# Patient Record
Sex: FEMALE | Race: WHITE | Employment: OTHER | URBAN - METROPOLITAN AREA
[De-identification: names, ages, dates, MRNs, and addresses within clinical notes are randomized per-mention and may not be internally consistent; named-entity substitution may affect disease eponyms.]

---

## 2018-06-14 ENCOUNTER — APPOINTMENT (OUTPATIENT)
Dept: PHYSICAL THERAPY | Facility: CLINIC | Age: 68
End: 2018-06-14
Payer: COMMERCIAL

## 2018-06-14 PROCEDURE — G8981 BODY POS CURRENT STATUS: HCPCS | Performed by: PHYSICAL THERAPIST

## 2018-06-14 PROCEDURE — G8982 BODY POS GOAL STATUS: HCPCS | Performed by: PHYSICAL THERAPIST

## 2018-06-18 ENCOUNTER — APPOINTMENT (OUTPATIENT)
Dept: PHYSICAL THERAPY | Facility: CLINIC | Age: 68
End: 2018-06-18
Payer: COMMERCIAL

## 2018-06-27 ENCOUNTER — OFFICE VISIT (OUTPATIENT)
Dept: PHYSICAL THERAPY | Facility: CLINIC | Age: 68
End: 2018-06-27
Payer: COMMERCIAL

## 2018-06-27 DIAGNOSIS — M51.36 LUMBAR DEGENERATIVE DISC DISEASE: ICD-10-CM

## 2018-06-27 DIAGNOSIS — M79.7 FIBROMYALGIA: ICD-10-CM

## 2018-06-27 DIAGNOSIS — R10.31 RIGHT GROIN PAIN: Primary | ICD-10-CM

## 2018-06-27 DIAGNOSIS — M47.812 OSTEOARTHRITIS OF CERVICAL SPINE, UNSPECIFIED SPINAL OSTEOARTHRITIS COMPLICATION STATUS: ICD-10-CM

## 2018-06-27 PROCEDURE — 97035 APP MDLTY 1+ULTRASOUND EA 15: CPT | Performed by: PHYSICAL THERAPIST

## 2018-06-27 PROCEDURE — 97110 THERAPEUTIC EXERCISES: CPT | Performed by: PHYSICAL THERAPIST

## 2018-06-27 NOTE — PROGRESS NOTES
Daily Note     Today's date: 2018  Patient name: Karine Chong  : 1950  MRN: [id number]  Referring provider: Giselle Chavez MD  Dx:   Encounter Diagnosis     ICD-10-CM    1  Right groin pain R10 31    2  Fibromyalgia M79 7    3  Osteoarthritis of cervical spine, unspecified spinal osteoarthritis complication status O04 119    4  Lumbar degenerative disc disease M51 36                   Subjective:Patient c/o L LBP 4-5/10 today and 1-2/10 pain Left upper trapezius  She received sacrum injections yesterday afternoon done by Dr Madisyn Judge with pain management  The patient expressed concern over red spots on arms and legs which are not new  She was advised to call her PCP regarding the red spots for follow up/ bloodwork - patient was accepting of this  The patient is to return to pain management again 18  Objective: See treatment diary below  Precautions Fibromyalgia  RE-EVAL DATE: 18  Specialty Daily Treatment Diary     Manual                                                     Exercise Diary  18       Nu step S=9 L2 x 8 mins       Stand Lumbar Extensions x15       Cross Legs LTR 3x:30 B/L       Self 90-90 HS stretch 3x:30 B/L       Abdominal Hollow x10 sit       Bridges x10       Abdominal Brace x15 sit       Calm Shells x10 B/L       L Upper Trapezius stretch 3x:30       Chin tucks x10                                                                                           Modalities 18       Ultrasound 3 mHz, Cont, 1 8w/cm2 L Upper Trapezius 10 mins                           The patient was given new home exercise plan with written handout, pictures, and verbal instruction  The patient accepts and understands her new home activities  Assessment: Tolerated treatment well  Patient demonstrated fatigue post treatment, exhibited good technique with therapeutic exercises and would benefit from continued PT  The patient did not complain of pain after session today  Plan: The patient will return for treatment 6/29/18  Will continue to focus on improving core strength and decreasing back and neck pain

## 2018-06-29 ENCOUNTER — APPOINTMENT (OUTPATIENT)
Dept: PHYSICAL THERAPY | Facility: CLINIC | Age: 68
End: 2018-06-29
Payer: COMMERCIAL

## 2018-07-03 ENCOUNTER — OFFICE VISIT (OUTPATIENT)
Dept: PHYSICAL THERAPY | Facility: CLINIC | Age: 68
End: 2018-07-03
Payer: COMMERCIAL

## 2018-07-03 DIAGNOSIS — M47.812 OSTEOARTHRITIS OF CERVICAL SPINE, UNSPECIFIED SPINAL OSTEOARTHRITIS COMPLICATION STATUS: ICD-10-CM

## 2018-07-03 DIAGNOSIS — R10.31 RIGHT GROIN PAIN: Primary | ICD-10-CM

## 2018-07-03 DIAGNOSIS — M51.36 LUMBAR DEGENERATIVE DISC DISEASE: ICD-10-CM

## 2018-07-03 DIAGNOSIS — M79.7 FIBROMYALGIA: ICD-10-CM

## 2018-07-03 PROCEDURE — 97035 APP MDLTY 1+ULTRASOUND EA 15: CPT

## 2018-07-03 PROCEDURE — 97110 THERAPEUTIC EXERCISES: CPT

## 2018-07-03 NOTE — PROGRESS NOTES
Daily Note     Today's date: 7/3/2018  Patient name: Isabelle Brown  : 1950  MRN: [id number]  Referring provider: Rosalba Dickens MD  Dx:   Encounter Diagnosis     ICD-10-CM    1  Right groin pain R10 31    2  Osteoarthritis of cervical spine, unspecified spinal osteoarthritis complication status O11 [id number]    3  Lumbar degenerative disc disease M51 36    4  Fibromyalgia M79 7                   Subjective: Pain level is 4/10 from waist down as per patient  Patient states she has a hard time distinguishing between two of her exercises and asked to review them  Objective: See treatment diary below  Precautions Fibromyalgia  RE-EVAL DATE: 18  Specialty Daily Treatment Diary     Manual                                                     Exercise Diary  6/27/18 7/3/18      Nu step S=9 L2 x 8 mins L2 x 9 mins      Stand Lumbar Extensions x15 X 20      Cross Legs LTR 3x:30 B/L 3 x :30 B/L      Self 90-90 HS stretch 3x:30 B/L 3 x:30 B/L      Abdominal Hollow x10 sit Supine x15      Bridges x10 x15      Abdominal Brace x15 sit Supine x15      Calm Shells x10 B/L X 15 B/L      L Upper Trapezius stretch 3x:30 3 x :30      Chin tucks x10 x15                        Reviewed HEP                                                                  Modalities 6/27/18 7/3/18      Ultrasound 3 mHz, Cont, 1 8w/cm2 L Upper Trapezius 10 mins 10 min                          Assessment: Tolerated treatment well  Patient would benefit from continued PT to improve technique of exercises and for strengthening  Plan: Progress treatment as tolerated

## 2018-07-05 ENCOUNTER — OFFICE VISIT (OUTPATIENT)
Dept: PHYSICAL THERAPY | Facility: CLINIC | Age: 68
End: 2018-07-05
Payer: COMMERCIAL

## 2018-07-05 DIAGNOSIS — M51.36 LUMBAR DEGENERATIVE DISC DISEASE: ICD-10-CM

## 2018-07-05 DIAGNOSIS — M47.812 OSTEOARTHRITIS OF CERVICAL SPINE, UNSPECIFIED SPINAL OSTEOARTHRITIS COMPLICATION STATUS: ICD-10-CM

## 2018-07-05 DIAGNOSIS — M79.7 FIBROMYALGIA: ICD-10-CM

## 2018-07-05 DIAGNOSIS — R10.31 RIGHT GROIN PAIN: Primary | ICD-10-CM

## 2018-07-05 PROCEDURE — 97035 APP MDLTY 1+ULTRASOUND EA 15: CPT

## 2018-07-05 PROCEDURE — 97110 THERAPEUTIC EXERCISES: CPT

## 2018-07-05 NOTE — PROGRESS NOTES
Daily Note     Today's date: 2018  Patient name: Katie Beyer  : 1950  MRN: [id number]  Referring provider: Roberta Dodge MD  Dx:   Encounter Diagnosis     ICD-10-CM    1  Right groin pain R10 31    2  Osteoarthritis of cervical spine, unspecified spinal osteoarthritis complication status [id number]    3  Lumbar degenerative disc disease M51 36    4  Fibromyalgia M79 7                   Subjective: Patient states her pain levels are up in her neck today  "My back is better then my neck now "  Patient rates her pain in her LB a 10 and her neck 10  She also states she sees her Neurologist tomorrow  Objective: See treatment diary below  Precautions Fibromyalgia  RE-EVAL DATE: 18  Specialty Daily Treatment Diary     Manual                                                     Exercise Diary  6/27/18 7/3/18 7/5/18     Nu step S=9 L2 x 8 mins L2 x 9 mins L2 x 10 min     Stand Lumbar Extensions x15 X 20 x25     Cross Legs LTR 3x:30 B/L 3 x :30 B/L 3 x :30 B/L     Self 90-90 HS stretch 3x:30 B/L 3 x:30 B/L 3 x :30 B/L     Abdominal Hollow x10 sit Supine x15 x20 sit     Bridges x10 x15 x20     Abdominal Brace x15 sit Supine x15 X 20 sit     Calm Shells x10 B/L X 15 B/L x20 B/L     L Upper Trapezius stretch 3x:30 3 x :30 4 x :30     Chin tucks x10 x15 x20                       Reviewed HEP Mirror/ posture  correction                                                                 Modalities 6/27/18 7/3/18 7/5/18     Ultrasound 3 mHz, Cont, 1 8w/cm2 L Upper Trapezius 10 mins 10 min 10 min                        Educated on sitting posture correction activity with mirror for addition to HEP  Assessment: Tolerated treatment well  Patient would benefit from continued PT for decrease of pain and increase of strength/ ROM  Plan: Continue per plan of care  Progress treatment as tolerated

## 2018-07-10 ENCOUNTER — OFFICE VISIT (OUTPATIENT)
Dept: PHYSICAL THERAPY | Facility: CLINIC | Age: 68
End: 2018-07-10
Payer: COMMERCIAL

## 2018-07-10 DIAGNOSIS — M79.7 FIBROMYALGIA: ICD-10-CM

## 2018-07-10 DIAGNOSIS — R10.31 RIGHT GROIN PAIN: Primary | ICD-10-CM

## 2018-07-10 DIAGNOSIS — M51.36 LUMBAR DEGENERATIVE DISC DISEASE: ICD-10-CM

## 2018-07-10 PROCEDURE — 97035 APP MDLTY 1+ULTRASOUND EA 15: CPT

## 2018-07-10 PROCEDURE — 97110 THERAPEUTIC EXERCISES: CPT

## 2018-07-10 NOTE — PROGRESS NOTES
Daily Note     Today's date: 7/10/2018  Patient name: Tiffany Sherman  : 1950  MRN: [id number]  Referring provider: Ct Gao MD  Dx:   Encounter Diagnosis     ICD-10-CM    1  Right groin pain R10 31    2  Lumbar degenerative disc disease M51 36    3  Fibromyalgia M79 7                   Subjective: Patient states pain is 4-5/10 in left LB only, no pain in the neck today  Patient feels her HEP going well  She has most of her pain when she tries to sleep or if she is reaching up with either hand in standing  She wants next visit to be her last visit due to personal issues  Objective: See treatment diary below  Precautions Fibromyalgia  RE-EVAL DATE: 18  Specialty Daily Treatment Diary     Manual                                                     Exercise Diary  6/27/18 7/3/18 7/5/18 7/10/18    Nu step S=9 L2 x 8 mins L2 x 9 mins L2 x 10 min L2 x 10 min    Stand Lumbar Extensions x15 X 20 x25 x30    Cross Legs LTR 3x:30 B/L 3 x :30 B/L 3 x :30 B/L 3x :30 B/L    Self 90-90 HS stretch 3x:30 B/L 3 x:30 B/L 3 x :30 B/L 3x :30 B/L    Abdominal Hollow x10 sit Supine x15 x20 sit Supine x25    Bridges x10 x15 x20 x25    Abdominal Brace x15 sit Supine x15 X 20 sit Supine x25    Calm Shells x10 B/L X 15 B/L x20 B/L x25 B/L    L Upper Trapezius stretch 3x:30 3 x :30 4 x :30 4x :30    Chin tucks x10 x15 x20 x20                      Reviewed HEP Mirror/ posture  correction                                                                 Modalities 6/27/18 7/3/18 7/5/18 7/10/18    Ultrasound 3 mHz, Cont, 1 8w/cm2 L Upper Trapezius 10 mins 10 min 10 min 10 min                        Assessment: Tolerated treatment well  Patient exhibited good technique with therapeutic exercises and would benefit from continued PT for strengthening, pain relief, and posture correction  Plan: Continue per plan of care  Potential discharge next visit

## 2018-07-12 ENCOUNTER — EVALUATION (OUTPATIENT)
Dept: PHYSICAL THERAPY | Facility: CLINIC | Age: 68
End: 2018-07-12
Payer: COMMERCIAL

## 2018-07-12 DIAGNOSIS — M47.812 OSTEOARTHRITIS OF CERVICAL SPINE, UNSPECIFIED SPINAL OSTEOARTHRITIS COMPLICATION STATUS: ICD-10-CM

## 2018-07-12 DIAGNOSIS — M51.36 LUMBAR DEGENERATIVE DISC DISEASE: ICD-10-CM

## 2018-07-12 DIAGNOSIS — M79.7 FIBROMYALGIA: ICD-10-CM

## 2018-07-12 DIAGNOSIS — R10.31 RIGHT GROIN PAIN: Primary | ICD-10-CM

## 2018-07-12 PROCEDURE — G8981 BODY POS CURRENT STATUS: HCPCS | Performed by: PHYSICAL THERAPIST

## 2018-07-12 PROCEDURE — G8982 BODY POS GOAL STATUS: HCPCS | Performed by: PHYSICAL THERAPIST

## 2018-07-12 PROCEDURE — 97110 THERAPEUTIC EXERCISES: CPT | Performed by: PHYSICAL THERAPIST

## 2018-07-12 NOTE — LETTER
2018    Alfredo Roldan MD  [address] [facility]  [address]  [phone]    Patient: Guero Hamm   YOB: 1950   Date of Visit: 2018     Encounter Diagnosis     ICD-10-CM    1  Right groin pain R10 31    2  Lumbar degenerative disc disease M51 36    3  Fibromyalgia M79 7    4  Osteoarthritis of cervical spine, unspecified spinal osteoarthritis complication status [id number]        Dear Dr Yoel Plaza:    Please review the attached Plan of Care from [facility] recent visit  Please verify that you agree therapy should continue by signing the attached document and sending it back to our office  If you have any questions or concerns, please don't hesitate to call  Sincerely,    Jake Patel, PT      Referring Provider:      I certify that I have read the below Plan of Care and certify the need for these services furnished under this plan of treatment while under my care  Alfredo Roldan MD  [address] [facility]  [address]  [phone]  [address] [place] [address] Avenue: [address] [place] [address]          PT Re-Evaluation     Today's date: 2018  Patient name: Guero Hamm  : 1950  MRN: [id number]  Referring provider: Dana Felix MD  Dx:   Encounter Diagnosis     ICD-10-CM    1  Right groin pain R10 31    2  Lumbar degenerative disc disease M51 36    3  Fibromyalgia M79 7    4  Osteoarthritis of cervical spine, unspecified spinal osteoarthritis complication status [id number]                   Assessment  Impairments: activity intolerance, lacks appropriate home exercise program, pain with function, poor posture  and poor body mechanics  Functional limitations: pain with sleeping, difficulty getting comfortable when sitting, difficulty walking long distances  Assessment details: Guero Hamm is a 70 y o  female that presents with signs/symptoms consistent with L-spine DDD, R groin pain, fibromyalgia, and C-spine OA    The patient has made functional gains since starting therapy  The patient's Oswestry score improved significantly since assessed during initial evaluation  She is able to cut grass and perform the necessary chores at her house to maintain the household  The patient continues to have difficulty with sleeping at night without pain at low back and neck  The patient will benefit from continued skilled PT for 1 more session to provide instruction and feedback on exercises and achieve independence for home exercise plan  Goals  ST-4 weeks  1  C-spine pain @ worst: 2/10 and L/B pain @ worst : 4/10 to improve sleeping  (Partially achieved - c-spine achieved and L-spine not achieved)  2  L-spine flexion/extension ROM improve by 50% to improve ADL's  ACHIEVED  3   R iliopsoas, Quads, L shoulder flexion strength > 4/5 to improve ADL's  ACHIEVED  LTG: Achieve in 4-6 weeks  1  Oswestry Disability Score less than 25% disability  Partially ACHIEVED - Oswestry score 26% disability  2  Patient sleep greater than 4 hours without disturbance  NOT ACHIEVED  3  Patient independently perform home exercise program   Partially Achieved - patient was given new activities for home completion today - she wants to do 1 more visit to perform therapeutic exercises and become independent with home program    Plan  Patient would benefit from: skilled physical therapy  Referral necessary: No  Planned modality interventions: ultrasound  Planned therapy interventions: home exercise program, therapeutic exercise, manual therapy, patient education, stretching and strengthening  Frequency: 1x week  Duration in visits: 1  Duration in weeks: 1  Plan of Care beginning date: 2018  Plan of Care expiration date: 2018  Plan details: Will continue 1 more session to perform new activities/exercises with supervision and feedback to make patient independent for completion of home exercises upon discharge          Subjective Evaluation    History of Present Illness  Date of onset: 2018  Mechanism of injury: Please refer to documentation in previous chart for mechanism of injury  Recurrent probem    Quality of life: fair    Pain  Current pain ratin  At best pain rating: 3  Location: B/L low back, R hip/groin, R anterior thigh  Quality: knife-like  Relieving factors: change in position  Aggravating factors: overhead activity and sitting  Progression: no change    Treatments  Current treatment: physical therapy  Patient Goals  Patient goals for therapy: decreased pain, increased strength, independence with ADLs/IADLs and return to sport/leisure activities  Patient goal: patient would like to sleep > 4hours without pain        Objective     Special Questions  Negative for bladder dysfunction, bowel dysfunction and saddle (S4) numbness    Additional Special Questions  The patient does have difficulty sleeping at night due to low back, hip, and neck pain  The patient's PCP is aware of these complaints per patient  Palpation   Left   Tenderness of the upper trapezius  Left Shoulder Comments  Left upper trapezius: mild  Active Range of Motion   Cervical/Thoracic Spine   Cervical    Flexion: WFL  Extension: with pain    Lumbar   Flexion: 50 degrees   Extension: 30 degrees     Strength/Myotome Testing     Left Shoulder     Planes of Motion   Flexion: 4     Right Shoulder     Planes of Motion   Flexion: 4     Left Hip   Planes of Motion   Flexion: 4  Extension: 4    Right Hip   Planes of Motion   Flexion: 4  Extension: 4    Left Knee   Extension: 4    Right Knee   Extension: 4    Tests       Thoracic   Negative slump  Lumbar   Negative repeated flexion, repeated extension and slump         Flowsheet Rows      Most Recent Value   PT/OT G-Codes   Current Score  oswestry score : 26% disability   FOTO information reviewed  No   Assessment Type  Re-evaluation   G code set  Changing & Maintaining Body Position   Changing and Maintaining Body Position Current Status ()  CJ Changing and Maintaining Body Position Goal Status ()  CI          Precautions: fibromyalgia  RE-EVAL DATE: 7/12/18  Specialty Daily Treatment Diary     Manual                                                     Exercise Diary  6/27/18 7/3/18 7/5/18 7/10/18 7/12/18   Nu step S=9 L2 x 8 mins L2 x 9 mins L2 x 10 min L2 x 10 min L2 x 10min   Stand Lumbar Extensions x15 X 20 x25 x30 x30   Cross Legs LTR 3x:30 B/L 3 x :30 B/L 3 x :30 B/L 3x :30 B/L 3x :30 B/L   Self 90-90 HS stretch 3x:30 B/L 3 x:30 B/L 3 x :30 B/L 3x :30 B/L 3x:30 B/L   Abdominal Hollow x10 sit Supine x15 x20 sit Supine x25 Supine x30   Bridges x10 x15 x20 x25 30   Abdominal Brace x15 sit Supine x15 X 20 sit Supine x25 Supine x25   Calm Shells x10 B/L X 15 B/L x20 B/L x25 B/L Red T-band x15   L Upper Trapezius stretch 3x:30 3 x :30 4 x :30 4x :30 4x:30     Chin tucks x10 x15 x20 x20 x25   Standing ham curls B/L     x15   Hip flexor stretch off mat      4x :20 B/L     Reviewed HEP Mirror/ posture  correction                                                                 Modalities 6/27/18 7/3/18 7/5/18 7/10/18 7/12/18   Ultrasound 3 mHz, Cont, 1 8w/cm2 L Upper Trapezius 10 mins 10 min 10 min 10 min hold                     The patient was given a new home exercise plan with written handout, pictures, and verbal instruction  The patient accepts and understands the new home activities

## 2018-07-12 NOTE — PROGRESS NOTES
PT Re-Evaluation     Today's date: 2018  Patient name: Luis Alfredo Mireles  : 1950  MRN: [id number]  Referring provider: Teetee Calloway MD  Dx:   Encounter Diagnosis     ICD-10-CM    1  Right groin pain R10 31    2  Lumbar degenerative disc disease M51 36    3  Fibromyalgia M79 7    4  Osteoarthritis of cervical spine, unspecified spinal osteoarthritis complication status [id number]                   Assessment  Impairments: activity intolerance, lacks appropriate home exercise program, pain with function, poor posture  and poor body mechanics  Functional limitations: pain with sleeping, difficulty getting comfortable when sitting, difficulty walking long distances  Assessment details: Luis Alfredo Mireles is a 70 y o  female that presents with signs/symptoms consistent with L-spine DDD, R groin pain, fibromyalgia, and C-spine OA  The patient has made functional gains since starting therapy  The patient's Oswestry score improved significantly since assessed during initial evaluation  She is able to cut grass and perform the necessary chores at her house to maintain the household  The patient continues to have difficulty with sleeping at night without pain at low back and neck  The patient will benefit from continued skilled PT for 1 more session to provide instruction and feedback on exercises and achieve independence for home exercise plan  Goals  ST-4 weeks  1  C-spine pain @ worst: 2/10 and L/B pain @ worst : 4/10 to improve sleeping  (Partially achieved - c-spine achieved and L-spine not achieved)  2  L-spine flexion/extension ROM improve by 50% to improve ADL's  ACHIEVED  3   R iliopsoas, Quads, L shoulder flexion strength > 4/5 to improve ADL's  ACHIEVED  LTG: Achieve in 4-6 weeks  1  Oswestry Disability Score less than 25% disability  Partially ACHIEVED - Oswestry score 26% disability  2  Patient sleep greater than 4 hours without disturbance  NOT ACHIEVED  3    Patient independently perform home exercise program   Partially Achieved - patient was given new activities for home completion today - she wants to do 1 more visit to perform therapeutic exercises and become independent with home program    Plan  Patient would benefit from: skilled physical therapy  Referral necessary: No  Planned modality interventions: ultrasound  Planned therapy interventions: home exercise program, therapeutic exercise, manual therapy, patient education, stretching and strengthening  Frequency: 1x week  Duration in visits: 1  Duration in weeks: 1  Plan of Care beginning date: 2018  Plan of Care expiration date: 2018  Plan details: Will continue 1 more session to perform new activities/exercises with supervision and feedback to make patient independent for completion of home exercises upon discharge  Subjective Evaluation    History of Present Illness  Date of onset: 2018  Mechanism of injury: Please refer to documentation in previous chart for mechanism of injury  Recurrent probem    Quality of life: fair    Pain  Current pain ratin  At best pain rating: 3  Location: B/L low back, R hip/groin, R anterior thigh  Quality: knife-like  Relieving factors: change in position  Aggravating factors: overhead activity and sitting  Progression: no change    Treatments  Current treatment: physical therapy  Patient Goals  Patient goals for therapy: decreased pain, increased strength, independence with ADLs/IADLs and return to sport/leisure activities  Patient goal: patient would like to sleep > 4hours without pain        Objective     Special Questions  Negative for bladder dysfunction, bowel dysfunction and saddle (S4) numbness    Additional Special Questions  The patient does have difficulty sleeping at night due to low back, hip, and neck pain  The patient's PCP is aware of these complaints per patient  Palpation   Left   Tenderness of the upper trapezius       Left Shoulder Comments  Left upper trapezius: mild  Active Range of Motion   Cervical/Thoracic Spine   Cervical    Flexion: WFL  Extension: with pain    Lumbar   Flexion: 50 degrees   Extension: 30 degrees     Strength/Myotome Testing     Left Shoulder     Planes of Motion   Flexion: 4     Right Shoulder     Planes of Motion   Flexion: 4     Left Hip   Planes of Motion   Flexion: 4  Extension: 4    Right Hip   Planes of Motion   Flexion: 4  Extension: 4    Left Knee   Extension: 4    Right Knee   Extension: 4    Tests       Thoracic   Negative slump  Lumbar   Negative repeated flexion, repeated extension and slump         Flowsheet Rows      Most Recent Value   PT/OT G-Codes   Current Score  oswestry score : 26% disability   FOTO information reviewed  No   Assessment Type  Re-evaluation   G code set  Changing & Maintaining Body Position   Changing and Maintaining Body Position Current Status ()  CJ   Changing and Maintaining Body Position Goal Status ()  CI          Precautions: fibromyalgia  RE-EVAL DATE: 7/12/18  Specialty Daily Treatment Diary     Manual                                                     Exercise Diary  6/27/18 7/3/18 7/5/18 7/10/18 7/12/18   Nu step S=9 L2 x 8 mins L2 x 9 mins L2 x 10 min L2 x 10 min L2 x 10min   Stand Lumbar Extensions x15 X 20 x25 x30 x30   Cross Legs LTR 3x:30 B/L 3 x :30 B/L 3 x :30 B/L 3x :30 B/L 3x :30 B/L   Self 90-90 HS stretch 3x:30 B/L 3 x:30 B/L 3 x :30 B/L 3x :30 B/L 3x:30 B/L   Abdominal Hollow x10 sit Supine x15 x20 sit Supine x25 Supine x30   Bridges x10 x15 x20 x25 30   Abdominal Brace x15 sit Supine x15 X 20 sit Supine x25 Supine x25   Calm Shells x10 B/L X 15 B/L x20 B/L x25 B/L Red T-band x15   L Upper Trapezius stretch 3x:30 3 x :30 4 x :30 4x :30 4x:30     Chin tucks x10 x15 x20 x20 x25   Standing ham curls B/L     x15   Hip flexor stretch off mat      4x :20 B/L     Reviewed HEP Mirror/ posture  correction Modalities 6/27/18 7/3/18 7/5/18 7/10/18 7/12/18   Ultrasound 3 mHz, Cont, 1 8w/cm2 L Upper Trapezius 10 mins 10 min 10 min 10 min hold                     The patient was given a new home exercise plan with written handout, pictures, and verbal instruction  The patient accepts and understands the new home activities

## 2018-07-16 ENCOUNTER — OFFICE VISIT (OUTPATIENT)
Dept: PHYSICAL THERAPY | Facility: CLINIC | Age: 68
End: 2018-07-16
Payer: COMMERCIAL

## 2018-07-16 DIAGNOSIS — R10.31 RIGHT GROIN PAIN: Primary | ICD-10-CM

## 2018-07-16 DIAGNOSIS — M79.7 FIBROMYALGIA: ICD-10-CM

## 2018-07-16 DIAGNOSIS — M51.36 LUMBAR DEGENERATIVE DISC DISEASE: ICD-10-CM

## 2018-07-16 DIAGNOSIS — M47.812 OSTEOARTHRITIS OF CERVICAL SPINE, UNSPECIFIED SPINAL OSTEOARTHRITIS COMPLICATION STATUS: ICD-10-CM

## 2018-07-16 PROCEDURE — 97110 THERAPEUTIC EXERCISES: CPT | Performed by: PHYSICAL THERAPIST

## 2018-07-16 NOTE — PROGRESS NOTES
Daily Note/Discharge    Today's date: 2018  Patient name: Isabelle Brown  : 1950  MRN: [id number]  Referring provider: Rosalba Dickens MD  Dx:   Encounter Diagnosis     ICD-10-CM    1  Right groin pain R10 31    2  Lumbar degenerative disc disease M51 36    3  Fibromyalgia M79 7    4  Osteoarthritis of cervical spine, unspecified spinal osteoarthritis complication status [id number]                   Subjective: The patient reports feeling pain at her lower back area -5/10  The patient is requesting to be discharged after performing her exercises today to review and prepare for being independent with home exercise plan  Objective: See treatment diary below  Precautions: fibromyalgia  RE-EVAL DATE: 18  Specialty Daily Treatment Diary     Manual                                                     Exercise Diary  18   Nu step S=9 L3 x10 mins    L2 x 10min   Stand Lumbar Extensions x30    x30   Cross Legs LTR 3x:30 B/L    3x :30 B/L   Self 90-90 HS stretch 1x:30 B/L D/C    3x:30 B/L   Abdominal Hollow x30 supine    Supine x30   Bridges  nt    30   Abdominal Brace x30    Supine x25   Calm Shells Red T-band  x20    Red T-band x15   L Upper Trapezius stretch nt    4x:30     Chin tucks nt    x25   Standing ham curls B/L x20    x15   Hip flexor stretch off mat  4x:30    4x :20 B/L                                                                       Modalities 18   Ultrasound 3 mHz, Cont, 1 8w/cm2 L Upper Trapezius hold    hold                       Assessment: The patient did well performing all of her exercises today  She only needed minimal verbal feedback to correct her positioning  The hamstring stretches were discontinued and removed from her home exercise plan due to hip cramping  The patient is requesting discharge today to home exercise plan  The patient is independent with her home exercises at this time        Plan: Discharge outpatient PT to independent home exercise plan at this time

## 2018-11-23 ENCOUNTER — TRANSCRIBE ORDERS (OUTPATIENT)
Dept: ADMINISTRATIVE | Facility: HOSPITAL | Age: 68
End: 2018-11-23

## 2018-11-23 DIAGNOSIS — M54.81 OCCIPITAL NEURALGIA, UNSPECIFIED LATERALITY: Primary | ICD-10-CM

## 2018-11-23 DIAGNOSIS — R20.0 NUMBNESS: ICD-10-CM

## 2018-12-27 ENCOUNTER — HOSPITAL ENCOUNTER (OUTPATIENT)
Dept: MRI IMAGING | Facility: HOSPITAL | Age: 68
Discharge: HOME | End: 2018-12-27
Payer: COMMERCIAL

## 2018-12-27 DIAGNOSIS — M54.81 OCCIPITAL NEURALGIA, UNSPECIFIED LATERALITY: ICD-10-CM

## 2018-12-27 DIAGNOSIS — R20.0 NUMBNESS: ICD-10-CM

## 2018-12-27 PROCEDURE — 72141 MRI NECK SPINE W/O DYE: CPT

## 2019-03-19 ENCOUNTER — TRANSCRIBE ORDERS (OUTPATIENT)
Dept: ADMINISTRATIVE | Facility: HOSPITAL | Age: 69
End: 2019-03-19

## 2019-03-19 DIAGNOSIS — E04.1 THYROID NODULE: Primary | ICD-10-CM

## 2019-03-22 ENCOUNTER — TRANSCRIBE ORDERS (OUTPATIENT)
Dept: ADMINISTRATIVE | Facility: HOSPITAL | Age: 69
End: 2019-03-22

## 2019-03-22 ENCOUNTER — APPOINTMENT (OUTPATIENT)
Dept: LAB | Facility: HOSPITAL | Age: 69
End: 2019-03-22
Payer: COMMERCIAL

## 2019-03-22 ENCOUNTER — HOSPITAL ENCOUNTER (OUTPATIENT)
Dept: ULTRASOUND IMAGING | Facility: HOSPITAL | Age: 69
Discharge: HOME | End: 2019-03-22
Payer: COMMERCIAL

## 2019-03-22 DIAGNOSIS — E55.9 VITAMIN D DEFICIENCY: ICD-10-CM

## 2019-03-22 DIAGNOSIS — R63.4 LOSS OF WEIGHT: ICD-10-CM

## 2019-03-22 DIAGNOSIS — R53.83 FATIGUE, UNSPECIFIED TYPE: ICD-10-CM

## 2019-03-22 DIAGNOSIS — E78.2 MIXED HYPERLIPIDEMIA: Primary | ICD-10-CM

## 2019-03-22 DIAGNOSIS — R22.1 FULLNESS OF NECK: ICD-10-CM

## 2019-03-22 DIAGNOSIS — E78.2 MIXED HYPERLIPIDEMIA: ICD-10-CM

## 2019-03-22 DIAGNOSIS — E04.1 THYROID NODULE: ICD-10-CM

## 2019-03-22 LAB
25(OH)D3 SERPL-MCNC: 29.6 NG/ML (ref 30–100)
ALBUMIN SERPL BCP-MCNC: 4.6 G/DL (ref 3.5–5.7)
ALP SERPL-CCNC: 60 U/L (ref 55–165)
ALT SERPL W P-5'-P-CCNC: 20 U/L (ref 7–52)
ANION GAP SERPL CALCULATED.3IONS-SCNC: 8 MMOL/L (ref 4–13)
AST SERPL W P-5'-P-CCNC: 30 U/L (ref 13–39)
BASOPHILS # BLD AUTO: 0.1 THOUSANDS/ΜL (ref 0–0.1)
BASOPHILS NFR BLD AUTO: 2 % (ref 0–2)
BILIRUB SERPL-MCNC: 0.5 MG/DL (ref 0.2–1)
BUN SERPL-MCNC: 15 MG/DL (ref 7–25)
CALCIUM ALBUM COR SERPL-MCNC: 10.1 MG/DL (ref 8.3–10.1)
CALCIUM SERPL-MCNC: 10.6 MG/DL (ref 8.6–10.5)
CHLORIDE SERPL-SCNC: 88 MMOL/L (ref 98–107)
CHOLEST SERPL-MCNC: 215 MG/DL (ref 0–200)
CO2 SERPL-SCNC: 32 MMOL/L (ref 21–31)
CREAT SERPL-MCNC: 0.76 MG/DL (ref 0.6–1.2)
EOSINOPHIL # BLD AUTO: 0.2 THOUSAND/ΜL (ref 0–0.61)
EOSINOPHIL NFR BLD AUTO: 4 % (ref 0–5)
ERYTHROCYTE [DISTWIDTH] IN BLOOD BY AUTOMATED COUNT: 12.6 % (ref 11.5–14.5)
GFR SERPL CREATININE-BSD FRML MDRD: 80 ML/MIN/1.73SQ M
GLUCOSE P FAST SERPL-MCNC: 104 MG/DL (ref 65–99)
HCT VFR BLD AUTO: 39.3 % (ref 42–47)
HDLC SERPL-MCNC: 114 MG/DL (ref 40–60)
HGB BLD-MCNC: 13.4 G/DL (ref 12–16)
LDLC SERPL CALC-MCNC: 85 MG/DL (ref 75–193)
LYMPHOCYTES # BLD AUTO: 1.9 THOUSANDS/ΜL (ref 0.6–4.47)
LYMPHOCYTES NFR BLD AUTO: 38 % (ref 21–51)
MCH RBC QN AUTO: 33.6 PG (ref 26–34)
MCHC RBC AUTO-ENTMCNC: 34 G/DL (ref 31–37)
MCV RBC AUTO: 99 FL (ref 81–99)
MONOCYTES # BLD AUTO: 0.5 THOUSAND/ΜL (ref 0.17–1.22)
MONOCYTES NFR BLD AUTO: 10 % (ref 2–12)
NEUTROPHILS # BLD AUTO: 2.3 THOUSANDS/ΜL (ref 1.4–6.5)
NEUTS SEG NFR BLD AUTO: 46 % (ref 42–75)
NONHDLC SERPL-MCNC: 101 MG/DL
PLATELET # BLD AUTO: 204 THOUSANDS/UL (ref 149–390)
PLATELET BLD QL SMEAR: ADEQUATE
PLATELET CLUMP BLD QL SMEAR: NORMAL
PMV BLD AUTO: 8.5 FL (ref 8.6–11.7)
POTASSIUM SERPL-SCNC: 3.7 MMOL/L (ref 3.5–5.5)
PROT SERPL-MCNC: 7.5 G/DL (ref 6.4–8.9)
RBC # BLD AUTO: 3.98 MILLION/UL (ref 3.9–5.2)
RBC MORPH BLD: NORMAL
SODIUM SERPL-SCNC: 128 MMOL/L (ref 134–143)
TRIGL SERPL-MCNC: 78 MG/DL (ref 44–166)
TSH SERPL DL<=0.05 MIU/L-ACNC: 1 UIU/ML (ref 0.45–5.33)
WBC # BLD AUTO: 5 THOUSAND/UL (ref 4.8–10.8)

## 2019-03-22 PROCEDURE — 82306 VITAMIN D 25 HYDROXY: CPT

## 2019-03-22 PROCEDURE — 36415 COLL VENOUS BLD VENIPUNCTURE: CPT

## 2019-03-22 PROCEDURE — 85025 COMPLETE CBC W/AUTO DIFF WBC: CPT

## 2019-03-22 PROCEDURE — 76536 US EXAM OF HEAD AND NECK: CPT

## 2019-03-22 PROCEDURE — 80053 COMPREHEN METABOLIC PANEL: CPT

## 2019-03-22 PROCEDURE — 84443 ASSAY THYROID STIM HORMONE: CPT

## 2019-03-22 PROCEDURE — 80061 LIPID PANEL: CPT

## 2019-06-17 ENCOUNTER — TRANSCRIBE ORDERS (OUTPATIENT)
Dept: ADMINISTRATIVE | Facility: HOSPITAL | Age: 69
End: 2019-06-17

## 2019-06-17 DIAGNOSIS — Z12.31 ENCOUNTER FOR SCREENING MAMMOGRAM FOR MALIGNANT NEOPLASM OF BREAST: Primary | ICD-10-CM

## 2019-06-18 ENCOUNTER — HOSPITAL ENCOUNTER (OUTPATIENT)
Dept: RADIOLOGY | Facility: HOSPITAL | Age: 69
Discharge: HOME | End: 2019-06-18
Attending: ANESTHESIOLOGY
Payer: COMMERCIAL

## 2019-06-18 ENCOUNTER — HOSPITAL ENCOUNTER (OUTPATIENT)
Dept: MAMMOGRAPHY | Facility: HOSPITAL | Age: 69
Discharge: HOME | End: 2019-06-18
Payer: COMMERCIAL

## 2019-06-18 ENCOUNTER — TRANSCRIBE ORDERS (OUTPATIENT)
Dept: ADMINISTRATIVE | Facility: HOSPITAL | Age: 69
End: 2019-06-18

## 2019-06-18 VITALS — WEIGHT: 146 LBS | HEIGHT: 62 IN | BODY MASS INDEX: 26.87 KG/M2

## 2019-06-18 DIAGNOSIS — Z12.31 ENCOUNTER FOR SCREENING MAMMOGRAM FOR MALIGNANT NEOPLASM OF BREAST: ICD-10-CM

## 2019-06-18 DIAGNOSIS — M54.6 PAIN IN THORACIC SPINE: ICD-10-CM

## 2019-06-18 DIAGNOSIS — M54.6 PAIN IN THORACIC SPINE: Primary | ICD-10-CM

## 2019-06-18 PROCEDURE — 72070 X-RAY EXAM THORAC SPINE 2VWS: CPT

## 2019-06-18 PROCEDURE — 77063 BREAST TOMOSYNTHESIS BI: CPT

## 2019-06-18 PROCEDURE — 77067 SCR MAMMO BI INCL CAD: CPT

## 2019-12-09 ENCOUNTER — TRANSCRIBE ORDERS (OUTPATIENT)
Dept: ADMINISTRATIVE | Facility: HOSPITAL | Age: 69
End: 2019-12-09

## 2019-12-09 ENCOUNTER — APPOINTMENT (OUTPATIENT)
Dept: LAB | Facility: HOSPITAL | Age: 69
End: 2019-12-09
Attending: INTERNAL MEDICINE
Payer: COMMERCIAL

## 2019-12-09 DIAGNOSIS — I25.10 ASCVD (ARTERIOSCLEROTIC CARDIOVASCULAR DISEASE): ICD-10-CM

## 2019-12-09 DIAGNOSIS — E78.2 MIXED HYPERLIPIDEMIA: ICD-10-CM

## 2019-12-09 DIAGNOSIS — R53.83 OTHER FATIGUE: ICD-10-CM

## 2019-12-09 DIAGNOSIS — R73.9 HYPERGLYCEMIA: ICD-10-CM

## 2019-12-09 DIAGNOSIS — N39.0 URINARY TRACT INFECTION WITHOUT HEMATURIA, SITE UNSPECIFIED: ICD-10-CM

## 2019-12-09 DIAGNOSIS — I10 ESSENTIAL HYPERTENSION: Primary | ICD-10-CM

## 2019-12-09 DIAGNOSIS — I10 ESSENTIAL HYPERTENSION: ICD-10-CM

## 2019-12-09 DIAGNOSIS — I63.9 CEREBROVASCULAR ACCIDENT (CVA), UNSPECIFIED MECHANISM (HCC): ICD-10-CM

## 2019-12-09 DIAGNOSIS — M06.9 RHEUMATOID ARTHRITIS, INVOLVING UNSPECIFIED SITE, UNSPECIFIED RHEUMATOID FACTOR PRESENCE: ICD-10-CM

## 2019-12-09 LAB
25(OH)D3 SERPL-MCNC: 33.1 NG/ML (ref 30–100)
ALBUMIN SERPL BCP-MCNC: 4 G/DL (ref 3.5–5)
ALP SERPL-CCNC: 69 U/L (ref 46–116)
ALT SERPL W P-5'-P-CCNC: 34 U/L (ref 12–78)
ANION GAP SERPL CALCULATED.3IONS-SCNC: 2 MMOL/L (ref 4–13)
AST SERPL W P-5'-P-CCNC: 27 U/L (ref 5–45)
BACTERIA UR QL AUTO: ABNORMAL /HPF
BASOPHILS # BLD AUTO: 0.09 THOUSANDS/ΜL (ref 0–0.1)
BASOPHILS NFR BLD AUTO: 2 % (ref 0–1)
BILIRUB SERPL-MCNC: 0.53 MG/DL (ref 0.2–1)
BILIRUB UR QL STRIP: NEGATIVE
BUN SERPL-MCNC: 17 MG/DL (ref 5–25)
CALCIUM ALBUM COR SERPL-MCNC: 10.7 MG/DL (ref 8.3–10.1)
CALCIUM SERPL-MCNC: 10.7 MG/DL (ref 8.3–10.1)
CHLORIDE SERPL-SCNC: 99 MMOL/L (ref 100–108)
CHOLEST SERPL-MCNC: 202 MG/DL (ref 50–200)
CK SERPL-CCNC: 83 U/L (ref 26–192)
CLARITY UR: CLEAR
CO2 SERPL-SCNC: 31 MMOL/L (ref 21–32)
COLOR UR: ABNORMAL
CREAT SERPL-MCNC: 0.86 MG/DL (ref 0.6–1.3)
EOSINOPHIL # BLD AUTO: 0.29 THOUSAND/ΜL (ref 0–0.61)
EOSINOPHIL NFR BLD AUTO: 5 % (ref 0–6)
ERYTHROCYTE [DISTWIDTH] IN BLOOD BY AUTOMATED COUNT: 11.8 % (ref 11.6–15.1)
ERYTHROCYTE [SEDIMENTATION RATE] IN BLOOD: 23 MM/HOUR (ref 0–20)
EST. AVERAGE GLUCOSE BLD GHB EST-MCNC: 105 MG/DL
FERRITIN SERPL-MCNC: 143 NG/ML (ref 8–388)
GFR SERPL CREATININE-BSD FRML MDRD: 69 ML/MIN/1.73SQ M
GLUCOSE P FAST SERPL-MCNC: 88 MG/DL (ref 65–99)
GLUCOSE UR STRIP-MCNC: NEGATIVE MG/DL
HBA1C MFR BLD: 5.3 % (ref 4.2–6.3)
HCT VFR BLD AUTO: 39.5 % (ref 34.8–46.1)
HDLC SERPL-MCNC: 121 MG/DL
HGB BLD-MCNC: 12.7 G/DL (ref 11.5–15.4)
HGB UR QL STRIP.AUTO: NEGATIVE
HYALINE CASTS #/AREA URNS LPF: ABNORMAL /LPF
IMM GRANULOCYTES # BLD AUTO: 0.01 THOUSAND/UL (ref 0–0.2)
IMM GRANULOCYTES NFR BLD AUTO: 0 % (ref 0–2)
IRON SERPL-MCNC: 78 UG/DL (ref 50–170)
KETONES UR STRIP-MCNC: NEGATIVE MG/DL
LDLC SERPL CALC-MCNC: 68 MG/DL (ref 0–100)
LEUKOCYTE ESTERASE UR QL STRIP: NEGATIVE
LYMPHOCYTES # BLD AUTO: 2.37 THOUSANDS/ΜL (ref 0.6–4.47)
LYMPHOCYTES NFR BLD AUTO: 39 % (ref 14–44)
MCH RBC QN AUTO: 32.6 PG (ref 26.8–34.3)
MCHC RBC AUTO-ENTMCNC: 32.2 G/DL (ref 31.4–37.4)
MCV RBC AUTO: 101 FL (ref 82–98)
MONOCYTES # BLD AUTO: 0.57 THOUSAND/ΜL (ref 0.17–1.22)
MONOCYTES NFR BLD AUTO: 9 % (ref 4–12)
NEUTROPHILS # BLD AUTO: 2.83 THOUSANDS/ΜL (ref 1.85–7.62)
NEUTS SEG NFR BLD AUTO: 45 % (ref 43–75)
NITRITE UR QL STRIP: NEGATIVE
NON-SQ EPI CELLS URNS QL MICRO: ABNORMAL /HPF
NONHDLC SERPL-MCNC: 81 MG/DL
NRBC BLD AUTO-RTO: 0 /100 WBCS
PH UR STRIP.AUTO: 5.5 [PH]
PLATELET # BLD AUTO: 142 THOUSANDS/UL (ref 149–390)
PMV BLD AUTO: 12.4 FL (ref 8.9–12.7)
POTASSIUM SERPL-SCNC: 4 MMOL/L (ref 3.5–5.3)
PROT SERPL-MCNC: 7.4 G/DL (ref 6.4–8.2)
PROT UR STRIP-MCNC: NEGATIVE MG/DL
RBC # BLD AUTO: 3.9 MILLION/UL (ref 3.81–5.12)
RBC #/AREA URNS AUTO: ABNORMAL /HPF
SODIUM SERPL-SCNC: 132 MMOL/L (ref 136–145)
SP GR UR STRIP.AUTO: 1.03 (ref 1–1.03)
T4 FREE SERPL-MCNC: 1.09 NG/DL (ref 0.76–1.46)
TIBC SERPL-MCNC: 400 UG/DL (ref 250–450)
TRIGL SERPL-MCNC: 67 MG/DL
TSH SERPL DL<=0.05 MIU/L-ACNC: 0.65 UIU/ML (ref 0.36–3.74)
UROBILINOGEN UR QL STRIP.AUTO: 0.2 E.U./DL
WBC # BLD AUTO: 6.16 THOUSAND/UL (ref 4.31–10.16)
WBC #/AREA URNS AUTO: ABNORMAL /HPF

## 2019-12-09 PROCEDURE — 84439 ASSAY OF FREE THYROXINE: CPT

## 2019-12-09 PROCEDURE — 80053 COMPREHEN METABOLIC PANEL: CPT

## 2019-12-09 PROCEDURE — 85652 RBC SED RATE AUTOMATED: CPT

## 2019-12-09 PROCEDURE — 86430 RHEUMATOID FACTOR TEST QUAL: CPT

## 2019-12-09 PROCEDURE — 86618 LYME DISEASE ANTIBODY: CPT

## 2019-12-09 PROCEDURE — 84443 ASSAY THYROID STIM HORMONE: CPT

## 2019-12-09 PROCEDURE — 82306 VITAMIN D 25 HYDROXY: CPT

## 2019-12-09 PROCEDURE — 83540 ASSAY OF IRON: CPT

## 2019-12-09 PROCEDURE — 82728 ASSAY OF FERRITIN: CPT

## 2019-12-09 PROCEDURE — 82550 ASSAY OF CK (CPK): CPT

## 2019-12-09 PROCEDURE — 87086 URINE CULTURE/COLONY COUNT: CPT

## 2019-12-09 PROCEDURE — 85025 COMPLETE CBC W/AUTO DIFF WBC: CPT

## 2019-12-09 PROCEDURE — 36415 COLL VENOUS BLD VENIPUNCTURE: CPT

## 2019-12-09 PROCEDURE — 83036 HEMOGLOBIN GLYCOSYLATED A1C: CPT

## 2019-12-09 PROCEDURE — 82747 ASSAY OF FOLIC ACID RBC: CPT

## 2019-12-09 PROCEDURE — 81001 URINALYSIS AUTO W/SCOPE: CPT | Performed by: INTERNAL MEDICINE

## 2019-12-09 PROCEDURE — 80061 LIPID PANEL: CPT

## 2019-12-09 PROCEDURE — 83550 IRON BINDING TEST: CPT

## 2019-12-10 LAB
B BURGDOR IGG+IGM SER-ACNC: <0.91 ISR (ref 0–0.9)
BACTERIA UR CULT: NORMAL
RHEUMATOID FACT SER QL LA: NEGATIVE

## 2019-12-11 LAB
FOLATE BLD-MCNC: 535.4 NG/ML
FOLATE RBC-MCNC: 1413 NG/ML
HCT VFR BLD AUTO: 37.9 % (ref 34–46.6)

## 2020-07-06 ENCOUNTER — OFFICE VISIT (OUTPATIENT)
Dept: OBGYN CLINIC | Facility: CLINIC | Age: 70
End: 2020-07-06
Payer: COMMERCIAL

## 2020-07-06 ENCOUNTER — APPOINTMENT (OUTPATIENT)
Dept: RADIOLOGY | Facility: CLINIC | Age: 70
End: 2020-07-06
Payer: COMMERCIAL

## 2020-07-06 VITALS
TEMPERATURE: 99.5 F | SYSTOLIC BLOOD PRESSURE: 142 MMHG | DIASTOLIC BLOOD PRESSURE: 68 MMHG | BODY MASS INDEX: 32.35 KG/M2 | WEIGHT: 171.2 LBS

## 2020-07-06 DIAGNOSIS — Z96.651 STATUS POST RIGHT KNEE REPLACEMENT: ICD-10-CM

## 2020-07-06 DIAGNOSIS — M25.512 LEFT SHOULDER PAIN, UNSPECIFIED CHRONICITY: Primary | ICD-10-CM

## 2020-07-06 DIAGNOSIS — M25.512 LEFT SHOULDER PAIN, UNSPECIFIED CHRONICITY: ICD-10-CM

## 2020-07-06 PROCEDURE — 73030 X-RAY EXAM OF SHOULDER: CPT

## 2020-07-06 PROCEDURE — 73562 X-RAY EXAM OF KNEE 3: CPT

## 2020-07-06 PROCEDURE — 99213 OFFICE O/P EST LOW 20 MIN: CPT | Performed by: ORTHOPAEDIC SURGERY

## 2020-07-06 NOTE — PROGRESS NOTES
Assessment/Plan:  Assessment/Plan   Diagnoses and all orders for this visit:    Left shoulder pain, unspecified chronicity  -     XR shoulder 2+ vw left; Future    Status post right knee replacement  -     XR knee 3 vw right non injury; Future      Discussed with patient that today's physical exam and radiographic findings are consistent with probable, chronic retear of the left rotator cuff  We discussed various treatment options with the patient in regards to her left shoulder, including continuing with capabilities as they are or seeng a specialist for consultation regarding reverse total shoulder surgery  Patient was offered a corticosteroid injection for relief of pain and soreness, but she declined at this time  We also discussed x-ray findings in regards to her right knee which show degradation of the replacement hardware that would most likely benefit from revision  At this time patient would like to follow up with a rheumatologist, and neurologist regarding other issues, and [place] back to these considerations in the next 2 months  She is welcome to contact the office to schedule sooner appointment if she feels necessary  Subjective:   Patient ID: Connor Fishman is a 69 y o  female  HPI  Patient returns today for follow-up evaluation of bilateral knee replacement and evaluation of new complaint of left shoulder/arm pain  She reports insidious onset of left shoulder pain over the course of several years, however she could not address this issue because she was caring for her ailing   She also has a past medical history that includes multiple rotator cuff surgeries on the left shoulder  On today's presentation she complains of aching pain that radiates from the shoulder into the brachial region  She reports occasional feelings of numbness and tingling that extend down to the hand  She denies any associated bruising or swelling      The following portions of the patient's history were reviewed and updated as appropriate: allergies, current medications, past family history, past medical history, past social history, past surgical history and problem list     There is no problem list on file for this patient      Past Medical History:   Diagnosis Date    Anxiety     Arthritis     Bernard's esophagus     [facility]     CVA [facility]cerebral vascular accident) [facility] ) 10/2016    Fibromyalgia     GERD (gastroesophageal reflux disease)     Hypercholesterolemia     Hypertension     Insomnia     [facility]umatoid arthritis [facility]     Stroke ([facility] ) 2016     Past Surgical History:   Procedure Laterality Date    APPENDECTOMY      BREAST BIOPSY Bilateral     benign    CARPAL TUNNEL RELEASE Left 10/27/2003    DORSAL COMPARTMENT RELEASE Left 03/09/2006    FINGER SURGERY      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    KNEE ARTHROSCOPY Right     x3(6/91,7/96,7/99)    OTHER SURGICAL HISTORY Left 03/09/2006    tennis elbow release    REPLACEMENT TOTAL KNEE BILATERAL      rt-11/99, lt-1/13/10    ROTATOR CUFF REPAIR Bilateral     SHOULDER ARTHROSCOPY Right 02/10/2016    SHOULDER ARTHROSCOPY Left     11/26/08,9/99    SHOULDER ARTHROSCOPY Left 03/09/2006    TONSILLECTOMY AND ADENOIDECTOMY      TRIGGER FINGER RELEASE Left 07/01/2002    middle and ring finger    ULNAR NERVE TRANSPOSITION Left      Family History   Problem Relation Age of Onset    Arthritis Mother     Lung cancer Father     Brain cancer Father     No Known Problems Daughter     No Known Problems Maternal Grandmother     No Known Problems Maternal Grandfather     No Known Problems Paternal Grandmother     No Known Problems Paternal Grandfather     No Known Problems Daughter     No Known Problems Maternal Aunt     No Known Problems Paternal Aunt     No Known Problems Paternal Aunt     No Known Problems Paternal Aunt      Social History     Socioeconomic History    Marital status: /Civil Union     Spouse name: None    Number of children: None    Years of education: None    Highest education level: None   Occupational History    None   Social Needs    Financial resource strain: None    Food insecurity:     Worry: None     Inability: None    Transportation needs:     Medical: None     Non-medical: None   Tobacco Use    Smoking status: Former Smoker     Types: Cigarettes     Last attempt to quit: 2010     Years since quitting: 10 5    Smokeless tobacco: Never Used   Substance and Sexual Activity    Alcohol use: Yes     Frequency: Monthly or less    Drug use: Never    Sexual activity: None   Lifestyle    Physical activity:     Days per week: None     Minutes per session: None    Stress: None   Relationships    Social connections:     Talks on phone: None     Gets together: None     Attends Protestant service: None     Active member of club or organization: None     Attends meetings of clubs or organizations: None     Relationship status: None    Intimate partner violence:     Fear of current or ex partner: None     Emotionally abused: None     Physically abused: None     Forced sexual activity: None   Other Topics Concern    None   Social History Narrative    None       Current Outpatient Medications:     amLODIPine (NORVASC) 5 mg tablet, take 1 tablet by mouth once daily, Disp: , Rfl:     atorvastatin (LIPITOR) 40 mg tablet, Take 40 mg by mouth daily at bedtime, Disp: , Rfl: 0    azelastine (ASTELIN) 0 1 % nasal spray, 1 spray into each nostril 2 (two) times a day, Disp: 30 mL, Rfl: 11    clopidogrel (PLAVIX) 75 mg tablet, , Disp: , Rfl: 0    gabapentin (NEURONTIN) 600 MG tablet, Take 600 mg by mouth 3 (three) times a day, Disp: , Rfl: 0    lisinopril-hydrochlorothiazide (PRINZIDE,ZESTORETIC) 10-12 5 MG per tablet, take 2 tablets by mouth once daily, Disp: , Rfl:     oxyCODONE-acetaminophen (PERCOCET) 5-325 mg per tablet, Take 1 tablet by mouth every 6 (six) hours as needed, Disp: , Rfl:     TiZANidine (ZANAFLEX) 2 MG capsule, , Disp: , Rfl: 0    traZODone (DESYREL) 50 mg tablet, , Disp: , Rfl: 0    DULoxetine (CYMBALTA) 60 mg delayed release capsule, Take 60 mg by mouth daily, Disp: , Rfl: 0    gabapentin (NEURONTIN) 300 mg capsule, Take 300 mg by mouth, Disp: , Rfl:     Melatonin 5 MG TABS, Take 5 mg by mouth, Disp: , Rfl:     morphine (MS CONTIN) 15 mg 12 hr tablet, Take 15 mg by mouth 2 (two) times a day, Disp: , Rfl: 0    pantoprazole (PROTONIX) 40 mg tablet, , Disp: , Rfl: 0    No Known Allergies    Review of Systems   Constitutional: Negative for chills, fever and unexpected weight change  HENT: Negative for hearing loss, nosebleeds and sore throat  Eyes: Negative for pain, redness and visual disturbance  Respiratory: Negative for cough, shortness of breath and wheezing  Cardiovascular: Negative for chest pain, palpitations and leg swelling  Gastrointestinal: Negative for abdominal pain, nausea and vomiting  Endocrine: Negative for polydipsia and polyuria  Genitourinary: Negative for dysuria and hematuria  Musculoskeletal:        As noted in HPI   Skin: Negative for rash and wound  Neurological: Negative for dizziness, numbness and headaches  Psychiatric/Behavioral: Negative for decreased concentration and suicidal ideas  The patient is not nervous/anxious          Objective:  /68 (BP Location: Left arm, Patient Position: Sitting, Cuff Size: Standard)   Temp 99 5 °F (37 5 °C)   Wt 77 7 kg (171 lb 3 2 oz)   BMI 32 35 kg/m²     Ortho Exam   Left shoulder -   No obvious anatomical deformity  Skin is warm and dry to touch with no signs of erythema, ecchymosis, or infection  Active forward flexion and abduction to 90°  Palpable glenohumeral crepitation with motion  3/5 rotator cuff strength  Demonstrates normal elbow motion  2+ distal radial pulse with brisk capillary refill to the fingers  Sensation light touch intact distally    Right knee -   No obvious anatomical deformity  Incision well healed with no signs of erythema, ecchymosis, or infection  Medial lateral joint line tenderness      Physical Exam   Constitutional: She is oriented to person, place, and time  She appears well-developed and well-nourished  HENT:   Right Ear: External ear normal    Left Ear: External ear normal    Nose: Nose normal    Eyes: Pupils are equal, round, and reactive to light  Conjunctivae and EOM are normal    Neck: Normal range of motion  Cardiovascular: Intact distal pulses  Pulmonary/Chest: Effort normal    Musculoskeletal: Normal range of motion  Neurological: She is alert and oriented to person, place, and time  Skin: Skin is warm and dry  Psychiatric: She has a normal mood and affect  Her behavior is normal  Judgment and thought content normal        Attending Physician has personally reviewed pertinent imaging and/or reports in PACS, impression is as follows:     Review of xray series taken 7/6/2020 of the left shoulder is significant for rotator cuff arthropathy with superior migration of the humeral head on the glenoid  Review of xray series taken 7/6/2020 of the right knee is significant for polyethylene wear of joint replacement hardware  There is also evidence of anterior compartment degeneration        Scribe Attestation    I,:   Germaine Call am acting as a scribe while in the presence of the attending physician :        I,:   Nataly Pandya, DO personally performed the services described in this documentation    as scribed in my presence :

## 2020-08-17 ENCOUNTER — TRANSCRIBE ORDERS (OUTPATIENT)
Dept: ADMINISTRATIVE | Facility: HOSPITAL | Age: 70
End: 2020-08-17

## 2020-08-17 DIAGNOSIS — Z12.31 SCREENING MAMMOGRAM FOR HIGH-RISK PATIENT: Primary | ICD-10-CM

## 2020-08-19 ENCOUNTER — HOSPITAL ENCOUNTER (OUTPATIENT)
Dept: MAMMOGRAPHY | Facility: HOSPITAL | Age: 70
Discharge: HOME | End: 2020-08-19
Attending: INTERNAL MEDICINE
Payer: COMMERCIAL

## 2020-08-19 VITALS — HEIGHT: 61 IN | WEIGHT: 173 LBS | BODY MASS INDEX: 32.66 KG/M2

## 2020-08-19 DIAGNOSIS — Z12.31 SCREENING MAMMOGRAM FOR HIGH-RISK PATIENT: ICD-10-CM

## 2020-08-19 PROCEDURE — 77067 SCR MAMMO BI INCL CAD: CPT

## 2020-08-19 PROCEDURE — 77063 BREAST TOMOSYNTHESIS BI: CPT

## 2020-08-25 ENCOUNTER — OFFICE VISIT (OUTPATIENT)
Dept: OBGYN CLINIC | Facility: CLINIC | Age: 70
End: 2020-08-25
Payer: COMMERCIAL

## 2020-08-25 ENCOUNTER — APPOINTMENT (OUTPATIENT)
Dept: RADIOLOGY | Facility: CLINIC | Age: 70
End: 2020-08-25
Payer: COMMERCIAL

## 2020-08-25 VITALS
WEIGHT: 172 LBS | HEIGHT: 61 IN | DIASTOLIC BLOOD PRESSURE: 64 MMHG | SYSTOLIC BLOOD PRESSURE: 128 MMHG | BODY MASS INDEX: 32.47 KG/M2 | TEMPERATURE: 97.2 F

## 2020-08-25 DIAGNOSIS — M25.511 RIGHT SHOULDER PAIN, UNSPECIFIED CHRONICITY: ICD-10-CM

## 2020-08-25 DIAGNOSIS — M75.101 ROTATOR CUFF TEAR ARTHROPATHY, RIGHT: ICD-10-CM

## 2020-08-25 DIAGNOSIS — M12.811 ROTATOR CUFF TEAR ARTHROPATHY, RIGHT: ICD-10-CM

## 2020-08-25 DIAGNOSIS — M12.812 ROTATOR CUFF TEAR ARTHROPATHY OF LEFT SHOULDER: ICD-10-CM

## 2020-08-25 DIAGNOSIS — M75.102 ROTATOR CUFF TEAR ARTHROPATHY OF LEFT SHOULDER: ICD-10-CM

## 2020-08-25 DIAGNOSIS — M75.101 NONTRAUMATIC TEAR OF RIGHT ROTATOR CUFF, UNSPECIFIED TEAR EXTENT: ICD-10-CM

## 2020-08-25 DIAGNOSIS — M75.102 NONTRAUMATIC TEAR OF LEFT ROTATOR CUFF, UNSPECIFIED TEAR EXTENT: Primary | ICD-10-CM

## 2020-08-25 PROCEDURE — 99213 OFFICE O/P EST LOW 20 MIN: CPT | Performed by: ORTHOPAEDIC SURGERY

## 2020-08-25 PROCEDURE — 73030 X-RAY EXAM OF SHOULDER: CPT

## 2020-08-25 NOTE — PROGRESS NOTES
Assessment:     1  Nontraumatic tear of left rotator cuff, unspecified tear extent    2  Nontraumatic tear of right rotator cuff, unspecified tear extent    3  Rotator cuff tear arthropathy of left shoulder    4  Rotator cuff tear arthropathy, right          Plan:     Problem List Items Addressed This Visit        Musculoskeletal and Integument    Rotator cuff tear arthropathy of left shoulder    Rotator cuff tear arthropathy, right    Relevant Orders    XR shoulder 2+ vw right      Other Visit Diagnoses     Nontraumatic tear of left rotator cuff, unspecified tear extent    -  Primary    Relevant Orders    Ambulatory referral to Orthopedic Surgery    Nontraumatic tear of right rotator cuff, unspecified tear extent        Relevant Orders    Ambulatory referral to Orthopedic Surgery           69 y o  female with bilateral rotator cuff tear arthropathy  Luda's x-ray's were reviewed with her in the office today  Her physical exam findings were discussed  Dianna Daniel would likely benefit from reverse total shoulders  A referral was placed for Dr Brian Rebollar  It was discussed today that I will have to differ her surgery questions for Dr Brian Rebollar  She can likely expect a 6-12 month recovery process  As she had a reaction to cortisone in the past I would not recommend a CSI even though this may be beneficial for pain control  I will see her back in the office as needed if symptoms worse or fail to improve  Patient ID: Letitia Freire is a 69 y o  female  Chief Complaint:  Bilateral shoulder pain     HPI:  69 y o  female presents to the office today for bilateral shoulder pain  Dianna Daniel states that she has been experiencing pain to the top as well as posterior aspect of her shoulder for multiple years  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 as she is unable to care for him  She notes her left shoulder is worse then her right   She notes her pain is worse with use, such as washing her hair, reaching behind her back or when wearing a bra as the straps sit over her painful are  She underwent 2 rotator cuff surgeries on the left and 1 on the right aprox  20 + years ago  She is taking tylenol as needed for pain control  She has not underwent any recent shoulder CSI as she underwent one once by her rheumatologist which she had a reaction to  She has known RA        Allergy:  No Known Allergies    Medications:  all current active meds have been reviewed    Past Medical History:  Past Medical History:   Diagnosis Date    Anxiety     Arthritis     Bernard's esophagus     [facility]     [facility]A [facility]cere[facility]) [facility] ) 10/2016    Fibromyalgia     GERD (gastroesophageal reflux disease)     Hypercholesterolemia     Hypertension     Insomnia     Rheumat[facility] arthritis [facility]     [facility] 2016       Past Surgical History:  Past Surgical History:   Procedure Laterality Date    APPENDECTOMY      BREAST BIOPSY Bilateral     benign    CARPAL TUNNEL RELEASE Left 10/27/2003    DORSAL COMPARTMENT RELEASE Left 03/09/2006    FINGER SURGERY      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    KNEE ARTHROSCOPY Right     x3(6/91,7/96,7/99)    OTHER SURGICAL HISTORY Left 03/09/2006    tennis elbow release    REPLACEMENT TOTAL KNEE BILATERAL      rt-11/99, lt-1/13/10    ROTATOR CUFF REPAIR Bilateral     SHOULDER ARTHROSCOPY Right 02/10/2016    SHOULDER ARTHROSCOPY Left     11/26/08,9/99    SHOULDER ARTHROSCOPY Left 03/09/2006    TONSILLECTOMY AND ADENOIDECTOMY      TRIGGER FINGER RELEASE Left 07/01/2002    middle and ring finger    ULNAR NERVE TRANSPOSITION Left        Family History:  Family History   Problem Relation Age of Onset    Arthritis Mother     Lung cancer Father     Brain cancer Father     No Known Problems Daughter     No Known Problems Maternal Grandmother     No Known Problems Maternal Grandfather     No Known Problems Paternal Grandmother     No Known Problems Paternal Grandfather     No Known Problems Daughter     No Known Problems Maternal Aunt     No Known Problems Paternal Aunt     No Known Problems Paternal Aunt     No Known Problems Paternal Aunt        Social History:  Social History     Substance and Sexual Activity   Alcohol Use Yes    Frequency: Monthly or less     Social History     Substance and Sexual Activity   Drug Use Never     Social History     Tobacco Use   Smoking Status Former Smoker    Types: Cigarettes    Last attempt to quit: 2010    Years since quitting: 10 6   Smokeless Tobacco Never Used           ROS:  Review of Systems   Constitutional: Negative for chills, fever and unexpected weight change  HENT: Negative for hearing loss, nosebleeds and sore throat  Eyes: Negative for pain, redness and visual disturbance  Respiratory: Negative for cough, shortness of breath and wheezing  Cardiovascular: Negative for chest pain, palpitations and leg swelling  Gastrointestinal: Negative for abdominal pain, nausea and vomiting  Endocrine: Negative for polydipsia and polyuria  Genitourinary: Negative for difficulty urinating and hematuria  Musculoskeletal: Negative for arthralgias, joint swelling and myalgias  Skin: Negative for rash and wound  Neurological: Negative for dizziness, numbness and headaches  Psychiatric/Behavioral: Negative for decreased concentration, dysphoric mood and suicidal ideas  The patient is not nervous/anxious  Objective:  BP Readings from Last 1 Encounters:   08/25/20 128/64      Wt Readings from Last 1 Encounters:   08/25/20 78 kg (172 lb)        BMI:   Estimated body mass index is 32 5 kg/m² as calculated from the following:    Height as of this encounter: 5' 1" (1 549 m)  Weight as of this encounter: 78 kg (172 lb)  EXAM:   Physical Exam  Vitals signs and nursing note reviewed  Constitutional:       Appearance: She is well-developed  HENT:      Head: Normocephalic and atraumatic  Eyes:      Pupils: Pupils are equal, round, and reactive to light  Neck:      Musculoskeletal: Neck supple  Pulmonary:      Effort: Pulmonary effort is normal       Breath sounds: Normal breath sounds  Skin:     General: Skin is warm and dry  Neurological:      Mental Status: She is alert and oriented to person, place, and time  Right Shoulder Exam     Range of Motion   External rotation: 30   Forward flexion: 100   Internal rotation 0 degrees: Lumbar     Muscle Strength   Supraspinatus: 4/5   Subscapularis: 4/5     Tests   Impingement: positive    Other   Erythema: absent  Scars: present  Sensation: normal  Pulse: present    Comments:  Crepitus with ROM   Infraspinatus strength 4/5  supraspinatus atrophy   Well healed portal incisions   Generalized posterior and anterior tenderness  Sensation intact to light touch         Left Shoulder Exam     Range of Motion   External rotation: 20   Forward flexion: 110   Internal rotation 0 degrees: Lumbar     Muscle Strength   Supraspinatus: 4/5   Subscapularis: 4/5     Tests   Impingement: positive    Other   Erythema: absent  Scars: present  Sensation: normal  Pulse: present     Comments:  Crepitus with ROM   Lateral incision aprox  4 CM in line with lateral border of the acromion   Diffuse posterior and anterior tenderness   infraspinatus atrophy   Infraspinatus strength 4/5  Sensation intact to light touch             Radiographs:  I have personally reviewed pertinent films in PACS and my interpretation is evidence of left rotator cuff repair with a high riding humeral head consistent with rotator cuff pathology, glenohumeral joint osteoarthritis  Right shoulder demonstrates severe glenohumeral joint osteoarthritis with subcondylar cysts and complete joint loss  Slight high riding humeral head         Scribe Attestation    I,:   David Comfort am acting as a scribe while in the presence of the attending physician :        I,:   Yaya Abraham MD personally performed the services described in this documentation    as scribed in my presence :

## 2020-09-28 ENCOUNTER — TRANSCRIBE ORDERS (OUTPATIENT)
Dept: ADMINISTRATIVE | Facility: HOSPITAL | Age: 70
End: 2020-09-28

## 2020-09-28 DIAGNOSIS — I63.9 CEREBROVASCULAR ACCIDENT (CVA), UNSPECIFIED MECHANISM (HCC): Primary | ICD-10-CM

## 2020-10-05 ENCOUNTER — HOSPITAL ENCOUNTER (OUTPATIENT)
Dept: MRI IMAGING | Facility: HOSPITAL | Age: 70
Discharge: HOME | End: 2020-10-05
Attending: INTERNAL MEDICINE
Payer: COMMERCIAL

## 2020-10-05 DIAGNOSIS — I63.9 CEREBROVASCULAR ACCIDENT (CVA), UNSPECIFIED MECHANISM (HCC): ICD-10-CM

## 2020-10-05 PROCEDURE — A9585 GADOBUTROL INJECTION: HCPCS | Performed by: INTERNAL MEDICINE

## 2020-10-05 PROCEDURE — 70553 MRI BRAIN STEM W/O & W/DYE: CPT

## 2020-10-05 PROCEDURE — G1004 CDSM NDSC: HCPCS

## 2020-10-05 RX ADMIN — GADOBUTROL 8 ML: 604.72 INJECTION INTRAVENOUS at 13:12

## 2020-10-06 ENCOUNTER — TRANSCRIBE ORDERS (OUTPATIENT)
Dept: ADMINISTRATIVE | Facility: HOSPITAL | Age: 70
End: 2020-10-06

## 2020-10-06 DIAGNOSIS — K22.70 BARRETT'S ESOPHAGUS WITHOUT DYSPLASIA: ICD-10-CM

## 2020-10-06 DIAGNOSIS — R93.89 ABNORMAL ULTRASOUND: Primary | ICD-10-CM

## 2020-10-06 DIAGNOSIS — K21.9 GASTROESOPHAGEAL REFLUX DISEASE WITHOUT ESOPHAGITIS: ICD-10-CM

## 2020-10-06 DIAGNOSIS — B18.2 CHRONIC HEPATITIS C WITH HEPATIC COMA (HCC): ICD-10-CM

## 2020-10-06 DIAGNOSIS — R53.83 FATIGUE, UNSPECIFIED TYPE: ICD-10-CM

## 2020-10-14 ENCOUNTER — TRANSCRIBE ORDERS (OUTPATIENT)
Dept: LAB | Facility: HOSPITAL | Age: 70
End: 2020-10-14

## 2020-10-14 ENCOUNTER — APPOINTMENT (OUTPATIENT)
Dept: LAB | Facility: HOSPITAL | Age: 70
End: 2020-10-14
Attending: INTERNAL MEDICINE
Payer: COMMERCIAL

## 2020-10-14 ENCOUNTER — HOSPITAL ENCOUNTER (OUTPATIENT)
Dept: ULTRASOUND IMAGING | Facility: HOSPITAL | Age: 70
Discharge: HOME | End: 2020-10-14
Attending: INTERNAL MEDICINE
Payer: COMMERCIAL

## 2020-10-14 DIAGNOSIS — R53.83 OTHER FATIGUE: ICD-10-CM

## 2020-10-14 DIAGNOSIS — K21.9 GASTRO-ESOPHAGEAL REFLUX DISEASE WITHOUT ESOPHAGITIS: ICD-10-CM

## 2020-10-14 DIAGNOSIS — K22.70 BARRETT'S ESOPHAGUS WITHOUT DYSPLASIA: ICD-10-CM

## 2020-10-14 DIAGNOSIS — B18.2 CHRONIC HEPATITIS C WITHOUT HEPATIC COMA (HCC): ICD-10-CM

## 2020-10-14 DIAGNOSIS — K21.9 GASTROESOPHAGEAL REFLUX DISEASE WITHOUT ESOPHAGITIS: ICD-10-CM

## 2020-10-14 DIAGNOSIS — B18.2 CHRONIC HEPATITIS C WITHOUT HEPATIC COMA (HCC): Primary | ICD-10-CM

## 2020-10-14 DIAGNOSIS — B18.2 CHRONIC HEPATITIS C WITH HEPATIC COMA (HCC): ICD-10-CM

## 2020-10-14 DIAGNOSIS — R53.83 FATIGUE, UNSPECIFIED TYPE: ICD-10-CM

## 2020-10-14 LAB
AFP-TM SERPL-MCNC: 4.3 NG/ML (ref 0.5–8)
ALBUMIN SERPL BCP-MCNC: 4.4 G/DL (ref 3.5–5.7)
ALP SERPL-CCNC: 64 U/L (ref 55–165)
ALT SERPL W P-5'-P-CCNC: 14 U/L (ref 7–52)
ANION GAP SERPL CALCULATED.3IONS-SCNC: 7 MMOL/L (ref 4–13)
AST SERPL W P-5'-P-CCNC: 21 U/L (ref 13–39)
BILIRUB SERPL-MCNC: 0.5 MG/DL (ref 0.2–1)
BUN SERPL-MCNC: 21 MG/DL (ref 7–25)
CALCIUM SERPL-MCNC: 10.6 MG/DL (ref 8.6–10.5)
CHLORIDE SERPL-SCNC: 95 MMOL/L (ref 98–107)
CO2 SERPL-SCNC: 34 MMOL/L (ref 21–31)
CREAT SERPL-MCNC: 0.83 MG/DL (ref 0.6–1.2)
GFR SERPL CREATININE-BSD FRML MDRD: 71 ML/MIN/1.73SQ M
GLUCOSE P FAST SERPL-MCNC: 86 MG/DL (ref 65–99)
INR PPP: 1.02 (ref 0.84–1.19)
POTASSIUM SERPL-SCNC: 4 MMOL/L (ref 3.5–5.5)
PROT SERPL-MCNC: 7 G/DL (ref 6.4–8.9)
PROTHROMBIN TIME: 13.3 SECONDS (ref 11.6–14.5)
SODIUM SERPL-SCNC: 136 MMOL/L (ref 134–143)

## 2020-10-14 PROCEDURE — 84460 ALANINE AMINO (ALT) (SGPT): CPT

## 2020-10-14 PROCEDURE — 87389 HIV-1 AG W/HIV-1&-2 AB AG IA: CPT

## 2020-10-14 PROCEDURE — 86706 HEP B SURFACE ANTIBODY: CPT

## 2020-10-14 PROCEDURE — 82247 BILIRUBIN TOTAL: CPT

## 2020-10-14 PROCEDURE — 80074 ACUTE HEPATITIS PANEL: CPT

## 2020-10-14 PROCEDURE — 82172 ASSAY OF APOLIPOPROTEIN: CPT

## 2020-10-14 PROCEDURE — 83010 ASSAY OF HAPTOGLOBIN QUANT: CPT

## 2020-10-14 PROCEDURE — 87902 NFCT AGT GNTYP ALYS HEP C: CPT

## 2020-10-14 PROCEDURE — 36415 COLL VENOUS BLD VENIPUNCTURE: CPT

## 2020-10-14 PROCEDURE — 82977 ASSAY OF GGT: CPT

## 2020-10-14 PROCEDURE — 82105 ALPHA-FETOPROTEIN SERUM: CPT

## 2020-10-14 PROCEDURE — 85610 PROTHROMBIN TIME: CPT

## 2020-10-14 PROCEDURE — 87522 HEPATITIS C REVRS TRNSCRPJ: CPT

## 2020-10-14 PROCEDURE — 76700 US EXAM ABDOM COMPLETE: CPT

## 2020-10-14 PROCEDURE — 83883 ASSAY NEPHELOMETRY NOT SPEC: CPT

## 2020-10-14 PROCEDURE — 86708 HEPATITIS A ANTIBODY: CPT

## 2020-10-14 PROCEDURE — 80053 COMPREHEN METABOLIC PANEL: CPT

## 2020-10-15 ENCOUNTER — APPOINTMENT (OUTPATIENT)
Dept: LAB | Facility: HOSPITAL | Age: 70
End: 2020-10-15
Attending: INTERNAL MEDICINE
Payer: COMMERCIAL

## 2020-10-15 LAB
HAV AB SER QL IA: NORMAL
HAV IGM SER QL: ABNORMAL
HBV CORE IGM SER QL: ABNORMAL
HBV SURFACE AB SER-ACNC: <3.1 MIU/ML
HBV SURFACE AG SER QL: ABNORMAL
HCV AB SER QL: ABNORMAL
HIV 1+2 AB+HIV1 P24 AG SERPL QL IA: NORMAL

## 2020-10-15 PROCEDURE — 36415 COLL VENOUS BLD VENIPUNCTURE: CPT

## 2020-10-16 ENCOUNTER — TRANSCRIBE ORDERS (OUTPATIENT)
Dept: LAB | Facility: HOSPITAL | Age: 70
End: 2020-10-16

## 2020-10-16 LAB
HCV RNA SERPL NAA+PROBE-ACNC: NORMAL IU/ML
HCV RNA SERPL NAA+PROBE-LOG IU: 5.9 LOG10 IU/ML
TEST INFORMATION: NORMAL

## 2020-10-17 ENCOUNTER — TRANSCRIBE ORDERS (OUTPATIENT)
Dept: LAB | Facility: HOSPITAL | Age: 70
End: 2020-10-17

## 2020-10-17 ENCOUNTER — LAB (OUTPATIENT)
Dept: LAB | Facility: HOSPITAL | Age: 70
End: 2020-10-17
Attending: INTERNAL MEDICINE
Payer: COMMERCIAL

## 2020-10-17 DIAGNOSIS — K21.9 GASTRO-ESOPHAGEAL REFLUX DISEASE WITHOUT ESOPHAGITIS: ICD-10-CM

## 2020-10-17 DIAGNOSIS — R53.83 OTHER FATIGUE: ICD-10-CM

## 2020-10-17 DIAGNOSIS — K22.70 BARRETT'S ESOPHAGUS WITHOUT DYSPLASIA: ICD-10-CM

## 2020-10-17 DIAGNOSIS — B18.2 CHRONIC HEPATITIS C WITHOUT HEPATIC COMA (HCC): ICD-10-CM

## 2020-10-17 LAB
A2 MACROGLOB SERPL-MCNC: 219 MG/DL (ref 110–276)
ALT SERPL W P-5'-P-CCNC: 19 IU/L (ref 0–40)
APO A-I SERPL-MCNC: 220 MG/DL (ref 116–209)
BASOPHILS # BLD AUTO: 0.1 THOUSANDS/ΜL (ref 0–0.1)
BASOPHILS NFR BLD AUTO: 1 % (ref 0–2)
BILIRUB SERPL-MCNC: 0.3 MG/DL (ref 0–1.2)
COMMENT: ABNORMAL
EOSINOPHIL # BLD AUTO: 0.5 THOUSAND/ΜL (ref 0–0.61)
EOSINOPHIL NFR BLD AUTO: 8 % (ref 0–5)
ERYTHROCYTE [DISTWIDTH] IN BLOOD BY AUTOMATED COUNT: 12.3 % (ref 11.5–14.5)
FIBROSIS SCORING:: ABNORMAL
FIBROSIS STAGE SERPL QL: ABNORMAL
GGT SERPL-CCNC: 35 IU/L (ref 0–60)
HAPTOGLOB SERPL-MCNC: 101 MG/DL (ref 42–346)
HCT VFR BLD AUTO: 38.2 % (ref 42–47)
HCV GENTYP SERPL NAA+PROBE: 3
HCV PLEASE NOTE: NORMAL
HGB BLD-MCNC: 12.8 G/DL (ref 12–16)
INTERPRETATIONS: ABNORMAL
LIVER FIBR SCORE SERPL CALC.FIBROSURE: 0.14 (ref 0–0.21)
LYMPHOCYTES # BLD AUTO: 1.8 THOUSANDS/ΜL (ref 0.6–4.47)
LYMPHOCYTES NFR BLD AUTO: 27 % (ref 21–51)
MACROCYTES BLD QL AUTO: PRESENT
MCH RBC QN AUTO: 34.2 PG (ref 26–34)
MCHC RBC AUTO-ENTMCNC: 33.5 G/DL (ref 31–37)
MCV RBC AUTO: 102 FL (ref 81–99)
MONOCYTES # BLD AUTO: 0.5 THOUSAND/ΜL (ref 0.17–1.22)
MONOCYTES NFR BLD AUTO: 8 % (ref 2–12)
NECROINFLAMM ACTIVITY SCORING:: ABNORMAL
NECROINFLAMMATORY ACT GRADE SERPL QL: ABNORMAL
NECROINFLAMMATORY ACT SCORE SERPL: 0.06 (ref 0–0.17)
NEUTROPHILS # BLD AUTO: 3.7 THOUSANDS/ΜL (ref 1.4–6.5)
NEUTS SEG NFR BLD AUTO: 56 % (ref 42–75)
PLATELET # BLD AUTO: 200 THOUSANDS/UL (ref 149–390)
PLATELET BLD QL SMEAR: ADEQUATE
PMV BLD AUTO: 8.4 FL (ref 8.6–11.7)
RBC # BLD AUTO: 3.74 MILLION/UL (ref 3.9–5.2)
RBC MORPH BLD: PRESENT
SERVICE CMNT-IMP: ABNORMAL
WBC # BLD AUTO: 6.6 THOUSAND/UL (ref 4.8–10.8)

## 2020-10-17 PROCEDURE — 85025 COMPLETE CBC W/AUTO DIFF WBC: CPT

## 2020-10-17 PROCEDURE — 36415 COLL VENOUS BLD VENIPUNCTURE: CPT

## 2020-10-27 ENCOUNTER — TRANSCRIBE ORDERS (OUTPATIENT)
Dept: ADMINISTRATIVE | Facility: HOSPITAL | Age: 70
End: 2020-10-27

## 2020-10-27 DIAGNOSIS — Z86.19 HISTORY OF HEPATITIS C: ICD-10-CM

## 2020-10-27 DIAGNOSIS — R93.5 ABNORMAL ULTRASOUND OF ABDOMEN: Primary | ICD-10-CM

## 2020-11-05 ENCOUNTER — HOSPITAL ENCOUNTER (OUTPATIENT)
Dept: MRI IMAGING | Facility: HOSPITAL | Age: 70
Discharge: HOME | End: 2020-11-05
Attending: INTERNAL MEDICINE
Payer: COMMERCIAL

## 2020-11-05 DIAGNOSIS — R93.5 ABNORMAL ULTRASOUND OF ABDOMEN: ICD-10-CM

## 2020-11-05 DIAGNOSIS — Z86.19 HISTORY OF HEPATITIS C: ICD-10-CM

## 2020-11-05 PROCEDURE — G1004 CDSM NDSC: HCPCS

## 2020-11-05 PROCEDURE — 74181 MRI ABDOMEN W/O CONTRAST: CPT

## 2020-11-20 ENCOUNTER — APPOINTMENT (EMERGENCY)
Dept: CT IMAGING | Facility: HOSPITAL | Age: 70
DRG: 948 | End: 2020-11-20
Payer: COMMERCIAL

## 2020-11-20 ENCOUNTER — HOSPITAL ENCOUNTER (INPATIENT)
Facility: HOSPITAL | Age: 70
LOS: 1 days | Discharge: HOME | DRG: 948 | End: 2020-11-21
Attending: EMERGENCY MEDICINE | Admitting: INTERNAL MEDICINE
Payer: COMMERCIAL

## 2020-11-20 ENCOUNTER — APPOINTMENT (INPATIENT)
Dept: MRI IMAGING | Facility: HOSPITAL | Age: 70
DRG: 948 | End: 2020-11-20
Payer: COMMERCIAL

## 2020-11-20 ENCOUNTER — APPOINTMENT (INPATIENT)
Dept: NUCLEAR MEDICINE | Facility: HOSPITAL | Age: 70
DRG: 948 | End: 2020-11-20
Payer: COMMERCIAL

## 2020-11-20 ENCOUNTER — APPOINTMENT (EMERGENCY)
Dept: RADIOLOGY | Facility: HOSPITAL | Age: 70
DRG: 948 | End: 2020-11-20
Payer: COMMERCIAL

## 2020-11-20 DIAGNOSIS — R51.9 HEADACHE: ICD-10-CM

## 2020-11-20 DIAGNOSIS — R77.8 TROPONIN LEVEL ELEVATED: Primary | ICD-10-CM

## 2020-11-20 DIAGNOSIS — R77.8 ELEVATED TROPONIN: ICD-10-CM

## 2020-11-20 DIAGNOSIS — I10 ESSENTIAL HYPERTENSION: ICD-10-CM

## 2020-11-20 DIAGNOSIS — Z86.73 HISTORY OF STROKE: ICD-10-CM

## 2020-11-20 PROBLEM — R79.89 ELEVATED D-DIMER: Status: ACTIVE | Noted: 2020-11-20

## 2020-11-20 PROBLEM — R29.90 STROKE-LIKE SYMPTOMS: Status: ACTIVE | Noted: 2020-11-20

## 2020-11-20 PROBLEM — G89.4 CHRONIC PAIN SYNDROME: Status: ACTIVE | Noted: 2020-11-20

## 2020-11-20 PROBLEM — J00 ACUTE NASOPHARYNGITIS: Status: ACTIVE | Noted: 2020-11-20

## 2020-11-20 LAB
ALBUMIN SERPL BCP-MCNC: 4 G/DL (ref 3.5–5.7)
ALP SERPL-CCNC: 79 U/L (ref 55–165)
ALT SERPL W P-5'-P-CCNC: 13 U/L (ref 7–52)
ANION GAP SERPL CALCULATED.3IONS-SCNC: 5 MMOL/L (ref 4–13)
APTT PPP: 34 SECONDS (ref 23–37)
AST SERPL W P-5'-P-CCNC: 20 U/L (ref 13–39)
ATRIAL RATE: 106 BPM
BASOPHILS # BLD AUTO: 0 THOUSANDS/ΜL (ref 0–0.1)
BASOPHILS NFR BLD AUTO: 1 % (ref 0–2)
BILIRUB SERPL-MCNC: 0.4 MG/DL (ref 0.2–1)
BILIRUB UR QL STRIP: NEGATIVE
BNP SERPL-MCNC: 52 PG/ML (ref 1–100)
BUN SERPL-MCNC: 29 MG/DL (ref 7–25)
CALCIUM SERPL-MCNC: 9.9 MG/DL (ref 8.6–10.5)
CHLORIDE SERPL-SCNC: 96 MMOL/L (ref 98–107)
CLARITY UR: CLEAR
CO2 SERPL-SCNC: 34 MMOL/L (ref 21–31)
COLOR UR: YELLOW
CREAT SERPL-MCNC: 0.97 MG/DL (ref 0.6–1.2)
D DIMER PPP FEU-MCNC: 1.32 UG/ML FEU
EOSINOPHIL # BLD AUTO: 0.3 THOUSAND/ΜL (ref 0–0.61)
EOSINOPHIL NFR BLD AUTO: 5 % (ref 0–5)
ERYTHROCYTE [DISTWIDTH] IN BLOOD BY AUTOMATED COUNT: 12.2 % (ref 11.5–14.5)
FLUAV RNA RESP QL NAA+PROBE: NEGATIVE
FLUBV RNA RESP QL NAA+PROBE: NEGATIVE
GFR SERPL CREATININE-BSD FRML MDRD: 59 ML/MIN/1.73SQ M
GLUCOSE SERPL-MCNC: 137 MG/DL (ref 65–99)
GLUCOSE UR STRIP-MCNC: NEGATIVE MG/DL
HCT VFR BLD AUTO: 37.1 % (ref 42–47)
HGB BLD-MCNC: 12 G/DL (ref 12–16)
HGB UR QL STRIP.AUTO: NEGATIVE
INR PPP: 0.97 (ref 0.84–1.19)
KETONES UR STRIP-MCNC: NEGATIVE MG/DL
LEUKOCYTE ESTERASE UR QL STRIP: NEGATIVE
LYMPHOCYTES # BLD AUTO: 0.9 THOUSANDS/ΜL (ref 0.6–4.47)
LYMPHOCYTES NFR BLD AUTO: 15 % (ref 21–51)
MCH RBC QN AUTO: 32.8 PG (ref 26–34)
MCHC RBC AUTO-ENTMCNC: 32.4 G/DL (ref 31–37)
MCV RBC AUTO: 101 FL (ref 81–99)
MONOCYTES # BLD AUTO: 0.4 THOUSAND/ΜL (ref 0.17–1.22)
MONOCYTES NFR BLD AUTO: 7 % (ref 2–12)
NEUTROPHILS # BLD AUTO: 4.6 THOUSANDS/ΜL (ref 1.4–6.5)
NEUTS SEG NFR BLD AUTO: 73 % (ref 42–75)
NITRITE UR QL STRIP: NEGATIVE
P AXIS: 59 DEGREES
PH UR STRIP.AUTO: 7.5 [PH]
PLATELET # BLD AUTO: 257 THOUSANDS/UL (ref 149–390)
PMV BLD AUTO: 7.6 FL (ref 8.6–11.7)
POTASSIUM SERPL-SCNC: 5 MMOL/L (ref 3.5–5.5)
PR INTERVAL: 152 MS
PROT SERPL-MCNC: 6.7 G/DL (ref 6.4–8.9)
PROT UR STRIP-MCNC: NEGATIVE MG/DL
PROTHROMBIN TIME: 12.8 SECONDS (ref 11.6–14.5)
QRS AXIS: 55 DEGREES
QRSD INTERVAL: 70 MS
QT INTERVAL: 292 MS
QTC INTERVAL: 387 MS
RBC # BLD AUTO: 3.67 MILLION/UL (ref 3.9–5.2)
RSV RNA RESP QL NAA+PROBE: NEGATIVE
SARS-COV-2 RNA RESP QL NAA+PROBE: NEGATIVE
SODIUM SERPL-SCNC: 135 MMOL/L (ref 134–143)
SP GR UR STRIP.AUTO: 1.01 (ref 1–1.03)
T WAVE AXIS: 58 DEGREES
TROPONIN I SERPL-MCNC: 0.12 NG/ML
TROPONIN I SERPL-MCNC: 0.14 NG/ML
TROPONIN I SERPL-MCNC: 0.14 NG/ML
UROBILINOGEN UR QL STRIP.AUTO: 1 E.U./DL
VENTRICULAR RATE: 106 BPM
WBC # BLD AUTO: 6.3 THOUSAND/UL (ref 4.8–10.8)

## 2020-11-20 PROCEDURE — 71045 X-RAY EXAM CHEST 1 VIEW: CPT

## 2020-11-20 PROCEDURE — 70498 CT ANGIOGRAPHY NECK: CPT

## 2020-11-20 PROCEDURE — 84484 ASSAY OF TROPONIN QUANT: CPT | Performed by: INTERNAL MEDICINE

## 2020-11-20 PROCEDURE — 70450 CT HEAD/BRAIN W/O DYE: CPT

## 2020-11-20 PROCEDURE — 96375 TX/PRO/DX INJ NEW DRUG ADDON: CPT

## 2020-11-20 PROCEDURE — G1004 CDSM NDSC: HCPCS

## 2020-11-20 PROCEDURE — 78580 LUNG PERFUSION IMAGING: CPT

## 2020-11-20 PROCEDURE — 85730 THROMBOPLASTIN TIME PARTIAL: CPT | Performed by: EMERGENCY MEDICINE

## 2020-11-20 PROCEDURE — 85610 PROTHROMBIN TIME: CPT | Performed by: EMERGENCY MEDICINE

## 2020-11-20 PROCEDURE — 99285 EMERGENCY DEPT VISIT HI MDM: CPT | Performed by: EMERGENCY MEDICINE

## 2020-11-20 PROCEDURE — 85379 FIBRIN DEGRADATION QUANT: CPT | Performed by: EMERGENCY MEDICINE

## 2020-11-20 PROCEDURE — 80053 COMPREHEN METABOLIC PANEL: CPT | Performed by: EMERGENCY MEDICINE

## 2020-11-20 PROCEDURE — 93005 ELECTROCARDIOGRAM TRACING: CPT

## 2020-11-20 PROCEDURE — 36415 COLL VENOUS BLD VENIPUNCTURE: CPT | Performed by: EMERGENCY MEDICINE

## 2020-11-20 PROCEDURE — 81003 URINALYSIS AUTO W/O SCOPE: CPT | Performed by: EMERGENCY MEDICINE

## 2020-11-20 PROCEDURE — 93010 ELECTROCARDIOGRAM REPORT: CPT | Performed by: INTERNAL MEDICINE

## 2020-11-20 PROCEDURE — 70551 MRI BRAIN STEM W/O DYE: CPT

## 2020-11-20 PROCEDURE — 85025 COMPLETE CBC W/AUTO DIFF WBC: CPT | Performed by: EMERGENCY MEDICINE

## 2020-11-20 PROCEDURE — 70496 CT ANGIOGRAPHY HEAD: CPT

## 2020-11-20 PROCEDURE — 99223 1ST HOSP IP/OBS HIGH 75: CPT | Performed by: INTERNAL MEDICINE

## 2020-11-20 PROCEDURE — 0241U HB NFCT DS VIR RESP RNA 4 TRGT: CPT | Performed by: EMERGENCY MEDICINE

## 2020-11-20 PROCEDURE — 99285 EMERGENCY DEPT VISIT HI MDM: CPT

## 2020-11-20 PROCEDURE — A9540 TC99M MAA: HCPCS

## 2020-11-20 PROCEDURE — 96361 HYDRATE IV INFUSION ADD-ON: CPT

## 2020-11-20 PROCEDURE — 96365 THER/PROPH/DIAG IV INF INIT: CPT

## 2020-11-20 PROCEDURE — 83880 ASSAY OF NATRIURETIC PEPTIDE: CPT | Performed by: EMERGENCY MEDICINE

## 2020-11-20 PROCEDURE — 84484 ASSAY OF TROPONIN QUANT: CPT | Performed by: EMERGENCY MEDICINE

## 2020-11-20 RX ORDER — MINERAL OIL AND PETROLATUM 150; 830 MG/G; MG/G
1 OINTMENT OPHTHALMIC
Status: DISCONTINUED | OUTPATIENT
Start: 2020-11-20 | End: 2020-11-20 | Stop reason: SDUPTHER

## 2020-11-20 RX ORDER — ASPIRIN 81 MG/1
324 TABLET, CHEWABLE ORAL ONCE
Status: COMPLETED | OUTPATIENT
Start: 2020-11-20 | End: 2020-11-20

## 2020-11-20 RX ORDER — ACETAMINOPHEN 325 MG/1
650 TABLET ORAL ONCE
Status: COMPLETED | OUTPATIENT
Start: 2020-11-20 | End: 2020-11-20

## 2020-11-20 RX ORDER — GUAIFENESIN 600 MG
600 TABLET, EXTENDED RELEASE 12 HR ORAL EVERY 12 HOURS SCHEDULED
Status: DISCONTINUED | OUTPATIENT
Start: 2020-11-20 | End: 2020-11-21 | Stop reason: HOSPADM

## 2020-11-20 RX ORDER — TIZANIDINE 2 MG/1
2 TABLET ORAL EVERY MORNING
Status: DISCONTINUED | OUTPATIENT
Start: 2020-11-21 | End: 2020-11-21 | Stop reason: HOSPADM

## 2020-11-20 RX ORDER — OXYCODONE HYDROCHLORIDE 5 MG/1
5 CAPSULE ORAL 2 TIMES DAILY
Status: ON HOLD | COMMUNITY
End: 2022-08-02 | Stop reason: SDUPTHER

## 2020-11-20 RX ORDER — AMLODIPINE BESYLATE 5 MG/1
5 TABLET ORAL DAILY
Status: DISCONTINUED | OUTPATIENT
Start: 2020-11-20 | End: 2020-11-21 | Stop reason: HOSPADM

## 2020-11-20 RX ORDER — FLUTICASONE PROPIONATE 50 MCG
1 SPRAY, SUSPENSION (ML) NASAL DAILY
Status: DISCONTINUED | OUTPATIENT
Start: 2020-11-20 | End: 2020-11-21 | Stop reason: HOSPADM

## 2020-11-20 RX ORDER — MORPHINE SULFATE 15 MG/1
15 TABLET, FILM COATED, EXTENDED RELEASE ORAL 2 TIMES DAILY
Status: DISCONTINUED | OUTPATIENT
Start: 2020-11-20 | End: 2020-11-21 | Stop reason: HOSPADM

## 2020-11-20 RX ORDER — MINERAL OIL AND PETROLATUM 150; 830 MG/G; MG/G
1 OINTMENT OPHTHALMIC DAILY
COMMUNITY

## 2020-11-20 RX ORDER — CLOPIDOGREL BISULFATE 75 MG/1
75 TABLET ORAL DAILY
Status: DISCONTINUED | OUTPATIENT
Start: 2020-11-20 | End: 2020-11-21 | Stop reason: HOSPADM

## 2020-11-20 RX ORDER — FUROSEMIDE 10 MG/ML
40 INJECTION INTRAMUSCULAR; INTRAVENOUS ONCE
Status: COMPLETED | OUTPATIENT
Start: 2020-11-20 | End: 2020-11-20

## 2020-11-20 RX ORDER — MINERAL OIL, WHITE PETROLATUM .03; .94 G/G; G/G
1 OINTMENT OPHTHALMIC
Status: DISCONTINUED | OUTPATIENT
Start: 2020-11-20 | End: 2020-11-21 | Stop reason: HOSPADM

## 2020-11-20 RX ORDER — ACETAMINOPHEN 325 MG/1
650 TABLET ORAL EVERY 6 HOURS PRN
Status: DISCONTINUED | OUTPATIENT
Start: 2020-11-20 | End: 2020-11-21 | Stop reason: HOSPADM

## 2020-11-20 RX ORDER — PANTOPRAZOLE SODIUM 40 MG/1
40 TABLET, DELAYED RELEASE ORAL
Status: DISCONTINUED | OUTPATIENT
Start: 2020-11-21 | End: 2020-11-21 | Stop reason: HOSPADM

## 2020-11-20 RX ORDER — ATORVASTATIN CALCIUM 40 MG/1
40 TABLET, FILM COATED ORAL
Status: DISCONTINUED | OUTPATIENT
Start: 2020-11-20 | End: 2020-11-21 | Stop reason: HOSPADM

## 2020-11-20 RX ORDER — HEPARIN SODIUM 1000 [USP'U]/ML
6800 INJECTION, SOLUTION INTRAVENOUS; SUBCUTANEOUS ONCE
Status: COMPLETED | OUTPATIENT
Start: 2020-11-20 | End: 2020-11-20

## 2020-11-20 RX ORDER — LORAZEPAM 2 MG/ML
0.5 INJECTION INTRAMUSCULAR ONCE
Status: COMPLETED | OUTPATIENT
Start: 2020-11-20 | End: 2020-11-20

## 2020-11-20 RX ORDER — AZELASTINE 1 MG/ML
1 SPRAY, METERED NASAL 2 TIMES DAILY
Status: DISCONTINUED | OUTPATIENT
Start: 2020-11-20 | End: 2020-11-20

## 2020-11-20 RX ORDER — GABAPENTIN 300 MG/1
600 CAPSULE ORAL 3 TIMES DAILY
Status: DISCONTINUED | OUTPATIENT
Start: 2020-11-20 | End: 2020-11-21 | Stop reason: HOSPADM

## 2020-11-20 RX ORDER — ONDANSETRON 2 MG/ML
4 INJECTION INTRAMUSCULAR; INTRAVENOUS EVERY 6 HOURS PRN
Status: DISCONTINUED | OUTPATIENT
Start: 2020-11-20 | End: 2020-11-21 | Stop reason: HOSPADM

## 2020-11-20 RX ORDER — HYDROCODONE BITARTRATE AND ACETAMINOPHEN 5; 325 MG/1; MG/1
1 TABLET ORAL ONCE
Status: COMPLETED | OUTPATIENT
Start: 2020-11-20 | End: 2020-11-20

## 2020-11-20 RX ORDER — TRAZODONE HYDROCHLORIDE 50 MG/1
50 TABLET ORAL
Status: DISCONTINUED | OUTPATIENT
Start: 2020-11-20 | End: 2020-11-21 | Stop reason: HOSPADM

## 2020-11-20 RX ORDER — HEPARIN SODIUM 10000 [USP'U]/100ML
3-30 INJECTION, SOLUTION INTRAVENOUS
Status: DISCONTINUED | OUTPATIENT
Start: 2020-11-20 | End: 2020-11-20

## 2020-11-20 RX ORDER — OXYCODONE HYDROCHLORIDE 5 MG/1
5 TABLET ORAL EVERY 6 HOURS PRN
Status: DISCONTINUED | OUTPATIENT
Start: 2020-11-20 | End: 2020-11-21 | Stop reason: HOSPADM

## 2020-11-20 RX ADMIN — GABAPENTIN 600 MG: 300 CAPSULE ORAL at 21:27

## 2020-11-20 RX ADMIN — GABAPENTIN 600 MG: 300 CAPSULE ORAL at 15:17

## 2020-11-20 RX ADMIN — MORPHINE SULFATE 15 MG: 15 TABLET, EXTENDED RELEASE ORAL at 17:32

## 2020-11-20 RX ADMIN — FUROSEMIDE 40 MG: 10 INJECTION, SOLUTION INTRAVENOUS at 16:30

## 2020-11-20 RX ADMIN — AMLODIPINE BESYLATE 5 MG: 5 TABLET ORAL at 15:17

## 2020-11-20 RX ADMIN — ASPIRIN 81 MG CHEWABLE TABLET 324 MG: 81 TABLET CHEWABLE at 10:13

## 2020-11-20 RX ADMIN — FLUTICASONE PROPIONATE 1 SPRAY: 50 SPRAY, METERED NASAL at 16:31

## 2020-11-20 RX ADMIN — SODIUM CHLORIDE 1000 ML: 0.9 INJECTION, SOLUTION INTRAVENOUS at 09:47

## 2020-11-20 RX ADMIN — HEPARIN SODIUM 18 UNITS/KG/HR: 10000 INJECTION, SOLUTION INTRAVENOUS at 11:03

## 2020-11-20 RX ADMIN — GLYCERIN, HYPROMELLOSE, POLYETHYLENE GLYCOL 1 DROP: .2; .2; 1 LIQUID OPHTHALMIC at 11:23

## 2020-11-20 RX ADMIN — GUAIFENESIN 600 MG: 600 TABLET, EXTENDED RELEASE ORAL at 21:27

## 2020-11-20 RX ADMIN — ATORVASTATIN CALCIUM 40 MG: 40 TABLET, FILM COATED ORAL at 21:27

## 2020-11-20 RX ADMIN — ACETAMINOPHEN 650 MG: 325 TABLET ORAL at 11:43

## 2020-11-20 RX ADMIN — ENOXAPARIN SODIUM 40 MG: 40 INJECTION SUBCUTANEOUS at 16:30

## 2020-11-20 RX ADMIN — LORAZEPAM 0.5 MG: 2 INJECTION INTRAMUSCULAR; INTRAVENOUS at 17:31

## 2020-11-20 RX ADMIN — CLOPIDOGREL BISULFATE 75 MG: 75 TABLET ORAL at 15:17

## 2020-11-20 RX ADMIN — HEPARIN SODIUM 6800 UNITS: 1000 INJECTION, SOLUTION INTRAVENOUS; SUBCUTANEOUS at 11:02

## 2020-11-20 RX ADMIN — HYDROCODONE BITARTRATE AND ACETAMINOPHEN 1 TABLET: 5; 325 TABLET ORAL at 14:50

## 2020-11-20 RX ADMIN — IOHEXOL 85 ML: 350 INJECTION, SOLUTION INTRAVENOUS at 10:02

## 2020-11-20 RX ADMIN — MINERAL OIL, WHITE PETROLATUM 1 APPLICATION: .03; .94 OINTMENT OPHTHALMIC at 16:31

## 2020-11-20 RX ADMIN — OXYCODONE HYDROCHLORIDE 5 MG: 5 TABLET ORAL at 15:17

## 2020-11-21 VITALS
SYSTOLIC BLOOD PRESSURE: 155 MMHG | RESPIRATION RATE: 18 BRPM | DIASTOLIC BLOOD PRESSURE: 80 MMHG | HEIGHT: 60 IN | WEIGHT: 181.22 LBS | BODY MASS INDEX: 35.58 KG/M2 | OXYGEN SATURATION: 99 % | HEART RATE: 73 BPM | TEMPERATURE: 98 F

## 2020-11-21 LAB
ANION GAP SERPL CALCULATED.3IONS-SCNC: 7 MMOL/L (ref 4–13)
ATRIAL RATE: 79 BPM
BASOPHILS # BLD AUTO: 0 THOUSANDS/ΜL (ref 0–0.1)
BASOPHILS NFR BLD AUTO: 1 % (ref 0–2)
BUN SERPL-MCNC: 23 MG/DL (ref 7–25)
CALCIUM SERPL-MCNC: 9.7 MG/DL (ref 8.6–10.5)
CHLORIDE SERPL-SCNC: 97 MMOL/L (ref 98–107)
CO2 SERPL-SCNC: 33 MMOL/L (ref 21–31)
CREAT SERPL-MCNC: 0.76 MG/DL (ref 0.6–1.2)
EOSINOPHIL # BLD AUTO: 0.3 THOUSAND/ΜL (ref 0–0.61)
EOSINOPHIL NFR BLD AUTO: 7 % (ref 0–5)
ERYTHROCYTE [DISTWIDTH] IN BLOOD BY AUTOMATED COUNT: 11.9 % (ref 11.5–14.5)
GFR SERPL CREATININE-BSD FRML MDRD: 79 ML/MIN/1.73SQ M
GLUCOSE SERPL-MCNC: 88 MG/DL (ref 65–99)
HCT VFR BLD AUTO: 35.5 % (ref 42–47)
HGB BLD-MCNC: 11.7 G/DL (ref 12–16)
LYMPHOCYTES # BLD AUTO: 1 THOUSANDS/ΜL (ref 0.6–4.47)
LYMPHOCYTES NFR BLD AUTO: 22 % (ref 21–51)
MCH RBC QN AUTO: 33 PG (ref 26–34)
MCHC RBC AUTO-ENTMCNC: 32.9 G/DL (ref 31–37)
MCV RBC AUTO: 100 FL (ref 81–99)
MONOCYTES # BLD AUTO: 0.3 THOUSAND/ΜL (ref 0.17–1.22)
MONOCYTES NFR BLD AUTO: 8 % (ref 2–12)
NEUTROPHILS # BLD AUTO: 2.8 THOUSANDS/ΜL (ref 1.4–6.5)
NEUTS SEG NFR BLD AUTO: 63 % (ref 42–75)
P AXIS: 52 DEGREES
PLATELET # BLD AUTO: 208 THOUSANDS/UL (ref 149–390)
PMV BLD AUTO: 8.4 FL (ref 8.6–11.7)
POTASSIUM SERPL-SCNC: 3.9 MMOL/L (ref 3.5–5.5)
PR INTERVAL: 164 MS
QRS AXIS: 23 DEGREES
QRSD INTERVAL: 80 MS
QT INTERVAL: 358 MS
QTC INTERVAL: 410 MS
RBC # BLD AUTO: 3.54 MILLION/UL (ref 3.9–5.2)
SODIUM SERPL-SCNC: 137 MMOL/L (ref 134–143)
T WAVE AXIS: 42 DEGREES
VENTRICULAR RATE: 79 BPM
WBC # BLD AUTO: 4.4 THOUSAND/UL (ref 4.8–10.8)

## 2020-11-21 PROCEDURE — 97166 OT EVAL MOD COMPLEX 45 MIN: CPT

## 2020-11-21 PROCEDURE — 99239 HOSP IP/OBS DSCHRG MGMT >30: CPT | Performed by: INTERNAL MEDICINE

## 2020-11-21 PROCEDURE — 99222 1ST HOSP IP/OBS MODERATE 55: CPT | Performed by: INTERNAL MEDICINE

## 2020-11-21 PROCEDURE — 97163 PT EVAL HIGH COMPLEX 45 MIN: CPT

## 2020-11-21 PROCEDURE — 85025 COMPLETE CBC W/AUTO DIFF WBC: CPT | Performed by: INTERNAL MEDICINE

## 2020-11-21 PROCEDURE — 80048 BASIC METABOLIC PNL TOTAL CA: CPT | Performed by: INTERNAL MEDICINE

## 2020-11-21 PROCEDURE — 93010 ELECTROCARDIOGRAM REPORT: CPT | Performed by: INTERNAL MEDICINE

## 2020-11-21 RX ADMIN — LISINOPRIL: 10 TABLET ORAL at 08:14

## 2020-11-21 RX ADMIN — PANTOPRAZOLE SODIUM 40 MG: 40 TABLET, DELAYED RELEASE ORAL at 05:16

## 2020-11-21 RX ADMIN — OXYCODONE HYDROCHLORIDE 5 MG: 5 TABLET ORAL at 03:09

## 2020-11-21 RX ADMIN — GABAPENTIN 600 MG: 300 CAPSULE ORAL at 08:14

## 2020-11-21 RX ADMIN — ENOXAPARIN SODIUM 40 MG: 40 INJECTION SUBCUTANEOUS at 08:14

## 2020-11-21 RX ADMIN — CLOPIDOGREL BISULFATE 75 MG: 75 TABLET ORAL at 08:14

## 2020-11-21 RX ADMIN — AMLODIPINE BESYLATE 5 MG: 5 TABLET ORAL at 08:15

## 2020-11-21 RX ADMIN — FLUTICASONE PROPIONATE 1 SPRAY: 50 SPRAY, METERED NASAL at 08:15

## 2020-11-21 RX ADMIN — TIZANIDINE 2 MG: 2 TABLET ORAL at 08:14

## 2020-11-21 RX ADMIN — MORPHINE SULFATE 15 MG: 15 TABLET, EXTENDED RELEASE ORAL at 08:14

## 2020-11-21 RX ADMIN — OXYCODONE HYDROCHLORIDE 5 MG: 5 TABLET ORAL at 12:51

## 2020-11-21 RX ADMIN — MINERAL OIL, WHITE PETROLATUM 1 APPLICATION: .03; .94 OINTMENT OPHTHALMIC at 08:15

## 2020-11-21 RX ADMIN — GUAIFENESIN 600 MG: 600 TABLET, EXTENDED RELEASE ORAL at 08:14

## 2020-12-18 ENCOUNTER — TRANSCRIBE ORDERS (OUTPATIENT)
Dept: LAB | Facility: HOSPITAL | Age: 70
End: 2020-12-18

## 2021-07-30 ENCOUNTER — HOSPITAL ENCOUNTER (OUTPATIENT)
Dept: CT IMAGING | Facility: HOSPITAL | Age: 71
Discharge: HOME | End: 2021-07-30
Attending: PHYSICIAN ASSISTANT
Payer: COMMERCIAL

## 2021-07-30 DIAGNOSIS — B18.2 CHRONIC HEPATITIS C WITHOUT HEPATIC COMA (HCC): ICD-10-CM

## 2021-07-30 DIAGNOSIS — R93.3 ABNORMAL FINDINGS ON RADIOLOGICAL EXAMINATION OF GASTROINTESTINAL TRACT: ICD-10-CM

## 2021-07-30 DIAGNOSIS — R63.4 ABNORMAL WEIGHT LOSS: ICD-10-CM

## 2021-07-30 DIAGNOSIS — K21.9 GASTROESOPHAGEAL REFLUX DISEASE WITHOUT ESOPHAGITIS: ICD-10-CM

## 2021-07-30 DIAGNOSIS — K22.70 BARRETT'S ESOPHAGUS WITHOUT DYSPLASIA: ICD-10-CM

## 2021-07-30 PROCEDURE — 74177 CT ABD & PELVIS W/CONTRAST: CPT

## 2021-07-30 PROCEDURE — G1004 CDSM NDSC: HCPCS

## 2021-07-30 PROCEDURE — 71260 CT THORAX DX C+: CPT

## 2021-07-30 RX ADMIN — IOHEXOL 100 ML: 350 INJECTION, SOLUTION INTRAVENOUS at 11:49

## 2021-09-07 ENCOUNTER — TELEPHONE (OUTPATIENT)
Dept: SURGERY | Facility: HOSPITAL | Age: 71
End: 2021-09-07

## 2021-09-08 ENCOUNTER — ANESTHESIA (OUTPATIENT)
Dept: GASTROENTEROLOGY | Facility: HOSPITAL | Age: 71
End: 2021-09-08

## 2021-09-08 ENCOUNTER — ANESTHESIA EVENT (OUTPATIENT)
Dept: GASTROENTEROLOGY | Facility: HOSPITAL | Age: 71
End: 2021-09-08

## 2021-09-08 ENCOUNTER — HOSPITAL ENCOUNTER (OUTPATIENT)
Dept: GASTROENTEROLOGY | Facility: HOSPITAL | Age: 71
Setting detail: OUTPATIENT SURGERY
Discharge: HOME | End: 2021-09-08
Attending: INTERNAL MEDICINE
Payer: COMMERCIAL

## 2021-09-08 VITALS
RESPIRATION RATE: 18 BRPM | HEART RATE: 77 BPM | BODY MASS INDEX: 27.94 KG/M2 | DIASTOLIC BLOOD PRESSURE: 54 MMHG | SYSTOLIC BLOOD PRESSURE: 96 MMHG | OXYGEN SATURATION: 92 % | TEMPERATURE: 97.2 F | WEIGHT: 148 LBS | HEIGHT: 61 IN

## 2021-09-08 DIAGNOSIS — R63.4 ABNORMAL WEIGHT LOSS: ICD-10-CM

## 2021-09-08 DIAGNOSIS — R93.3 ABNORMAL FINDINGS ON DIAGNOSTIC IMAGING OF OTHER PARTS OF DIGESTIVE TRACT: ICD-10-CM

## 2021-09-08 DIAGNOSIS — K21.9 GASTRO-ESOPHAGEAL REFLUX DISEASE WITHOUT ESOPHAGITIS: ICD-10-CM

## 2021-09-08 DIAGNOSIS — K22.70 BARRETT'S ESOPHAGUS WITHOUT DYSPLASIA: ICD-10-CM

## 2021-09-08 PROBLEM — M19.90 ARTHRITIS: Status: ACTIVE | Noted: 2021-09-08

## 2021-09-08 PROCEDURE — 88305 TISSUE EXAM BY PATHOLOGIST: CPT | Performed by: PATHOLOGY

## 2021-09-08 RX ORDER — PROPOFOL 10 MG/ML
INJECTION, EMULSION INTRAVENOUS AS NEEDED
Status: DISCONTINUED | OUTPATIENT
Start: 2021-09-08 | End: 2021-09-08

## 2021-09-08 RX ORDER — ZINC GLUCONATE 50 MG
50 TABLET ORAL DAILY
COMMUNITY
End: 2022-07-21

## 2021-09-08 RX ORDER — FUROSEMIDE 20 MG/1
20 TABLET ORAL DAILY
COMMUNITY
End: 2022-01-25 | Stop reason: HOSPADM

## 2021-09-08 RX ORDER — LIDOCAINE HYDROCHLORIDE 10 MG/ML
INJECTION, SOLUTION EPIDURAL; INFILTRATION; INTRACAUDAL; PERINEURAL AS NEEDED
Status: DISCONTINUED | OUTPATIENT
Start: 2021-09-08 | End: 2021-09-08

## 2021-09-08 RX ORDER — SODIUM CHLORIDE, SODIUM LACTATE, POTASSIUM CHLORIDE, CALCIUM CHLORIDE 600; 310; 30; 20 MG/100ML; MG/100ML; MG/100ML; MG/100ML
INJECTION, SOLUTION INTRAVENOUS CONTINUOUS PRN
Status: DISCONTINUED | OUTPATIENT
Start: 2021-09-08 | End: 2021-09-08

## 2021-09-08 RX ADMIN — PROPOFOL 50 MG: 10 INJECTION, EMULSION INTRAVENOUS at 08:54

## 2021-09-08 RX ADMIN — SODIUM CHLORIDE, SODIUM LACTATE, POTASSIUM CHLORIDE, AND CALCIUM CHLORIDE: .6; .31; .03; .02 INJECTION, SOLUTION INTRAVENOUS at 08:43

## 2021-09-08 RX ADMIN — LIDOCAINE HYDROCHLORIDE 50 MG: 10 INJECTION, SOLUTION EPIDURAL; INFILTRATION; INTRACAUDAL; PERINEURAL at 08:49

## 2021-09-08 RX ADMIN — PROPOFOL 100 MG: 10 INJECTION, EMULSION INTRAVENOUS at 08:50

## 2021-09-08 NOTE — H&P
History and Physical - [facility]  Tresa Gamez 66 y o  female MRN: [id number]                  HPI: Tresa Gamez is a 66y o  year old female who presents for EGD      REVIEW OF SYSTEMS: Per the HPI, and otherwise unremarkable      Historical Information   Past Medical History:   Diagnosis Date    Anxiety     Arthritis     Bernard's esophagus     [facility] [address] [facility]     [facility]    Fibromyalgia     GERD (gastroesophageal reflux disease)     Hypercholesterolemia     Hypertension     Insomnia     Rheumatoid arthritis ([place] [address] )     Stroke ([place] [address] )      Past Surgical History:   Procedure Laterality Date    APPENDECTOMY      BREAST BIOPSY Bilateral     benign    CARPAL TUNNEL RELEASE Left 10/27/2003    DORSAL COMPARTMENT RELEASE Left 2006    FINGER SURGERY      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    KNEE ARTHROSCOPY Right     x3(,,)    OTHER SURGICAL HISTORY Left 2006    tennis elbow release    REPLACEMENT TOTAL KNEE BILATERAL      rt-, lt-1/13/10    ROTATOR CUFF REPAIR Bilateral     SHOULDER ARTHROSCOPY Right 02/10/2016    SHOULDER ARTHROSCOPY Left     08,    SHOULDER ARTHROSCOPY Left 2006    TONSILLECTOMY AND ADENOIDECTOMY      TRIGGER FINGER RELEASE Left 2002    middle and ring finger    ULNAR NERVE TRANSPOSITION Left      Social History   Social History     Substance and Sexual Activity   Alcohol Use Not Currently     Social History     Substance and Sexual Activity   Drug Use Yes    Types: Marijuana    Comment: medical marijuana     Social History     Tobacco Use   Smoking Status Former Smoker    Types: Cigarettes    Quit date:     Years since quittin 6   Smokeless Tobacco Never Used     Family History   Problem Relation Age of Onset    Arthritis Mother     Lung cancer Father     Brain cancer Father     No Known Problems Daughter     No Known Problems Maternal Grandmother     No Known Problems Maternal Grandfather     No Known Problems Paternal Grandmother     No Known Problems Paternal Grandfather     No Known Problems Daughter     No Known Problems Maternal Aunt     No Known Problems Paternal Aunt     No Known Problems Paternal Aunt     No Known Problems Paternal Aunt        Meds/Allergies       Current Outpatient Medications:     artificial tear (LUBRIFRESH P M ) 83-15 % ophthalmic ointment    atorvastatin (LIPITOR) 40 mg tablet    azelastine (ASTELIN) 0 1 % nasal spray    clopidogrel (PLAVIX) 75 mg tablet    furosemide (LASIX) 20 mg tablet    gabapentin (NEURONTIN) 600 MG tablet    lisinopril-hydrochlorothiazide (PRINZIDE,ZESTORETIC) 10-12 5 MG per tablet    Melatonin 5 MG TABS    morphine (MS CONTIN) 15 mg 12 hr tablet    oxyCODONE (OXY-IR) 5 MG capsule    pantoprazole (PROTONIX) 40 mg tablet    TiZANidine (ZANAFLEX) 2 MG capsule    traZODone (DESYREL) 50 mg tablet    White Petrolatum-Mineral Oil (Soothe Night Time) OINT    zinc gluconate 50 mg tablet    fluticasone (FLONASE) 50 mcg/act nasal spray  No current facility-administered medications for this encounter  Facility-Administered Medications Ordered in Other Encounters:     lactated ringers infusion, , Intravenous, Continuous PRN, New Bag at 09/08/21 0843    No Known Allergies    Objective     /59   Pulse 94   Temp 98 2 °F (36 8 °C) (Temporal)   Resp 18   Ht 5' 1" (1 549 m)   Wt 67 1 kg (148 lb)   SpO2 94%   BMI 27 96 kg/m²       PHYSICAL EXAM    Gen: NAD  Head: NCAT  CV: RRR  CHEST: Clear  ABD: soft, NT/ND  EXT: no edema      ASSESSMENT/PLAN:  This is a 66y o  year old female here for EGD, and she is stable and optimized for her procedure

## 2021-09-08 NOTE — ANESTHESIA PREPROCEDURE EVALUATION
Procedure:  EGD    Relevant Problems   ANESTHESIA (within normal limits)   (-) History of anesthesia complications      CARDIO   (+) Essential hypertension      GI/HEPATIC  Confirmed NPO appropriate      HEMATOLOGY (within normal limits)      MUSCULOSKELETAL   (+) Arthritis      NEURO/PSYCH   (+) Chronic pain syndrome   (+) History of stroke (vertebral artery stenosis)      PULMONARY   (+) Acute nasopharyngitis   (-) Smoking        Physical Exam    Airway    Mallampati score: II  TM Distance: >3 FB  Neck ROM: limited     Dental   upper dentures,     Cardiovascular  Rhythm: regular, Rate: normal,     Pulmonary  Breath sounds clear to auscultation,     Other Findings        Anesthesia Plan  ASA Score- 3     Anesthesia Type- IV sedation with anesthesia with ASA Monitors  Additional Monitors:   Airway Plan:     Comment: I discussed the risks and benefits of IV sedation anesthesia including the possibility of the need to convert to general anesthesia and the potential risk of awareness  The patient was given the opportunity to ask questions, which were answered          Plan Factors-    Chart reviewed  Imaging results reviewed  Patient is not a current smoker  Induction- intravenous  Postoperative Plan-     Informed Consent- Anesthetic plan and risks discussed with patient  I personally reviewed this patient with the CRNA  Discussed and agreed on the Anesthesia Plan with the CRNA  Connie Craig CTA head and neck 11/20/2020:  IMPRESSION:  No evidence for high-grade stenosis, major branch vessel occlusion or vascular aneurysm of the anterior circulation  Tandem areas of high-grade stenosis involving the right V3-V4 junction as well as intradural V4 segment, as described above  Findings are however overall improved since 2016 which demonstrated segmental nonopacification of the intradural right V4 segment

## 2021-10-19 ENCOUNTER — HOSPITAL ENCOUNTER (OUTPATIENT)
Dept: MRI IMAGING | Facility: HOSPITAL | Age: 71
Discharge: HOME | End: 2021-10-19
Attending: INTERNAL MEDICINE
Payer: COMMERCIAL

## 2021-10-19 DIAGNOSIS — R63.4 LOSS OF WEIGHT: ICD-10-CM

## 2021-10-19 DIAGNOSIS — B18.2 CHRONIC HEPATITIS C WITH HEPATIC COMA (HCC): ICD-10-CM

## 2021-10-19 DIAGNOSIS — K22.70 BARRETT'S ESOPHAGUS WITHOUT DYSPLASIA: ICD-10-CM

## 2021-10-19 PROCEDURE — A9585 GADOBUTROL INJECTION: HCPCS | Performed by: INTERNAL MEDICINE

## 2021-10-19 PROCEDURE — 74183 MRI ABD W/O CNTR FLWD CNTR: CPT

## 2021-10-19 RX ADMIN — GADOBUTROL 7 ML: 604.72 INJECTION INTRAVENOUS at 15:05

## 2021-10-28 ENCOUNTER — APPOINTMENT (OUTPATIENT)
Dept: LAB | Facility: HOSPITAL | Age: 71
End: 2021-10-28
Attending: PHYSICIAN ASSISTANT
Payer: COMMERCIAL

## 2021-10-28 DIAGNOSIS — B18.2 CHRONIC HEPATITIS C WITHOUT HEPATIC COMA (HCC): ICD-10-CM

## 2021-10-28 DIAGNOSIS — R93.3 ABNORMAL FINDINGS ON RADIOLOGICAL EXAMINATION OF GASTROINTESTINAL TRACT: ICD-10-CM

## 2021-10-28 DIAGNOSIS — K22.70 BARRETT'S ESOPHAGUS WITHOUT DYSPLASIA: ICD-10-CM

## 2021-10-28 DIAGNOSIS — R63.4 LOSS OF WEIGHT: ICD-10-CM

## 2021-10-28 DIAGNOSIS — K21.9 GASTROESOPHAGEAL REFLUX DISEASE WITHOUT ESOPHAGITIS: ICD-10-CM

## 2021-10-28 DIAGNOSIS — R63.4 ABNORMAL WEIGHT LOSS: ICD-10-CM

## 2021-10-28 LAB
AFP-TM SERPL-MCNC: 3.7 NG/ML (ref 0.5–8)
ALBUMIN SERPL BCP-MCNC: 3.8 G/DL (ref 3.5–5.7)
ALP SERPL-CCNC: 59 U/L (ref 55–165)
ALT SERPL W P-5'-P-CCNC: 25 U/L (ref 7–52)
ANION GAP SERPL CALCULATED.3IONS-SCNC: 6 MMOL/L (ref 4–13)
AST SERPL W P-5'-P-CCNC: 28 U/L (ref 13–39)
BASOPHILS # BLD AUTO: 0.1 THOUSANDS/ΜL (ref 0–0.1)
BASOPHILS NFR BLD AUTO: 1 % (ref 0–2)
BILIRUB SERPL-MCNC: 0.6 MG/DL (ref 0.2–1)
BUN SERPL-MCNC: 33 MG/DL (ref 7–25)
CALCIUM SERPL-MCNC: 10.4 MG/DL (ref 8.6–10.5)
CHLORIDE SERPL-SCNC: 95 MMOL/L (ref 98–107)
CO2 SERPL-SCNC: 36 MMOL/L (ref 21–31)
CREAT SERPL-MCNC: 0.9 MG/DL (ref 0.6–1.2)
EOSINOPHIL # BLD AUTO: 0.6 THOUSAND/ΜL (ref 0–0.61)
EOSINOPHIL NFR BLD AUTO: 7 % (ref 0–5)
ERYTHROCYTE [DISTWIDTH] IN BLOOD BY AUTOMATED COUNT: 12.7 % (ref 11.5–14.5)
ERYTHROCYTE [SEDIMENTATION RATE] IN BLOOD: 28 MM/HOUR (ref 0–29)
GFR SERPL CREATININE-BSD FRML MDRD: 64 ML/MIN/1.73SQ M
GIANT PLATELETS BLD QL SMEAR: PRESENT
GLUCOSE P FAST SERPL-MCNC: 92 MG/DL (ref 65–99)
HCT VFR BLD AUTO: 39.4 % (ref 42–47)
HGB BLD-MCNC: 12.9 G/DL (ref 12–16)
INR PPP: 1.01 (ref 0.84–1.19)
LG PLATELETS BLD QL SMEAR: PRESENT
LYMPHOCYTES # BLD AUTO: 2.2 THOUSANDS/ΜL (ref 0.6–4.47)
LYMPHOCYTES NFR BLD AUTO: 28 % (ref 21–51)
MCH RBC QN AUTO: 31.1 PG (ref 26–34)
MCHC RBC AUTO-ENTMCNC: 32.7 G/DL (ref 31–37)
MCV RBC AUTO: 95 FL (ref 81–99)
MONOCYTES # BLD AUTO: 0.7 THOUSAND/ΜL (ref 0.17–1.22)
MONOCYTES NFR BLD AUTO: 9 % (ref 2–12)
NEUTROPHILS # BLD AUTO: 4.4 THOUSANDS/ΜL (ref 1.4–6.5)
NEUTS SEG NFR BLD AUTO: 55 % (ref 42–75)
PLATELET # BLD AUTO: 205 THOUSANDS/UL (ref 149–390)
PLATELET BLD QL SMEAR: ADEQUATE
PLATELET CLUMP BLD QL SMEAR: NORMAL
PMV BLD AUTO: 9.5 FL (ref 8.6–11.7)
POTASSIUM SERPL-SCNC: 3.8 MMOL/L (ref 3.5–5.5)
PROT SERPL-MCNC: 6.4 G/DL (ref 6.4–8.9)
PROTHROMBIN TIME: 13.4 SECONDS (ref 11.6–14.5)
RBC # BLD AUTO: 4.13 MILLION/UL (ref 3.9–5.2)
RBC MORPH BLD: NORMAL
SODIUM SERPL-SCNC: 137 MMOL/L (ref 134–143)
WBC # BLD AUTO: 8 THOUSAND/UL (ref 4.8–10.8)

## 2021-10-28 PROCEDURE — 85652 RBC SED RATE AUTOMATED: CPT

## 2021-10-28 PROCEDURE — 85025 COMPLETE CBC W/AUTO DIFF WBC: CPT

## 2021-10-28 PROCEDURE — 80053 COMPREHEN METABOLIC PANEL: CPT

## 2021-10-28 PROCEDURE — 36415 COLL VENOUS BLD VENIPUNCTURE: CPT

## 2021-10-28 PROCEDURE — 82105 ALPHA-FETOPROTEIN SERUM: CPT

## 2021-10-28 PROCEDURE — 85610 PROTHROMBIN TIME: CPT

## 2021-11-04 ENCOUNTER — HOSPITAL ENCOUNTER (OUTPATIENT)
Dept: MAMMOGRAPHY | Facility: HOSPITAL | Age: 71
Discharge: HOME | End: 2021-11-04
Attending: OBSTETRICS & GYNECOLOGY
Payer: COMMERCIAL

## 2021-11-04 ENCOUNTER — HOSPITAL ENCOUNTER (OUTPATIENT)
Dept: RADIOLOGY | Facility: HOSPITAL | Age: 71
Discharge: HOME | End: 2021-11-04
Attending: INTERNAL MEDICINE
Payer: COMMERCIAL

## 2021-11-04 VITALS — BODY MASS INDEX: 29.03 KG/M2 | HEIGHT: 59 IN | WEIGHT: 144 LBS

## 2021-11-04 DIAGNOSIS — K56.600 PARTIAL SMALL BOWEL OBSTRUCTION (HCC): ICD-10-CM

## 2021-11-04 DIAGNOSIS — Z12.31 ENCOUNTER FOR SCREENING MAMMOGRAM FOR MALIGNANT NEOPLASM OF BREAST: ICD-10-CM

## 2021-11-04 PROCEDURE — 74250 X-RAY XM SM INT 1CNTRST STD: CPT

## 2021-11-04 PROCEDURE — 77067 SCR MAMMO BI INCL CAD: CPT

## 2021-11-04 PROCEDURE — 77063 BREAST TOMOSYNTHESIS BI: CPT

## 2021-12-28 ENCOUNTER — APPOINTMENT (OUTPATIENT)
Dept: LAB | Facility: CLINIC | Age: 71
End: 2021-12-28
Payer: COMMERCIAL

## 2021-12-28 DIAGNOSIS — R31.9 HEMATURIA, UNSPECIFIED TYPE: ICD-10-CM

## 2021-12-28 DIAGNOSIS — N93.9 VAGINAL BLEEDING, ABNORMAL: ICD-10-CM

## 2021-12-28 DIAGNOSIS — D64.9 ANEMIA, UNSPECIFIED TYPE: ICD-10-CM

## 2021-12-28 DIAGNOSIS — N93.9 ABNORMAL VAGINAL BLEEDING: ICD-10-CM

## 2021-12-28 LAB
ALBUMIN SERPL BCP-MCNC: 3.2 G/DL (ref 3.5–5)
ALP SERPL-CCNC: 77 U/L (ref 46–116)
ALT SERPL W P-5'-P-CCNC: 29 U/L (ref 12–78)
ANION GAP SERPL CALCULATED.3IONS-SCNC: 3 MMOL/L (ref 4–13)
APTT PPP: 39 SECONDS (ref 23–37)
AST SERPL W P-5'-P-CCNC: 26 U/L (ref 5–45)
BILIRUB SERPL-MCNC: 0.48 MG/DL (ref 0.2–1)
BUN SERPL-MCNC: 28 MG/DL (ref 5–25)
CALCIUM ALBUM COR SERPL-MCNC: 10.9 MG/DL (ref 8.3–10.1)
CALCIUM SERPL-MCNC: 10.3 MG/DL (ref 8.3–10.1)
CHLORIDE SERPL-SCNC: 96 MMOL/L (ref 100–108)
CO2 SERPL-SCNC: 35 MMOL/L (ref 21–32)
CREAT SERPL-MCNC: 0.81 MG/DL (ref 0.6–1.3)
FERRITIN SERPL-MCNC: 117 NG/ML (ref 8–388)
GFR SERPL CREATININE-BSD FRML MDRD: 72 ML/MIN/1.73SQ M
GLUCOSE P FAST SERPL-MCNC: 105 MG/DL (ref 65–99)
INR PPP: 1.05 (ref 0.84–1.19)
IRON SATN MFR SERPL: 13 % (ref 15–50)
IRON SERPL-MCNC: 36 UG/DL (ref 50–170)
POTASSIUM SERPL-SCNC: 3.9 MMOL/L (ref 3.5–5.3)
PROT SERPL-MCNC: 6.9 G/DL (ref 6.4–8.2)
PROTHROMBIN TIME: 13.3 SECONDS (ref 11.6–14.5)
SODIUM SERPL-SCNC: 134 MMOL/L (ref 136–145)
TIBC SERPL-MCNC: 269 UG/DL (ref 250–450)

## 2021-12-28 PROCEDURE — 82728 ASSAY OF FERRITIN: CPT

## 2021-12-28 PROCEDURE — 36415 COLL VENOUS BLD VENIPUNCTURE: CPT

## 2021-12-28 PROCEDURE — 85610 PROTHROMBIN TIME: CPT

## 2021-12-28 PROCEDURE — 83550 IRON BINDING TEST: CPT

## 2021-12-28 PROCEDURE — 83540 ASSAY OF IRON: CPT

## 2021-12-28 PROCEDURE — 85730 THROMBOPLASTIN TIME PARTIAL: CPT

## 2021-12-28 PROCEDURE — 82747 ASSAY OF FOLIC ACID RBC: CPT

## 2021-12-28 PROCEDURE — 87086 URINE CULTURE/COLONY COUNT: CPT

## 2021-12-28 PROCEDURE — 80053 COMPREHEN METABOLIC PANEL: CPT

## 2021-12-29 LAB
BACTERIA UR CULT: NORMAL
FOLATE BLD-MCNC: >620 NG/ML
FOLATE RBC-MCNC: >1782 NG/ML
HCT VFR BLD AUTO: 34.8 % (ref 34–46.6)

## 2021-12-30 ENCOUNTER — APPOINTMENT (OUTPATIENT)
Dept: LAB | Facility: CLINIC | Age: 71
End: 2021-12-30
Payer: COMMERCIAL

## 2021-12-30 DIAGNOSIS — D64.9 ANEMIA, UNSPECIFIED TYPE: ICD-10-CM

## 2021-12-30 LAB
ERYTHROCYTE [DISTWIDTH] IN BLOOD BY AUTOMATED COUNT: 12.5 % (ref 11.6–15.1)
HCT VFR BLD AUTO: 37.2 % (ref 34.8–46.1)
HGB BLD-MCNC: 11.8 G/DL (ref 11.5–15.4)
MCH RBC QN AUTO: 30.5 PG (ref 26.8–34.3)
MCHC RBC AUTO-ENTMCNC: 31.7 G/DL (ref 31.4–37.4)
MCV RBC AUTO: 96 FL (ref 82–98)
PLATELET # BLD AUTO: 344 THOUSANDS/UL (ref 149–390)
PMV BLD AUTO: 11.6 FL (ref 8.9–12.7)
RBC # BLD AUTO: 3.87 MILLION/UL (ref 3.81–5.12)
WBC # BLD AUTO: 10.29 THOUSAND/UL (ref 4.31–10.16)

## 2021-12-30 PROCEDURE — 85027 COMPLETE CBC AUTOMATED: CPT

## 2021-12-30 PROCEDURE — 36415 COLL VENOUS BLD VENIPUNCTURE: CPT

## 2022-01-01 ENCOUNTER — HOME CARE VISIT (OUTPATIENT)
Dept: HOME HOSPICE | Facility: HOSPICE | Age: 72
End: 2022-01-01
Payer: MEDICARE

## 2022-01-01 ENCOUNTER — HOME CARE VISIT (OUTPATIENT)
Dept: HOME HEALTH SERVICES | Facility: HOME HEALTHCARE | Age: 72
End: 2022-01-01
Payer: MEDICARE

## 2022-01-01 VITALS
DIASTOLIC BLOOD PRESSURE: 74 MMHG | TEMPERATURE: 98.4 F | RESPIRATION RATE: 20 BRPM | HEART RATE: 92 BPM | SYSTOLIC BLOOD PRESSURE: 162 MMHG

## 2022-01-01 VITALS
TEMPERATURE: 97.2 F | RESPIRATION RATE: 20 BRPM | SYSTOLIC BLOOD PRESSURE: 122 MMHG | DIASTOLIC BLOOD PRESSURE: 64 MMHG | HEART RATE: 76 BPM

## 2022-01-01 DIAGNOSIS — C54.1 ENDOMETRIAL CANCER (HCC): Primary | ICD-10-CM

## 2022-01-01 PROCEDURE — G0299 HHS/HOSPICE OF RN EA 15 MIN: HCPCS

## 2022-01-01 PROCEDURE — G0156 HHCP-SVS OF AIDE,EA 15 MIN: HCPCS

## 2022-01-01 RX ORDER — SODIUM CHLORIDE 9 MG/ML
75 INJECTION, SOLUTION INTRAVENOUS CONTINUOUS
Status: CANCELLED | OUTPATIENT
Start: 2022-01-01

## 2022-01-01 RX ORDER — SODIUM CHLORIDE 9 MG/ML
20 INJECTION, SOLUTION INTRAVENOUS ONCE
Status: CANCELLED | OUTPATIENT
Start: 2022-01-01

## 2022-01-01 RX ORDER — PALONOSETRON 0.05 MG/ML
0.25 INJECTION, SOLUTION INTRAVENOUS ONCE
Status: CANCELLED | OUTPATIENT
Start: 2022-01-01

## 2022-01-01 RX ORDER — CEFAZOLIN SODIUM 1 G/50ML
1000 SOLUTION INTRAVENOUS ONCE
Status: CANCELLED | OUTPATIENT
Start: 2022-01-01 | End: 2022-01-01

## 2022-01-14 ENCOUNTER — TRANSCRIBE ORDERS (OUTPATIENT)
Dept: SURGERY | Facility: CLINIC | Age: 72
End: 2022-01-14

## 2022-01-17 ENCOUNTER — TRANSCRIBE ORDERS (OUTPATIENT)
Dept: SURGERY | Facility: CLINIC | Age: 72
End: 2022-01-17

## 2022-01-17 ENCOUNTER — CONSULT (OUTPATIENT)
Dept: SURGERY | Facility: CLINIC | Age: 72
End: 2022-01-17
Payer: COMMERCIAL

## 2022-01-17 VITALS
BODY MASS INDEX: 29.64 KG/M2 | OXYGEN SATURATION: 91 % | HEIGHT: 59 IN | WEIGHT: 147 LBS | TEMPERATURE: 98.7 F | SYSTOLIC BLOOD PRESSURE: 100 MMHG | HEART RATE: 95 BPM | RESPIRATION RATE: 16 BRPM | DIASTOLIC BLOOD PRESSURE: 60 MMHG

## 2022-01-17 DIAGNOSIS — T14.8XXA OTHER INJURY OF UNSPECIFIED BODY REGION, INITIAL ENCOUNTER: ICD-10-CM

## 2022-01-17 DIAGNOSIS — T14.8XXA OTHER INJURY OF UNSPECIFIED BODY REGION, INITIAL ENCOUNTER: Primary | ICD-10-CM

## 2022-01-17 DIAGNOSIS — L89.153 STAGE III PRESSURE ULCER OF SACRAL REGION (HCC): Primary | ICD-10-CM

## 2022-01-17 PROBLEM — Z96.653 HISTORY OF KNEE REPLACEMENT, TOTAL, BILATERAL: Status: ACTIVE | Noted: 2017-12-11

## 2022-01-17 PROBLEM — M50.20 CERVICAL DISC HERNIATION: Status: ACTIVE | Noted: 2019-01-10

## 2022-01-17 PROCEDURE — 99204 OFFICE O/P NEW MOD 45 MIN: CPT | Performed by: SURGERY

## 2022-01-17 RX ORDER — OXYCODONE AND ACETAMINOPHEN 7.5; 325 MG/1; MG/1
TABLET ORAL
COMMUNITY
End: 2022-07-21

## 2022-01-17 RX ORDER — FAMOTIDINE 20 MG/1
TABLET, FILM COATED ORAL
COMMUNITY
End: 2022-07-21

## 2022-01-17 RX ORDER — CEFAZOLIN SODIUM 2 G/50ML
2000 SOLUTION INTRAVENOUS ONCE
Status: CANCELLED | OUTPATIENT
Start: 2022-01-24 | End: 2022-01-17

## 2022-01-17 RX ORDER — CLINDAMYCIN HYDROCHLORIDE 300 MG/1
CAPSULE ORAL
COMMUNITY
Start: 2022-01-12 | End: 2022-01-25 | Stop reason: HOSPADM

## 2022-01-17 RX ORDER — OMEPRAZOLE 20 MG/1
CAPSULE, DELAYED RELEASE ORAL
COMMUNITY
End: 2022-01-25 | Stop reason: HOSPADM

## 2022-01-17 RX ORDER — SODIUM CHLORIDE, SODIUM LACTATE, POTASSIUM CHLORIDE, CALCIUM CHLORIDE 600; 310; 30; 20 MG/100ML; MG/100ML; MG/100ML; MG/100ML
125 INJECTION, SOLUTION INTRAVENOUS CONTINUOUS
Status: CANCELLED | OUTPATIENT
Start: 2022-01-24

## 2022-01-17 NOTE — ASSESSMENT & PLAN NOTE
The patient is a 58-year-old female with multiple comorbidities most notably history hypertension cerebrovascular disease and coronary artery disease presenting with a deep stage III sacral decubitus ulcer for which examination under anesthesia, surgical wound debridement and application of wound VAC is now indicated  According the patient she has developed acute pain over her tailbone to his having a significant impact upon her ability to have restful night's sleep  She has also developed a 2nd lump on the skin over the greater trochanter of the left hip for which she presents today for surgical consultation  On physical examination patient is a pleasant well-nourished well-developed elderly female  Competent and reliable as a historian  She has a deep stage III sacral decubitus ulcer and the midline at the apex of the gluteal cleft  Addition she has a stage I pressure ulcer over the left greater trochanter  Diagnosis of sacral decubitus ulcer was explained to the patient the presence of her daughter  Various pressure relief maneuvers were discussed  Additionally hive recommended surgical debridement of the stage III ulcer and application of wound VAC to help encourage granulation tissue formation and ultimately wound epithelialization  The options benefits risks and all internist is approach were reviewed  All questions answered to the satisfaction of the patient and informed written consent taken to proceed

## 2022-01-17 NOTE — H&P
Assessment/Plan:    Stage III pressure ulcer of sacral region [facility])  The patient is a 76-year-old female with multiple comorbidities most notably history hypertension cerebrovascular disease and coronary artery disease presenting with a deep stage III sacral decubitus ulcer for which examination under anesthesia, surgical wound debridement and application of wound VAC is now indicated  According the patient she has developed acute pain over her tailbone to his having a significant impact upon her ability to have restful night's sleep  She has also developed a 2nd lump on the skin over the greater trochanter of the left hip for which she presents today for surgical consultation  On physical examination patient is a pleasant well-nourished well-developed elderly female  Competent and reliable as a historian  She has a deep stage III sacral decubitus ulcer and the midline at the apex of the gluteal cleft  Addition she has a stage I pressure ulcer over the left greater trochanter  Diagnosis of sacral decubitus ulcer was explained to the patient the presence of her daughter  Various pressure relief maneuvers were discussed  Additionally hive recommended surgical debridement of the stage III ulcer and application of wound VAC to help encourage granulation tissue formation and ultimately wound epithelialization  The options benefits risks and all internist is approach were reviewed  All questions answered to the satisfaction of the patient and informed written consent taken to proceed  Subjective:      Patient ID: Aaliyah Kent is a 66 y o  female  Patient came in today for a sore on her left buttock, she states she had the sore for a while, she is currently getting wound care and they are applying the sliver cream on her sore  She rates her pain a 10/10  She explain that she has a hard time sleeping and sitting  Patient also has a red hard spot that is also really painful under her left buttocks, and a rash all over her body  She is also currently bleeding from her vagina since 12/27/21  The following portions of the patient's history were reviewed and updated as appropriate: allergies, current medications, past family history, past medical history, past social history, past surgical history and problem list     Review of Systems   Constitutional: Negative for chills and fever  HENT: Negative for ear pain and sore throat  Eyes: Negative for pain and visual disturbance  Respiratory: Negative for cough and shortness of breath  Cardiovascular: Negative for chest pain and palpitations  Gastrointestinal: Negative for abdominal pain and vomiting  Genitourinary: Negative for dysuria and hematuria  Musculoskeletal: Negative for arthralgias and back pain  Skin: Negative for color change and rash  Neurological: Negative for seizures and syncope  All other systems reviewed and are negative  Objective:      /60 (BP Location: Left arm, Patient Position: Sitting, Cuff Size: Standard)   Pulse 95   Temp 98 7 °F (37 1 °C) (Temporal)   Resp 16   Ht 4' 11" (1 499 m)   Wt 66 7 kg (147 lb)   SpO2 91%   BMI 29 69 kg/m²          Physical Exam  Constitutional:       Appearance: She is well-developed  HENT:      Head: Normocephalic and atraumatic  Eyes:      Conjunctiva/sclera: Conjunctivae normal       Pupils: Pupils are equal, round, and reactive to light  Cardiovascular:      Rate and Rhythm: Normal rate and regular rhythm  Pulmonary:      Effort: Pulmonary effort is normal       Breath sounds: Normal breath sounds  Abdominal:      General: Bowel sounds are normal       Palpations: Abdomen is soft  Musculoskeletal:         General: Normal range of motion  Cervical back: Normal range of motion and neck supple  Skin:     General: Skin is warm and dry        Comments: Deep stage III sacral decubitus ulcer    Stage I left hip pressure ulcer    Neurological: Mental Status: She is alert and oriented to person, place, and time  Psychiatric:         Behavior: Behavior normal          Thought Content:  Thought content normal          Judgment: Judgment normal

## 2022-01-17 NOTE — PROGRESS NOTES
Assessment/Plan:    Stage III pressure ulcer of sacral region [facility])  The patient is a 69-year-old female with multiple comorbidities most notably history hypertension cerebrovascular disease and coronary artery disease presenting with a deep stage III sacral decubitus ulcer for which examination under anesthesia, surgical wound debridement and application of wound VAC is now indicated  According the patient she has developed acute pain over her tailbone to his having a significant impact upon her ability to have restful night's sleep  She has also developed a 2nd lump on the skin over the greater trochanter of the left hip for which she presents today for surgical consultation  On physical examination patient is a pleasant well-nourished well-developed elderly female  Competent and reliable as a historian  She has a deep stage III sacral decubitus ulcer and the midline at the apex of the gluteal cleft  Addition she has a stage I pressure ulcer over the left greater trochanter  Diagnosis of sacral decubitus ulcer was explained to the patient the presence of her daughter  Various pressure relief maneuvers were discussed  Additionally hive recommended surgical debridement of the stage III ulcer and application of wound VAC to help encourage granulation tissue formation and ultimately wound epithelialization  The options benefits risks and all internist is approach were reviewed  All questions answered to the satisfaction of the patient and informed written consent taken to proceed  Subjective:      Patient ID: Piper Figueroa is a 66 y o  female  Patient came in today for a sore on her left buttock, she states she had the sore for a while, she is currently getting wound care and they are applying the sliver cream on her sore  She rates her pain a 10/10  She explain that she has a hard time sleeping and sitting  Patient also has a red hard spot that is also really painful under her left buttocks, and a rash all over her body  She is also currently bleeding from her vagina since 12/27/21  The following portions of the patient's history were reviewed and updated as appropriate: allergies, current medications, past family history, past medical history, past social history, past surgical history and problem list     Review of Systems   Constitutional: Negative for chills and fever  HENT: Negative for ear pain and sore throat  Eyes: Negative for pain and visual disturbance  Respiratory: Negative for cough and shortness of breath  Cardiovascular: Negative for chest pain and palpitations  Gastrointestinal: Negative for abdominal pain and vomiting  Genitourinary: Negative for dysuria and hematuria  Musculoskeletal: Negative for arthralgias and back pain  Skin: Negative for color change and rash  Neurological: Negative for seizures and syncope  All other systems reviewed and are negative  Objective:      /60 (BP Location: Left arm, Patient Position: Sitting, Cuff Size: Standard)   Pulse 95   Temp 98 7 °F (37 1 °C) (Temporal)   Resp 16   Ht 4' 11" (1 499 m)   Wt 66 7 kg (147 lb)   SpO2 91%   BMI 29 69 kg/m²          Physical Exam  Constitutional:       Appearance: She is well-developed  HENT:      Head: Normocephalic and atraumatic  Eyes:      Conjunctiva/sclera: Conjunctivae normal       Pupils: Pupils are equal, round, and reactive to light  Cardiovascular:      Rate and Rhythm: Normal rate and regular rhythm  Pulmonary:      Effort: Pulmonary effort is normal       Breath sounds: Normal breath sounds  Abdominal:      General: Bowel sounds are normal       Palpations: Abdomen is soft  Musculoskeletal:         General: Normal range of motion  Cervical back: Normal range of motion and neck supple  Skin:     General: Skin is warm and dry        Comments: Deep stage III sacral decubitus ulcer    Stage I left hip pressure ulcer    Neurological: Mental Status: She is alert and oriented to person, place, and time  Psychiatric:         Behavior: Behavior normal          Thought Content:  Thought content normal          Judgment: Judgment normal

## 2022-01-17 NOTE — LETTER
January 17, 2022     Melisa Tay MD  [address] [address]    Patient: Tasia Guevara   YOB: 1950   Date of Visit: 1/17/2022       Dear Dr Sage French: Thank you for referring Tasia Guevara to me for evaluation  Below are my notes for this consultation  If you have questions, please do not hesitate to call me  I look forward to following your patient along with you  Sincerely,        Scottie Denver, MD        CC: No Recipients  Scottie Denver, MD  1/17/2022 11:41 AM  Sign when Signing Visit  Assessment/Plan:    Stage III pressure ulcer of sacral region [facility])  The patient is a 65-year-old female with multiple comorbidities most notably history hypertension cerebrovascular disease and coronary artery disease presenting with a deep stage III sacral decubitus ulcer for which examination under anesthesia, surgical wound debridement and application of wound VAC is now indicated  According the patient she has developed acute pain over her tailbone to his having a significant impact upon her ability to have restful night's sleep  She has also developed a 2nd lump on the skin over the greater trochanter of the left hip for which she presents today for surgical consultation  On physical examination patient is a pleasant well-nourished well-developed elderly female  Competent and reliable as a historian  She has a deep stage III sacral decubitus ulcer and the midline at the apex of the gluteal cleft  Addition she has a stage I pressure ulcer over the left greater trochanter  Diagnosis of sacral decubitus ulcer was explained to the patient the presence of her daughter  Various pressure relief maneuvers were discussed  Additionally hive recommended surgical debridement of the stage III ulcer and application of wound VAC to help encourage granulation tissue formation and ultimately wound epithelialization      The options benefits risks and all internist is approach were reviewed  All questions answered to the satisfaction of the patient and informed written consent taken to proceed  Subjective:      Patient ID: Rexene Apgar is a 66 y o  female  Patient came in today for a sore on her left buttock, she states she had the sore for a while, she is currently getting wound care and they are applying the sliver cream on her sore  She rates her pain a 10/10  She explain that she has a hard time sleeping and sitting  Patient also has a red hard spot that is also really painful under her left buttocks, and a rash all over her body  She is also currently bleeding from her vagina since 21  The following portions of the patient's history were reviewed and updated as appropriate: allergies, current medications, past family history, past medical history, past social history, past surgical history and problem list     Review of Systems   Constitutional: Negative for chills and fever  HENT: Negative for ear pain and sore throat  Eyes: Negative for pain and visual disturbance  Respiratory: Negative for cough and shortness of breath  Cardiovascular: Negative for chest pain and palpitations  Gastrointestinal: Negative for abdominal pain and vomiting  Genitourinary: Negative for dysuria and hematuria  Musculoskeletal: Negative for arthralgias and back pain  Skin: Negative for color change and rash  Neurological: Negative for seizures and syncope  All other systems reviewed and are negative  Objective:      /60 (BP Location: Left arm, Patient Position: Sitting, Cuff Size: Standard)   Pulse 95   Temp 98 7 °F (37 1 °C) (Temporal)   Resp 16   Ht 4' 11" (1 499 m)   Wt 66 7 kg (147 lb)   SpO2 91%   BMI 29 69 kg/m²          Physical Exam  Constitutional:       Appearance: She is well-developed  HENT:      Head: Normocephalic and atraumatic     Eyes:      Conjunctiva/sclera: Conjunctivae normal       Pupils: Pupils are equal, round, and reactive to light  Cardiovascular:      Rate and Rhythm: Normal rate and regular rhythm  Pulmonary:      Effort: Pulmonary effort is normal       Breath sounds: Normal breath sounds  Abdominal:      General: Bowel sounds are normal       Palpations: Abdomen is soft  Musculoskeletal:         General: Normal range of motion  Cervical back: Normal range of motion and neck supple  Skin:     General: Skin is warm and dry  Comments: Deep stage III sacral decubitus ulcer    Stage I left hip pressure ulcer    Neurological:      Mental Status: She is alert and oriented to person, place, and time  Psychiatric:         Behavior: Behavior normal          Thought Content:  Thought content normal          Judgment: Judgment normal

## 2022-01-17 NOTE — PROGRESS NOTES
CC: "sore on left buttocks"    HPI:   uLisa Marie is a pleasant 65-year-old F with a PMH significant for HTN, CVA in 2016, and RA presenting to the outpatient surgery clinic for a sore on her left buttocks x 5-6 months  Patient was referred by her PCP  Patient states the sore is a 10/10 in severity, pain is sharp and constant  Patient has been visited by wound care for the sore for the last month and was recently prescribed clindamycin by her PCP for a suspected infection  Patient states she previously had sores similar to this in the same area which resolved without treatment or intervention  Patient states she does not sit or lay for long periods of time, but admits to sleeping upright secondary to RA pain/stiffness  Admits that the pain is inhibiting her ability to sleep at night  Patient admits to a new "red spot" on her posterior lateral left thigh  Admits to sharp pain in the area that is 9/10 in severity and constant  Patient states she has not tried anything for the "spot", but admits to laying on her left side more to alleviate pain from the left buttocks  Upon further questioning, patient admits to vaginal bleeding since December 27th  Patient states she has been to Gynecology for evaluation of this, and was recommended to undergo hysterectomy       PMH:  Patient Active Problem List   Diagnosis    Rotator cuff tear arthropathy of left shoulder    Rotator cuff tear arthropathy, right    Chronic pain syndrome    Stroke-like symptoms    Elevated troponin    Elevated d-dimer    Acute nasopharyngitis    Essential hypertension    History of stroke    Arthritis    Acute arterial ischemic stroke, vertebrobasilar, brainstem, right (HCC)    Cervical disc herniation    Cervical spondylosis    Coronary artery disease    GERD (gastroesophageal reflux disease)    History of knee replacement, total, bilateral    Lateral medullary syndrome    Stage III pressure ulcer of sacral region [facility])     PSH: Appendectomy, year unknown, uncomplicated  Bilateral total knee replacement, year unknown, uncomplicated    Medications:  Current Outpatient Medications on File Prior to Visit   Medication Sig Dispense Refill    artificial tear (LUBRIFRESH P M ) 83-15 % ophthalmic ointment 1 application daily      atorvastatin (LIPITOR) 40 mg tablet Take 40 mg by mouth daily at bedtime  0    azelastine (ASTELIN) 0 1 % nasal spray 1 spray into each nostril 2 (two) times a day 30 mL 11    clindamycin (CLEOCIN) 300 MG capsule clindamycin HCl 300 mg capsule      clopidogrel (PLAVIX) 75 mg tablet   0    famotidine (PEPCID) 20 mg tablet famotidine 20 mg tablet   Take by oral route for 30 days        furosemide (LASIX) 20 mg tablet Take 20 mg by mouth daily      gabapentin (NEURONTIN) 600 MG tablet Take 600 mg by mouth 3 (three) times a day  0    lisinopril-hydrochlorothiazide (PRINZIDE,ZESTORETIC) 10-12 5 MG per tablet take 2 tablets by mouth once daily      morphine (MS CONTIN) 15 mg 12 hr tablet Take 15 mg by mouth 2 (two) times a day  0    omeprazole (PriLOSEC) 20 mg delayed release capsule omeprazole 20 mg capsule,delayed release      oxyCODONE (OXY-IR) 5 MG capsule Take 5 mg by mouth 2 (two) times a day      pantoprazole (PROTONIX) 40 mg tablet   0    TiZANidine (ZANAFLEX) 2 MG capsule   0    traZODone (DESYREL) 50 mg tablet   0    fluticasone (FLONASE) 50 mcg/act nasal spray 2 sprays into each nostril daily 16 g 11    Melatonin 5 MG TABS Take 5 mg by mouth (Patient not taking: Reported on 1/17/2022 )      oxyCODONE-acetaminophen (PERCOCET) 7 5-325 MG per tablet oxycodone-acetaminophen 7 5 mg-325 mg tablet   1 tab Q8h prn (Patient not taking: Reported on 1/17/2022)      White Petrolatum-Mineral Oil (Soothe Night Time) OINT Apply 1 application to eye daily at bedtime (Patient not taking: Reported on 1/17/2022 )      zinc gluconate 50 mg tablet Take 50 mg by mouth daily (Patient not taking: Reported on 1/17/2022 ) No current facility-administered medications on file prior to visit  Allergies: NKDA    FH: Noncontributary    SH:  Patient states she lives in a one-story home with her dog  States she is able to take care of herself and cook herself meals  Denies alcohol usage, tobacco usage, and use of illicit drugs  However, patient admits to occasionally using medical marijuana to supplement pain medication  ROS:  Constitutional: Denies fever, chills, night sweats  Admits to previous unintentional weight loss, but denies recent weight loss  Skin: Admits to skin lesion on left buttocks and drainage from the lesion  CV: Denies chest pain, palpitations  Pulm: Denies SOB  GI: Denies abdominal pain, N/V/D  : Admits to vaginal bleeding  Physical:  /60 (BP Location: Left arm, Patient Position: Sitting, Cuff Size: Standard)   Pulse 95   Temp 98 7 °F (37 1 °C) (Temporal)   Resp 16   Ht 4' 11" (1 499 m)   Wt 66 7 kg (147 lb)   SpO2 91%   BMI 29 69 kg/m²   Gen: Pleasant 65-year-old female in NAD  Alert and coherent  Accompanied by her daughter  Skin: Warm, dry and pink  Deep stage-3 pressure ulcer present on medial left buttocks, approximately 5 by 2 cm in size with a 4 cm cephalad tract  Stage 1 pressure ulcer present on posterior-lateral left thigh below greater trochanter, approximately 4 by 4 cm in size  CV: RRR, no M/R/G  Pulm: CTAB, no W/R/R  Ab: + BS x 4  Soft abdomen  NTND  Impression:  Nathan Correa is a 65-year-old female referred by her PCP for evaluation of a sore on her left buttocks  Physical examination revealing deep stage 3 pressure ulcer on left medial buttocks with a developing cephalad tract and stage 1 pressure ulcer on left posterior lateral thigh  Stage 3 pressure ulcer now indicates intraoperative debridement and wound vac placement      Plan:  Explained diagnosis of pressure ulcer to patient and her daughter and predisposing factors, such as sitting on the area for long periods of time  Script for gel pads given to patient to provide additional cushioning  Patient given treatment options, including performing in-office debridement and intraoperative debridement  Intraoperative debridement of deep stage 3 pressure ulcer explained to patient in detail, including benefits and risks, such as anesthesia, bleeding, and infection  Explained wound vac to patient  Discussed staying in the hospital for wound vac changes, visiting nurses, and coming to the office for changes every Monday, Wednesday, Friday  Patient and her daughter stated that if needed, she would be able to come to the office 3-times per week  Consent was performed  Patient to continue Plavix and get pre-op clearance from her PCP  Time was allowed for questions  Patient agreed with plan as outlined above  Call office with questions or concerns  Procedure to be scheduled  Follow up at time of procedure or sooner if needed      Seen by: CHERELLE South  Seen with: Dr Harjinder Valentin MD

## 2022-01-20 ENCOUNTER — APPOINTMENT (OUTPATIENT)
Dept: RADIOLOGY | Facility: CLINIC | Age: 72
End: 2022-01-20
Payer: COMMERCIAL

## 2022-01-20 ENCOUNTER — CLINICAL SUPPORT (OUTPATIENT)
Dept: URGENT CARE | Facility: CLINIC | Age: 72
End: 2022-01-20
Payer: COMMERCIAL

## 2022-01-20 ENCOUNTER — APPOINTMENT (OUTPATIENT)
Dept: LAB | Facility: CLINIC | Age: 72
End: 2022-01-20
Payer: COMMERCIAL

## 2022-01-20 DIAGNOSIS — L89.153 STAGE III PRESSURE ULCER OF SACRAL REGION (HCC): ICD-10-CM

## 2022-01-20 DIAGNOSIS — R94.31 ABNORMAL EKG: Primary | ICD-10-CM

## 2022-01-20 LAB
ANION GAP SERPL CALCULATED.3IONS-SCNC: 6 MMOL/L (ref 4–13)
BUN SERPL-MCNC: 53 MG/DL (ref 5–25)
CALCIUM SERPL-MCNC: 10.5 MG/DL (ref 8.3–10.1)
CHLORIDE SERPL-SCNC: 95 MMOL/L (ref 100–108)
CO2 SERPL-SCNC: 29 MMOL/L (ref 21–32)
CREAT SERPL-MCNC: 1.62 MG/DL (ref 0.6–1.3)
ERYTHROCYTE [DISTWIDTH] IN BLOOD BY AUTOMATED COUNT: 12.4 % (ref 11.6–15.1)
GFR SERPL CREATININE-BSD FRML MDRD: 31 ML/MIN/1.73SQ M
GLUCOSE P FAST SERPL-MCNC: 105 MG/DL (ref 65–99)
HCT VFR BLD AUTO: 32.8 % (ref 34.8–46.1)
HGB BLD-MCNC: 10.6 G/DL (ref 11.5–15.4)
MCH RBC QN AUTO: 30.1 PG (ref 26.8–34.3)
MCHC RBC AUTO-ENTMCNC: 32.3 G/DL (ref 31.4–37.4)
MCV RBC AUTO: 93 FL (ref 82–98)
PMV BLD AUTO: 11.4 FL (ref 8.9–12.7)
POTASSIUM SERPL-SCNC: 4.8 MMOL/L (ref 3.5–5.3)
RBC # BLD AUTO: 3.52 MILLION/UL (ref 3.81–5.12)
SODIUM SERPL-SCNC: 130 MMOL/L (ref 136–145)
WBC # BLD AUTO: 16.05 THOUSAND/UL (ref 4.31–10.16)

## 2022-01-20 PROCEDURE — 93005 ELECTROCARDIOGRAM TRACING: CPT

## 2022-01-20 PROCEDURE — 85027 COMPLETE CBC AUTOMATED: CPT

## 2022-01-20 PROCEDURE — 80048 BASIC METABOLIC PNL TOTAL CA: CPT

## 2022-01-20 PROCEDURE — 36415 COLL VENOUS BLD VENIPUNCTURE: CPT

## 2022-01-20 PROCEDURE — 71046 X-RAY EXAM CHEST 2 VIEWS: CPT

## 2022-01-21 ENCOUNTER — ANESTHESIA EVENT (INPATIENT)
Dept: PERIOP | Facility: HOSPITAL | Age: 72
DRG: 571 | End: 2022-01-21
Payer: COMMERCIAL

## 2022-01-21 ENCOUNTER — TELEPHONE (OUTPATIENT)
Dept: SURGERY | Facility: CLINIC | Age: 72
End: 2022-01-21

## 2022-01-21 LAB
ATRIAL RATE: 101 BPM
P AXIS: 36 DEGREES
PR INTERVAL: 136 MS
QRS AXIS: 27 DEGREES
QRSD INTERVAL: 76 MS
QT INTERVAL: 314 MS
QTC INTERVAL: 407 MS
T WAVE AXIS: 45 DEGREES
VENTRICULAR RATE: 101 BPM

## 2022-01-21 PROCEDURE — 93010 ELECTROCARDIOGRAM REPORT: CPT | Performed by: INTERNAL MEDICINE

## 2022-01-21 NOTE — TELEPHONE ENCOUNTER
Spoke to patient by phone regarding abnormal blood work  After discussing the case with Dr Felicia Huynh I have advised the patient to present to the emergency room for further evaluation and regarding her abnormal kidney function and leukocytosis

## 2022-01-22 ENCOUNTER — HOSPITAL ENCOUNTER (INPATIENT)
Facility: HOSPITAL | Age: 72
LOS: 3 days | Discharge: HOME HEALTH | DRG: 571 | End: 2022-01-25
Attending: EMERGENCY MEDICINE | Admitting: INTERNAL MEDICINE
Payer: COMMERCIAL

## 2022-01-22 ENCOUNTER — APPOINTMENT (EMERGENCY)
Dept: CT IMAGING | Facility: HOSPITAL | Age: 72
DRG: 571 | End: 2022-01-22
Payer: COMMERCIAL

## 2022-01-22 DIAGNOSIS — L89.153 STAGE III PRESSURE ULCER OF SACRAL REGION (HCC): ICD-10-CM

## 2022-01-22 DIAGNOSIS — S31.000A WOUND OF SACRAL REGION, INITIAL ENCOUNTER: Primary | ICD-10-CM

## 2022-01-22 PROBLEM — N93.9 VAGINAL BLEEDING: Status: ACTIVE | Noted: 2022-01-01

## 2022-01-22 PROBLEM — R60.0 LOWER EXTREMITY EDEMA: Status: ACTIVE | Noted: 2022-01-22

## 2022-01-22 PROBLEM — E87.1 HYPONATREMIA: Status: ACTIVE | Noted: 2022-01-22

## 2022-01-22 PROBLEM — N17.9 AKI (ACUTE KIDNEY INJURY) (HCC): Status: ACTIVE | Noted: 2022-01-22

## 2022-01-22 LAB
ALBUMIN SERPL BCP-MCNC: 3.4 G/DL (ref 3.5–5)
ALP SERPL-CCNC: 59 U/L (ref 34–104)
ALT SERPL W P-5'-P-CCNC: 44 U/L (ref 7–52)
ANION GAP SERPL CALCULATED.3IONS-SCNC: 1 MMOL/L (ref 4–13)
APTT PPP: 30 SECONDS (ref 23–37)
AST SERPL W P-5'-P-CCNC: 70 U/L (ref 13–39)
BASOPHILS # BLD AUTO: 0.08 THOUSANDS/ΜL (ref 0–0.1)
BASOPHILS NFR BLD AUTO: 1 % (ref 0–1)
BILIRUB SERPL-MCNC: 0.37 MG/DL (ref 0.2–1)
BNP SERPL-MCNC: 77 PG/ML (ref 1–100)
BUN SERPL-MCNC: 61 MG/DL (ref 5–25)
CALCIUM ALBUM COR SERPL-MCNC: 11.1 MG/DL (ref 8.3–10.1)
CALCIUM SERPL-MCNC: 10.6 MG/DL (ref 8.4–10.2)
CHLORIDE SERPL-SCNC: 91 MMOL/L (ref 96–108)
CO2 SERPL-SCNC: 34 MMOL/L (ref 21–32)
CREAT SERPL-MCNC: 1.42 MG/DL (ref 0.6–1.3)
EOSINOPHIL # BLD AUTO: 0.69 THOUSAND/ΜL (ref 0–0.61)
EOSINOPHIL NFR BLD AUTO: 5 % (ref 0–6)
ERYTHROCYTE [DISTWIDTH] IN BLOOD BY AUTOMATED COUNT: 12.1 % (ref 11.6–15.1)
GFR SERPL CREATININE-BSD FRML MDRD: 36 ML/MIN/1.73SQ M
GLUCOSE SERPL-MCNC: 147 MG/DL (ref 65–140)
HCT VFR BLD AUTO: 33.6 % (ref 34.8–46.1)
HGB BLD-MCNC: 10.6 G/DL (ref 11.5–15.4)
IMM GRANULOCYTES # BLD AUTO: 0.08 THOUSAND/UL (ref 0–0.2)
IMM GRANULOCYTES NFR BLD AUTO: 1 % (ref 0–2)
INR PPP: 0.96 (ref 0.84–1.19)
LACTATE SERPL-SCNC: 0.8 MMOL/L (ref 0.5–2)
LYMPHOCYTES # BLD AUTO: 1.41 THOUSANDS/ΜL (ref 0.6–4.47)
LYMPHOCYTES NFR BLD AUTO: 11 % (ref 14–44)
MCH RBC QN AUTO: 29.8 PG (ref 26.8–34.3)
MCHC RBC AUTO-ENTMCNC: 31.5 G/DL (ref 31.4–37.4)
MCV RBC AUTO: 94 FL (ref 82–98)
MONOCYTES # BLD AUTO: 1.12 THOUSAND/ΜL (ref 0.17–1.22)
MONOCYTES NFR BLD AUTO: 9 % (ref 4–12)
NEUTROPHILS # BLD AUTO: 9.44 THOUSANDS/ΜL (ref 1.85–7.62)
NEUTS SEG NFR BLD AUTO: 73 % (ref 43–75)
NRBC BLD AUTO-RTO: 0 /100 WBCS
PLATELET # BLD AUTO: 240 THOUSANDS/UL (ref 149–390)
PMV BLD AUTO: 10.4 FL (ref 8.9–12.7)
POTASSIUM SERPL-SCNC: 4.6 MMOL/L (ref 3.5–5.3)
PROT SERPL-MCNC: 6.8 G/DL (ref 6.4–8.4)
PROTHROMBIN TIME: 12.7 SECONDS (ref 11.6–14.5)
RBC # BLD AUTO: 3.56 MILLION/UL (ref 3.81–5.12)
SODIUM SERPL-SCNC: 126 MMOL/L (ref 135–147)
WBC # BLD AUTO: 12.82 THOUSAND/UL (ref 4.31–10.16)

## 2022-01-22 PROCEDURE — 87205 SMEAR GRAM STAIN: CPT | Performed by: EMERGENCY MEDICINE

## 2022-01-22 PROCEDURE — 87040 BLOOD CULTURE FOR BACTERIA: CPT | Performed by: EMERGENCY MEDICINE

## 2022-01-22 PROCEDURE — 83605 ASSAY OF LACTIC ACID: CPT | Performed by: EMERGENCY MEDICINE

## 2022-01-22 PROCEDURE — 87070 CULTURE OTHR SPECIMN AEROBIC: CPT | Performed by: EMERGENCY MEDICINE

## 2022-01-22 PROCEDURE — 99223 1ST HOSP IP/OBS HIGH 75: CPT | Performed by: NURSE PRACTITIONER

## 2022-01-22 PROCEDURE — 85610 PROTHROMBIN TIME: CPT | Performed by: EMERGENCY MEDICINE

## 2022-01-22 PROCEDURE — G1004 CDSM NDSC: HCPCS

## 2022-01-22 PROCEDURE — 99285 EMERGENCY DEPT VISIT HI MDM: CPT | Performed by: EMERGENCY MEDICINE

## 2022-01-22 PROCEDURE — 85730 THROMBOPLASTIN TIME PARTIAL: CPT | Performed by: EMERGENCY MEDICINE

## 2022-01-22 PROCEDURE — 99285 EMERGENCY DEPT VISIT HI MDM: CPT

## 2022-01-22 PROCEDURE — 85025 COMPLETE CBC W/AUTO DIFF WBC: CPT | Performed by: EMERGENCY MEDICINE

## 2022-01-22 PROCEDURE — 87186 SC STD MICRODIL/AGAR DIL: CPT | Performed by: EMERGENCY MEDICINE

## 2022-01-22 PROCEDURE — 72192 CT PELVIS W/O DYE: CPT

## 2022-01-22 PROCEDURE — 36415 COLL VENOUS BLD VENIPUNCTURE: CPT | Performed by: EMERGENCY MEDICINE

## 2022-01-22 PROCEDURE — 96365 THER/PROPH/DIAG IV INF INIT: CPT

## 2022-01-22 PROCEDURE — 99222 1ST HOSP IP/OBS MODERATE 55: CPT | Performed by: SURGERY

## 2022-01-22 PROCEDURE — 80053 COMPREHEN METABOLIC PANEL: CPT | Performed by: EMERGENCY MEDICINE

## 2022-01-22 PROCEDURE — 96366 THER/PROPH/DIAG IV INF ADDON: CPT

## 2022-01-22 PROCEDURE — 83880 ASSAY OF NATRIURETIC PEPTIDE: CPT | Performed by: NURSE PRACTITIONER

## 2022-01-22 PROCEDURE — 87077 CULTURE AEROBIC IDENTIFY: CPT | Performed by: EMERGENCY MEDICINE

## 2022-01-22 RX ORDER — CEFEPIME HYDROCHLORIDE 1 G/1
2000 INJECTION, POWDER, FOR SOLUTION INTRAMUSCULAR; INTRAVENOUS EVERY 12 HOURS
Status: DISCONTINUED | OUTPATIENT
Start: 2022-01-22 | End: 2022-01-22

## 2022-01-22 RX ORDER — MINERAL OIL AND PETROLATUM 150; 830 MG/G; MG/G
1 OINTMENT OPHTHALMIC DAILY
Status: DISCONTINUED | OUTPATIENT
Start: 2022-01-23 | End: 2022-01-25 | Stop reason: HOSPADM

## 2022-01-22 RX ORDER — ACETAMINOPHEN 325 MG/1
650 TABLET ORAL EVERY 6 HOURS PRN
Status: DISCONTINUED | OUTPATIENT
Start: 2022-01-22 | End: 2022-01-25 | Stop reason: HOSPADM

## 2022-01-22 RX ORDER — MORPHINE SULFATE 15 MG/1
15 TABLET, FILM COATED, EXTENDED RELEASE ORAL 2 TIMES DAILY
Status: DISCONTINUED | OUTPATIENT
Start: 2022-01-22 | End: 2022-01-25 | Stop reason: HOSPADM

## 2022-01-22 RX ORDER — GABAPENTIN 600 MG/1
300 TABLET ORAL
Status: DISCONTINUED | OUTPATIENT
Start: 2022-01-22 | End: 2022-01-25 | Stop reason: HOSPADM

## 2022-01-22 RX ORDER — SODIUM CHLORIDE 9 MG/ML
100 INJECTION, SOLUTION INTRAVENOUS CONTINUOUS
Status: DISCONTINUED | OUTPATIENT
Start: 2022-01-22 | End: 2022-01-24

## 2022-01-22 RX ORDER — CEFEPIME HYDROCHLORIDE 1 G/50ML
1000 INJECTION, SOLUTION INTRAVENOUS EVERY 12 HOURS
Status: DISCONTINUED | OUTPATIENT
Start: 2022-01-23 | End: 2022-01-25 | Stop reason: HOSPADM

## 2022-01-22 RX ORDER — VANCOMYCIN HYDROCHLORIDE 1 G/200ML
1000 INJECTION, SOLUTION INTRAVENOUS EVERY 24 HOURS
Status: DISCONTINUED | OUTPATIENT
Start: 2022-01-22 | End: 2022-01-22

## 2022-01-22 RX ORDER — CEFEPIME HYDROCHLORIDE 2 G/50ML
2000 INJECTION, SOLUTION INTRAVENOUS EVERY 12 HOURS
Status: DISCONTINUED | OUTPATIENT
Start: 2022-01-22 | End: 2022-01-22 | Stop reason: DRUGHIGH

## 2022-01-22 RX ORDER — OXYCODONE HYDROCHLORIDE 5 MG/1
5 TABLET ORAL EVERY 12 HOURS PRN
Status: DISCONTINUED | OUTPATIENT
Start: 2022-01-22 | End: 2022-01-25 | Stop reason: HOSPADM

## 2022-01-22 RX ORDER — ATORVASTATIN CALCIUM 40 MG/1
40 TABLET, FILM COATED ORAL
Status: DISCONTINUED | OUTPATIENT
Start: 2022-01-22 | End: 2022-01-25 | Stop reason: HOSPADM

## 2022-01-22 RX ORDER — VANCOMYCIN HYDROCHLORIDE 1 G/200ML
1000 INJECTION, SOLUTION INTRAVENOUS EVERY 24 HOURS
Status: DISCONTINUED | OUTPATIENT
Start: 2022-01-23 | End: 2022-01-23

## 2022-01-22 RX ORDER — AZELASTINE 1 MG/ML
1 SPRAY, METERED NASAL 2 TIMES DAILY
Status: DISCONTINUED | OUTPATIENT
Start: 2022-01-22 | End: 2022-01-25 | Stop reason: HOSPADM

## 2022-01-22 RX ORDER — FLUTICASONE PROPIONATE 50 MCG
2 SPRAY, SUSPENSION (ML) NASAL DAILY
Status: DISCONTINUED | OUTPATIENT
Start: 2022-01-23 | End: 2022-01-25 | Stop reason: HOSPADM

## 2022-01-22 RX ORDER — HEPARIN SODIUM 5000 [USP'U]/ML
5000 INJECTION, SOLUTION INTRAVENOUS; SUBCUTANEOUS EVERY 8 HOURS SCHEDULED
Status: DISCONTINUED | OUTPATIENT
Start: 2022-01-22 | End: 2022-01-25 | Stop reason: HOSPADM

## 2022-01-22 RX ORDER — PANTOPRAZOLE SODIUM 40 MG/1
40 TABLET, DELAYED RELEASE ORAL
Status: DISCONTINUED | OUTPATIENT
Start: 2022-01-23 | End: 2022-01-25 | Stop reason: HOSPADM

## 2022-01-22 RX ORDER — VANCOMYCIN HYDROCHLORIDE 1 G/200ML
15 INJECTION, SOLUTION INTRAVENOUS ONCE
Status: COMPLETED | OUTPATIENT
Start: 2022-01-22 | End: 2022-01-22

## 2022-01-22 RX ORDER — TIZANIDINE 4 MG/1
2 TABLET ORAL 3 TIMES DAILY
Status: DISCONTINUED | OUTPATIENT
Start: 2022-01-22 | End: 2022-01-25 | Stop reason: HOSPADM

## 2022-01-22 RX ADMIN — MORPHINE SULFATE 15 MG: 15 TABLET, EXTENDED RELEASE ORAL at 17:02

## 2022-01-22 RX ADMIN — MORPHINE SULFATE 2 MG: 2 INJECTION, SOLUTION INTRAMUSCULAR; INTRAVENOUS at 23:26

## 2022-01-22 RX ADMIN — OXYCODONE HYDROCHLORIDE 5 MG: 5 TABLET ORAL at 21:55

## 2022-01-22 RX ADMIN — GABAPENTIN 300 MG: 600 TABLET, FILM COATED ORAL at 21:55

## 2022-01-22 RX ADMIN — TIZANIDINE 2 MG: 4 TABLET ORAL at 16:58

## 2022-01-22 RX ADMIN — VANCOMYCIN HYDROCHLORIDE 1000 MG: 1 INJECTION, SOLUTION INTRAVENOUS at 13:05

## 2022-01-22 RX ADMIN — CEFEPIME HYDROCHLORIDE 2000 MG: 2 INJECTION, SOLUTION INTRAVENOUS at 16:51

## 2022-01-22 RX ADMIN — HEPARIN SODIUM 5000 UNITS: 5000 INJECTION INTRAVENOUS; SUBCUTANEOUS at 21:55

## 2022-01-22 RX ADMIN — AZELASTINE HYDROCHLORIDE 1 SPRAY: 137 SPRAY, METERED NASAL at 21:56

## 2022-01-22 RX ADMIN — ATORVASTATIN CALCIUM 40 MG: 40 TABLET, FILM COATED ORAL at 21:55

## 2022-01-22 RX ADMIN — SODIUM CHLORIDE 100 ML/HR: 0.9 INJECTION, SOLUTION INTRAVENOUS at 16:50

## 2022-01-22 RX ADMIN — ACETAMINOPHEN 650 MG: 325 TABLET ORAL at 23:26

## 2022-01-22 RX ADMIN — TIZANIDINE 2 MG: 4 TABLET ORAL at 22:00

## 2022-01-22 RX ADMIN — HEPARIN SODIUM 5000 UNITS: 5000 INJECTION INTRAVENOUS; SUBCUTANEOUS at 16:54

## 2022-01-22 NOTE — PROGRESS NOTES
Vancomycin Assessment    Ruddy Trevino is a 66 y o  female who is currently receiving vancomycin 1000 mg IV q24 for skin-soft tissue infection     Relevant clinical data and objective history reviewed:  Creatinine   Date Value Ref Range Status   01/22/2022 1 42 (H) 0 60 - 1 30 mg/dL Final     Comment:     Standardized to IDMS reference method   01/20/2022 1 62 (H) 0 60 - 1 30 mg/dL Final     Comment:     Standardized to IDMS reference method   12/28/2021 0 81 0 60 - 1 30 mg/dL Final     Comment:     Standardized to IDMS reference method     BP 98/53 (BP Location: Right arm)   Pulse 90   Temp 98 1 °F (36 7 °C) (Temporal)   Resp 18   Wt 66 7 kg (147 lb)   SpO2 95%   BMI 29 69 kg/m²   No intake/output data recorded  Lab Results   Component Value Date/Time    BUN 61 (H) 01/22/2022 11:47 AM    WBC 12 82 (H) 01/22/2022 11:47 AM    HGB 10 6 (L) 01/22/2022 11:47 AM    HCT 33 6 (L) 01/22/2022 11:47 AM    MCV 94 01/22/2022 11:47 AM     01/22/2022 11:47 AM     Temp Readings from Last 3 Encounters:   01/22/22 98 1 °F (36 7 °C) (Temporal)   01/17/22 98 7 °F (37 1 °C) (Temporal)   09/08/21 (!) 97 2 °F (36 2 °C) (Temporal)     Vancomycin Days of Therapy: 1    Assessment/Plan  The patient is currently on vancomycin utilizing scheduled dosing based on adjusted body weight (due to obesity)  Baseline risks associated with therapy include: pre-existing renal impairment and advanced age  The patient is currently receiving 1000 mg IV q24 and is clinically appropriate and dose will be continued  Pharmacy will also follow closely for s/sx of nephrotoxicity, infusion reactions, and appropriateness of therapy  BMP and CBC will be ordered per protocol  Plan for trough as patient approaches steady state, prior to the 4th  dose at approximately 1230 on 1/25/22  Due to infection severity, will target a trough of 15-20 (appropriate for most indications)     Pharmacy will continue to follow the patients culture results and clinical progress daily      Naheed Meadows, Pharmacist

## 2022-01-22 NOTE — ASSESSMENT & PLAN NOTE
· This has been going on since September of 2021  The patient was evaluated by [facility] gyn with vaginal bleeding  She was recommended to follow-up and undergo hysterectomy however have not had a chance to do so  · CT abdomen and pelvis shows low-density thickening of the endometrium     · Hemoglobin stable  · Recommend to follow-up with gynecology outpatient

## 2022-01-22 NOTE — ASSESSMENT & PLAN NOTE
· Patient states that she has had this wound for quite sometime but only was evaluated by Dr Jose Tripathi on  01/17/2022  · The patient was scheduled for a surgical sacral wound debridement on Monday 01/24/2022 with wound VAC placement  However, due to abnormal labs she was recommended to come in to the ED  · Surgery input appreciated   · CT a/p shows sacral wound with soft tissue thickening, fluid, and gas  No sign of osteomyelitis  · Received 1 dose of IV vancomycin  Will vancomycin and cefepime pending surgical wound culture     · Trend procalcitonin  · The patient has leukocytosis however this is improving  · Continue with frequent reposition, local wound care  · Pain management  · Surgery team plans for OR debridement with wound VAC placement on Monday

## 2022-01-22 NOTE — ASSESSMENT & PLAN NOTE
· Hold off on lisinopril and hydrochlorothiazide and diuresis for now due to acute kidney injury and volume depletion  · Monitor blood pressure closely

## 2022-01-22 NOTE — CONSULTS
Consultation - General Surgery   Adella Goltz 66 y o  female MRN: [id number]  Unit/Bed#: ED 06 Encounter: [id number]    Reason for Consult: Sacral Wound  Physician Requesting Consult: Janneth Akbar , *    Assessment/Plan   Adella Goltz is a 66 y o  female with multiple medical comorbidities including HTN, cerebrovascular dz, CAD, post-menopausal bleeding with plan for future THBSO who was recently seen under general surgery consultation by Dr Isamar Bergre on 1/17/2022 for her sacral wound  She was scheduled for surgical sacral wound debridement followed by wound vac placement [place] Monday 1[place]24 [place] [address]  Her pre-operative labs were obtained and she was noted to have leukocytosis, ANDRES, and hyponatremia  Dr Isamar Berger reviewed these results and recommended she be evaluated at [facility] ED for treatment and medical optimization before surgical debridement  Stage III Sacral Decubitus Wound  -Patient was examined around 1:00pm today with Dr Kimberly Colindres  Daughter present at bedside  All questions/concerns answered    -1/22 CTAP reveals: sacral wound with soft tissue thickening, fluid, and gas   No signs of osteomyelitis   -See physical exam and media below   -Daily wound checks  -Wound care orders placed for nursing   -Offloading  -Frequent position changes   -Medically optimize before surgical sacral wound debridement   -Trend CBC, BMP  -Plan for eventual OR debridement with wound vac placement     Leukocytosis  -WBC improved 12 82>16 05  -May be related to wound, however no acute signs of infection based on physical exam and has been improving    ANDRES  -Cr 1 42>1 63  -Baseline 0 8-0 9  -Likely prerenal, 2/2 dehydration  -Monitor renal indices, I/Os  -IVF  -Avoid nephrotoxic agents    Hyponatremia  -Na 126>130  -Likely related to dehydration/poor oral intake with recent decreased appetite   -Medical management per primary           Subjective   Chief Complaint: I was referred to come here by   Render Ramón, due to my abnormal lab values     History of Present Illness   HPI:  Rogers Mandujano is a 66 y o  female who presents to [facility] ED for a workup and tx of her abnormal pre-op lab values  Her daughter is also with her at bedside, but the patient provides her own history  States she does have some discomfort at the area of the wound, but does not feel any different over the last few days compared to previously  Daughter reports that she saw the wound and was concerned and says there has been significant malodor coming from the wound  [facility] has been providing wound care at home  Per patient, they have been placing silver on the wound and a thick sticky pad  Denies any significant saturation through the pad  Patient admits to "just feeling sick " Denies fevers/chills  Reports decreased appetite, no N/V/D/C or changes in bowel/urinary habits  Review of Systems   Constitutional: Positive for appetite change  Negative for chills and fever  HENT: Negative  Eyes: Negative  Respiratory: Positive for cough and shortness of breath  Cardiovascular: Positive for chest pain and palpitations  Gastrointestinal: Positive for abdominal distention, constipation, diarrhea, nausea and vomiting  Genitourinary: Negative for difficulty urinating and dysuria  Musculoskeletal: Positive for neck pain  Skin: Positive for wound (sacral wound)  Neurological: Positive for dizziness and headaches (occipital region)  Negative for weakness and light-headedness  Hematological: Negative  Psychiatric/Behavioral: Negative          Historical Information   Past Medical History:   Diagnosis Date    Anxiety     Arthritis     Bernard's esophagus     [facility]eds easily [facility]     CVA (cerebral vascular accident) ([facility] ) 10/2016    Fibromyalgia     GERD (gastroesophageal reflux disease)     Hypercholesterolemia     Hypertension     Insomnia     Rheumatoid arthritis ([facility]     [facility] Past Surgical History:   Procedure Laterality Date    APPENDECTOMY      BREAST BIOPSY Bilateral     benign    CARPAL TUNNEL RELEASE Left 10/27/2003    DORSAL COMPARTMENT RELEASE Left 2006    FINGER SURGERY      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    KNEE ARTHROSCOPY Right     x3(,,)    OTHER SURGICAL HISTORY Left 2006    tennis elbow release    REPLACEMENT TOTAL KNEE BILATERAL      rt-, lt-1/13/10    ROTATOR CUFF REPAIR Bilateral     SHOULDER ARTHROSCOPY Right 02/10/2016    SHOULDER ARTHROSCOPY Left     08,    SHOULDER ARTHROSCOPY Left 2006    TONSILLECTOMY AND ADENOIDECTOMY      TRIGGER FINGER RELEASE Left 2002    middle and ring finger    ULNAR NERVE TRANSPOSITION Left      Social History   Social History     Substance and Sexual Activity   Alcohol Use Not Currently     Social History     Substance and Sexual Activity   Drug Use Yes    Types: Marijuana    Comment: medical marijuana     Social History     Tobacco Use   Smoking Status Former Smoker    Types: Cigarettes    Quit date:     Years since quittin 0   Smokeless Tobacco Never Used     Family History:   Family History   Problem Relation Age of Onset    Arthritis Mother     Lung cancer Father     Brain cancer Father     No Known Problems Daughter     No Known Problems Maternal Grandmother     No Known Problems Maternal Grandfather     No Known Problems Paternal Grandmother     No Known Problems Paternal Grandfather     No Known Problems Daughter     No Known Problems Maternal Aunt     No Known Problems Paternal Aunt     No Known Problems Paternal Aunt     No Known Problems Paternal Aunt        Meds/Allergies   all medications and allergies reviewed  No Known Allergies        Objective   First Vitals:   Blood Pressure: 98/53 (22 1109)  Pulse: 90 (22 1109)  Temperature: 98 1 °F (36 7 °C) (22 1109)  Temp Source: Temporal (22 1109)  Respirations: 18 (01/22/22 1109)  Weight - Scale: 66 7 kg (147 lb) (01/22/22 1109)  SpO2: 95 % (01/22/22 1109) Body mass index is 29 69 kg/m²  Current Vitals:   Blood Pressure: 98/53 (01/22/22 1109)  Pulse: 90 (01/22/22 1109)  Temperature: 98 1 °F (36 7 °C) (01/22/22 1109)  Temp Source: Temporal (01/22/22 1109)  Respirations: 18 (01/22/22 1109)  Weight - Scale: 66 7 kg (147 lb) (01/22/22 1109)  SpO2: 95 % (01/22/22 1109)   I/Os:    Intake/Output Summary (Last 24 hours) at 1/22/2022 1521  Last data filed at 1/22/2022 1436  Gross per 24 hour   Intake 200 ml   Output --   Net 200 ml     Lines/Drains:  Invasive Devices  Report    Peripheral Intravenous Line            Peripheral IV 01/22/22 Left Arm <1 day                Physical Exam  Vitals reviewed  Constitutional:       General: She is not in acute distress  Appearance: She is not toxic-appearing  Comments: Daughter present at bedside  Nontoxic, well-appearing 66year old   HENT:      Head: Normocephalic and atraumatic  Nose: No rhinorrhea  Eyes:      Extraocular Movements: Extraocular movements intact  Cardiovascular:      Rate and Rhythm: Normal rate and regular rhythm  Pulmonary:      Effort: Pulmonary effort is normal       Breath sounds: Normal breath sounds  Abdominal:      General: There is no distension  Palpations: Abdomen is soft  Musculoskeletal:         General: No tenderness  Cervical back: Neck supple  Right lower leg: No edema  Skin:     General: Skin is warm and dry  Comments: Stage 3 sacral wound, thickened soft black slough covers entire wound bed, dressing with wet sloughy material, no active purulent drainage    No surrounding erythema, edema, warmth, slightly tender to palpation, no flutuance or induration of surrounding skin   Neurological:      General: No focal deficit present  Mental Status: She is alert and oriented to person, place, and time               Lab Results: I have personally reviewed pertinent lab results      Recent Results (from the past 36 hour(s))   Lactic acid    Collection Time: 01/22/22 11:41 AM   Result Value Ref Range    LACTIC ACID 0 8 0 5 - 2 0 mmol/L   CBC and differential    Collection Time: 01/22/22 11:47 AM   Result Value Ref Range    WBC 12 82 (H) 4 31 - 10 16 Thousand/uL    RBC 3 56 (L) 3 81 - 5 12 Million/uL    Hemoglobin 10 6 (L) 11 5 - 15 4 g/dL    Hematocrit 33 6 (L) 34 8 - 46 1 %    MCV 94 82 - 98 fL    MCH 29 8 26 8 - 34 3 pg    MCHC 31 5 31 4 - 37 4 g/dL    RDW 12 1 11 6 - 15 1 %    MPV 10 4 8 9 - 12 7 fL    Platelets 664 036 - 770 Thousands/uL    nRBC 0 /100 WBCs    Neutrophils Relative 73 43 - 75 %    Immat GRANS % 1 0 - 2 %    Lymphocytes Relative 11 (L) 14 - 44 %    Monocytes Relative 9 4 - 12 %    Eosinophils Relative 5 0 - 6 %    Basophils Relative 1 0 - 1 %    Neutrophils Absolute 9 44 (H) 1 85 - 7 62 Thousands/µL    Immature Grans Absolute 0 08 0 00 - 0 20 Thousand/uL    Lymphocytes Absolute 1 41 0 60 - 4 47 Thousands/µL    Monocytes Absolute 1 12 0 17 - 1 22 Thousand/µL    Eosinophils Absolute 0 69 (H) 0 00 - 0 61 Thousand/µL    Basophils Absolute 0 08 0 00 - 0 10 Thousands/µL   Comprehensive metabolic panel    Collection Time: 01/22/22 11:47 AM   Result Value Ref Range    Sodium 126 (L) 135 - 147 mmol/L    Potassium 4 6 3 5 - 5 3 mmol/L    Chloride 91 (L) 96 - 108 mmol/L    CO2 34 (H) 21 - 32 mmol/L    ANION GAP 1 (L) 4 - 13 mmol/L    BUN 61 (H) 5 - 25 mg/dL    Creatinine 1 42 (H) 0 60 - 1 30 mg/dL    Glucose 147 (H) 65 - 140 mg/dL    Calcium 10 6 (H) 8 4 - 10 2 mg/dL    Corrected Calcium 11 1 (H) 8 3 - 10 1 mg/dL    AST 70 (H) 13 - 39 U/L    ALT 44 7 - 52 U/L    Alkaline Phosphatase 59 34 - 104 U/L    Total Protein 6 8 6 4 - 8 4 g/dL    Albumin 3 4 (L) 3 5 - 5 0 g/dL    Total Bilirubin 0 37 0 20 - 1 00 mg/dL    eGFR 36 ml/min/1 73sq m   Protime-INR    Collection Time: 01/22/22 11:47 AM   Result Value Ref Range    Protime 12 7 11 6 - 14 5 seconds    INR 0 96 0 84 - 1 19   APTT    Collection Time: 01/22/22 11:47 AM   Result Value Ref Range    PTT 30 23 - 37 seconds     Imaging: I have personally reviewed pertinent reports  CT pelvis wo contrast    Result Date: 1/22/2022  Impression: Sacral decubitus ulcer with associated soft tissue thickening, fluid and locules of gas extending to the posterior margin of the sacrum without overt destruction to suggest osteomyelitis  Low-density thickening of the endometrium again noted  Although difficult to determine on noncontrast imaging, this may have progressed from prior study  Please see pelvic ultrasound report from [facility] dated 9/14/2021  Workstation performed: [id number]     EKG, Pathology, and Other Studies: I have personally reviewed pertinent reports  VTE Prophylaxis: Sequential compression device (Venodyne)        Counseling / Coordination of Care  Counseling/Coordination of Care: Total floor / unit time spent today 20 minutes  Greater than 50% of total time was spent with the patient and / or family counseling and / or coordination of care  A description of the counseling / coordination of care: discussed dx and tx plan with patient and patients daughter  Dr Murray Young discussed with ED provider and hospitalists        [address] [place] [address]  1/22/2022

## 2022-01-22 NOTE — H&P
[address] 44  H&P- Edwin Goodpasture 1950, 66 y o  female MRN: [id number]  Unit/Bed#: ED 06 Encounter: [id number]  Primary Care Provider: Melita Mercado MD   Date and time admitted to hospital: 1/22/2022 11:12 AM    * Stage III pressure ulcer of sacral region [facility])  Assessment & Plan  · Patient states that she has had this wound for quite sometime but only was evaluated by Dr Nico Lorenzana on  01/17/2022  · The patient was scheduled for a surgical sacral wound debridement on Monday 01/24/2022 with wound VAC placement  However, due to abnormal labs she was recommended to come in to the ED  · Surgery input appreciated   · CT a/p shows sacral wound with soft tissue thickening, fluid, and gas  No sign of osteomyelitis  · Received 1 dose of IV vancomycin  Will vancomycin and cefepime pending surgical wound culture     · Trend procalcitonin  · The patient has leukocytosis however this is improving  · Continue with frequent reposition, local wound care  · Pain management  · Surgery team plans for OR debridement with wound VAC placement on Monday    ANDRES (acute kidney injury) ([facility] [address] )  Assessment & Plan  · Baseline Cr appears to be 0 7 to 0 9 mg/dL  · Presented with creatinine of 1 42 mg/dL today  · Suspected this is likely due to acute infection and volume depletion in the setting of diuresis use  · Obtain PVR  · CT abdomen and pelvis without hydronephrosis  · Gentle IV fluid; monitor closely with lower extremity edema  · Avoid nephrotoxins and hypotension  · Hold furosemide for now  · Will hold off on consult to Nephrology unless renal function worsens    Results from last 7 days   Lab Units 01/22/22  1147 01/20/22  1400   CREATININE mg/dL 1 42* 1 62*         Lower extremity edema  Assessment & Plan  · Will check BNP   · No prior echocardiogram result to review   · Chronic lower extremities edema on furosemide 20 mg daily  · Hold off on diuresis at this time due to acute kidney injury  · Wrap lower extremity and elevate as tolerated    Vaginal bleeding  Assessment & Plan  · This has been going on since September of 2021  The patient was evaluated by [facility] gyn with vaginal bleeding  She was recommended to follow-up and undergo hysterectomy however have not had a chance to do so  · CT abdomen and pelvis shows low-density thickening of the endometrium  · Hemoglobin stable  · Recommend to follow-up with gynecology outpatient    Hyponatremia  Assessment & Plan  · Likely hypovolemia hyponatremia  · Obtain urine study   · Gentle IVF   · Monitor sodium level     Coronary artery disease involving native coronary artery of native heart without angina pectoris  Assessment & Plan  · Continue with medical management  · Hold Plavix for now for debridement plan on Monday by surgery  · Continue with statin    Essential hypertension  Assessment & Plan  · Hold off on lisinopril and hydrochlorothiazide and diuresis for now due to acute kidney injury and volume depletion  · Monitor blood pressure closely    VTE Prophylaxis: Heparin  Code Status: full code   Discussion with family: patient and daughter at bedside     Anticipated Length of Stay:  Patient will be admitted on an Inpatient basis with an anticipated length of stay of  > 2 midnights  Justification for Hospital Stay: sacral wound     Chief Complaint:  Sent here by Dr Dinesh Munoz for "worsening labwork"  Was to have wound debridement this coming monday    History of Present Illness:    Timothy Caldwell is a 66 y o  female who presents to the ED today with her daughter at the request of DR Dinesh Munoz due to worsening labwork (patient was due to have debridement of wound by Dr Luann Gonzalez on 1/24/22)  Patient states that she has rheumatoid arthritis and sleeps in an upright position which put pressure on her sacral area causing the wound   She is unclear as to exactly how long she had this wound, daughter states it developed "sometime after the summer " Patient states she was initially embarrassed about the wound so she did not seek medical attention initially  She first sought out dermatology for treatment and remarks "they tried a few things but it didn't get better so they referred me to wound care "  Since then, wound care has had VNA set up and patient reports they have been coming to the house twice a week for wound care, which is a "silver packing covred by a foamy dressing" per the patient  Last week she saw Dr Johana Thomas due to rash on her lower extremities and some leg swelling, was prescribed 7 days of clindamycin and the rash resolved  PCP referred her to Dr Dana Peña who saw her for the first time on Monday of this week and planned for a debridement of the wound on 1/24/22  She was sent in to the ED today by Dr Dana Peña due to worsening lab work and possible wound infection requiring antibiotic  She also mentioned she has been having postmenopausal vaginal bleeding since July  CT of abdomen and pelvis 7/30/21 showed 2 8 cm thickened endometrial canal, indeterminate  Differential includes endometrial carcinoma, endometrial hyperplasia, among other differentials  Patient has been following with GYN who recommend a hysterectomy, however, they requested clearance by neurology who wanted to do a CT scan of the head which she didn't persue "due to her worsening sacral wound"  She felt she would handle the sacral wound first     Review of Systems:    Review of Systems   Constitutional: Negative for chills, fatigue and fever  HENT: Negative for congestion, ear discharge, ear pain, sinus pressure, sinus pain and sore throat  Eyes: Negative for pain, discharge and itching  Respiratory: Negative for cough, chest tightness, shortness of breath, wheezing and stridor  Cardiovascular: Positive for leg swelling  Negative for chest pain and palpitations          Reports chronic leg swelling    Gastrointestinal: Negative for abdominal distention, abdominal pain, anal bleeding, blood in stool, constipation, diarrhea, nausea, rectal pain and vomiting  Endocrine: Negative  Negative for cold intolerance and heat intolerance  Genitourinary: Positive for vaginal bleeding  Negative for difficulty urinating, dysuria, frequency, hematuria, pelvic pain and vaginal pain  Patient reports she has had ongoing vaginal bleeding for a few months now  Is following with GYN in the [facility] recommended hysterectomy but needed further testing so didn't pursue yet   Musculoskeletal: Positive for back pain and neck pain  Patient reports she has RA and has neck and back pain    Skin: Positive for wound  Negative for pallor  Allergic/Immunologic: Negative for environmental allergies, food allergies and immunocompromised state  Neurological: Negative for dizziness, weakness, light-headedness and headaches  Hematological: Negative for adenopathy  Does not bruise/bleed easily  Psychiatric/Behavioral: Negative for agitation, confusion and decreased concentration         Past Medical and Surgical History:     Past Medical History:   Diagnosis Date    Anxiety     Arthritis     Bernard's esophagus     Bleeds easily ([facility] [address] )     CVA (cerebral vascular accident) ([facility] [address] ) 10/2016    Fibromyalgia     GERD (gastroesophageal reflux disease)     Hypercholesterolemia     Hypertension     Insomnia     Rheumatoid arthritis ([facility] [address] )     Stroke ([facility] [address] ) 2016       Past Surgical History:   Procedure Laterality Date    APPENDECTOMY      BREAST BIOPSY Bilateral     benign    CARPAL TUNNEL RELEASE Left 10/27/2003    DORSAL COMPARTMENT RELEASE Left 03/09/2006    FINGER SURGERY      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    KNEE ARTHROSCOPY Right     x3(6/91,7/96,7/99)    OTHER SURGICAL HISTORY Left 03/09/2006    tennis elbow release    REPLACEMENT TOTAL KNEE BILATERAL      rt-11/99, lt-1/13/10    ROTATOR CUFF REPAIR Bilateral     SHOULDER ARTHROSCOPY Right 02/10/2016    SHOULDER ARTHROSCOPY Left     11/26/08,9/99    SHOULDER ARTHROSCOPY Left 03/09/2006    TONSILLECTOMY AND ADENOIDECTOMY      TRIGGER FINGER RELEASE Left 07/01/2002    middle and ring finger    ULNAR NERVE TRANSPOSITION Left        Meds/Allergies:    Prior to Admission medications    Medication Sig Start Date End Date Taking? Authorizing Provider   artificial tear (LUBRIFRESH P M ) 83-15 % ophthalmic ointment 1 application daily    Historical Provider, MD   atorvastatin (LIPITOR) 40 mg tablet Take 40 mg by mouth daily at bedtime 7/18/19   Historical Provider, MD   azelastine (ASTELIN) 0 1 % nasal spray 1 spray into each nostril 2 (two) times a day 9/9/19   Max Powers DO   clindamycin (CLEOCIN) 300 MG capsule clindamycin HCl 300 mg capsule 1/12/22   Historical Provider, MD   clopidogrel (PLAVIX) 75 mg tablet  6/28/19   Historical Provider, MD   famotidine (PEPCID) 20 mg tablet famotidine 20 mg tablet   Take by oral route for 30 days      Historical Provider, MD   fluticasone Radha Cobble) 50 mcg/act nasal spray 2 sprays into each nostril daily 6/7/21 7/7/21  Max Powers DO   furosemide (LASIX) 20 mg tablet Take 20 mg by mouth daily    Historical Provider, MD   gabapentin (NEURONTIN) 600 MG tablet Take 600 mg by mouth 3 (three) times a day 8/12/19   Historical Provider, MD   lisinopril-hydrochlorothiazide (PRINZIDE,ZESTORETIC) 10-12 5 MG per tablet take 2 tablets by mouth once daily 4/3/19   Historical Provider, MD   Melatonin 5 MG TABS Take 5 mg by mouth  Patient not taking: Reported on 1/17/2022     Historical Provider, MD   morphine (MS CONTIN) 15 mg 12 hr tablet Take 15 mg by mouth 2 (two) times a day 8/28/19   Historical Provider, MD   omeprazole (PriLOSEC) 20 mg delayed release capsule omeprazole 20 mg capsule,delayed release    Historical Provider, MD   oxyCODONE (OXY-IR) 5 MG capsule Take 5 mg by mouth 2 (two) times a day    Historical Provider, MD   oxyCODONE-acetaminophen (PERCOCET) 7 5-325 MG per tablet oxycodone-acetaminophen 7 5 mg-325 mg tablet   1 tab Q8h prn  Patient not taking: Reported on 2022    Historical Provider, MD   pantoprazole (PROTONIX) 40 mg tablet  19   Historical Provider, MD   TiZANidine ([place]) 2 MG capsule  19   Historical Provider, MD   traZODone (DESYREL) 50 mg tablet  19   Historical Provider, MD Torres Daigle Petrolatum-Mineral Oil (Soothe Night Time) OINT Apply 1 application to eye daily at bedtime  Patient not taking: Reported on 2022     Historical Provider, MD   zinc gluconate 50 mg tablet Take 50 mg by mouth daily  Patient not taking: Reported on 2022     Historical Provider, MD     all medications and allergies reviewed    Allergies: No Known Allergies    Social History:     Marital Status: /Civil Union   Occupation: retired   Patient Pre-hospital Living Situation: family   Patient Pre-hospital Level of Mobility: assist  Patient Pre-hospital Diet Restrictions: none   Substance Use History:   Social History     Substance and Sexual Activity   Alcohol Use Not Currently     Social History     Tobacco Use   Smoking Status Former Smoker    Types: Cigarettes    Quit date:     Years since quittin 0   Smokeless Tobacco Never Used     Social History     Substance and Sexual Activity   Drug Use Yes    Types: Marijuana    Comment: medical marijuana       Family History:  I have reviewed the patient's family history    Physical Exam:     Vitals:   Blood Pressure: 98/53 (22 1109)  Pulse: 90 (22 1109)  Temperature: 98 1 °F (36 7 °C) (22 1109)  Temp Source: Temporal (22 110)  Respirations: 18 (22 1109)  Weight - Scale: 66 7 kg (147 lb) (22 1109)  SpO2: 95 % (22 1109)    Physical Exam  Vitals and nursing note reviewed  Constitutional:       General: She is not in acute distress  Appearance: Normal appearance  She is not toxic-appearing        Comments: Thin, frail   HENT: Head: Normocephalic and atraumatic  Right Ear: External ear normal       Left Ear: External ear normal       Nose: Nose normal  No congestion or rhinorrhea  Mouth/Throat:      Mouth: Mucous membranes are moist       Pharynx: Oropharynx is clear  Eyes:      General:         Right eye: No discharge  Left eye: No discharge  Conjunctiva/sclera: Conjunctivae normal       Pupils: Pupils are equal, round, and reactive to light  Neck:      Comments: Patient reports always has limited ROM in neck due to RA  Cardiovascular:      Rate and Rhythm: Normal rate and regular rhythm  Pulses: Normal pulses  Heart sounds: Normal heart sounds  No murmur heard  No friction rub  No gallop  Pulmonary:      Effort: Pulmonary effort is normal  No respiratory distress  Breath sounds: Normal breath sounds  No stridor  No wheezing, rhonchi or rales  Chest:      Chest wall: No tenderness  Abdominal:      General: Bowel sounds are normal  There is no distension  Palpations: Abdomen is soft  There is no mass  Tenderness: There is no abdominal tenderness  There is no guarding  Genitourinary:     Vagina: Vaginal discharge present  Comments: Pt reports ongoing vaginal bleeding since July has been following with GYN and states they recommended a hyster but required neurology clearance, then she developed the gluteal  wound in the interim so didn't pursue the hyster at this time  Musculoskeletal:         General: No swelling or tenderness  Normal range of motion  Cervical back: Normal range of motion and neck supple  No tenderness  No muscular tenderness  Right lower leg: Edema present  Left lower leg: Edema present  Lymphadenopathy:      Cervical: No cervical adenopathy  Skin:     General: Skin is warm and dry  Capillary Refill: Capillary refill takes less than 2 seconds  Findings: Bruising present  No erythema or rash        Comments: Sacral wound POA     Neurological:      General: No focal deficit present  Mental Status: She is alert and oriented to person, place, and time  Mental status is at baseline  Cranial Nerves: No cranial nerve deficit  Motor: No weakness  Gait: Gait normal       Comments: Ambulates independently with cane gait steady    Psychiatric:         Mood and Affect: Mood normal          Behavior: Behavior normal          Thought Content: Thought content normal          Judgment: Judgment normal          Additional Data:     Lab Results: I have personally reviewed pertinent reports  Results from last 7 days   Lab Units 01/22/22  1147   WBC Thousand/uL 12 82*   HEMOGLOBIN g/dL 10 6*   HEMATOCRIT % 33 6*   PLATELETS Thousands/uL 240   NEUTROS PCT % 73   LYMPHS PCT % 11*   MONOS PCT % 9   EOS PCT % 5     Results from last 7 days   Lab Units 01/22/22  1147   SODIUM mmol/L 126*   POTASSIUM mmol/L 4 6   CHLORIDE mmol/L 91*   CO2 mmol/L 34*   BUN mg/dL 61*   CREATININE mg/dL 1 42*   ANION GAP mmol/L 1*   CALCIUM mg/dL 10 6*   ALBUMIN g/dL 3 4*   TOTAL BILIRUBIN mg/dL 0 37   ALK PHOS U/L 59   ALT U/L 44   AST U/L 70*   GLUCOSE RANDOM mg/dL 147*     Results from last 7 days   Lab Units 01/22/22  1147   INR  0 96             Results from last 7 days   Lab Units 01/22/22  1141   LACTIC ACID mmol/L 0 8       Imaging: I have personally reviewed pertinent reports  CT pelvis wo contrast   Final Result by Army Sicard, DO (01/22 1437)      Sacral decubitus ulcer with associated soft tissue thickening, fluid and locules of gas extending to the posterior margin of the sacrum without overt destruction to suggest osteomyelitis  Low-density thickening of the endometrium again noted  Although difficult to determine on noncontrast imaging, this may have progressed from prior study  Please see pelvic ultrasound report from [facility] dated 9/14/2021               Workstation performed: [id number] EKG, Pathology, and Other Studies Reviewed on Admission:   · EKG: sinus tachycardia     Epic / Care Everywhere Records Reviewed: Yes    ** Please Note: This note has been constructed using a voice recognition system   **

## 2022-01-22 NOTE — ASSESSMENT & PLAN NOTE
· Continue with medical management  · Hold Plavix for now for debridement plan on Monday by surgery  · Continue with statin

## 2022-01-22 NOTE — ASSESSMENT & PLAN NOTE
· Baseline Cr appears to be 0 7 to 0 9 mg/dL  · Presented with creatinine of 1 42 mg/dL today  · Suspected this is likely due to acute infection and volume depletion in the setting of diuresis use  · Obtain PVR  · CT abdomen and pelvis without hydronephrosis  · Gentle IV fluid; monitor closely with lower extremity edema  · Avoid nephrotoxins and hypotension  · Hold furosemide for now  · Will hold off on consult to Nephrology unless renal function worsens    Results from last 7 days   Lab Units 01/22/22  1147 01/20/22  1400   CREATININE mg/dL 1 42* 1 62*

## 2022-01-22 NOTE — ED PROVIDER NOTES
History  Chief Complaint   Patient presents with    Abnormal Lab     pt reports she was sent by Dr Marcelle Winn for elevated WBC, "kidney function was off" and other blood work was not good  pts daughter reports she is to have surgery monday for a wound to her sacrum that appears infected   Wound Infection     65-year-old female presents emergency department complaining of worsening blood work that was done on an outpatient basis as well as an infection that is getting out of hand over the left superior gluteal cleft  Dr Oliva Dailey said the patient to be admitted for IV antibiotics to prepare her for surgery on Monday  The patient denies any chest pain shortness of breath or high fevers  Patient denies any abdominal pain at this time  Prior to Admission Medications   Prescriptions Last Dose Informant Patient Reported? Taking? Melatonin 5 MG TABS  Self Yes No   Sig: Take 5 mg by mouth   Patient not taking: Reported on 2022    TiZANidine (ZANAFLEX) 2 MG capsule  Self Yes No   White Petrolatum-Mineral Oil (Soothe Night Time) OINT  Self Yes No   Sig: Apply 1 application to eye daily at bedtime   Patient not taking: Reported on 2022    artificial tear (LUBRIFRESH P M ) 83-15 % ophthalmic ointment  Self Yes No   Si application daily   atorvastatin (LIPITOR) 40 mg tablet  Self Yes No   Sig: Take 40 mg by mouth daily at bedtime   azelastine (ASTELIN) 0 1 % nasal spray  Self No No   Si spray into each nostril 2 (two) times a day   clindamycin (CLEOCIN) 300 MG capsule  Self Yes No   Sig: clindamycin HCl 300 mg capsule   clopidogrel (PLAVIX) 75 mg tablet  Self Yes No   famotidine (PEPCID) 20 mg tablet  Self Yes No   Sig: famotidine 20 mg tablet   Take by oral route for 30 days     fluticasone (FLONASE) 50 mcg/act nasal spray   No No   Si sprays into each nostril daily   furosemide (LASIX) 20 mg tablet  Self Yes No   Sig: Take 20 mg by mouth daily   gabapentin (NEURONTIN) 600 MG tablet  Self Yes No   Sig: Take 600 mg by mouth 3 (three) times a day   lisinopril-hydrochlorothiazide (PRINZIDE,ZESTORETIC) 10-12 5 MG per tablet  Self Yes No   Sig: take 2 tablets by mouth once daily   morphine (MS CONTIN) 15 mg 12 hr tablet  Self Yes No   Sig: Take 15 mg by mouth 2 (two) times a day   omeprazole (PriLOSEC) 20 mg delayed release capsule  Self Yes No   Sig: omeprazole 20 mg capsule,delayed release   oxyCODONE (OXY-IR) 5 MG capsule  Self Yes No   Sig: Take 5 mg by mouth 2 (two) times a day   oxyCODONE-acetaminophen (PERCOCET) 7 5-325 MG per tablet  Self Yes No   Sig: oxycodone-acetaminophen 7 5 mg-325 mg tablet   1 tab Q8h prn   Patient not taking: Reported on 1/17/2022   pantoprazole (PROTONIX) 40 mg tablet  Self Yes No   traZODone (DESYREL) 50 mg tablet  Self Yes No   zinc gluconate 50 mg tablet  Self Yes No   Sig: Take 50 mg by mouth daily   Patient not taking: Reported on 1/17/2022       Facility-Administered Medications: None       Past Medical History:   Diagnosis Date    Anxiety     Arthritis     Bernard's esophagus     [facility] [address] [facility]     CVA (cerebral vascular accident) ([facility] [address] ) 10/2016    Fibromyalgia     GERD (gastroesophageal reflux disease)     Hypercholesterolemia     Hypertension     Insomnia     Rheumatoid arthritis [facility] [address] [facility]     [facility] 2016       Past Surgical History:   Procedure Laterality Date    APPENDECTOMY      BREAST BIOPSY Bilateral     benign    CARPAL TUNNEL RELEASE Left 10/27/2003    DORSAL COMPARTMENT RELEASE Left 03/09/2006    FINGER SURGERY      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    KNEE ARTHROSCOPY Right     x3(6/91,7/96,7/99)    OTHER SURGICAL HISTORY Left 03/09/2006    tennis elbow release    REPLACEMENT TOTAL KNEE BILATERAL      rt-11/99, lt-1/13/10    ROTATOR CUFF REPAIR Bilateral     SHOULDER ARTHROSCOPY Right 02/10/2016    SHOULDER ARTHROSCOPY Left     11/26/08,9/99    SHOULDER ARTHROSCOPY Left 03/09/2006    TONSILLECTOMY AND ADENOIDECTOMY  TRIGGER FINGER RELEASE Left 2002    middle and ring finger    ULNAR NERVE TRANSPOSITION Left        Family History   Problem Relation Age of Onset    Arthritis Mother     Lung cancer Father     Brain cancer Father     No Known Problems Daughter     No Known Problems Maternal Grandmother     No Known Problems Maternal Grandfather     No Known Problems Paternal Grandmother     No Known Problems Paternal Grandfather     No Known Problems Daughter     No Known Problems Maternal Aunt     No Known Problems Paternal Aunt     No Known Problems Paternal Aunt     No Known Problems Paternal Aunt      I have reviewed and agree with the history as documented  E-Cigarette/Vaping    E-Cigarette Use Never User      E-Cigarette/Vaping Substances    Nicotine No     THC No     CBD No     Flavoring No     Other No     Unknown No      Social History     Tobacco Use    Smoking status: Former Smoker     Types: Cigarettes     Quit date:      Years since quittin 0    Smokeless tobacco: Never Used   Vaping Use    Vaping Use: Never used   Substance Use Topics    Alcohol use: Not Currently    Drug use: Yes     Types: Marijuana     Comment: medical marijuana       Review of Systems   Constitutional: Positive for fatigue  Negative for chills and fever  HENT: Negative for ear pain and sore throat  Eyes: Negative for pain and visual disturbance  Respiratory: Negative for cough and shortness of breath  Cardiovascular: Negative for chest pain and palpitations  Gastrointestinal: Negative for abdominal pain and vomiting  Genitourinary: Negative for dysuria and hematuria  Musculoskeletal: Negative for arthralgias and back pain  Skin: Positive for color change and wound  Negative for rash  Neurological: Negative for seizures and syncope  All other systems reviewed and are negative  Physical Exam  Physical Exam  Vitals and nursing note reviewed     Constitutional:       General: She is not in acute distress  Appearance: Normal appearance  She is well-developed  She is ill-appearing  HENT:      Head: Normocephalic and atraumatic  Right Ear: External ear normal       Left Ear: External ear normal       Nose: Nose normal       Mouth/Throat:      Mouth: Mucous membranes are moist    Eyes:      Conjunctiva/sclera: Conjunctivae normal    Cardiovascular:      Rate and Rhythm: Normal rate and regular rhythm  Pulses: Normal pulses  Heart sounds: Murmur heard  Pulmonary:      Effort: Pulmonary effort is normal  No respiratory distress  Breath sounds: Normal breath sounds  Abdominal:      General: Bowel sounds are normal       Palpations: Abdomen is soft  Tenderness: There is no abdominal tenderness  Musculoskeletal:         General: No swelling or deformity  Cervical back: Neck supple  Skin:     General: Skin is warm and dry  Capillary Refill: Capillary refill takes less than 2 seconds  Findings: Lesion present  Comments: Roughly a 3 x 3 cm necrotic appearing wound to the left superior gluteal cleft with faint surrounding erythema  Foul smelling  Neurological:      General: No focal deficit present  Mental Status: She is alert and oriented to person, place, and time  Mental status is at baseline           Vital Signs  ED Triage Vitals   Temperature Pulse Respirations Blood Pressure SpO2   01/22/22 1109 01/22/22 1109 01/22/22 1109 01/22/22 1109 01/22/22 1109   98 1 °F (36 7 °C) 90 18 98/53 95 %      Temp Source Heart Rate Source Patient Position - Orthostatic VS BP Location FiO2 (%)   01/22/22 1109 01/22/22 1109 -- 01/22/22 1109 --   Temporal Monitor  Right arm       Pain Score       01/22/22 1702       10 - Worst Possible Pain           Vitals:    01/22/22 1109   BP: 98/53   Pulse: 90         Visual Acuity  Visual Acuity      Most Recent Value   L Pupil Size (mm) 3   R Pupil Size (mm) 3   L Pupil Shape Round   R Pupil Shape Round ED Medications  Medications   iohexol (OMNIPAQUE) 240 MG/ML solution 50 mL (has no administration in time range)   artificial tear (LUBRIFRESH P M ) ophthalmic ointment 1 application (has no administration in time range)   atorvastatin (LIPITOR) tablet 40 mg (has no administration in time range)   azelastine (ASTELIN) 0 1 % nasal spray 1 spray (has no administration in time range)   fluticasone (FLONASE) 50 mcg/act nasal spray 2 spray (has no administration in time range)   gabapentin (NEURONTIN) tablet 300 mg (has no administration in time range)   morphine (MS CONTIN) ER tablet 15 mg (15 mg Oral Given 1/22/22 1702)   pantoprazole (PROTONIX) EC tablet 40 mg (has no administration in time range)   oxyCODONE (ROXICODONE) IR tablet 5 mg (has no administration in time range)   tiZANidine (ZANAFLEX) tablet 2 mg (2 mg Oral Given 1/22/22 1658)   sodium chloride 0 9 % infusion (100 mL/hr Intravenous New Bag 1/22/22 1650)   acetaminophen (TYLENOL) tablet 650 mg (has no administration in time range)   heparin (porcine) subcutaneous injection 5,000 Units (5,000 Units Subcutaneous Given 1/22/22 1654)   morphine injection 2 mg (has no administration in time range)   vancomycin (VANCOCIN) IVPB (premix in dextrose) 1,000 mg 200 mL (has no administration in time range)   cefepime (MAXIPIME) IVPB (premix in dextrose) 1,000 mg 50 mL (has no administration in time range)   vancomycin (VANCOCIN) IVPB (premix in dextrose) 1,000 mg 200 mL (0 mg/kg × 66 7 kg Intravenous Stopped 1/22/22 1436)       Diagnostic Studies  Results Reviewed     Procedure Component Value Units Date/Time    B-Type Natriuretic Peptide(BNP) CA, GH, EA Camp[facility] Only [[id number]]     Lab Status: No result Specimen: Blood     Blood culture #1 [[id number]] Collected: 01/22/22 1147    Lab Status: Preliminary result Specimen: Blood from Arm, Left Updated: 01/22/22 1701     Blood Culture Received in Microbiology Lab  Culture in Progress      Blood culture #2 [[id number]] Collected: 01/22/22 1141    Lab Status: Preliminary result Specimen: Blood from Arm, Right Updated: 01/22/22 1701     Blood Culture Received in Microbiology Lab  Culture in Progress      UA w Reflex to Microscopic w Reflex to Culture [[id number]]     Lab Status: No result Specimen: Urine     Protime-INR [[id number]]  (Normal) Collected: 01/22/22 1147    Lab Status: Final result Specimen: Blood from Arm, Right Updated: 01/22/22 1214     Protime 12 7 seconds      INR 0 96    APTT [[id number]]  (Normal) Collected: 01/22/22 1147    Lab Status: Final result Specimen: Blood from Arm, Right Updated: 01/22/22 1214     PTT 30 seconds     Comprehensive metabolic panel [[id number]]  (Abnormal) Collected: 01/22/22 1147    Lab Status: Final result Specimen: Blood from Arm, Right Updated: 01/22/22 1211     Sodium 126 mmol/L      Potassium 4 6 mmol/L      Chloride 91 mmol/L      CO2 34 mmol/L      ANION GAP 1 mmol/L      BUN 61 mg/dL      Creatinine 1 42 mg/dL      Glucose 147 mg/dL      Calcium 10 6 mg/dL      Corrected Calcium 11 1 mg/dL      AST 70 U/L      ALT 44 U/L      Alkaline Phosphatase 59 U/L      Total Protein 6 8 g/dL      Albumin 3 4 g/dL      Total Bilirubin 0 37 mg/dL      eGFR 36 ml/min/1 73sq m     Narrative:      [place] guidelines for Chronic Kidney Disease (CKD):     Stage 1 with normal or high GFR (GFR > 90 mL/min/1 73 square meters)    Stage 2 Mild CKD (GFR = 60-89 mL/min/1 73 square meters)    Stage 3A Moderate CKD (GFR = 45-59 mL/min/1 73 square meters)    Stage 3B Moderate CKD (GFR = 30-44 mL/min/1 73 square meters)    Stage 4 Severe CKD (GFR = 15-29 mL/min/1 73 square meters)    Stage 5 End Stage CKD (GFR <15 mL/min/1 73 square meters)  Note: GFR calculation is accurate only with a steady state creatinine    Lactic acid [[id number]]  (Normal) Collected: 01/22/22 1141    Lab Status: Final result Specimen: Blood from Arm, Right Updated: 01/22/22 1211     LACTIC ACID 0 8 mmol/L     Narrative:      Result may be elevated if tourniquet was used during collection  Wound culture and Gram stain [[id number]] Collected: 01/22/22 1204    Lab Status: In process Specimen: Wound from Sacrum Updated: 01/22/22 1209    CBC and differential [[id number]]  (Abnormal) Collected: 01/22/22 1147    Lab Status: Final result Specimen: Blood from Arm, Right Updated: 01/22/22 1156     WBC 12 82 Thousand/uL      RBC 3 56 Million/uL      Hemoglobin 10 6 g/dL      Hematocrit 33 6 %      MCV 94 fL      MCH 29 8 pg      MCHC 31 5 g/dL      RDW 12 1 %      MPV 10 4 fL      Platelets 724 Thousands/uL      nRBC 0 /100 WBCs      Neutrophils Relative 73 %      Immat GRANS % 1 %      Lymphocytes Relative 11 %      Monocytes Relative 9 %      Eosinophils Relative 5 %      Basophils Relative 1 %      Neutrophils Absolute 9 44 Thousands/µL      Immature Grans Absolute 0 08 Thousand/uL      Lymphocytes Absolute 1 41 Thousands/µL      Monocytes Absolute 1 12 Thousand/µL      Eosinophils Absolute 0 69 Thousand/µL      Basophils Absolute 0 08 Thousands/µL                  CT pelvis wo contrast   Final Result by Bela Sandoval DO (01/22 9067)      Sacral decubitus ulcer with associated soft tissue thickening, fluid and locules of gas extending to the posterior margin of the sacrum without overt destruction to suggest osteomyelitis  Low-density thickening of the endometrium again noted  Although difficult to determine on noncontrast imaging, this may have progressed from prior study  Please see pelvic ultrasound report from [facility] dated 9/14/2021  Workstation performed: [id number]                    Procedures  Procedures         ED Course  ED Course as of 01/22/22 1725   Sat Jan 22, 2022   1159 WBC(!): 12 82   1228 Sodium(!): 126   1228 Discussed case with Dr Blair Roy, he recommends that the patient be admitted to medicine service surgical consult  IV antibiotics initiated  SBIRT 22yo+      Most Recent Value   SBIRT (22 yo +)    In order to provide better care to our patients, we are screening all of our patients for alcohol and drug use  Would it be okay to ask you these screening questions? Yes Filed at: 01/22/2022 1207   Initial Alcohol Screen: US AUDIT-C     1  How often do you have a drink containing alcohol? 0 Filed at: 01/22/2022 1207   2  How many drinks containing alcohol do you have on a typical day you are drinking? 0 Filed at: 01/22/2022 1207   3a  Male UNDER 65: How often do you have five or more drinks on one occasion? 0 Filed at: 01/22/2022 1207   3b  FEMALE Any Age, or MALE 65+: How often do you have 4 or more drinks on one occassion? 0 Filed at: 01/22/2022 1207   Audit-C Score 0 Filed at: 01/22/2022 1207   ISADORA: How many times in the past year have you    Used an illegal drug or used a prescription medication for non-medical reasons? Never Filed at: 01/22/2022 1207                    MDM    Disposition  Final diagnoses:   Wound of sacral region, initial encounter     Time reflects when diagnosis was documented in both MDM as applicable and the Disposition within this note     Time User Action Codes Description Comment    1/22/2022  3:20 PM BruSanam foster Shoulder Add [S31 000A] Wound of sacral region, initial encounter       ED Disposition     ED Disposition Condition Date/Time Comment    Admit Stable Sat Jan 22, 2022  3:22 PM Case was discussed with Mi Morrison and the patient's admission status was agreed to be Admission Status: inpatient status to the service of Dr Michael Pierson   Follow-up Information    None         Patient's Medications   Discharge Prescriptions    No medications on file       No discharge procedures on file      PDMP Review     None          ED Provider  Electronically Signed by           Ria Oates DO  01/22/22 0904

## 2022-01-22 NOTE — ASSESSMENT & PLAN NOTE
· Will check BNP   · No prior echocardiogram result to review   · Chronic lower extremities edema on furosemide 20 mg daily  · Hold off on diuresis at this time due to acute kidney injury  · Wrap lower extremity and elevate as tolerated

## 2022-01-23 LAB
ALBUMIN SERPL BCP-MCNC: 2.8 G/DL (ref 3.5–5)
ALP SERPL-CCNC: 49 U/L (ref 34–104)
ALT SERPL W P-5'-P-CCNC: 34 U/L (ref 7–52)
ANION GAP SERPL CALCULATED.3IONS-SCNC: 6 MMOL/L (ref 4–13)
AST SERPL W P-5'-P-CCNC: 47 U/L (ref 13–39)
BASOPHILS # BLD AUTO: 0.11 THOUSANDS/ΜL (ref 0–0.1)
BASOPHILS NFR BLD AUTO: 1 % (ref 0–1)
BILIRUB SERPL-MCNC: 0.38 MG/DL (ref 0.2–1)
BUN SERPL-MCNC: 42 MG/DL (ref 5–25)
CALCIUM ALBUM COR SERPL-MCNC: 10.8 MG/DL (ref 8.3–10.1)
CALCIUM SERPL-MCNC: 9.8 MG/DL (ref 8.4–10.2)
CHLORIDE SERPL-SCNC: 96 MMOL/L (ref 96–108)
CO2 SERPL-SCNC: 32 MMOL/L (ref 21–32)
CREAT SERPL-MCNC: 0.89 MG/DL (ref 0.6–1.3)
EOSINOPHIL # BLD AUTO: 1.17 THOUSAND/ΜL (ref 0–0.61)
EOSINOPHIL NFR BLD AUTO: 10 % (ref 0–6)
ERYTHROCYTE [DISTWIDTH] IN BLOOD BY AUTOMATED COUNT: 12.1 % (ref 11.6–15.1)
GFR SERPL CREATININE-BSD FRML MDRD: 64 ML/MIN/1.73SQ M
GLUCOSE SERPL-MCNC: 109 MG/DL (ref 65–140)
HCT VFR BLD AUTO: 31.7 % (ref 34.8–46.1)
HGB BLD-MCNC: 10.1 G/DL (ref 11.5–15.4)
IMM GRANULOCYTES # BLD AUTO: 0.04 THOUSAND/UL (ref 0–0.2)
IMM GRANULOCYTES NFR BLD AUTO: 0 % (ref 0–2)
LYMPHOCYTES # BLD AUTO: 1.78 THOUSANDS/ΜL (ref 0.6–4.47)
LYMPHOCYTES NFR BLD AUTO: 16 % (ref 14–44)
MCH RBC QN AUTO: 29.5 PG (ref 26.8–34.3)
MCHC RBC AUTO-ENTMCNC: 31.9 G/DL (ref 31.4–37.4)
MCV RBC AUTO: 93 FL (ref 82–98)
MONOCYTES # BLD AUTO: 1.39 THOUSAND/ΜL (ref 0.17–1.22)
MONOCYTES NFR BLD AUTO: 12 % (ref 4–12)
NEUTROPHILS # BLD AUTO: 7 THOUSANDS/ΜL (ref 1.85–7.62)
NEUTS SEG NFR BLD AUTO: 61 % (ref 43–75)
NRBC BLD AUTO-RTO: 0 /100 WBCS
PLATELET # BLD AUTO: 304 THOUSANDS/UL (ref 149–390)
PMV BLD AUTO: 9.9 FL (ref 8.9–12.7)
POTASSIUM SERPL-SCNC: 4.1 MMOL/L (ref 3.5–5.3)
PROT SERPL-MCNC: 5.7 G/DL (ref 6.4–8.4)
RBC # BLD AUTO: 3.42 MILLION/UL (ref 3.81–5.12)
SODIUM SERPL-SCNC: 134 MMOL/L (ref 135–147)
WBC # BLD AUTO: 11.49 THOUSAND/UL (ref 4.31–10.16)

## 2022-01-23 PROCEDURE — 99232 SBSQ HOSP IP/OBS MODERATE 35: CPT | Performed by: PHYSICIAN ASSISTANT

## 2022-01-23 PROCEDURE — 99232 SBSQ HOSP IP/OBS MODERATE 35: CPT | Performed by: INTERNAL MEDICINE

## 2022-01-23 PROCEDURE — 97166 OT EVAL MOD COMPLEX 45 MIN: CPT

## 2022-01-23 PROCEDURE — 85025 COMPLETE CBC W/AUTO DIFF WBC: CPT | Performed by: NURSE PRACTITIONER

## 2022-01-23 PROCEDURE — 97162 PT EVAL MOD COMPLEX 30 MIN: CPT

## 2022-01-23 PROCEDURE — 80053 COMPREHEN METABOLIC PANEL: CPT | Performed by: NURSE PRACTITIONER

## 2022-01-23 RX ORDER — VANCOMYCIN HYDROCHLORIDE 500 MG/100ML
10 INJECTION, SOLUTION INTRAVENOUS EVERY 12 HOURS
Status: DISCONTINUED | OUTPATIENT
Start: 2022-01-23 | End: 2022-01-24

## 2022-01-23 RX ADMIN — AZELASTINE HYDROCHLORIDE 1 SPRAY: 137 SPRAY, METERED NASAL at 11:10

## 2022-01-23 RX ADMIN — VANCOMYCIN HYDROCHLORIDE 500 MG: 500 INJECTION, SOLUTION INTRAVENOUS at 13:26

## 2022-01-23 RX ADMIN — AZELASTINE HYDROCHLORIDE 1 SPRAY: 137 SPRAY, METERED NASAL at 17:42

## 2022-01-23 RX ADMIN — PANTOPRAZOLE SODIUM 40 MG: 40 TABLET, DELAYED RELEASE ORAL at 06:29

## 2022-01-23 RX ADMIN — TIZANIDINE 2 MG: 4 TABLET ORAL at 22:00

## 2022-01-23 RX ADMIN — HEPARIN SODIUM 5000 UNITS: 5000 INJECTION INTRAVENOUS; SUBCUTANEOUS at 15:59

## 2022-01-23 RX ADMIN — MINERAL OIL AND PETROLATUM 1 APPLICATION: 150; 830 OINTMENT OPHTHALMIC at 11:09

## 2022-01-23 RX ADMIN — OXYCODONE HYDROCHLORIDE 5 MG: 5 TABLET ORAL at 13:38

## 2022-01-23 RX ADMIN — CEFEPIME HYDROCHLORIDE 1000 MG: 1 INJECTION, SOLUTION INTRAVENOUS at 06:00

## 2022-01-23 RX ADMIN — MORPHINE SULFATE 15 MG: 15 TABLET, EXTENDED RELEASE ORAL at 17:44

## 2022-01-23 RX ADMIN — CEFEPIME HYDROCHLORIDE 1000 MG: 1 INJECTION, SOLUTION INTRAVENOUS at 16:28

## 2022-01-23 RX ADMIN — MORPHINE SULFATE 2 MG: 2 INJECTION, SOLUTION INTRAMUSCULAR; INTRAVENOUS at 06:29

## 2022-01-23 RX ADMIN — ATORVASTATIN CALCIUM 40 MG: 40 TABLET, FILM COATED ORAL at 22:00

## 2022-01-23 RX ADMIN — MORPHINE SULFATE 15 MG: 15 TABLET, EXTENDED RELEASE ORAL at 08:59

## 2022-01-23 RX ADMIN — TIZANIDINE 2 MG: 4 TABLET ORAL at 08:59

## 2022-01-23 RX ADMIN — GABAPENTIN 300 MG: 600 TABLET, FILM COATED ORAL at 21:59

## 2022-01-23 RX ADMIN — MORPHINE SULFATE 2 MG: 2 INJECTION, SOLUTION INTRAMUSCULAR; INTRAVENOUS at 16:23

## 2022-01-23 RX ADMIN — TIZANIDINE 2 MG: 4 TABLET ORAL at 15:59

## 2022-01-23 RX ADMIN — SODIUM CHLORIDE 100 ML/HR: 0.9 INJECTION, SOLUTION INTRAVENOUS at 17:40

## 2022-01-23 RX ADMIN — ACETAMINOPHEN 650 MG: 325 TABLET ORAL at 15:59

## 2022-01-23 RX ADMIN — ACETAMINOPHEN 650 MG: 325 TABLET ORAL at 08:59

## 2022-01-23 RX ADMIN — SODIUM CHLORIDE 100 ML/HR: 0.9 INJECTION, SOLUTION INTRAVENOUS at 06:28

## 2022-01-23 NOTE — UTILIZATION REVIEW
Initial Clinical Review    Admission: Date/Time/Statement:   Admission Orders (From admission, onward)     Ordered        01/22/22 1523  Inpatient Admission  Once                      Orders Placed This Encounter   Procedures    Inpatient Admission     Standing Status:   Standing     Number of Occurrences:   1     Order Specific Question:   Level of Care     Answer:   Med Surg [16]     Order Specific Question:   Estimated length of stay     Answer:   More than 2 Midnights     Order Specific Question:   Certification     Answer:   I certify that inpatient services are medically necessary for this patient for a duration of greater than two midnights  See H&P and MD Progress Notes for additional information about the patient's course of treatment  ED Arrival Information     Expected Arrival Acuity    - 1/22/2022 11:01 Urgent         Means of arrival Escorted by Service Admission type    Walk-In Family Member Hospitalist Urgent         Arrival complaint    abnormal labs        Chief Complaint   Patient presents with    Abnormal Lab     pt reports she was sent by Dr Avinash Pollard for elevated WBC, "kidney function was off" and other blood work was not good  pts daughter reports she is to have surgery monday for a wound to her sacrum that appears infected   Wound Infection     Initial Presentation: 66year old female to the ED from home with complaints of abnormal blood work, wound worsening  Admitted to inpatient for stage III pressure ulcer sacrum, mirza, hyponatremia, vaginal bleeding  Arrives with chronic leg swelling, vaginal bleeding for months, back, neck pain  Has rheumatoid arthritis and sleeps sitting up in a chair  Visiting nurses coming for dressing changes 2x week  Gen/surg consult  IV abx started in the ED  CT a/p shows sacral wound with soft tissue thickening, fluid, and gas  Creat on arrival 1 42 with baseline 0 7-0 9  IV fluids started  Hold furosemide  Check PVR  Continue with iv fluids  CHeck BNP  Has had vaginal bleeding since 9/21, supposed to follow up with gyn, hasnt done so yet  Hgb stable  Gen/surg: Reposition  Medically optimize prior to surgical debridement and vac placement  Date: 1/23   Day 2:   Trend procal   WBCs improving  Continue with repositioning  Creat improving  Continue with gentle iv hydration  Hold lasix  Pro BNP WNL  Continue with IV abx  ED Triage Vitals   Temperature Pulse Respirations Blood Pressure SpO2   01/22/22 1109 01/22/22 1109 01/22/22 1109 01/22/22 1109 01/22/22 1109   98 1 °F (36 7 °C) 90 18 98/53 95 %      Temp Source Heart Rate Source Patient Position - Orthostatic VS BP Location FiO2 (%)   01/22/22 1109 01/22/22 1109 01/22/22 1826 01/22/22 1109 --   Temporal Monitor Lying Right arm       Pain Score       01/22/22 1702       10 - Worst Possible Pain          Wt Readings from Last 1 Encounters:   01/22/22 66 7 kg (147 lb)     Additional Vital Signs:   Patient Position - Orthostatic VS           01/23/22 0700 97 3 °F (36 3 °C) Abnormal  74 -- 127/58 83 93 % None (Room air) Lying   01/22/22 2318 101 4 °F (38 6 °C) Abnormal  90 17 125/56 -- 91 % None (Room air) Lying   01/22/22 1826 99 6 °F (37 6 °C) 89 18 116/56 -- 94 % None (Room air) Lying   01/22/22 1109 98 1 °F (36 7 °C) 90 18 98/53 -- 95 % None (Room air) --       Pertinent Labs/Diagnostic Test Results:   1/22 CT pelvis: Sacral decubitus ulcer with associated soft tissue thickening, fluid and locules of gas extending to the posterior margin of the sacrum without overt destruction to suggest osteomyelitis  Low-density thickening of the endometrium again noted  1/21 CXR:No acute cardiopulmonary disease      1/20 EKG     Sinus tachycardia  Possible Left atrial enlargement  Borderline ECG  When compared with ECG of 20-NOV-2020 19:58,  No significant change was found  Results from last 7 days   Lab Units 01/23/22  0634 01/22/22  1147 01/20/22  1400   WBC Thousand/uL 11 49* 12 82* 16 05* HEMOGLOBIN g/dL 10 1* 10 6* 10 6*   HEMATOCRIT % 31 7* 33 6* 32 8*   PLATELETS Thousands/uL 304 240  --    NEUTROS ABS Thousands/µL 7 00 9 44*  --          Results from last 7 days   Lab Units 01/23/22  0634 01/22/22  1147 01/20/22  1400   SODIUM mmol/L 134* 126* 130*   POTASSIUM mmol/L 4 1 4 6 4 8   CHLORIDE mmol/L 96 91* 95*   CO2 mmol/L 32 34* 29   ANION GAP mmol/L 6 1* 6   BUN mg/dL 42* 61* 53*   CREATININE mg/dL 0 89 1 42* 1 62*   EGFR ml/min/1 73sq m 64 36 31   CALCIUM mg/dL 9 8 10 6* 10 5*     Results from last 7 days   Lab Units 01/23/22  0634 01/22/22  1147   AST U/L 47* 70*   ALT U/L 34 44   ALK PHOS U/L 49 59   TOTAL PROTEIN g/dL 5 7* 6 8   ALBUMIN g/dL 2 8* 3 4*   TOTAL BILIRUBIN mg/dL 0 38 0 37         Results from last 7 days   Lab Units 01/23/22  0634 01/22/22  1147   GLUCOSE RANDOM mg/dL 109 147*             Results from last 7 days   Lab Units 01/22/22  1147   PROTIME seconds 12 7   INR  0 96   PTT seconds 30     Results from last 7 days   Lab Units 01/22/22  1141   LACTIC ACID mmol/L 0 8       Results from last 7 days   Lab Units 01/22/22  1147   BNP pg/mL 77     Results from last 7 days   Lab Units 01/22/22  1204 01/22/22  1147 01/22/22  1141   BLOOD CULTURE   --  Received in Microbiology Lab  Culture in Progress  Received in Microbiology Lab  Culture in Progress     GRAM STAIN RESULT  2+ Gram positive cocci in pairs and chains*  2+ Gram negative rods*  2+ Gram positive rods*  No polys seen*  --   --      ED Treatment:   Medication Administration from 01/22/2022 1101 to 01/22/2022 1841       Date/Time Order Dose Route Action     01/22/2022 1305 vancomycin (VANCOCIN) IVPB (premix in dextrose) 1,000 mg 200 mL 1,000 mg Intravenous New Bag     01/22/2022 1702 morphine (MS CONTIN) ER tablet 15 mg 15 mg Oral Given     01/22/2022 1658 tiZANidine (ZANAFLEX) tablet 2 mg 2 mg Oral Given     01/22/2022 1650 sodium chloride 0 9 % infusion 100 mL/hr Intravenous New Bag     01/22/2022 1654 heparin (porcine) subcutaneous injection 5,000 Units 5,000 Units Subcutaneous Given     01/22/2022 1641 cefepime (MAXIPIME) injection 2,000 mg 2,000 mg Intravenous Not Given     01/22/2022 1651 cefepime (MAXIPIME) IVPB (premix in dextrose) 2,000 mg 50 mL 2,000 mg Intravenous New Bag        Past Medical History:   Diagnosis Date    Anxiety     Arthritis     Bernard's esophagus     Bleeds easily ([place] [address] )     CVA (cerebral vascular accident) ([place] [address] ) 10/2016    Fibromyalgia     GERD (gastroesophageal reflux disease)     Hypercholesterolemia     Hypertension     Insomnia     Rheumatoid arthritis ([place] [address] )     Stroke ([place] [address] ) 2016       Admitting Diagnosis: Wound of sacral region, initial encounter [S31 000A]  Age/Sex: 66 y o  female  Admission Orders:  SCDs  Up with assistance  CBC, BMP, mag, phos  UA with reflex  NPO  1800  Scheduled Medications:  artificial tear, 1 application, Both Eyes, Daily  atorvastatin, 40 mg, Oral, HS  azelastine, 1 spray, Each Nare, BID  cefepime, 1,000 mg, Intravenous, Q12H  fluticasone, 2 spray, Nasal, Daily  gabapentin, 300 mg, Oral, HS  heparin (porcine), 5,000 Units, Subcutaneous, Q8H RENETTA  morphine, 15 mg, Oral, BID  pantoprazole, 40 mg, Oral, Early Morning  tiZANidine, 2 mg, Oral, TID  vancomycin, 10 mg/kg (Adjusted), Intravenous, Q12H      Continuous IV Infusions:  sodium chloride, 100 mL/hr, Intravenous, Continuous      PRN Meds:  acetaminophen, 650 mg, Oral, Q6H PRN  iohexol, 50 mL, Oral, Once in imaging  morphine injection, 2 mg, Intravenous, Q6H PRN x1  oxyCODONE, 5 mg, Oral, Q12H PRN        IP CONSULT TO ACUTE CARE SURGERY  IP CONSULT TO ACUTE CARE SURGERY  IP CONSULT TO CASE MANAGEMENT  IP CONSULT TO PHARMACY    Network Utilization Review Department  ATTENTION: Please call with any questions or concerns to [phone] and carefully listen to the prompts so that you are directed to the right person   All voicemails are confidential   Rafia Valdes all requests for admission clinical reviews, approved or denied determinations and any other requests to dedicated fax number below belonging to the campus where the patient is receiving treatment   List of dedicated fax numbers for the Facilities:  [address] [place] [address] DENIALS (Administrative/Medical Necessity) [phone]   [address] (Maternity/NICU/Pediatrics) [phone] [address]  [address] [facility] [place] [address] [address] [place] [address] [address] [facility] [place] [address] [phone]

## 2022-01-23 NOTE — UTILIZATION REVIEW
Inpatient Admission Authorization Request   NOTIFICATION OF INPATIENT ADMISSION/INPATIENT AUTHORIZATION REQUEST   [facility]IC[facility] FACILITY[facility]   [facility]  [address] [facility], [address]  Tax ID: [id number]  NPI: [id number]  Place of Service[facility]patient [facility]  Place of Service Code: 24     ATTENDING PROVIDER:  Attending Name and NPI#: Roxy Macdonaldhead [[id number]]  Address: [address] [place] [address], [address]  Phone: [phone]     UTILIZATION REVIEW CONTACT:  Crystal Lund, Utilization Review Supervisor  Network Utilization Review Department  Phone: [phone]  Fax [phone]  Email: [email or web address]     PHYSICIAN ADVISORY SERVICES:  FOR [id number] REVIEW - MEDICAL NECESSITY DENIAL  Phone: [phone]  Fax: [phone]  Email: [email or web address]     TYPE OF REQUEST:  Inpatient Status     ADMISSION INFORMATION:  ADMISSION DATE/TIME: 1/22/22  3:23 PM  PATIENT DIAGNOSIS CODE/DESCRIPTION:  Wound of sacral region, initial encounter [S31 000A]  DISCHARGE DATE/TIME: No discharge date for patient encounter  DISCHARGE DISPOSITION (IF DISCHARGED): Home/Self Care     IMPORTANT INFORMATION:  Please contact the Crystal Lund directly with any questions or concerns regarding this request  Department voicemails are confidential     Send requests for admission clinical reviews, concurrent reviews, approvals, and administrative denials due to lack of clinical to fax [phone]

## 2022-01-23 NOTE — PROGRESS NOTES
Progress Note - General Surgery   Tariq Mrar 66 y o  female MRN: [id number]  Unit/Bed#: [facility] 12 Encounter: [id number]      ASSESSMENT/PLAN:  67 y/o F with multiple comorbidities including HTN, cerebrovascular dz, CAD, and post-menopausal vaginal bleeding who presenting with:     Stage III Sacral Decubitus Wound  -Tmax 101 4 last night, received Tylenol and has been afebrile  -Wound cx growing gram + cocci in pairs/chains, gram +/- rods  -Blood cx pending   -Still with signficant pain    -Pain control prn   -IV abx   -FU final growth wound cx, adjust abx regimen accordingly   -Daily wound checks  -Local wound care per nursing (Mesalt in wound bed + Allevyn pad)  -Offloading  -Frequent position changes q 2 hours  -P500 mattress   -Continue to medically optimize before surgical sacral wound debridement   -Trend CBC, BMP  -Continue to hold Plavix   -NPO at midnight   -Plan for surigcal sacral wound debridement/washout with wound vac placement by Dr Wesley Ivory tomorrow     Leukocytosis  -2/2 to sacral wound above   -WBC downtrendin 49>12 48>16 05    ANDRES  -Cr improving 0 89>1 42>1 673  -IVF  -Monitor renal indices, I/Os  -Avoid nephrotoxic agents    Hyponatremia  -Na improving 134>126  -Medical management per primary     Protein Malnutrition   -Hypoalbuminemia 2 8  -Total protein 5 7   -Add nutritional supplement improve nutrition for wound healing       SUBJECTIVE:  Pain still significant, 11/10  Uncomfortable when sitting on her bottom  Smells significant odor  Tolerating diet, no n/v/d/c  No troubles voiding or having BMs  Denies fevers/chills  OBJECTIVE:   Vitals:  Blood pressure 127/58, pulse 74, temperature (!) 97 3 °F (36 3 °C), temperature source Tympanic, resp  rate 17, weight 66 7 kg (147 lb), SpO2 93 %  ,Body mass index is 29 69 kg/m²    I/Os:    Intake/Output Summary (Last 24 hours) at 2022 0805  Last data filed at 2022 0423  Gross per 24 hour   Intake 250 ml   Output 1000 ml   Net -750 ml     Lines/Drains:  Invasive Devices  Report    Peripheral Intravenous Line            Peripheral IV 01/22/22 Left Arm <1 day                Physical Exam:   Physical Exam  Constitutional:       General: She is not in acute distress  HENT:      Head: Normocephalic and atraumatic  Cardiovascular:      Rate and Rhythm: Normal rate and regular rhythm  Pulmonary:      Effort: Pulmonary effort is normal       Breath sounds: Normal breath sounds  Musculoskeletal:         General: No tenderness  Cervical back: Neck supple  Skin:     General: Skin is warm and dry  Comments: Stage III sacral wound  Wound bed with thick dark slough, soft, tender to palpation  Allie-wound skin erythematous, edematous, warm to touch, tender to palpation, no fluctuance/induration felt  No crepitus, bullae, or skin necrosis  Significant malodor  Sloughy exudate on dressing  Neurological:      General: No focal deficit present  Mental Status: She is alert and oriented to person, place, and time  Diagnostics:  I have personally reviewed pertinent lab results  , Urinalysis: No results found for: Tedra Juli, SPECGRAV, PHUR, LEUKOCYTESUR, NITRITE, PROTEINUA, GLUCOSEU, KETONESU, BILIRUBINUR, BLOODU, Amylase: No results found for: AMYLASE, Lipase: No results found for: LIPASE   CT pelvis wo contrast    Result Date: 1/22/2022  Impression: Sacral decubitus ulcer with associated soft tissue thickening, fluid and locules of gas extending to the posterior margin of the sacrum without overt destruction to suggest osteomyelitis  Low-density thickening of the endometrium again noted  Although difficult to determine on noncontrast imaging, this may have progressed from prior study  Please see pelvic ultrasound report from [facility] dated 9/14/2021   Workstation performed: [id number]     Recent Results (from the past 36 hour(s))   Lactic acid    Collection Time: 01/22/22 11:41 AM   Result Value Ref Range    LACTIC ACID 0 8 0 5 - 2 0 mmol/L   Blood culture #2    Collection Time: 01/22/22 11:41 AM    Specimen: Arm, Right; Blood   Result Value Ref Range    Blood Culture Received in Microbiology Lab  Culture in Progress      CBC and differential    Collection Time: 01/22/22 11:47 AM   Result Value Ref Range    WBC 12 82 (H) 4 31 - 10 16 Thousand/uL    RBC 3 56 (L) 3 81 - 5 12 Million/uL    Hemoglobin 10 6 (L) 11 5 - 15 4 g/dL    Hematocrit 33 6 (L) 34 8 - 46 1 %    MCV 94 82 - 98 fL    MCH 29 8 26 8 - 34 3 pg    MCHC 31 5 31 4 - 37 4 g/dL    RDW 12 1 11 6 - 15 1 %    MPV 10 4 8 9 - 12 7 fL    Platelets 795 495 - 213 Thousands/uL    nRBC 0 /100 WBCs    Neutrophils Relative 73 43 - 75 %    Immat GRANS % 1 0 - 2 %    Lymphocytes Relative 11 (L) 14 - 44 %    Monocytes Relative 9 4 - 12 %    Eosinophils Relative 5 0 - 6 %    Basophils Relative 1 0 - 1 %    Neutrophils Absolute 9 44 (H) 1 85 - 7 62 Thousands/µL    Immature Grans Absolute 0 08 0 00 - 0 20 Thousand/uL    Lymphocytes Absolute 1 41 0 60 - 4 47 Thousands/µL    Monocytes Absolute 1 12 0 17 - 1 22 Thousand/µL    Eosinophils Absolute 0 69 (H) 0 00 - 0 61 Thousand/µL    Basophils Absolute 0 08 0 00 - 0 10 Thousands/µL   Comprehensive metabolic panel    Collection Time: 01/22/22 11:47 AM   Result Value Ref Range    Sodium 126 (L) 135 - 147 mmol/L    Potassium 4 6 3 5 - 5 3 mmol/L    Chloride 91 (L) 96 - 108 mmol/L    CO2 34 (H) 21 - 32 mmol/L    ANION GAP 1 (L) 4 - 13 mmol/L    BUN 61 (H) 5 - 25 mg/dL    Creatinine 1 42 (H) 0 60 - 1 30 mg/dL    Glucose 147 (H) 65 - 140 mg/dL    Calcium 10 6 (H) 8 4 - 10 2 mg/dL    Corrected Calcium 11 1 (H) 8 3 - 10 1 mg/dL    AST 70 (H) 13 - 39 U/L    ALT 44 7 - 52 U/L    Alkaline Phosphatase 59 34 - 104 U/L    Total Protein 6 8 6 4 - 8 4 g/dL    Albumin 3 4 (L) 3 5 - 5 0 g/dL    Total Bilirubin 0 37 0 20 - 1 00 mg/dL    eGFR 36 ml/min/1 73sq m   Protime-INR    Collection Time: 01/22/22 11:47 AM   Result Value Ref Range Protime 12 7 11 6 - 14 5 seconds    INR 0 96 0 84 - 1 19   APTT    Collection Time: 01/22/22 11:47 AM   Result Value Ref Range    PTT 30 23 - 37 seconds   Blood culture #1    Collection Time: 01/22/22 11:47 AM    Specimen: Arm, Left; Blood   Result Value Ref Range    Blood Culture Received in Microbiology Lab  Culture in Progress  B-Type Natriuretic Peptide(BNP) CA, GH, EA Campuses Only    Collection Time: 01/22/22 11:47 AM   Result Value Ref Range    BNP 77 1 - 100 pg/mL   Wound culture and Gram stain    Collection Time: 01/22/22 12:04 PM    Specimen: Sacrum;  Wound   Result Value Ref Range    Gram Stain Result 2+ Gram positive cocci in pairs and chains (A)     Gram Stain Result 2+ Gram negative rods (A)     Gram Stain Result 2+ Gram positive rods (A)     Gram Stain Result No polys seen (A)    Comprehensive metabolic panel    Collection Time: 01/23/22  6:34 AM   Result Value Ref Range    Sodium 134 (L) 135 - 147 mmol/L    Potassium 4 1 3 5 - 5 3 mmol/L    Chloride 96 96 - 108 mmol/L    CO2 32 21 - 32 mmol/L    ANION GAP 6 4 - 13 mmol/L    BUN 42 (H) 5 - 25 mg/dL    Creatinine 0 89 0 60 - 1 30 mg/dL    Glucose 109 65 - 140 mg/dL    Calcium 9 8 8 4 - 10 2 mg/dL    Corrected Calcium 10 8 (H) 8 3 - 10 1 mg/dL    AST 47 (H) 13 - 39 U/L    ALT 34 7 - 52 U/L    Alkaline Phosphatase 49 34 - 104 U/L    Total Protein 5 7 (L) 6 4 - 8 4 g/dL    Albumin 2 8 (L) 3 5 - 5 0 g/dL    Total Bilirubin 0 38 0 20 - 1 00 mg/dL    eGFR 64 ml/min/1 73sq m   CBC and differential    Collection Time: 01/23/22  6:34 AM   Result Value Ref Range    WBC 11 49 (H) 4 31 - 10 16 Thousand/uL    RBC 3 42 (L) 3 81 - 5 12 Million/uL    Hemoglobin 10 1 (L) 11 5 - 15 4 g/dL    Hematocrit 31 7 (L) 34 8 - 46 1 %    MCV 93 82 - 98 fL    MCH 29 5 26 8 - 34 3 pg    MCHC 31 9 31 4 - 37 4 g/dL    RDW 12 1 11 6 - 15 1 %    MPV 9 9 8 9 - 12 7 fL    Platelets 476 728 - 447 Thousands/uL    nRBC 0 /100 WBCs    Neutrophils Relative 61 43 - 75 %    Immat GRANS % 0 0 - 2 %    Lymphocytes Relative 16 14 - 44 %    Monocytes Relative 12 4 - 12 %    Eosinophils Relative 10 (H) 0 - 6 %    Basophils Relative 1 0 - 1 %    Neutrophils Absolute 7 00 1 85 - 7 62 Thousands/µL    Immature Grans Absolute 0 04 0 00 - 0 20 Thousand/uL    Lymphocytes Absolute 1 78 0 60 - 4 47 Thousands/µL    Monocytes Absolute 1 39 (H) 0 17 - 1 22 Thousand/µL    Eosinophils Absolute 1 17 (H) 0 00 - 0 61 Thousand/µL    Basophils Absolute 0 11 (H) 0 00 - 0 10 Thousands/µL       Current Medications:  Scheduled Meds:  Current Facility-Administered Medications   Medication Dose Route Frequency Provider Last Rate    acetaminophen  650 mg Oral Q6H PRN Vernona Spiperi, CRNP      artificial tear  1 application Both Eyes Daily Vernona Melody, CRNP      atorvastatin  40 mg Oral HS Vernona Melody, CRNP      azelastine  1 spray Each Nare BID Vernona Melody, CRNP      cefepime  1,000 mg Intravenous Q12H Vernona Melody, CRNP 1,000 mg (01/23/22 0600)    fluticasone  2 spray Nasal Daily Vernona Melody, CRNP      gabapentin  300 mg Oral HS Vernona Melody, CRNP      heparin (porcine)  5,000 Units Subcutaneous [facility] Vernona Melody, CRNP      iohexol  50 mL Oral Once in imaging Vernona Melody, SANDIENP      morphine  15 mg Oral BID Vernona Melody, CRNP      morphine injection  2 mg Intravenous Q6H PRN Vernona Melody, CRNP      oxyCODONE  5 mg Oral Q12H PRN Vernona Melody, CRNP      pantoprazole  40 mg Oral Early Morning Vernona Melody, CRNP      sodium chloride  100 mL/hr Intravenous Continuous Vernona Spitz, CRNP 100 mL/hr (01/23/22 0435)    tiZANidine  2 mg Oral TID Vernona Melody, CRNP      vancomycin  1,000 mg Intravenous Q24H Vernona Melody, CRNP       Continuous Infusions:sodium chloride, 100 mL/hr, Last Rate: 100 mL/hr (01/23/22 4957)      PRN Meds:    acetaminophen    iohexol    morphine injection    oxyCODONE    VTE PPX:  VTE Pharmacologic Prophylaxis: Heparin  VTE Mechanical Prophylaxis: sequential compression device      JAY Kelly  1/23/2022

## 2022-01-23 NOTE — ASSESSMENT & PLAN NOTE
· This has been going on since September of 2021  The patient was evaluated by [facility] gyn with vaginal bleeding  She was recommended to follow-up and undergo hysterectomy however have not had a chance to do so  · CT abdomen and pelvis shows low-density thickening of the endometrium     · Hemoglobin stable  · Recommend to follow-up with gynecology outpatient

## 2022-01-23 NOTE — PROGRESS NOTES
01/22/22 2100   Sepsis Screening (Must be performed on all patients over 25years of age) **Use most recent VS and lab values available within previous 24 hours for your screen**Notify Physician of new/acute/suspected infection   Indicate SIRS criteria Leukocytosis (WBC > 32538 IJL); Tachycardia > 90 bpm;Hyperthemia > 38 3C (100 9F)   Two (2) or more SIRS criteria present? (!) Yes (Proceed)   Physician Notified? Yes (enter name in comments)  [place])   Sepsis Protocol Initiated?  No

## 2022-01-23 NOTE — OCCUPATIONAL THERAPY NOTE
Occupational Therapy Evaluation      Leora Goode    1/23/2022    Patient Active Problem List   Diagnosis    Rotator cuff tear arthropathy of left shoulder    Rotator cuff tear arthropathy, right    Chronic pain syndrome    Stroke-like symptoms    Elevated troponin    Elevated d-dimer    Acute nasopharyngitis    Essential hypertension    History of stroke    Arthritis    Acute arterial ischemic stroke, vertebrobasilar, brainstem, right (HCC)    Cervical disc herniation    Cervical spondylosis    Coronary artery disease involving native coronary artery of native heart without angina pectoris    GERD (gastroesophageal reflux disease)    History of knee replacement, total, bilateral    Lateral medullary syndrome    Stage III pressure ulcer of sacral region ([address] )    Hyponatremia    ANDRES (acute kidney injury) ([address] )    Vaginal bleeding    Lower extremity edema       Past Medical History:   Diagnosis Date    Anxiety     Arthritis     Bernard's esophagus     Bleeds easily ([address] )     CVA (cerebral vascular accident) ([address] ) 10/2016    Fibromyalgia     GERD (gastroesophageal reflux disease)     Hypercholesterolemia     Hypertension     Insomnia     Rheumatoid arthritis ([address] )     Stroke ([address] ) 2016       Past Surgical History:   Procedure Laterality Date    APPENDECTOMY      BREAST BIOPSY Bilateral     benign    CARPAL TUNNEL RELEASE Left 10/27/2003    DORSAL COMPARTMENT RELEASE Left 03/09/2006    FINGER SURGERY      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    KNEE ARTHROSCOPY Right     x3(6/91,7/96,7/99)    OTHER SURGICAL HISTORY Left 03/09/2006    tennis elbow release    REPLACEMENT TOTAL KNEE BILATERAL      rt-11/99, lt-1/13/10    ROTATOR CUFF REPAIR Bilateral     SHOULDER ARTHROSCOPY Right 02/10/2016    SHOULDER ARTHROSCOPY Left     11/26/08,9/99    SHOULDER ARTHROSCOPY Left 03/09/2006    TONSILLECTOMY AND ADENOIDECTOMY      TRIGGER FINGER RELEASE Left 07/01/2002    middle and ring finger    ULNAR NERVE TRANSPOSITION Left         01/23/22 0930   OT Last Visit   OT Visit Date 01/23/22   Note Type   Note type Evaluation   Additional Comments Pt agreeable to OT eval  Upon arrival pt seated OOB on commode  Restrictions/Precautions   Weight Bearing Precautions Per Order No   Other Precautions Multiple lines;Telemetry; Fall Risk;Pain   Pain Assessment   Pain Assessment Tool 0-10   Pain Score 10 - Worst Possible Pain   Pain Location/Orientation Location: Generalized  ("ankles, back and buttocks")   Pain Onset/Description Onset: Ongoing;Frequency: Constant/Continuous; Descriptor: Aching;Descriptor: Sore;Descriptor: Discomfort   Hospital Pain Intervention(s) Ambulation/increased activity;Repositioned;Medication (See MAR)   Home Living   Type of [address] [place] [address] One level;Performs ADLs on one level; Able to live on main level with bedroom/bathroom; Ramped entrance   BEKIZ Grab bars in shower; Shower chair   Bathroom Accessibility Accessible   Home Equipment Walker;Cane  (no AD used in house, [address] in community )   Prior Function   Level of [place] Independent with ADLs and functional mobility   Lives With [facility] in the last 6 months 0   Vocational Retired   Comments (+) driving    Lifestyle   Autonomy Patient reporting being independent with ADLs/IADLs, ambulatory with no AD or SPC and lives alone in a one level house    Reciprocal Relationships daughter    Service to Others retired    Intrinsic Gratification cares for dog    ADL   Eating Assistance 6  Modified independent   [address] [facility] 5  [facility] [facility] 5  [address] Additional Comments DNT: pt seated OOB on commode upon arrival and seated in recliner at end of eval    Transfers   Sit to Stand   (CGA)   Additional items Assist x 1; Armrests; Increased time required;Verbal cues   Stand to Sit   (CGA)   Additional items Assist x 1; Increased time required;Armrests; Verbal cues   Toilet transfer   (CGA)   Additional items Assist x 1; Armrests; Increased time required;Verbal cues; Commode   Functional Mobility   Functional Mobility   (CGA)   Additional Comments Pt ambulated from commode to recliner with no overt LOB or SOB  Pt limited by pain  Additional items Hand hold assistance   Balance   Static Sitting Good   Dynamic Sitting Fair +   Static Standing Fair   Dynamic Standing Fair -   Activity Tolerance   Activity Tolerance Patient limited by pain; Patient limited by fatigue   Medical Staff Made Aware Pt seen as a co-eval with PT due to the patient's co-morbidities, clinically unstable presentation, and present impairments which are a regression from the patient's baseline  Nurse Made Aware RN made aware of outcomes    RUE Assessment   RUE Assessment WFL  (4-/5 MMT based on functional assessment )   LUE Assessment   LUE Assessment WFL  (4-/5 MMT based on functional assessment )   Hand Function   Gross Motor Coordination Functional   Fine Motor Coordination Functional   Sensation   Light Touch Severe deficits in the RLE; Severe deficits in the LLE  (neuropathy in B feet )   Vision-Basic Assessment   Current Vision Wears glasses all the time   Cognition   Overall Cognitive Status [facility]   Arousal/Participation Alert; Responsive; Cooperative   Attention Within functional limits   Orientation Level Oriented X4   Memory Within functional limits   Following Commands Follows all commands and directions without difficulty   Comments Pt agreeable to OT eval    Assessment   Limitation Decreased ADL status; Decreased UE strength;Decreased endurance;Decreased self-care trans;Decreased high-level ADLs   Prognosis Good   Assessment Patient is a 66 y o  female seen for OT evaluation s/p [facility]  on 1/22/2022 w/Stage III pressure ulcer of sacral region ([address] )  Commorbidities affecting patient's functional performance at time of assessment include: ANDRES, CAD, HTN, hyponatremia, LE edema and vaginal bleeding  Orders placed for OT evaluation and treatment and up with assistance  Performed at least two patient identifiers during session including name and wristband  Prior to admission, Patient reporting being independent with ADLs/IADLs, ambulatory with no AD or SPC and lives alone in a one level house  Personal factors affecting patient at time of initial evaluation include: difficulty performing ADLs and difficulty performing IADLs  Upon evaluation, patient requires supervision assist for UB ADLs, minimal  assist for LB ADLs, transfers and functional ambulation in room and bathroom with contact guard assist, with the use of HHA  Patient is oriented x 4 and presents with ability to recognize a problem, define a problem, identify alternative plan, select a plan, organize steps in a plan, implement plan and evaluate outcome (problem solving)  Occupational performance is affected by the following deficits: decreased muscle strength, dynamic sit/ stand balance deficit with poor standing tolerance time for self care and functional mobility, decreased activity tolerance and increased pain  Based on the mentioned OT evaluation outcomes, functional performance deficits, and assessment findings, pt has been identified as a moderate complexity evaluation  Patient to benefit from continued Occupational Therapy treatment while in the hospital to address deficits as defined above and maximize level of functional independence with ADLs and functional mobility   Occupational Performance areas to address include: grooming , bathing/ shower, dressing, toilet hygiene, transfer to all surfaces, functional ambulation, medication routine/ management, IADLS: Household maintenance, IADLs: safety procedures and IADLs: meal prep/ clean up  From OT standpoint, recommendation at time of d/c would be Home with home health rehabilitation  Goals   Patient Goals to have less pain    Plan   Treatment Interventions ADL retraining;Functional transfer training;UE strengthening/ROM; Endurance training;Patient/family training; Compensatory technique education; Activityengagement   Goal Expiration Date 02/02/22   OT Treatment Day 0   OT Frequency 3-5x/wk   Recommendation   OT Discharge Recommendation Home with home health rehabilitation   OT - OK to Discharge Yes  (Once medically cleared )   Additional Comments  At end of eval, pt seated OOB in recliner with call bell within reach    Additional Comments 2 The patient's raw score on the AM-PAC Daily Activity inpatient short form is 20, standardized score is 42 03, greater than 39 4  Patients at this level are likely to benefit from discharge to home  Please refer to the recommendation of the Occupational Therapist for safe discharge planning  AM-PAC Daily Activity Inpatient   Lower Body Dressing 3   Bathing 3   Toileting 3   Upper Body Dressing 3   Grooming 4   Eating 4   Daily Activity Raw Score 20   Daily Activity Standardized Score (Calc for Raw Score >=11) 42 03   AM-PAC Applied Cognition Inpatient   Following a Speech/Presentation 4   Understanding Ordinary Conversation 4   Taking Medications 4   Remembering Where Things Are Placed or Put Away 3   Remembering List of 4-5 Errands 3   Taking Care of Complicated Tasks 3   Applied Cognition Raw Score 21   Applied Cognition Standardized Score 44 3      01/23/22 0930   OT Last Visit   OT Visit Date 01/23/22   Note Type   Note type Evaluation   Additional Comments Pt agreeable to OT eval  Upon arrival pt seated OOB on commode      Restrictions/Precautions   Weight Bearing Precautions Per Order No   Other Precautions Multiple lines;Telemetry; Fall Risk;Pain   Pain Assessment   Pain Assessment Tool 0-10   Pain Score 10 - Worst Possible Pain   Pain Location/Orientation Location: Generalized  ("ankles, back and buttocks")   Pain Onset/Description Onset: Ongoing;Frequency: Constant/Continuous; Descriptor: Aching;Descriptor: Sore;Descriptor: Discomfort   Hospital Pain Intervention(s) Ambulation/increased activity;Repositioned;Medication (See MAR)   Home Living   Type of [address] One level;Performs ADLs on one level; Able to live on main level with bedroom/bathroom; Ramped entrance   Mamina Shkola Grab bars in shower; Shower chair   Bathroom Accessibility Accessible   Home Equipment Walker;Cane  (no AD used in house, [facility] in [facility] )   Prior Function   Level of [place] Independent with ADLs and functional mobility   L[facility] With [facility] in the last 6 months 0   Vocational Retired   Comments (+) driving    Lifestyle   Autonomy Patient reporting being independent with ADLs/IADLs, ambulatory with no AD or SPC and lives alone in a one level house    Reciprocal Relationships daughter    Service to Others retired    Intrinsic Gratification cares for dog    ADL   Eating Assistance 6  Modified independent   [facility] 6  [address] 5  [address] 5  [address] [facility] 4  [address] [facility]  4  [facility]   Additional Comments DNT: pt seated OOB on commode upon arrival and seated in recliner at end of eval    Transfers   Sit to Stand   (CGA)   Additional items Assist x 1; Armrests; Increased time required;Verbal cues   Stand to Sit   (CGA)   Additional items Assist x 1; Increased time required;Armrests; Verbal cues   Toilet transfer (CGA)   Additional items Assist x 1; Armrests; Increased time required;Verbal cues; Commode   Functional Mobility   Functional Mobility   (CGA)   Additional Comments Pt ambulated from commode to recliner with no overt LOB or SOB  Pt limited by pain  Additional items Hand hold assistance   Balance   Static Sitting Good   Dynamic Sitting Fair +   Static Standing Fair   Dynamic Standing Fair -   Activity Tolerance   Activity Tolerance Patient limited by pain; Patient limited by fatigue   Medical Staff Made Aware Pt seen as a co-eval with PT due to the patient's co-morbidities, clinically unstable presentation, and present impairments which are a regression from the patient's baseline  Nurse Made Aware RN made aware of outcomes    RUE Assessment   RUE Assessment WFL  (4-/5 MMT based on functional assessment )   LUE Assessment   LUE Assessment WFL  (4-/5 MMT based on functional assessment )   Hand Function   Gross Motor Coordination Functional   Fine Motor Coordination Functional   Sensation   Light Touch Severe deficits in the RLE; Severe deficits in the LLE  (neuropathy in B feet )   Vision-Basic Assessment   Current Vision Wears glasses all the time   Cognition   Overall Cognitive Status [facility]   Arousal/Participation Alert; Responsive; Cooperative   Attention Within functional limits   Orientation Level Oriented X4   Memory Within functional limits   Following Commands Follows all commands and directions without difficulty   Comments Pt agreeable to OT eval    Assessment   Limitation Decreased ADL status; Decreased UE strength;Decreased endurance;Decreased self-care trans;Decreased high-level ADLs   Prognosis Good   Assessment Patient is a 66 y o  female seen for OT evaluation s/p [facility]  on 1/22/2022 w/Stage III pressure ulcer of sacral region ([address] )   Commorbidities affecting patient's functional performance at time of assessment include: ANDRES, CAD, HTN, hyponatremia, LE edema and vaginal bleeding  Orders placed for OT evaluation and treatment and up with assistance  Performed at least two patient identifiers during session including name and wristband  Prior to admission, Patient reporting being independent with ADLs/IADLs, ambulatory with no AD or SPC and lives alone in a one level house  Personal factors affecting patient at time of initial evaluation include: difficulty performing ADLs and difficulty performing IADLs  Upon evaluation, patient requires supervision assist for UB ADLs, minimal  assist for LB ADLs, transfers and functional ambulation in room and bathroom with contact guard assist, with the use of HHA  Patient is oriented x 4 and presents with ability to recognize a problem, define a problem, identify alternative plan, select a plan, organize steps in a plan, implement plan and evaluate outcome (problem solving)  Occupational performance is affected by the following deficits: decreased muscle strength, dynamic sit/ stand balance deficit with poor standing tolerance time for self care and functional mobility, decreased activity tolerance and increased pain  Based on the mentioned OT evaluation outcomes, functional performance deficits, and assessment findings, pt has been identified as a moderate complexity evaluation  Patient to benefit from continued Occupational Therapy treatment while in the hospital to address deficits as defined above and maximize level of functional independence with ADLs and functional mobility  Occupational Performance areas to address include: grooming , bathing/ shower, dressing, toilet hygiene, transfer to all surfaces, functional ambulation, medication routine/ management, IADLS: Household maintenance, IADLs: safety procedures and IADLs: meal prep/ clean up  From OT standpoint, recommendation at time of d/c would be Home with home health rehabilitation       Goals   Patient Goals to have less pain    Plan   Treatment Interventions ADL retraining;Functional transfer training;UE strengthening/ROM; Endurance training;Patient/family training; Compensatory technique education; Activityengagement   Goal Expiration Date 02/02/22   OT Treatment Day 0   OT Frequency 3-5x/wk   Recommendation   OT Discharge Recommendation Home with home health rehabilitation   OT - OK to Discharge Yes  (Once medically cleared )   Additional Comments  At end of eval, pt seated OOB in recliner with call bell within reach    Additional Comments 2 The patient's raw score on the AM-PAC Daily Activity inpatient short form is 20, standardized score is 42 03, greater than 39 4  Patients at this level are likely to benefit from discharge to home  Please refer to the recommendation of the Occupational Therapist for safe discharge planning     AM-PAC Daily Activity Inpatient   Lower Body Dressing 3   Bathing 3   Toileting 3   Upper Body Dressing 3   Grooming 4   Eating 4   Daily Activity Raw Score 20   Daily Activity Standardized Score (Calc for Raw Score >=11) 42 03   AM-[facility] Applied Cognition Inpatient   Following a Speech/Presentation 4   Understanding Ordinary Conversation 4   Taking Medications 4   Remembering Where Things Are Placed or Put Away 3   Remembering List of 4-5 Errands 3   Taking Care of Complicated Tasks 3   Applied Cognition Raw Score 21   Applied Cognition Standardized Score 44 3     GOALS:    *ADL transfers with (I) for inc'd independence with ADLs/purposeful tasks    *UB ADL with (I) for inc'd independence with self cares    *LB ADL with (S) using AE prn for inc'd independence with self cares    *Toileting with (I) for clothing management and hygiene for return to PLOF with personal care    *Increase stand tolerance x5 m for inc'd tolerance with standing purposeful tasks    *Participate in 10m UE therex to increase overall stamina/activity tolerance for purposeful tasks    *Bed mobility- (I) for inc'd independence to manage own comfort and initiate EOB & OOB purposeful tasks    *Patient will verbalize 3 safety awareness/ principles to prevent falls in the home setting  *Patient will increase OOB/sitting tolerance to 2-4 hours per day to increase participation in self-care and leisure tasks with no s/s of exertion       *Pt will demonstrate use of long handled AE during 100% of tx sessions for increased ADL safety and independence following D/C     Janice Hodgson, ANNA, OTR/L

## 2022-01-23 NOTE — PROGRESS NOTES
Vancomycin Assessment    Yani Higgins is a 66 y o  female who is currently receiving vancomycin 1000 mg q24h for skin-soft tissue infection     Relevant clinical data and objective history reviewed:  Creatinine   Date Value Ref Range Status   01/23/2022 0 89 0 60 - 1 30 mg/dL Final     Comment:     Standardized to IDMS reference method   01/22/2022 1 42 (H) 0 60 - 1 30 mg/dL Final     Comment:     Standardized to IDMS reference method   01/20/2022 1 62 (H) 0 60 - 1 30 mg/dL Final     Comment:     Standardized to IDMS reference method     /58 (BP Location: Left arm)   Pulse 74   Temp (!) 97 3 °F (36 3 °C) (Tympanic)   Resp 17   Wt 66 7 kg (147 lb)   SpO2 93%   BMI 29 69 kg/m²   I/O last 3 completed shifts: In: 250 [IV Piggyback:250]  Out: 1000 [Urine:1000]  Lab Results   Component Value Date/Time    BUN 42 (H) 01/23/2022 06:34 AM    WBC 11 49 (H) 01/23/2022 06:34 AM    HGB 10 1 (L) 01/23/2022 06:34 AM    HCT 31 7 (L) 01/23/2022 06:34 AM    MCV 93 01/23/2022 06:34 AM     01/23/2022 06:34 AM     Temp Readings from Last 3 Encounters:   01/23/22 (!) 97 3 °F (36 3 °C) (Tympanic)   01/17/22 98 7 °F (37 1 °C) (Temporal)   09/08/21 (!) 97 2 °F (36 2 °C) (Temporal)     Vancomycin Days of Therapy: 2    Assessment/Plan  The patient is currently on vancomycin utilizing scheduled dosing  Baseline risks associated with therapy include: pre-existing renal impairment, concomitant nephrotoxic medications, advanced age, and dehydration  The patient is receiving 1000 mg q24h, and after clinical evaluation will be changed to 500 mg IV q12h  Pharmacy will continue to follow closely for s/sx of nephrotoxicity, infusion reactions, and appropriateness of therapy  BMP and CBC will be ordered per protocol  Plan for trough as patient approaches steady state, prior to the 4th  dose at approximately 1230 on 1/24/22   Pharmacy will continue to follow the patients culture results and clinical progress daily     Mae Barnes, Pharmacist

## 2022-01-23 NOTE — NURSING NOTE
Pt has been compliant with treatment plan and has been turning and/or moving at least every 2 hours  Pt has gotten OOB to the bedside commode with contact guard of one  She is receiving analgesic as requested which are helping to some degree  She is aware she is having surgery tomorrow and will need to be NPO after midnight  Reviewed periop routines  Stable at this time with no overt evidence of further complication

## 2022-01-23 NOTE — ASSESSMENT & PLAN NOTE
· Patient states that she has had this wound for quite sometime but only was evaluated by Dr Derek Graham on  01/17/2022  · The patient was scheduled for a surgical sacral wound debridement on Monday 01/24/2022 with wound VAC placement  However, due to abnormal labs she was recommended to come in to the ED  · Surgery input appreciated   · CT a/p shows sacral wound with soft tissue thickening, fluid, and gas  No sign of osteomyelitis  · Received 1 dose of IV vancomycin  Will continue vancomycin and cefepime pending surgical wound culture     · Trend procalcitonin  · The patient has leukocytosis however this is improving  · Continue with frequent reposition, local wound care  · Pain management  · Surgery team plans for OR debridement with wound VAC placement on Monday

## 2022-01-23 NOTE — ASSESSMENT & PLAN NOTE
· Baseline Cr appears to be 0 7 to 0 9 mg/dL  · Presented with creatinine of 1 42 mg/dL   · Suspected this is likely due to acute infection and volume depletion in the setting of diuresis use  · Obtain PVR  · CT abdomen and pelvis without hydronephrosis  · Gentle IV fluid; monitor closely with lower extremity edema  · Avoid nephrotoxins and hypotension  · Hold furosemide for now  · Will hold off on consult to Nephrology unless renal function worsens    Results from last 7 days   Lab Units 01/23/22  0634 01/22/22  1147 01/20/22  1400   CREATININE mg/dL 0 89 1 42* 1 62*

## 2022-01-23 NOTE — ANESTHESIA PREPROCEDURE EVALUATION
Procedure:  SACRAL DEBRIDEMENT WOUND AND DRESSING CHANGE [facility]) (N/A Buttocks)  ECG: ST  Hx CVA on Plavix at home, currently on hold  Hyponatremia 130 -> 126 -> 137    CT Pelvis 1/22:  Sacral decubitus ulcer with associated soft tissue thickening, fluid and locules of gas extending to the posterior margin of the sacrum without overt destruction to suggest osteomyelitis      Low-density thickening of the endometrium again noted  Although difficult to determine on noncontrast imaging, this may have progressed from prior study  Please see pelvic ultrasound report from [facility] dated 9/14/2021        Blood cx from admission are NGTD at 24 hrs    Cefepime 2 gm every 12 hrs, last dose 530 am today    Pain regimen:  Morphine 15 mg BID   Oxycodone 5 mg BID     Relevant Problems   CARDIO   (+) Coronary artery disease involving native coronary artery of native heart without angina pectoris   (+) Essential hypertension      GI/HEPATIC   (+) GERD (gastroesophageal reflux disease)      /RENAL   (+) ANDRES (acute kidney injury) ([address] ) (resolved, Cr 0 64)      MUSCULOSKELETAL   (+) Arthritis   (+) Cervical spondylosis      NEURO/PSYCH   (+) Acute arterial ischemic stroke, vertebrobasilar, brainstem, right (HCC)   (+) Chronic pain syndrome   (+) History of stroke   (+) Lateral medullary syndrome      Other   (+) Hyponatremia (resolved)   (+) Lower extremity edema (off diuretics during this admission)   (+) Stage III pressure ulcer of sacral region (HCC)   (+) Vaginal bleeding (chronic)        Physical Exam    Airway    Mallampati score: II  TM Distance: >3 FB  Neck ROM: full     Dental       Cardiovascular      Pulmonary      Other Findings        Anesthesia Plan  ASA Score- 3     Anesthesia Type- general with ASA Monitors  Additional Monitors:   Airway Plan: LMA      Comment: Recent labs personally reviewed:  Lab Results       Component                Value               Date                       WBC 13 19 (H)           01/24/2022                 HGB                      11 7                01/24/2022                 PLT                      451 (H)             01/24/2022            Lab Results       Component                Value               Date                       K                        3 7                 01/24/2022                 BUN                      23                  01/24/2022                 CREATININE               0 64                01/24/2022            Lab Results       Component                Value               Date                       PTT                      30                  01/22/2022             Lab Results       Component                Value               Date                       INR                      0 96                01/22/2022              Blood type       I, Germaine Weldon MD, have personally seen and evaluated the patient prior to anesthetic care  I have reviewed the pre-anesthetic record, medical history, allergies, medications and any other medical records if appropriate to the anesthetic care  If a CRNA is involved in the case, I have reviewed the CRNA assessment, if present, and agree  I consented the patient for general anesthesia with appropriate airway support as indicated  We reviewed the risks associated including PONV, sore throat, allergic reaction to anesthetics and management plan to address these issues  We discussed the indication and risks associated with any invasive monitors that would be placed  We discussed post op pain control and expectations  We discussed rare complications including hypoxia, perioperative cardiac and neurologic events, and death based on the patient's baseline risk  All questions and concerns were addressed          Plan Factors-Exercise tolerance (METS): <4 METS  Chart reviewed  EKG reviewed  Imaging results reviewed  Existing labs reviewed  Patient summary reviewed      Patient is not a current smoker  Patient did not smoke on day of surgery  Obstructive sleep apnea risk education given perioperatively  Induction- intravenous  Postoperative Plan- Plan for postoperative opioid use  Planned trial extubation    Informed Consent- Anesthetic plan and risks discussed with patient  I personally reviewed this patient with the CRNA  Discussed and agreed on the Anesthesia Plan with the CRNA  Mendez Grimes

## 2022-01-23 NOTE — PLAN OF CARE
Problem: OCCUPATIONAL THERAPY ADULT  Goal: Performs self-care activities at highest level of function for planned discharge setting  See evaluation for individualized goals  Description: Treatment Interventions: ADL retraining,Functional transfer training,UE strengthening/ROM,Endurance training,Patient/family training,Compensatory technique education,Activityengagement          See flowsheet documentation for full assessment, interventions and recommendations  Note: Limitation: Decreased ADL status,Decreased UE strength,Decreased endurance,Decreased self-care trans,Decreased high-level ADLs  Prognosis: Good  Assessment: Patient is a 66 y o  female seen for OT evaluation s/p admit to [place] [address] on 1/22/2022 w/Stage III pressure ulcer of sacral region ([address] )  Commorbidities affecting patient's functional performance at time of assessment include: ANDRES, CAD, HTN, hyponatremia, LE edema and vaginal bleeding  Orders placed for OT evaluation and treatment and up with assistance  Performed at least two patient identifiers during session including name and wristband  Prior to admission, Patient reporting being independent with ADLs/IADLs, ambulatory with no AD or SPC and lives alone in a one level house  Personal factors affecting patient at time of initial evaluation include: difficulty performing ADLs and difficulty performing IADLs  Upon evaluation, patient requires supervision assist for UB ADLs, minimal  assist for LB ADLs, transfers and functional ambulation in room and bathroom with contact guard assist, with the use of HHA  Patient is oriented x 4 and presents with ability to recognize a problem, define a problem, identify alternative plan, select a plan, organize steps in a plan, implement plan and evaluate outcome (problem solving)    Occupational performance is affected by the following deficits: decreased muscle strength, dynamic sit/ stand balance deficit with poor standing tolerance time for self care and functional mobility, decreased activity tolerance and increased pain  Based on the mentioned OT evaluation outcomes, functional performance deficits, and assessment findings, pt has been identified as a moderate complexity evaluation  Patient to benefit from continued Occupational Therapy treatment while in the hospital to address deficits as defined above and maximize level of functional independence with ADLs and functional mobility  Occupational Performance areas to address include: grooming , bathing/ shower, dressing, toilet hygiene, transfer to all surfaces, functional ambulation, medication routine/ management, IADLS: Household maintenance, IADLs: safety procedures and IADLs: meal prep/ clean up  From OT standpoint, recommendation at time of d/c would be Home with home health rehabilitation         OT Discharge Recommendation: Home with home health rehabilitation  OT - OK to Discharge: Yes (Once medically cleared )     Helen Harper OT

## 2022-01-23 NOTE — PROGRESS NOTES
[address]  Progress Note - Leora Goode 1950, 66 y o  female MRN: [id number]  Unit/Bed#: APU 16 Encounter: [id number]  Primary Care Provider: Idalia Browning MD   Date and time admitted to hospital: 1/22/2022 11:12 AM    * Stage III pressure ulcer of sacral region [facility])  Assessment & Plan  · Patient states that she has had this wound for quite sometime but only was evaluated by Dr Fazal Colindres on  01/17/2022  · The patient was scheduled for a surgical sacral wound debridement on Monday 01/24/2022 with wound VAC placement  However, due to abnormal labs she was recommended to come in to the ED  · Surgery input appreciated   · CT a/p shows sacral wound with soft tissue thickening, fluid, and gas  No sign of osteomyelitis  · Received 1 dose of IV vancomycin  Will continue vancomycin and cefepime pending surgical wound culture     · Trend procalcitonin  · The patient has leukocytosis however this is improving  · Continue with frequent reposition, local wound care  · Pain management  · Surgery team plans for OR debridement with wound VAC placement on Monday    Essential hypertension  Assessment & Plan  · Hold off on lisinopril and hydrochlorothiazide and diuresis for now due to acute kidney injury and volume depletion  · Monitor blood pressure closely    ANDRES (acute kidney injury) ([facility] [address] [facility] )  Assessment & Plan  · Baseline Cr appears to be 0 7 to 0 9 mg/dL  · Presented with creatinine of 1 42 mg/dL   · Suspected this is likely due to acute infection and volume depletion in the setting of diuresis use  · Obtain PVR  · CT abdomen and pelvis without hydronephrosis  · Gentle IV fluid; monitor closely with lower extremity edema  · Avoid nephrotoxins and hypotension  · Hold furosemide for now  · Will hold off on consult to Nephrology unless renal function worsens    Results from last 7 days   Lab Units 01/23/22  0634 01/22/22  1147 01/20/22  1400   CREATININE mg/dL 0 89 1 42* 1 62*         Coronary artery disease involving native coronary artery of native heart without angina pectoris  Assessment & Plan  · Continue with medical management  · Hold Plavix for now for debridement plan on Monday by surgery  · Continue with statin    Hyponatremia  Assessment & Plan  · Improving  · Likely hypovolemia hyponatremia  · Obtain urine study   · Gentle IVF   · Monitor sodium level     Vaginal bleeding  Assessment & Plan  · This has been going on since September of 2021  The patient was evaluated by [facility] gyn with vaginal bleeding  She was recommended to follow-up and undergo hysterectomy however have not had a chance to do so  · CT abdomen and pelvis shows low-density thickening of the endometrium  · Hemoglobin stable  · Recommend to follow-up with gynecology outpatient    Lower extremity edema  Assessment & Plan  · Will check BNP   · No prior echocardiogram result to review   · Chronic lower extremities edema on furosemide 20 mg daily  · Hold off on diuresis at this time due to acute kidney injury  · Wrap lower extremity and elevate as tolerated      VTE Pharmacologic Prophylaxis:   Pharmacologic: Heparin  Mechanical VTE Prophylaxis in Place: Yes    Patient Centered Rounds: I have performed bedside rounds with nursing staff today  Discussions with Specialists or Other Care Team Provider: General Surgery, Case Management, Nursing    Education and Discussions with Family / Patient: Will call patient's daughter Pau Lund    Time Spent for Care: 45 minutes  More than 50% of total time spent on counseling and coordination of care as described above      Current Length of Stay: 1 day(s)    Current Patient Status: Inpatient   Certification Statement: The patient will continue to require additional inpatient hospital stay due to Sacral Wound with plan for debridement on 1/24/2022    Discharge Plan: Pending PT/OT evaluation and treatment    Code Status: Level 1 - Full Code      Subjective:   Patient seen and examined at bedside  No acute events overnight  Denies chest pain, SOB, diaphoresis, nausea/vomiting/diarrhea, fevers/chills  Objective:     Vitals:   Temp (24hrs), Av 1 °F (37 3 °C), Min:97 3 °F (36 3 °C), Max:101 4 °F (38 6 °C)    Temp:  [97 3 °F (36 3 °C)-101 4 °F (38 6 °C)] 97 3 °F (36 3 °C)  HR:  [74-90] 74  Resp:  [17-18] 17  BP: ()/(53-58) 127/58  SpO2:  [91 %-95 %] 93 %  Body mass index is 29 69 kg/m²  Input and Output Summary (last 24 hours): Intake/Output Summary (Last 24 hours) at 2022 0826  Last data filed at 2022 0423  Gross per 24 hour   Intake 250 ml   Output 1000 ml   Net -750 ml       Physical Exam:     Physical Exam  Vitals and nursing note reviewed  Constitutional:       General: She is not in acute distress  Appearance: She is well-developed  HENT:      Head: Normocephalic and atraumatic  Eyes:      Conjunctiva/sclera: Conjunctivae normal    Cardiovascular:      Rate and Rhythm: Normal rate and regular rhythm  Heart sounds: No murmur heard  Pulmonary:      Effort: Pulmonary effort is normal  No respiratory distress  Breath sounds: Normal breath sounds  Abdominal:      Palpations: Abdomen is soft  Tenderness: There is no abdominal tenderness  Musculoskeletal:      Cervical back: Neck supple  Skin:     General: Skin is warm and dry  Comments: Stage III sacral wound   Neurological:      Mental Status: She is alert  Additional Data:     Labs:  I have reviewed pertinent results     Results from last 7 days   Lab Units 22  0634   WBC Thousand/uL 11 49*   HEMOGLOBIN g/dL 10 1*   HEMATOCRIT % 31 7*   PLATELETS Thousands/uL 304   NEUTROS PCT % 61   LYMPHS PCT % 16   MONOS PCT % 12   EOS PCT % 10*     Results from last 7 days   Lab Units 22  0634   SODIUM mmol/L 134*   POTASSIUM mmol/L 4 1   CHLORIDE mmol/L 96   CO2 mmol/L 32   BUN mg/dL 42*   CREATININE mg/dL 0 89   ANION GAP mmol/L 6   CALCIUM mg/dL 9 8 ALBUMIN g/dL 2 8*   TOTAL BILIRUBIN mg/dL 0 38   ALK PHOS U/L 49   ALT U/L 34   AST U/L 47*   GLUCOSE RANDOM mg/dL 109     Results from last 7 days   Lab Units 01/22/22  1147   INR  0 96             Results from last 7 days   Lab Units 01/22/22  1141   LACTIC ACID mmol/L 0 8         Imaging: I have reviewed pertinent imaging       Recent Cultures (last 7 days):     Results from last 7 days   Lab Units 01/22/22  1204 01/22/22  1147 01/22/22  1141   BLOOD CULTURE   --  Received in Microbiology Lab  Culture in Progress  Received in Microbiology Lab  Culture in Progress     GRAM STAIN RESULT  2+ Gram positive cocci in pairs and chains*  2+ Gram negative rods*  2+ Gram positive rods*  No polys seen*  --   --        Last 24 Hours Medication List:   Current Facility-Administered Medications   Medication Dose Route Frequency Provider Last Rate    acetaminophen  650 mg Oral Q6H PRN Chandan Couch, CRNP      artificial tear  1 application Both Eyes Daily Chandan Couch, CRNP      atorvastatin  40 mg Oral HS Chandan Couch, CRNP      azelastine  1 spray Each Nare BID Chandan Couch, CRNP      cefepime  1,000 mg Intravenous Q12H Chandan Couch, CRNP 1,000 mg (01/23/22 0600)    fluticasone  2 spray Nasal Daily Chandan Couch, CRNP      gabapentin  300 mg Oral HS Chandan Couch, CRNP      heparin (porcine)  5,000 Units Subcutaneous [facility] Chandan Couch, CRNP      iohexol  50 mL Oral Once in imaging Chandan Couch, CRNP      morphine  15 mg Oral BID Chandan Couch, CRNP      morphine injection  2 mg Intravenous Q6H PRN Chandan Couch, CRNP      oxyCODONE  5 mg Oral Q12H PRN Chandan Couch, CRNP      pantoprazole  40 mg Oral Early Morning Chandan Couch, CRNP      sodium chloride  100 mL/hr Intravenous Continuous Chandan Couch, CRNP 100 mL/hr (01/23/22 3129)    tiZANidine  2 mg Oral TID Chandan Couch, CRNP      vancomycin  1,000 mg Intravenous Q24H Chandan Couch, CRNP Today, Patient Was Seen By: Misha Dwyer DO    ** Please Note: Dictation voice to text software may have been used in the creation of this document   **

## 2022-01-23 NOTE — ASSESSMENT & PLAN NOTE
· Improving  · Likely hypovolemia hyponatremia  · Obtain urine study   · Gentle IVF   · Monitor sodium level

## 2022-01-23 NOTE — PROGRESS NOTES
01/22/22 2100   Sepsis Screening (Must be performed on all patients over 25years of age) **Use most recent VS and lab values available within previous 24 hours for your screen**Notify Physician of new/acute/suspected infection   Indicate SIRS criteria Leukocytosis (WBC > 25132 IJL); Tachycardia > 90 bpm;Hyperthemia > 38 3C (100 9F)   Two (2) or more SIRS criteria present? (!) Yes (Proceed)   Physician Notified? Yes (enter name in comments)  Michelle Body)   Sepsis Protocol Initiated?  No

## 2022-01-23 NOTE — PHYSICAL THERAPY NOTE
PHYSICAL THERAPY EVALUATION  NAME:  Refugio Sánchez  DATE: 01/23/22    AGE:   66 y o    Mrn:   [id number]  ADMIT DX:  Wound of sacral region, initial encounter [S31 000A]  Problem List:   Patient Active Problem List   Diagnosis    Rotator cuff tear arthropathy of left shoulder    Rotator cuff tear arthropathy, right    Chronic pain syndrome    Stroke-like symptoms    Elevated troponin    Elevated d-dimer    Acute nasopharyngitis    Essential hypertension    History of stroke    Arthritis    Acute arterial ischemic stroke, vertebrobasilar, brainstem, right (HCC)    Cervical disc herniation    Cervical spondylosis    Coronary artery disease involving native coronary artery of native heart without angina pectoris    GERD (gastroesophageal reflux disease)    History of knee replacement, total, bilateral    Lateral medullary syndrome    Stage III pressure ulcer of sacral region ([facility] )    Hyponatremia    ANDRES (acute kidney injury) ([facility] )    Vaginal bleeding    Lower extremity edema       Past Medical History  Past Medical History:   Diagnosis Date    Anxiety     Arthritis     Bernard's esophagus     Bleeds easily ([facility] )     CVA (cerebral vascular accident) ([facility] ) 10/2016    Fibromyalgia     GERD (gastroesophageal reflux disease)     Hypercholesterolemia     Hypertension     Insomnia     Rheumatoid arthritis ([address] )     Stroke ([facility] ) 2016       Past Surgical History  Past Surgical History:   Procedure Laterality Date    APPENDECTOMY      BREAST BIOPSY Bilateral     benign    CARPAL TUNNEL RELEASE Left 10/27/2003    DORSAL COMPARTMENT RELEASE Left 03/09/2006    FINGER SURGERY      traumatic amputation age 3 right sided    KNEE ARTHROSCOPY Right     x3(6/91,7/96,7/99)    OTHER SURGICAL HISTORY Left 03/09/2006    tennis elbow release    REPLACEMENT TOTAL KNEE BILATERAL      rt-11/99, lt-1/13/10    ROTATOR CUFF REPAIR Bilateral     SHOULDER ARTHROSCOPY Right 02/10/2016    SHOULDER ARTHROSCOPY Left     11/26/08,9/99    SHOULDER ARTHROSCOPY Left 03/09/2006    TONSILLECTOMY AND ADENOIDECTOMY      TRIGGER FINGER RELEASE Left 07/01/2002    middle and ring finger    ULNAR NERVE TRANSPOSITION Left        Length Of Stay: 1  Performed at least 2 patient identifiers during session: Keith Gaviria       01/23/22 0918   PT Last Visit   PT Visit Date 01/23/22   Note Type   Note type Evaluation   Pain Assessment   Pain Assessment Tool 0-10   Pain Score 10 - Worst Possible Pain   Pain Location/Orientation Location: Generalized  (from "RA" per pt)   Pain Onset/Description Onset: Ongoing   Hospital Pain Intervention(s) Repositioned  (warm blankets)   Restrictions/Precautions   Weight Bearing Precautions Per Order No   Other Precautions Multiple lines;Telemetry; Fall Risk;Pain   Home Living   Type of [address] One level;Performs ADLs on one level; Able to live on main level with bedroom/bathroom; Ramped entrance   Caregivers Grab bars in shower; Shower chair   Bathroom Accessibility Accessible   Home Equipment Walker;Cane  (no AD used in house, [facility] in [facility] )   Prior Function   Level of Robert Independent with ADLs and functional mobility   Lives With [address] [place] [address] in the last 6 months 0   Vocational Retired   Comments +    General   Family/Caregiver Present No   Cognition   Overall Cognitive Status WFL   Arousal/Participation Alert   Attention Within functional limits   Orientation Level Oriented X4   Memory Within functional limits   Following Commands Follows all commands and directions without difficulty   RLE Assessment   RLE Assessment WFL   LLE Assessment   LLE Assessment WFL   Vision-Basic Assessment   Current Vision Wears glasses all the time   Coordination   Movements are Fluid and Coordinated 1   Transfers   Sit to Stand   (CGA)   Additional items Assist x 1; Armrests; Increased time required;Verbal cues   Stand to Sit   (CGA)   Additional items Assist x 1; Increased time required;Armrests; Verbal cues   Toilet transfer   (CGA)   Additional items Assist x 1; Armrests; Increased time required;Verbal cues; Commode   Additional Comments pt denied dizziness   Ambulation/Elevation   Gait pattern Improper Weight shift; Antalgic   Gait Assistance   (CGA)   Additional items Verbal cues   Assistive Device   (HHA)   Distance 20 ft   Balance   Static Sitting Good   Dynamic Sitting Fair +   Static Standing Fair   Dynamic Standing Fair -   Ambulatory Fair -   Endurance Deficit   Endurance Deficit Yes   Endurance Deficit Description pt reported fatigue with ambulation   Activity Tolerance   Activity Tolerance Patient limited by fatigue;Patient limited by pain   Nurse Made Aware RN verbalized pt appropriate for session   Assessment   Prognosis Good   Problem List Decreased endurance; Impaired balance;Decreased mobility;Pain   Assessment Pt is 66 y o  female seen for PT evaluation s/p admit to [address] on 1/22/2022 w/ Stage III pressure ulcer of sacral region [facility])  PT consulted to assess pt's functional mobility and d/c needs  Order placed for PT eval and tx, w/ up w/ A order  Pt agreeable to PT  session upon arrival, pt found on commode  Pt requires CGA assistance for bed mobility, transfers, and ambulation with HHA  PTA, pt was independent w/ all functional mobility w/ occ  use of SPC, ambulates community distances and elevations, lives alone in 1 level home and retired  Pt to benefit from continued PT tx to address deficits and maximize level of functional independent mobility and consistency  From PT/mobility standpoint, recommendation at time of d/c would be home with home health rehabilitation pending progress in order to facilitate return to OF   Upon conclusion pt  seated in recliner   Barriers to Discharge None   Goals   Patient Goals to decrease her pain   LTG Expiration Date 02/02/22   Long Term Goal #1 Pt will: Perform bed mobility tasks to modified I to improve ease of bed mobility  Perform transfers to modified I to improve ease of transfers  Perform ambulation with MI and RW for 250 feet to  increase Indep in home environment  Increase dynamic standing balance to F+ to decrease fall risk  Increase OOB activity tolerance to 10 minutes without s/s of exertion to decrease fall risk  PT Treatment Day 0   Plan   Treatment/Interventions Functional transfer training; Therapeutic exercise; Endurance training;Bed mobility;Gait training   PT Frequency 3-5x/wk   Recommendation   PT Discharge Recommendation Home with home health rehabilitation   Equipment Recommended   (none)   AM-PAC Basic Mobility Inpatient   Turning in Bed Without Bedrails 4   Lying on Back to Sitting on Edge of Flat Bed 4   Moving Bed to Chair 3   Standing Up From Chair 3   Walk in Room 3   Climb 3-5 Stairs 3   Basic Mobility Inpatient Raw Score 20   Basic Mobility Standardized Score 43 99   Highest Level Of Mobility   -[facility] Goal 6: Walk 10 steps or more       Time In: 0918  Time Out: 0930  Total Evaluation Minutes:    Rajesh Beatty, PT

## 2022-01-23 NOTE — PLAN OF CARE
Problem: PHYSICAL THERAPY ADULT  Goal: Performs mobility at highest level of function for planned discharge setting  See evaluation for individualized goals  Description: Treatment/Interventions: Functional transfer training,Therapeutic exercise,Endurance training,Bed mobility,Gait training          See flowsheet documentation for full assessment, interventions and recommendations  1/23/2022 1138 by Wili Azevedo PT  Outcome: Progressing  Note: Prognosis: Good  Problem List: Decreased endurance,Impaired balance,Decreased mobility,Pain  Assessment: Pt is 66 y o  female seen for PT evaluation s/p admit to [address] on 1/22/2022 w/ Stage III pressure ulcer of sacral region [facility])  PT consulted to assess pt's functional mobility and d/c needs  Order placed for PT eval and tx, w/ up w/ A order  Pt agreeable to PT  session upon arrival, pt found on commode  Pt requires CGA assistance for bed mobility, transfers, and ambulation with HHA  PTA, pt was independent w/ all functional mobility w/ occ  use of SPC, ambulates community distances and elevations, lives alone in 1 level home and retired  Pt to benefit from continued PT tx to address deficits and maximize level of functional independent mobility and consistency  From PT/mobility standpoint, recommendation at time of d/c would be home with home health rehabilitation pending progress in order to facilitate return to PLOF  Upon conclusion pt  seated in recliner  Barriers to Discharge: None        PT Discharge Recommendation: Home with home health rehabilitation          See flowsheet documentation for full assessment  1/23/2022 1138 by Wili Azevedo PT  Note: Prognosis: Good  Problem List: Decreased endurance,Impaired balance,Decreased mobility,Pain  Assessment: Pt is 66 y o  female seen for PT evaluation s/p admit to [address] on 1/22/2022 w/ Stage III pressure ulcer of sacral [facility])   PT consulted to assess pt's functional mobility and d/c needs  Order placed for PT eval and tx, w/ up w/ A order  Pt agreeable to PT  session upon arrival, pt found on commode  Pt requires CGA assistance for bed mobility, transfers, and ambulation with HHA  PTA, pt was independent w/ all functional mobility w/ occ  use of SPC, ambulates community distances and elevations, lives alone in 1 level home and retired  Pt to benefit from continued PT tx to address deficits and maximize level of functional independent mobility and consistency  From PT/mobility standpoint, recommendation at time of d/c would be home with home health rehabilitation pending progress in order to facilitate return to PLOF  Upon conclusion pt  seated in recliner  Barriers to Discharge: None        PT Discharge Recommendation: Home with home health rehabilitation          See flowsheet documentation for full assessment   Shaji Sellers, PT

## 2022-01-24 ENCOUNTER — ANESTHESIA (INPATIENT)
Dept: PERIOP | Facility: HOSPITAL | Age: 72
DRG: 571 | End: 2022-01-24
Payer: COMMERCIAL

## 2022-01-24 LAB
ANION GAP SERPL CALCULATED.3IONS-SCNC: 7 MMOL/L (ref 4–13)
BACTERIA UR QL AUTO: ABNORMAL /HPF
BASOPHILS # BLD AUTO: 0.08 THOUSANDS/ΜL (ref 0–0.1)
BASOPHILS NFR BLD AUTO: 1 % (ref 0–1)
BILIRUB UR QL STRIP: NEGATIVE
BUN SERPL-MCNC: 23 MG/DL (ref 5–25)
CALCIUM SERPL-MCNC: 9.7 MG/DL (ref 8.4–10.2)
CHLORIDE SERPL-SCNC: 99 MMOL/L (ref 96–108)
CLARITY UR: ABNORMAL
CO2 SERPL-SCNC: 33 MMOL/L (ref 21–32)
COLOR UR: YELLOW
CREAT SERPL-MCNC: 0.64 MG/DL (ref 0.6–1.3)
EOSINOPHIL # BLD AUTO: 0.71 THOUSAND/ΜL (ref 0–0.61)
EOSINOPHIL NFR BLD AUTO: 5 % (ref 0–6)
ERYTHROCYTE [DISTWIDTH] IN BLOOD BY AUTOMATED COUNT: 12.3 % (ref 11.6–15.1)
FLUAV RNA RESP QL NAA+PROBE: NEGATIVE
FLUBV RNA RESP QL NAA+PROBE: NEGATIVE
GFR SERPL CREATININE-BSD FRML MDRD: 89 ML/MIN/1.73SQ M
GLUCOSE SERPL-MCNC: 104 MG/DL (ref 65–140)
GLUCOSE UR STRIP-MCNC: NEGATIVE MG/DL
HCT VFR BLD AUTO: 37 % (ref 34.8–46.1)
HGB BLD-MCNC: 11.7 G/DL (ref 11.5–15.4)
HGB UR QL STRIP.AUTO: ABNORMAL
IMM GRANULOCYTES # BLD AUTO: 0.06 THOUSAND/UL (ref 0–0.2)
IMM GRANULOCYTES NFR BLD AUTO: 1 % (ref 0–2)
KETONES UR STRIP-MCNC: ABNORMAL MG/DL
LEUKOCYTE ESTERASE UR QL STRIP: ABNORMAL
LYMPHOCYTES # BLD AUTO: 1.9 THOUSANDS/ΜL (ref 0.6–4.47)
LYMPHOCYTES NFR BLD AUTO: 14 % (ref 14–44)
MAGNESIUM SERPL-MCNC: 1.4 MG/DL (ref 1.9–2.7)
MCH RBC QN AUTO: 29.8 PG (ref 26.8–34.3)
MCHC RBC AUTO-ENTMCNC: 31.6 G/DL (ref 31.4–37.4)
MCV RBC AUTO: 94 FL (ref 82–98)
MONOCYTES # BLD AUTO: 1.2 THOUSAND/ΜL (ref 0.17–1.22)
MONOCYTES NFR BLD AUTO: 9 % (ref 4–12)
NEUTROPHILS # BLD AUTO: 9.24 THOUSANDS/ΜL (ref 1.85–7.62)
NEUTS SEG NFR BLD AUTO: 70 % (ref 43–75)
NITRITE UR QL STRIP: NEGATIVE
NON-SQ EPI CELLS URNS QL MICRO: ABNORMAL /HPF
NRBC BLD AUTO-RTO: 0 /100 WBCS
PH UR STRIP.AUTO: 6 [PH]
PHOSPHATE SERPL-MCNC: 1.9 MG/DL (ref 2.3–4.1)
PLATELET # BLD AUTO: 451 THOUSANDS/UL (ref 149–390)
PMV BLD AUTO: 8.5 FL (ref 8.9–12.7)
POTASSIUM SERPL-SCNC: 3.7 MMOL/L (ref 3.5–5.3)
PROT UR STRIP-MCNC: ABNORMAL MG/DL
RBC # BLD AUTO: 3.92 MILLION/UL (ref 3.81–5.12)
RBC #/AREA URNS AUTO: ABNORMAL /HPF
RSV RNA RESP QL NAA+PROBE: NEGATIVE
SARS-COV-2 RNA RESP QL NAA+PROBE: NEGATIVE
SODIUM SERPL-SCNC: 139 MMOL/L (ref 135–147)
SP GR UR STRIP.AUTO: 1.02 (ref 1–1.03)
UROBILINOGEN UR QL STRIP.AUTO: 0.2 E.U./DL
VANCOMYCIN TROUGH SERPL-MCNC: 9.7 UG/ML (ref 10–20)
WBC # BLD AUTO: 13.19 THOUSAND/UL (ref 4.31–10.16)
WBC #/AREA URNS AUTO: ABNORMAL /HPF

## 2022-01-24 PROCEDURE — 81001 URINALYSIS AUTO W/SCOPE: CPT | Performed by: NURSE PRACTITIONER

## 2022-01-24 PROCEDURE — 88304 TISSUE EXAM BY PATHOLOGIST: CPT | Performed by: PATHOLOGY

## 2022-01-24 PROCEDURE — 0241U HB NFCT DS VIR RESP RNA 4 TRGT: CPT | Performed by: ANESTHESIOLOGY

## 2022-01-24 PROCEDURE — 11045 DBRDMT SUBQ TISS EACH ADDL: CPT | Performed by: SURGERY

## 2022-01-24 PROCEDURE — 83735 ASSAY OF MAGNESIUM: CPT | Performed by: INTERNAL MEDICINE

## 2022-01-24 PROCEDURE — 97605 NEG PRS WND THER DME<=50SQCM: CPT | Performed by: SURGERY

## 2022-01-24 PROCEDURE — 85025 COMPLETE CBC W/AUTO DIFF WBC: CPT | Performed by: INTERNAL MEDICINE

## 2022-01-24 PROCEDURE — 11042 DBRDMT SUBQ TIS 1ST 20SQCM/<: CPT | Performed by: SURGERY

## 2022-01-24 PROCEDURE — 80048 BASIC METABOLIC PNL TOTAL CA: CPT | Performed by: INTERNAL MEDICINE

## 2022-01-24 PROCEDURE — 84100 ASSAY OF PHOSPHORUS: CPT | Performed by: INTERNAL MEDICINE

## 2022-01-24 PROCEDURE — 80202 ASSAY OF VANCOMYCIN: CPT | Performed by: NURSE PRACTITIONER

## 2022-01-24 PROCEDURE — 0JB70ZZ EXCISION OF BACK SUBCUTANEOUS TISSUE AND FASCIA, OPEN APPROACH: ICD-10-PCS | Performed by: INTERNAL MEDICINE

## 2022-01-24 PROCEDURE — 99232 SBSQ HOSP IP/OBS MODERATE 35: CPT | Performed by: INTERNAL MEDICINE

## 2022-01-24 RX ORDER — KETAMINE HCL IN NACL, ISO-OSM 100MG/10ML
SYRINGE (ML) INJECTION AS NEEDED
Status: DISCONTINUED | OUTPATIENT
Start: 2022-01-24 | End: 2022-01-24

## 2022-01-24 RX ORDER — VANCOMYCIN HYDROCHLORIDE 1 G/200ML
15 INJECTION, SOLUTION INTRAVENOUS EVERY 12 HOURS
Status: DISCONTINUED | OUTPATIENT
Start: 2022-01-25 | End: 2022-01-25

## 2022-01-24 RX ORDER — MORPHINE SULFATE 4 MG/ML
2 INJECTION, SOLUTION INTRAMUSCULAR; INTRAVENOUS
Status: DISCONTINUED | OUTPATIENT
Start: 2022-01-24 | End: 2022-01-24

## 2022-01-24 RX ORDER — SODIUM CHLORIDE, SODIUM LACTATE, POTASSIUM CHLORIDE, CALCIUM CHLORIDE 600; 310; 30; 20 MG/100ML; MG/100ML; MG/100ML; MG/100ML
INJECTION, SOLUTION INTRAVENOUS CONTINUOUS PRN
Status: DISCONTINUED | OUTPATIENT
Start: 2022-01-24 | End: 2022-01-24

## 2022-01-24 RX ORDER — ONDANSETRON 2 MG/ML
4 INJECTION INTRAMUSCULAR; INTRAVENOUS ONCE AS NEEDED
Status: DISCONTINUED | OUTPATIENT
Start: 2022-01-24 | End: 2022-01-24 | Stop reason: HOSPADM

## 2022-01-24 RX ORDER — LIDOCAINE HYDROCHLORIDE 10 MG/ML
INJECTION, SOLUTION EPIDURAL; INFILTRATION; INTRACAUDAL; PERINEURAL AS NEEDED
Status: DISCONTINUED | OUTPATIENT
Start: 2022-01-24 | End: 2022-01-24

## 2022-01-24 RX ORDER — MORPHINE SULFATE 10 MG/ML
2 INJECTION, SOLUTION INTRAMUSCULAR; INTRAVENOUS
Status: DISCONTINUED | OUTPATIENT
Start: 2022-01-24 | End: 2022-01-24 | Stop reason: HOSPADM

## 2022-01-24 RX ORDER — PROPOFOL 10 MG/ML
INJECTION, EMULSION INTRAVENOUS AS NEEDED
Status: DISCONTINUED | OUTPATIENT
Start: 2022-01-24 | End: 2022-01-24

## 2022-01-24 RX ORDER — MAGNESIUM HYDROXIDE 1200 MG/15ML
LIQUID ORAL AS NEEDED
Status: DISCONTINUED | OUTPATIENT
Start: 2022-01-24 | End: 2022-01-24 | Stop reason: HOSPADM

## 2022-01-24 RX ORDER — ONDANSETRON 2 MG/ML
INJECTION INTRAMUSCULAR; INTRAVENOUS AS NEEDED
Status: DISCONTINUED | OUTPATIENT
Start: 2022-01-24 | End: 2022-01-24

## 2022-01-24 RX ORDER — MORPHINE SULFATE 10 MG/ML
4 INJECTION, SOLUTION INTRAMUSCULAR; INTRAVENOUS
Status: DISCONTINUED | OUTPATIENT
Start: 2022-01-24 | End: 2022-01-24 | Stop reason: HOSPADM

## 2022-01-24 RX ORDER — FENTANYL CITRATE 50 UG/ML
INJECTION, SOLUTION INTRAMUSCULAR; INTRAVENOUS AS NEEDED
Status: DISCONTINUED | OUTPATIENT
Start: 2022-01-24 | End: 2022-01-24

## 2022-01-24 RX ORDER — SUCCINYLCHOLINE/SOD CL,ISO/PF 100 MG/5ML
SYRINGE (ML) INTRAVENOUS AS NEEDED
Status: DISCONTINUED | OUTPATIENT
Start: 2022-01-24 | End: 2022-01-24

## 2022-01-24 RX ORDER — FENTANYL CITRATE/PF 50 MCG/ML
50 SYRINGE (ML) INJECTION
Status: DISCONTINUED | OUTPATIENT
Start: 2022-01-24 | End: 2022-01-24

## 2022-01-24 RX ORDER — MORPHINE SULFATE 4 MG/ML
INJECTION, SOLUTION INTRAMUSCULAR; INTRAVENOUS
Status: COMPLETED
Start: 2022-01-24 | End: 2022-01-24

## 2022-01-24 RX ORDER — MAGNESIUM SULFATE HEPTAHYDRATE 40 MG/ML
2 INJECTION, SOLUTION INTRAVENOUS ONCE
Status: COMPLETED | OUTPATIENT
Start: 2022-01-24 | End: 2022-01-24

## 2022-01-24 RX ADMIN — AZELASTINE HYDROCHLORIDE 1 SPRAY: 137 SPRAY, METERED NASAL at 09:00

## 2022-01-24 RX ADMIN — Medication 30 MG: at 14:08

## 2022-01-24 RX ADMIN — AZELASTINE HYDROCHLORIDE 1 SPRAY: 137 SPRAY, METERED NASAL at 19:11

## 2022-01-24 RX ADMIN — VANCOMYCIN HYDROCHLORIDE 500 MG: 500 INJECTION, SOLUTION INTRAVENOUS at 15:18

## 2022-01-24 RX ADMIN — PROPOFOL 150 MG: 10 INJECTION, EMULSION INTRAVENOUS at 13:47

## 2022-01-24 RX ADMIN — HEPARIN SODIUM 5000 UNITS: 5000 INJECTION INTRAVENOUS; SUBCUTANEOUS at 17:35

## 2022-01-24 RX ADMIN — MORPHINE SULFATE 15 MG: 15 TABLET, EXTENDED RELEASE ORAL at 09:01

## 2022-01-24 RX ADMIN — OXYCODONE HYDROCHLORIDE 5 MG: 5 TABLET ORAL at 21:40

## 2022-01-24 RX ADMIN — MAGNESIUM SULFATE HEPTAHYDRATE 2 G: 40 INJECTION, SOLUTION INTRAVENOUS at 09:28

## 2022-01-24 RX ADMIN — GABAPENTIN 300 MG: 600 TABLET, FILM COATED ORAL at 21:40

## 2022-01-24 RX ADMIN — POTASSIUM PHOSPHATE, MONOBASIC POTASSIUM PHOSPHATE, DIBASIC 12 MMOL: 224; 236 INJECTION, SOLUTION, CONCENTRATE INTRAVENOUS at 10:13

## 2022-01-24 RX ADMIN — FENTANYL CITRATE 100 MCG: 50 INJECTION, SOLUTION INTRAMUSCULAR; INTRAVENOUS at 13:47

## 2022-01-24 RX ADMIN — MORPHINE SULFATE 15 MG: 15 TABLET, EXTENDED RELEASE ORAL at 17:38

## 2022-01-24 RX ADMIN — HEPARIN SODIUM 5000 UNITS: 5000 INJECTION INTRAVENOUS; SUBCUTANEOUS at 21:39

## 2022-01-24 RX ADMIN — TIZANIDINE 2 MG: 4 TABLET ORAL at 21:39

## 2022-01-24 RX ADMIN — ONDANSETRON 4 MG: 2 INJECTION INTRAMUSCULAR; INTRAVENOUS at 14:19

## 2022-01-24 RX ADMIN — ATORVASTATIN CALCIUM 40 MG: 40 TABLET, FILM COATED ORAL at 21:39

## 2022-01-24 RX ADMIN — VANCOMYCIN HYDROCHLORIDE 500 MG: 500 INJECTION, SOLUTION INTRAVENOUS at 02:00

## 2022-01-24 RX ADMIN — MINERAL OIL AND PETROLATUM 1 APPLICATION: 150; 830 OINTMENT OPHTHALMIC at 09:00

## 2022-01-24 RX ADMIN — MORPHINE SULFATE 2 MG: 2 INJECTION, SOLUTION INTRAMUSCULAR; INTRAVENOUS at 12:20

## 2022-01-24 RX ADMIN — LIDOCAINE HYDROCHLORIDE 50 MG: 10 INJECTION, SOLUTION EPIDURAL; INFILTRATION; INTRACAUDAL; PERINEURAL at 13:47

## 2022-01-24 RX ADMIN — Medication 100 MG: at 13:49

## 2022-01-24 RX ADMIN — TIZANIDINE 2 MG: 4 TABLET ORAL at 17:35

## 2022-01-24 RX ADMIN — MORPHINE SULFATE 4 MG: 4 INJECTION INTRAVENOUS at 15:33

## 2022-01-24 RX ADMIN — OXYCODONE HYDROCHLORIDE 5 MG: 5 TABLET ORAL at 05:29

## 2022-01-24 RX ADMIN — CEFEPIME HYDROCHLORIDE 1000 MG: 1 INJECTION, SOLUTION INTRAVENOUS at 05:29

## 2022-01-24 RX ADMIN — Medication 20 MG: at 13:47

## 2022-01-24 RX ADMIN — CEFEPIME HYDROCHLORIDE 1000 MG: 1 INJECTION, SOLUTION INTRAVENOUS at 17:36

## 2022-01-24 RX ADMIN — FENTANYL CITRATE 50 MCG: 50 INJECTION, SOLUTION INTRAMUSCULAR; INTRAVENOUS at 15:01

## 2022-01-24 RX ADMIN — SODIUM CHLORIDE, SODIUM LACTATE, POTASSIUM CHLORIDE, AND CALCIUM CHLORIDE: .6; .31; .03; .02 INJECTION, SOLUTION INTRAVENOUS at 13:44

## 2022-01-24 RX ADMIN — PANTOPRAZOLE SODIUM 40 MG: 40 TABLET, DELAYED RELEASE ORAL at 05:29

## 2022-01-24 RX ADMIN — TIZANIDINE 2 MG: 4 TABLET ORAL at 09:01

## 2022-01-24 RX ADMIN — MORPHINE SULFATE 2 MG: 2 INJECTION, SOLUTION INTRAMUSCULAR; INTRAVENOUS at 00:38

## 2022-01-24 NOTE — OCCUPATIONAL THERAPY NOTE
01/24/22 0940   Note Type   Cancel Reasons Other   Restrictions/Precautions   Weight Bearing Precautions Per Order   (Pt  unavailable due to Nsg prep for surgery)   Pt  Unavailable for OT treatment due to Nsg  Prep for surgery

## 2022-01-24 NOTE — ANESTHESIA POSTPROCEDURE EVALUATION
Post-Op Assessment Note    CV Status:  Stable    Pain management: satisfactory to patient     Mental Status:  Alert and awake   Hydration Status:  Stable   PONV Controlled:  None   Airway Patency:  Patent      Post Op Vitals Reviewed: Yes      Staff: CRNA         No complications documented      /73 (01/24/22 1449)    Temp (!) 97 4 °F (36 3 °C) (01/24/22 1449)    Pulse 88 (01/24/22 1449)   Resp 16 (01/24/22 1449)    SpO2 98 % (01/24/22 1449)

## 2022-01-24 NOTE — ASSESSMENT & PLAN NOTE
· This has been going on since September of 2021  The patient was evaluated by [facility] gynecology  She was recommended to follow-up and undergo hysterectomy however has not had a chance to do so  · CT abdomen and pelvis shows thickening of the endometrium     · Hemoglobin stable   · Recommend to follow-up with gynecology outpatient

## 2022-01-24 NOTE — ASSESSMENT & PLAN NOTE
· No prior echocardiogram result to review   · Chronic lower extremities edema on furosemide 20 mg daily  · Hold off on diuresis at this time due to acute kidney injury  · Wrap lower extremity and elevate as tolerated

## 2022-01-24 NOTE — OP NOTE
PERATIVE REPORT  PATIENT NAME: Elizabeth Maradiaga    :  1950  MRN: [id number]  Pt Location: [facility] OR ROOM 02    SURGERY DATE: 2022    Surgeon(s) and Role:     Brianne Hermosillo MD - Primary    Preop Diagnosis:  Stage III pressure ulcer of sacral region [facility]) [L89 153]    Post-Op Diagnosis Codes:     * Stage III pressure ulcer of sacral region ([address] ) [L89 153]    Procedure(s) (LRB):  SACRAL DEBRIDEMENT WOUND AND DRESSING CHANGE ([address] OUT) (N/A)    Specimen(s):  * No specimens in log *    Estimated Blood Loss:   Minimal    Drains:  * No LDAs found *    Anesthesia Type:   IV Sedation with Anesthesia    Operative Indications:  Stage III pressure ulcer of sacral region [facility]) [L89 153]  Patient is a 50-year-old female presenting with a deep stage III sacral decubitus ulcer for which debridement under anesthesia is now indicated    Operative Findings:  Sharp debridement with a 15  Scalpel was used    Debridement carried down to the level of the presacral fascia    Wound dimensions 10 cm long x 4 cm wide x 3 cm in depth     All necrotic debris excised  There is no obviously exposed bone  Medium black foam used to achieve good seal with no leak  Complications:   None    Procedure and Technique:  Patient taken to the operating room where she was properly identified monitored and anesthetized  She was rolled into the prone position  Skin prepped and draped  Time-out performed  All necrotic tissue sharply debrided with a 15 blade  Pulse lavage irrigation applied  Good hemostasis found  Wound measured  Medium black foam selected and cut to fit  Bioclusive dressing applied  Wound VAC applied with good seal and the procedure completed uneventfully  Patient tolerated the procedure well     I was present for the entire procedure    Patient Disposition:  PACU       SIGNATURE: Ayan Schaefer MD  DATE: 2022  TIME: 2:39 PM

## 2022-01-24 NOTE — PLAN OF CARE
Problem: Nutrition/Hydration-ADULT  Goal: Nutrient/Hydration intake appropriate for improving, restoring or maintaining nutritional needs  Description: Monitor and assess patient's nutrition/hydration status for malnutrition  Collaborate with interdisciplinary team and initiate plan and interventions as ordered  Monitor patient's weight and dietary intake as ordered or per policy  Utilize nutrition screening tool and intervene as necessary  Determine patient's food preferences and provide high-protein, high-caloric foods as appropriate       INTERVENTIONS:  - Monitor oral intake, urinary output, labs, and treatment plans  - Assess nutrition and hydration status and recommend course of action  - Evaluate amount of meals eaten  - Assist patient with eating if necessary   - Allow adequate time for meals  - Recommend/ encourage appropriate diets, oral nutritional supplements, and vitamin/mineral supplements  - Order, calculate, and assess calorie counts as needed  - Recommend, monitor, and adjust tube feedings and TPN/PPN based on assessed needs  - Assess need for intravenous fluids  - Provide specific nutrition/hydration education as appropriate  - Include patient/family/caregiver in decisions related to nutrition  Outcome: Progressing TEAM

## 2022-01-24 NOTE — APP STUDENT NOTE
Progress Note - General Surgery   Rut Pressley 66 y o  female MRN: [id number]  Unit/Bed#: APU 16 Encounter: [id number]    Assessment:  Myra Gale is a 65-year-old female with a PMH significant for HTN, CVD, CAD, and arthritis presenting on HOD #2 for a stage III sacral decubitus pressure ulcer  Afebrile  Vital signs stable  Leukocytosis, increased to 13 16 from 11 49  Thrombocytosis, increased to 451 from 304  Hypercarbia, increased to 33 from 32  Lactic acid WNL  Wound culture growing gram + cocci in pairs/chains and gram - rods  Blood culture pending    Plan:  Stage III sacral decubitus ulcer  - Wound debridement and wound VAC application in OR scheduled for today, 01/24/2021  - Continue IV antibiotics  - Continue analgesics PRN pain  - Continue SQ heparin for VTE prophylaxis  - Apply SCD  - Order incentive spirometry  - Remain NPO  - Continue IVF    Leukocytosis  - Wound debridement and wound VAC application in OR scheduled for today, 01/24/2021  - Continue IV antibiotics  - Continue to monitor CBC for leukocytosis  - Continue to monitor vitals for SIRS      Subjective/Objective     Subjective:   Myra Gale is a 65-year-old female with a PMH significant for HTN, CVD, and CAD presenting on HOD #2 for a stage III sacral decubitus pressure ulcer  Today, the patient is feeling better  Admits to persistent pain in the sacral area secondary to ulcer  Also admits to generalized joint pain, likely secondary to arthritis  Patient states she did not sleep well last PM secondary to noise and pain  Admits to multiple normal BM and urinating without difficulty  Ambulating to commode with assistance  Patient states she was tolerating diet well  Currently NPO for procedure this afternoon  Denies fever, chills, N/V, and SOB  Admits to chest discomfort that she contributes to laying in one position  Admits to calf tenderness, but states it has improved since yesterday  Objective: Pleasant female in NAD  WDWN  Alert and coherent  Good and reliable historian  Blood pressure 119/59, pulse 74, temperature 97 5 °F (36 4 °C), temperature source Tympanic, resp  rate 18, weight 66 7 kg (147 lb), SpO2 93 %  ,Body mass index is 29 69 kg/m²  Intake/Output Summary (Last 24 hours) at 1/24/2022 0806  Last data filed at 1/23/2022 1744  Gross per 24 hour   Intake 220 ml   Output --   Net 220 ml       Invasive Devices  Report    Peripheral Intravenous Line            Peripheral IV 01/22/22 Left Arm 1 day                Physical Exam: /59 (BP Location: Left arm)   Pulse 74   Temp 97 5 °F (36 4 °C) (Tympanic)   Resp 18   Wt 66 7 kg (147 lb)   SpO2 93%   BMI 29 69 kg/m²   General appearance: alert and oriented, in no acute distress  Lungs: clear to auscultation bilaterally  Heart: regular rate and rhythm, S1, S2 normal, no murmur, click, rub or gallop  Abdomen: soft, non-tender; bowel sounds normal; no masses,  no organomegaly  Extremities: edema 1+ bilateral LE  Skin: Stage III deep sacral pressure ulcer, approximately 5 cm by 3 cm in size with 4 cm cephalad tract  Lab, Imaging and other studies:  I have personally reviewed pertinent lab results      CBC:   Lab Results   Component Value Date    WBC 13 19 (H) 01/24/2022    HGB 11 7 01/24/2022    HCT 37 0 01/24/2022    MCV 94 01/24/2022     (H) 01/24/2022    MCH 29 8 01/24/2022    MCHC 31 6 01/24/2022    RDW 12 3 01/24/2022    MPV 8 5 (L) 01/24/2022    NRBC 0 01/24/2022     CMP:   Lab Results   Component Value Date    SODIUM 139 01/24/2022    K 3 7 01/24/2022    CL 99 01/24/2022    CO2 33 (H) 01/24/2022    BUN 23 01/24/2022    CREATININE 0 64 01/24/2022    CALCIUM 9 7 01/24/2022    EGFR 89 01/24/2022     VTE Pharmacologic Prophylaxis: Heparin  VTE Mechanical Prophylaxis: sequential compression device

## 2022-01-24 NOTE — PROGRESS NOTES
[address]  Progress Note - Adra Maker 1950, 66 y o  female MRN: [id number]  Unit/Bed#: APU 16 Encounter: [id number]  Primary Care Provider: Melisa Tay MD   Date and time admitted to hospital: 1/22/2022 11:12 AM    * Stage III pressure ulcer of sacral region [facility])  Assessment & Plan  · CT a/p shows sacral wound with soft tissue thickening, fluid, and gas  No sign of osteomyelitis  · Will continue IV antibiotics with cefepime/vancomycin  · Will follow-up cultures  · Trend procalcitonin  · Continue with frequent reposition, local wound care  · Pain management  · Surgery team plans for OR debridement with wound VAC placement today    Lower extremity edema  Assessment & Plan  · No prior echocardiogram result to review   · Chronic lower extremities edema on furosemide 20 mg daily  · Hold off on diuresis at this time due to acute kidney injury  · Wrap lower extremity and elevate as tolerated    Vaginal bleeding  Assessment & Plan  · This has been going on since September of 2021  The patient was evaluated by [facility] gynecology  She was recommended to follow-up and undergo hysterectomy however has not had a chance to do so  · CT abdomen and pelvis shows thickening of the endometrium  · Hemoglobin stable   · Recommend to follow-up with gynecology outpatient    ANDRES (acute kidney injury) ([facility] [address] )  Assessment & Plan  · Improved with IV fluids  · HCTZ and lisinopril on hold  · Will monitor    Hyponatremia  Assessment & Plan  · Sodium level improved  Will continue to monitor    Essential hypertension  Assessment & Plan  · Hold off on lisinopril and hydrochlorothiazide for now due to acute kidney injury and volume depletion  · Monitor blood pressure closely      VTE Prophylaxis:  Heparin    Patient Centered Rounds: I have performed bedside rounds with nursing staff today      Discussions with Specialists or Other Care Team Provider: yes  Education and Discussions with Family / Patient:  Elly Tamez with patient's daughter Cely Cueva over the phone regarding plan of care    Current Length of Stay: 2 day(s)    Current Patient Status: Inpatient   Certification Statement: The patient will continue to require additional inpatient hospital stay due to Sacral wound    Discharge Plan:  Pending hospital course    Code Status: Level 1 - Full Code    Subjective:   No overnight events noted  Patient continues with chronic pain  Patient NPO for surgical debridement today  Currently afebrile  Objective:     Vitals:   Temp (24hrs), Av °F (36 7 °C), Min:97 5 °F (36 4 °C), Max:98 3 °F (36 8 °C)    Temp:  [97 5 °F (36 4 °C)-98 3 °F (36 8 °C)] 98 3 °F (36 8 °C)  HR:  [74-76] 76  Resp:  [14-18] 14  BP: (119-138)/(59-77) 138/77  SpO2:  [93 %-95 %] 95 %  Body mass index is 29 69 kg/m²  Input and Output Summary (last 24 hours): Intake/Output Summary (Last 24 hours) at 2022 0901  Last data filed at 2022 1744  Gross per 24 hour   Intake 120 ml   Output --   Net 120 ml       Physical Exam:   Physical Exam  Constitutional:       General: She is not in acute distress  Comments: Frail elderly female   HENT:      Head: Normocephalic and atraumatic  Nose: Nose normal       Mouth/Throat:      Mouth: Mucous membranes are moist    Eyes:      Extraocular Movements: Extraocular movements intact  Conjunctiva/sclera: Conjunctivae normal    Cardiovascular:      Rate and Rhythm: Normal rate and regular rhythm  Pulmonary:      Effort: Pulmonary effort is normal  No respiratory distress  Abdominal:      Palpations: Abdomen is soft  Tenderness: There is no abdominal tenderness  Musculoskeletal:         General: Normal range of motion  Cervical back: Normal range of motion and neck supple  Right lower leg: Edema present  Left lower leg: Edema present  Comments: Generalized weakness   Skin:     General: Skin is warm and dry        Comments: Sacral wound   Neurological: General: No focal deficit present  Mental Status: She is alert  Mental status is at baseline  Cranial Nerves: No cranial nerve deficit  Psychiatric:         Mood and Affect: Mood normal          Behavior: Behavior normal          Additional Data:     Labs:    Results from last 7 days   Lab Units 01/24/22  0434   WBC Thousand/uL 13 19*   HEMOGLOBIN g/dL 11 7   HEMATOCRIT % 37 0   PLATELETS Thousands/uL 451*   NEUTROS PCT % 70   LYMPHS PCT % 14   MONOS PCT % 9   EOS PCT % 5     Results from last 7 days   Lab Units 01/24/22  0434 01/23/22  0634 01/23/22  0634   SODIUM mmol/L 139   < > 134*   POTASSIUM mmol/L 3 7   < > 4 1   CHLORIDE mmol/L 99   < > 96   CO2 mmol/L 33*   < > 32   BUN mg/dL 23   < > 42*   CREATININE mg/dL 0 64   < > 0 89   CALCIUM mg/dL 9 7   < > 9 8   ALK PHOS U/L  --   --  49   ALT U/L  --   --  34   AST U/L  --   --  47*    < > = values in this interval not displayed  Results from last 7 days   Lab Units 01/22/22  1147   INR  0 96               * I Have Reviewed All Lab Data Listed Above  * Additional Pertinent Lab Tests Reviewed: [address] Admission  Reviewed    Imaging:  Imaging Reports Reviewed Today Include:  No new imaging    Recent Cultures (last 7 days):     Results from last 7 days   Lab Units 01/22/22  1204 01/22/22  1147 01/22/22  1141   BLOOD CULTURE   --  No Growth at 24 hrs  No Growth at 24 hrs     GRAM STAIN RESULT  2+ Gram positive cocci in pairs and chains*  2+ Gram negative rods*  2+ Gram positive rods*  No polys seen*  --   --    WOUND CULTURE  4+ Growth of Non lactose fermenting gram negative oscar*  --   --        Last 24 Hours Medication List:   Current Facility-Administered Medications   Medication Dose Route Frequency Provider Last Rate    acetaminophen  650 mg Oral Q6H PRN Cesar De La Rosao, CRNP      artificial tear  1 application Both Eyes Daily Cesar De La RosaoTIFFANIE      atorvastatin  40 mg Oral HS Cesar De La RosaoSANDIENP      azelastine  1 spray Each Nare BID Gaetana Ada, CRNP      cefepime  1,000 mg Intravenous Q12H Gaetana Ada, CRNP 1,000 mg (01/24/22 0529)    fluticasone  2 spray Nasal Daily Gaetana Ada, CRNP      gabapentin  300 mg Oral HS Gaetana Ada, CRNP      heparin (porcine)  5,000 Units Subcutaneous [facility] Gaetana Ada, CRNP      iohexol  50 mL Oral Once in imaging Gaetana Xiomara, CRNP      magnesium sulfate  2 g Intravenous Once Rufino Luna MD      morphine  15 mg Oral BID Gaetana Ada, CRNP      morphine injection  2 mg Intravenous Q6H PRN Gaetana Ada, CRNP      oxyCODONE  5 mg Oral Q12H PRN Gaetana Ada, CRNP      pantoprazole  40 mg Oral Early Morning Gaetana Ada, CRNP      potassium phosphate  12 mmol Intravenous Once Rufino Luna MD      tiZANidine  2 mg Oral TID Gaetana Xiomara, CRNP      vancomycin  10 mg/kg (Adjusted) Intravenous Q12H Gaetana Xiomara, CRNP 500 mg (01/24/22 0200)        Today, Patient Was Seen By: Rufino Luna MD    ** Please Note: Dictation voice to text software may have been used in the creation of this document   **

## 2022-01-24 NOTE — ASSESSMENT & PLAN NOTE
· Hold off on lisinopril and hydrochlorothiazide for now due to acute kidney injury and volume depletion  · Monitor blood pressure closely

## 2022-01-24 NOTE — PLAN OF CARE
Problem: MOBILITY - ADULT  Goal: Maintain or return to baseline ADL function  Description: INTERVENTIONS:  -  Assess patient's ability to carry out ADLs; assess patient's baseline for ADL function and identify physical deficits which impact ability to perform ADLs (bathing, care of mouth/teeth, toileting, grooming, dressing, etc )  - Assess/evaluate cause of self-care deficits   - Assess range of motion  - Assess patient's mobility; develop plan if impaired  - Assess patient's need for assistive devices and provide as appropriate  - Encourage maximum independence but intervene and supervise when necessary  - Involve family in performance of ADLs  - Assess for home care needs following discharge   - Consider OT consult to assist with ADL evaluation and planning for discharge  - Provide patient education as appropriate  Outcome: Progressing  Goal: Maintains/Returns to pre admission functional level  Description: INTERVENTIONS:  - Perform BMAT or MOVE assessment daily    - Set and communicate daily mobility goal to care team and patient/family/caregiver     - Collaborate with rehabilitation services on mobility goals if consulted  - - Out of bed for toileting  - Record patient progress and toleration of activity level   Outcome: Progressing

## 2022-01-24 NOTE — ASSESSMENT & PLAN NOTE
· CT a/p shows sacral wound with soft tissue thickening, fluid, and gas    No sign of osteomyelitis  · Will continue IV antibiotics with cefepime/vancomycin  · Will follow-up cultures  · Trend procalcitonin  · Continue with frequent reposition, local wound care  · Pain management  · Surgery team plans for OR debridement with wound VAC placement today

## 2022-01-25 ENCOUNTER — TELEPHONE (OUTPATIENT)
Dept: SURGERY | Facility: CLINIC | Age: 72
End: 2022-01-25

## 2022-01-25 VITALS
OXYGEN SATURATION: 96 % | WEIGHT: 147 LBS | TEMPERATURE: 98.2 F | BODY MASS INDEX: 29.69 KG/M2 | SYSTOLIC BLOOD PRESSURE: 148 MMHG | HEART RATE: 67 BPM | RESPIRATION RATE: 18 BRPM | DIASTOLIC BLOOD PRESSURE: 65 MMHG

## 2022-01-25 LAB
ANION GAP SERPL CALCULATED.3IONS-SCNC: 5 MMOL/L (ref 4–13)
BASOPHILS # BLD AUTO: 0.1 THOUSANDS/ΜL (ref 0–0.1)
BASOPHILS NFR BLD AUTO: 1 % (ref 0–1)
BUN SERPL-MCNC: 12 MG/DL (ref 5–25)
CALCIUM SERPL-MCNC: 9.8 MG/DL (ref 8.4–10.2)
CHLORIDE SERPL-SCNC: 101 MMOL/L (ref 96–108)
CO2 SERPL-SCNC: 33 MMOL/L (ref 21–32)
CREAT SERPL-MCNC: 0.58 MG/DL (ref 0.6–1.3)
EOSINOPHIL # BLD AUTO: 0.47 THOUSAND/ΜL (ref 0–0.61)
EOSINOPHIL NFR BLD AUTO: 4 % (ref 0–6)
ERYTHROCYTE [DISTWIDTH] IN BLOOD BY AUTOMATED COUNT: 12.4 % (ref 11.6–15.1)
GFR SERPL CREATININE-BSD FRML MDRD: 92 ML/MIN/1.73SQ M
GLUCOSE SERPL-MCNC: 101 MG/DL (ref 65–140)
HCT VFR BLD AUTO: 35.5 % (ref 34.8–46.1)
HGB BLD-MCNC: 11 G/DL (ref 11.5–15.4)
IMM GRANULOCYTES # BLD AUTO: 0.05 THOUSAND/UL (ref 0–0.2)
IMM GRANULOCYTES NFR BLD AUTO: 0 % (ref 0–2)
LYMPHOCYTES # BLD AUTO: 1.94 THOUSANDS/ΜL (ref 0.6–4.47)
LYMPHOCYTES NFR BLD AUTO: 17 % (ref 14–44)
MAGNESIUM SERPL-MCNC: 1.5 MG/DL (ref 1.9–2.7)
MCH RBC QN AUTO: 29.3 PG (ref 26.8–34.3)
MCHC RBC AUTO-ENTMCNC: 31 G/DL (ref 31.4–37.4)
MCV RBC AUTO: 95 FL (ref 82–98)
MONOCYTES # BLD AUTO: 0.98 THOUSAND/ΜL (ref 0.17–1.22)
MONOCYTES NFR BLD AUTO: 8 % (ref 4–12)
NEUTROPHILS # BLD AUTO: 8.13 THOUSANDS/ΜL (ref 1.85–7.62)
NEUTS SEG NFR BLD AUTO: 70 % (ref 43–75)
NRBC BLD AUTO-RTO: 0 /100 WBCS
PLATELET # BLD AUTO: 478 THOUSANDS/UL (ref 149–390)
PMV BLD AUTO: 8.9 FL (ref 8.9–12.7)
POTASSIUM SERPL-SCNC: 4 MMOL/L (ref 3.5–5.3)
RBC # BLD AUTO: 3.75 MILLION/UL (ref 3.81–5.12)
SODIUM SERPL-SCNC: 139 MMOL/L (ref 135–147)
WBC # BLD AUTO: 11.67 THOUSAND/UL (ref 4.31–10.16)

## 2022-01-25 PROCEDURE — 97530 THERAPEUTIC ACTIVITIES: CPT

## 2022-01-25 PROCEDURE — 80048 BASIC METABOLIC PNL TOTAL CA: CPT | Performed by: INTERNAL MEDICINE

## 2022-01-25 PROCEDURE — 85025 COMPLETE CBC W/AUTO DIFF WBC: CPT | Performed by: INTERNAL MEDICINE

## 2022-01-25 PROCEDURE — 97535 SELF CARE MNGMENT TRAINING: CPT

## 2022-01-25 PROCEDURE — 99239 HOSP IP/OBS DSCHRG MGMT >30: CPT | Performed by: INTERNAL MEDICINE

## 2022-01-25 PROCEDURE — 83735 ASSAY OF MAGNESIUM: CPT | Performed by: INTERNAL MEDICINE

## 2022-01-25 PROCEDURE — 99232 SBSQ HOSP IP/OBS MODERATE 35: CPT | Performed by: SURGERY

## 2022-01-25 RX ORDER — AMOXICILLIN AND CLAVULANATE POTASSIUM 875; 125 MG/1; MG/1
1 TABLET, FILM COATED ORAL EVERY 12 HOURS SCHEDULED
Qty: 16 TABLET | Refills: 0 | Status: SHIPPED | OUTPATIENT
Start: 2022-01-25 | End: 2022-02-02

## 2022-01-25 RX ORDER — MAGNESIUM SULFATE HEPTAHYDRATE 40 MG/ML
2 INJECTION, SOLUTION INTRAVENOUS ONCE
Status: COMPLETED | OUTPATIENT
Start: 2022-01-25 | End: 2022-01-25

## 2022-01-25 RX ADMIN — PANTOPRAZOLE SODIUM 40 MG: 40 TABLET, DELAYED RELEASE ORAL at 05:16

## 2022-01-25 RX ADMIN — HEPARIN SODIUM 5000 UNITS: 5000 INJECTION INTRAVENOUS; SUBCUTANEOUS at 05:16

## 2022-01-25 RX ADMIN — TIZANIDINE 2 MG: 4 TABLET ORAL at 08:08

## 2022-01-25 RX ADMIN — VANCOMYCIN HYDROCHLORIDE 1000 MG: 1 INJECTION, SOLUTION INTRAVENOUS at 02:29

## 2022-01-25 RX ADMIN — MORPHINE SULFATE 2 MG: 2 INJECTION, SOLUTION INTRAMUSCULAR; INTRAVENOUS at 02:29

## 2022-01-25 RX ADMIN — MORPHINE SULFATE 2 MG: 2 INJECTION, SOLUTION INTRAMUSCULAR; INTRAVENOUS at 12:54

## 2022-01-25 RX ADMIN — MAGNESIUM SULFATE HEPTAHYDRATE 2 G: 40 INJECTION, SOLUTION INTRAVENOUS at 10:06

## 2022-01-25 RX ADMIN — CEFEPIME HYDROCHLORIDE 1000 MG: 1 INJECTION, SOLUTION INTRAVENOUS at 05:16

## 2022-01-25 RX ADMIN — MORPHINE SULFATE 15 MG: 15 TABLET, EXTENDED RELEASE ORAL at 08:08

## 2022-01-25 NOTE — ASSESSMENT & PLAN NOTE
· CT a/p shows sacral wound with soft tissue thickening, fluid, and gas  No sign of osteomyelitis  · Culture result appreciated    Reviewed with Infectious Disease  · Will discharge on Augmentin to complete 8 more days of therapy  · Patient had wound debridement and wound VAC placed in the OR on 01/24/2022  · Continue home pain medications  · Follow-up with surgical team as outpatient

## 2022-01-25 NOTE — ASSESSMENT & PLAN NOTE
· No prior echocardiogram result to review   · Chronic lower extremities edema on furosemide 20 mg daily  · Lasix held for now  · Wrap lower extremity and elevate as tolerated  · Resume home HCTZ/lisinopril

## 2022-01-25 NOTE — WOUND OSTOMY CARE
Consult Note - Wound   Fairliliana Spurling 66 y o  female MRN: [id number]  Unit/Bed#: APU 16 Encounter: [id number]        History and Present Illness:  Patient admitted with stage 3 pressure injury to the sacrum and buttocks  Wound care consulted for same  Patient history significant for HTN, stroke, arthritis, rotator cuff of right and left shoulders  Assessment Findings:   Patient in bed for assessment  She was to the OR today for washout, debridement and wound VAC placement for sacro-buttocks stage 3 pressure injury  Wound VAC in dressing intact, no drainage in the canister at this time  Patient has wound VAC supplies at the bedside for home  Patient is not incontinent  She states she lives at home by herself  1  Bilateral heels intact  2  Abdominal and breast folds intact  3  POA-Evolving DTI to the left lateral hip  Wound bed is beefy red, maroon, brown and purple, non-blanchable erythema  Suspect stage 3, stage 4 or unstageable pressure injury when wound evolves, wound bed firm  4  Wound VAC to sacro-buttocks, dressing intact, bridged to left hip, no drainage at this time in the canister    Skin and Wound Care Plan:   1  P-500 low air loss therapy surface  2  Ehob offloading cushion to chair when OOB  3  Elevate heels off the bed with pillows   4  Turn and reposition every two hours  5  Cleanse left lateral hip with NSS, pat dry, place Allevyn life silicone bordered dressing, kirk with T, date, change every other day and PRN with soilage or dislodgement  6  Skin nourishing cream daily    Wounds:  Wound 01/22/22 Pressure Injury Sacrum Right (Active)   Wound Image   01/22/22 2308   Wound Description ROBERT 01/24/22 1537   Allie-wound Assessment Pink;Fragile;Scar tissue 01/24/22 1100   Dressing Wound V A C  01/24/22 1630   Patient Tolerance Tolerated well 01/24/22 1630   Dressing Status Clean;Dry; Intact 01/24/22 1537       Wound 01/22/22 Pressure Injury Hip Left;Lateral (Active)   Wound Image   01/24/22 1630 Wound Description Beefy red;Brown;Fragile;Gray;Light purple 01/24/22 1630   Pressure Injury Stage DTPI 01/24/22 1630   Allie-wound Assessment Intact 01/24/22 1630   Wound Width (cm) 5 5 cm 01/24/22 1630   Wound Depth (cm) 3 5 cm 01/24/22 1630   Tunneling 0 cm 01/24/22 1630   Undermining 0 01/24/22 1630   Drainage Amount Scant 01/24/22 1630   Drainage Description Yellow 01/24/22 1630   Treatments Cleansed;Irrigation with NSS 01/24/22 1630   Dressing Foam, Silicon (eg  Allevyn, etc) 01/24/22 1630   Patient Tolerance Tolerated well 01/24/22 1630   Dressing Status Clean;Dry; Intact 01/24/22 1100     Reviewed plan of care with primary RN Yuliya  Recommendations written as orders  Wound care team to follow weekly while admitted  Questions or concerns [address] [facility] Nurse    Garret JOYCEN, RN, [facility]

## 2022-01-25 NOTE — DISCHARGE SUMMARY
[address] 44  Discharge- Ruddy Belinda 1950, 66 y o  female MRN: [id number]  Unit/Bed#: APU 16 Encounter: [id number]  Primary Care Provider: Alexei Oneill MD   Date and time admitted to hospital: 1/22/2022 11:12 AM    * Stage III pressure ulcer of sacral region [facility])  Assessment & Plan  · CT a/p shows sacral wound with soft tissue thickening, fluid, and gas  No sign of osteomyelitis  · Culture result appreciated  Reviewed with Infectious Disease  · Will discharge on Augmentin to complete 8 more days of therapy  · Patient had wound debridement and wound VAC placed in the OR on 01/24/2022  · Continue home pain medications  · Follow-up with surgical team as outpatient    Lower extremity edema  Assessment & Plan  · No prior echocardiogram result to review   · Chronic lower extremities edema on furosemide 20 mg daily  · Lasix held for now  · Wrap lower extremity and elevate as tolerated  · Resume home HCTZ/lisinopril    Vaginal bleeding  Assessment & Plan  · This has been going on since September of 2021  The patient was evaluated by [facility] gynecology  She was recommended to follow-up and undergo hysterectomy however has not had a chance to do so  · CT abdomen and pelvis shows thickening of the endometrium  · Hemoglobin stable   · Recommend to follow-up with gynecology outpatient    ANDRES (acute kidney injury) ([facility] [address] )  Assessment & Plan  · Improved with IV fluids  · Resume HCTZ/lisinopril  Lasix held  · Will monitor    Hyponatremia  Assessment & Plan  · Sodium level improved    Will continue to monitor    Essential hypertension  Assessment & Plan  · BP has been stable  · Will resume home HCTZ/lisinopril  · Hold home Lasix  · Follow-up with PCP for further management      Discharging Physician / Practitioner: Natalee Calle MD  PCP: Alexei Oneill MD  Admission Date:   Admission Orders (From admission, onward)     Ordered        01/22/22 1523  Inpatient Admission Once                      Discharge Date: 01/25/22    Medical Problems             Resolved Problems  Date Reviewed: 1/24/2022    None                Consultations During Hospital Stay:  · Surgery    Procedures Performed:   · Sacral wound debridement and wound VAC placement by surgical team in the OR    Significant Findings / Test Results:   · Infected sacral ulcer    Incidental Findings:   · None     Test Results Pending at Discharge (will require follow up): · None     Outpatient Tests Requested:  · Routine labs with PCP    Complications:     None    Reason for Admission:  Infected sacral wound    Hospital Course:     Sukumar Gutiérrez is a 66 y o  female patient who originally presented to the hospital on 1/22/2022 due to sacral wound  Patient started on IV antibiotics  Surgical team was consulted  Patient was taken to the OR on 01/24/2022 for wound debridement and wound VAC placement  Patient was stable today from surgical standpoint and discharged home on oral antibiotics  Will follow-up with surgical team and PCP as outpatient    Please see above list of diagnoses and related plan for additional information  Condition at Discharge: stable     Discharge Day Visit / Exam:     Subjective:  Patient states she is doing well today  Tolerating diet  Afebrile  Pain controlled    Vitals: Blood Pressure: 148/65 (01/25/22 0700)  Pulse: 67 (01/25/22 0700)  Temperature: 98 2 °F (36 8 °C) (01/25/22 0700)  Temp Source: Tympanic (01/25/22 0700)  Respirations: 18 (01/25/22 0700)  Weight - Scale: 66 7 kg (147 lb) (01/22/22 1109)  SpO2: 96 % (01/25/22 0700)     Exam:   Physical Exam  Constitutional:       General: She is not in acute distress  Comments: Frail elderly female   HENT:      Head: Normocephalic and atraumatic  Nose: Nose normal       Mouth/Throat:      Mouth: Mucous membranes are moist    Eyes:      Extraocular Movements: Extraocular movements intact        Conjunctiva/sclera: Conjunctivae normal    Cardiovascular:      Rate and Rhythm: Normal rate and regular rhythm  Pulmonary:      Effort: Pulmonary effort is normal  No respiratory distress  Abdominal:      Palpations: Abdomen is soft  Tenderness: There is no abdominal tenderness  Musculoskeletal:         General: Normal range of motion  Cervical back: Normal range of motion and neck supple  Comments: Generalized weakness   Skin:     General: Skin is warm and dry  Comments: Sacral wound with dressing in place   Neurological:      General: No focal deficit present  Mental Status: She is alert  Mental status is at baseline  Cranial Nerves: No cranial nerve deficit  Psychiatric:         Mood and Affect: Mood normal          Behavior: Behavior normal          Discussion with Family:  Spoke with patient's daughter over the phone regarding plan of care    Discharge instructions/Information to patient and family:   See after visit summary for information provided to patient and family  Provisions for Follow-Up Care:  See after visit summary for information related to follow-up care and any pertinent home health orders  Disposition:     Home with VNA Services (Reminder: Complete face to face encounter)      Planned Readmission:    no     Discharge Statement:  I spent 35 minutes discharging the patient  This time was spent on the day of discharge  I had direct contact with the patient on the day of discharge  Greater than 50% of the total time was spent examining patient, answering all patient questions, arranging and discussing plan of care with patient as well as directly providing post-discharge instructions  Additional time then spent on discharge activities  Discharge Medications:  See after visit summary for reconciled discharge medications provided to patient and family        ** Please Note: This note has been constructed using a voice recognition system **

## 2022-01-25 NOTE — ASSESSMENT & PLAN NOTE
· BP has been stable  · Will resume home HCTZ/lisinopril  · Hold home Lasix  · Follow-up with PCP for further management

## 2022-01-25 NOTE — PROGRESS NOTES
Vancomycin Assessment    Bonney Landau is a 66 y o  female who is currently receiving vancomycin 500 mg IV Q 12 Hrs for skin-soft tissue infection     Relevant clinical data and objective history reviewed:  Creatinine   Date Value Ref Range Status   01/24/2022 0 64 0 60 - 1 30 mg/dL Final     Comment:     Standardized to IDMS reference method   01/23/2022 0 89 0 60 - 1 30 mg/dL Final     Comment:     Standardized to IDMS reference method   01/22/2022 1 42 (H) 0 60 - 1 30 mg/dL Final     Comment:     Standardized to IDMS reference method     /63 (BP Location: Right arm)   Pulse 72   Temp (!) 97 4 °F (36 3 °C) (Tympanic)   Resp 18   Wt 66 7 kg (147 lb)   SpO2 99%   BMI 29 69 kg/m²   I/O last 3 completed shifts: In: 067 [P O :220; I V :700]  Out: 0   Lab Results   Component Value Date/Time    BUN 23 01/24/2022 04:34 AM    WBC 13 19 (H) 01/24/2022 04:34 AM    HGB 11 7 01/24/2022 04:34 AM    HCT 37 0 01/24/2022 04:34 AM    MCV 94 01/24/2022 04:34 AM     (H) 01/24/2022 04:34 AM     Temp Readings from Last 3 Encounters:   01/24/22 (!) 97 4 °F (36 3 °C) (Tympanic)   01/17/22 98 7 °F (37 1 °C) (Temporal)   09/08/21 (!) 97 2 °F (36 2 °C) (Temporal)     Vancomycin Days of Therapy: 3    Assessment/Plan  The patient is currently on vancomycin utilizing scheduled dosing  Baseline risks associated with therapy include: pre-existing renal impairment, concomitant nephrotoxic medications, and advanced age  The patient is receiving 500 mg IV Q 12 Hrs with the most recent vancomycin level being not at steady-state and sub-therapeutic based on a goal of 15-20 (appropriate for most indications) ; therefore, after clinical evaluation will be changed to 1000 mg iv q 12 hrs   Pharmacy will continue to follow closely for s/sx of nephrotoxicity, infusion reactions, and appropriateness of therapy  BMP and CBC will be ordered per protocol    Plan for trough as patient approaches steady state, prior to the 4th  dose at approximately 0230 01/26/22  Pharmacy will continue to follow the patients culture results and clinical progress daily      Guadalupe Mckoy, Pharmacist

## 2022-01-25 NOTE — PROGRESS NOTES
Vancomycin IV Pharmacy-to-Dose Consultation    Jeff Mcmahon is a 66 y o  female who is currently receiving Vancomycin IV with management by the Pharmacy Consult service  Assessment/Plan:  The patient was reviewed  Renal function is stable and no signs or symptoms of nephrotoxicity and/or infusion reactions were documented in the chart  Based on todays assessment, continue current vancomycin (day # 4) dosing of 1000mg IV Q12H, with a plan for trough to be drawn at 0230 on 01/26/22  We will continue to follow the patients culture results and clinical progress daily      Too Naylor, Pharmacist

## 2022-01-25 NOTE — PROGRESS NOTES
Progress Note - General Surgery   Brittaney Mas 66 y o  female MRN: [id number]  Unit/Bed#: APU 16 Encounter: [id number]    Assessment/Plan      71F POD1 status post debridement of a stage III sacral decubitus ulcer and VAC placement  Doing well  VAC holding suction, no leak  No acute clinical events  Patient to be discharged home with outpatient follow up and VAC change in the office tomorrow  Subjective/Objective     Subjective: some right shoulder discomfort that is chronic, minimal sacral pain at the wound site, VAC holding suction, no fevers, no acute events, continues on vancomycin and cefepime and being transitioned to PO antibiotics for outpatient management    Objective:     Blood pressure 148/65, pulse 67, temperature 98 2 °F (36 8 °C), temperature source Tympanic, resp  rate 18, weight 66 7 kg (147 lb), SpO2 96 %  ,Body mass index is 29 69 kg/m²  Intake/Output Summary (Last 24 hours) at 1/25/2022 1525  Last data filed at 1/25/2022 0500  Gross per 24 hour   Intake 120 ml   Output 400 ml   Net -280 ml       Invasive Devices  Report    None                 Physical Exam  Vitals reviewed  Constitutional:       General: She is not in acute distress  Cardiovascular:      Rate and Rhythm: Normal rate  Pulmonary:      Effort: Pulmonary effort is normal  No respiratory distress  Skin:     General: Skin is warm and dry  Comments: Sacral wound with VAC in place with bridge to hip, holding suction well, no leak, scant serosanguinous output in the VAC canister, no spreading erythema from the wound   Neurological:      General: No focal deficit present  Mental Status: She is alert and oriented to person, place, and time  Psychiatric:         Mood and Affect: Mood normal          Behavior: Behavior normal          Lab, Imaging and other studies:  I have personally reviewed pertinent lab results    , CBC:   Lab Results   Component Value Date    WBC 11 67 (H) 01/25/2022    HGB 11 0 (L) 01/25/2022 HCT 35 5 01/25/2022    MCV 95 01/25/2022     (H) 01/25/2022    MCH 29 3 01/25/2022    MCHC 31 0 (L) 01/25/2022    RDW 12 4 01/25/2022    MPV 8 9 01/25/2022    NRBC 0 01/25/2022   , CMP:   Lab Results   Component Value Date    SODIUM 139 01/25/2022    K 4 0 01/25/2022     01/25/2022    CO2 33 (H) 01/25/2022    BUN 12 01/25/2022    CREATININE 0 58 (L) 01/25/2022    CALCIUM 9 8 01/25/2022    EGFR 92 01/25/2022     VTE Pharmacologic Prophylaxis: Heparin  VTE Mechanical Prophylaxis: foot pump applied

## 2022-01-25 NOTE — CASE MANAGEMENT
Case Management Assessment & Discharge Planning Note    Patient name Alverto Thacker  Location APU 16/APU 16 MRN [id number]  : 1950 Date 2022       Current Admission Date: 2022  Current Admission Diagnosis:Stage III pressure ulcer of sacral region [facility])   Patient Active Problem List    Diagnosis Date Noted    Hyponatremia 2022    ANDRES (acute kidney injury) ([facility] [address] ) 2022    Vaginal bleeding 2022    Lower extremity edema 2022    Stage III pressure ulcer of sacral region ([facility] [address] ) 2022    Arthritis 2021    Chronic pain syndrome 2020    Stroke-like symptoms 2020    Elevated troponin 2020    Elevated d-dimer 2020    Acute nasopharyngitis 2020    History of stroke 2020    Rotator cuff tear arthropathy of left shoulder 2020    Rotator cuff tear arthropathy, right 2020    Cervical disc herniation 01/10/2019    History of knee replacement, total, bilateral 2017    Cervical spondylosis 10/12/2016    Acute arterial ischemic stroke, vertebrobasilar, brainstem, right ([facility] [address] ) 10/11/2016    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    GERD (gastroesophageal reflux disease) 10/06/2016    Coronary artery disease involving native coronary artery of native heart without angina pectoris 2015    Essential hypertension 10/01/2013      LOS (days): 2  Geometric Mean LOS (GMLOS) (days):   Days to GMLOS:     OBJECTIVE:    Risk of Unplanned Readmission Score: 13         Current admission status: Inpatient  Referral Reason: Other    Preferred Pharmacy:   [address]  [place], [address]  [address] [facility] [address]  Phone: [phone] Fax: [phone]    Primary Care Provider: Jozef Kennedy MD    Primary Insurance: [facility]  Secondary Insurance:     ASSESSMENT:  Active Health Care Agents    There are no active Health Care Agents on file  Readmission Root Cause  30 Day Readmission: No    Patient Information  Admitted from[de-identified] Home  Mental Status: Alert  During Assessment patient was accompanied by: Not accompanied during assessment  Assessment information provided by[de-identified] Patient  Primary Caregiver: Self  Support Systems: Daughter  [place] of Residence: [address] do you live in?: [address] entry access options   Select all that apply : Ramp  Type of Current Residence: [place]  In the last 12 months, was there a time when you were not able to pay the mortgage or rent on time?: No  In the last 12 months, how many places have you lived?: 1  In the last 12 months, was there a time when you did not have a steady place to sleep or slept in a shelter (including now)?: No  Homeless/housing insecurity resource given?: N/A  Living Arrangements: Lives Alone  Is patient a ?: No    Activities of Daily Living Prior to Admission  Functional Status: Independent  Completes ADLs independently?: Yes  Ambulates independently?: Yes  Does patient use assisted devices?: Yes  Assisted Devices (DME) used: Straight Cane  Does patient currently own DME?: Yes  What DME does the patient currently own?: Walker,Straight Cane,Shower Chair (glucometer, bp cuff, pulse ox)  Does patient have a history of Outpatient Therapy (PT/OT)?: Yes  Does the patient have a history of Short-Term Rehab?: Yes (unsure of the facility)  Does patient have a history of HHC?: Yes Cand[facility] Serve)  Does patient currently have [facility]?: Yes    Current Home Health Care  Type of Current Home Care Services: Nurse visit  Current Home Health Agency[de-identified] [address] [place] [address] Provider[de-identified] PCP    Patient Information Continued  Income Source: Pension/custodial  Does patient have prescription coverage?: Yes ([facility] [place])  Within the past 12 months, you worried that your food would run out before you got the money to buy more : Never true  Within the past 12 months, the food you bought just didnt last and you didnt have money to get more : Never true  Food insecurity resource given?: N/A  Does patient receive dialysis treatments?: No  Does patient have a history of substance abuse?: No  Does patient have a history of Mental Health Diagnosis?: No         Means of Transportation  Means of Transport to Appts[de-identified] Drives Self  In the past 12 months, has lack of transportation kept you from medical appointments or from getting medications?: No  In the past 12 months, has lack of transportation kept you from meetings, work, or from getting things needed for daily living?: No  Was application for public transport provided?: N/A        DISCHARGE DETAILS:    Discharge planning discussed with[de-identified] patient  and daughter was called at 10:34 am  Freedom of Choice: Yes  Comments - Freedom of Choice: [facility]  referral sent to [facility] as requested  CM contacted family/caregiver?: Yes             Contacts  Patient Contacts: Patricia Chan  Relationship to Patient[de-identified] Family (daughter)  Contact Method: Phone  Phone Number: [phone]  Reason/Outcome: Discharge [address]         Is the patient interested in [facility] at discharge?: Yes  Via Cookie Metz requested[de-identified] [address] Name[de-identified] [address] Provider[de-identified] Referring Provider  Home Health Services Needed[de-identified] Wound/Ostomy Care,Post-Op Care and Assessment  Homebound Criteria Met[de-identified] Requires the Assistance of Another Person for Safe Ambulation or to Leave the Home,Uses an Assist Device (i e  cane, walker, etc)  Supporting Clincal Findings[de-identified] Limited Endurance    DME Referral Provided  Referral made for DME?: Yes (wound vac was ordered by Wesley Swan prior to admission)  DME referral completed for the following items[de-identified] Other  DME Supplier Name[de-identified] Other (Add supplier name in comments) (wound vac was orderedd for the patient prior to surgery  wound vac was delievered to the pt's room at the hospital)    Other Referral/Resources/Interventions Provided:  Interventions: [facility]  Referral Comments: referrals snet to [facility], they resume care         Treatment Team Recommendation: Home with [address] (home [facility])  Discharge Destination Plan[de-identified] Home with [address] (home with [address] and family will transport)

## 2022-01-25 NOTE — CASE MANAGEMENT
Case Management Discharge Planning Note    Patient name Oleg Dyson  Location APU 16/APU 16 MRN [id number]  : 1950 Date 2022       Current Admission Date: 2022  Current Admission Diagnosis:Stage III pressure ulcer of sacral region [facility])   Patient Active Problem List    Diagnosis Date Noted    Hyponatremia 2022    ANDRES (acute kidney injury) ([facility] [address] ) 2022    Vaginal bleeding 2022    Lower extremity edema 2022    Stage III pressure ulcer of sacral region ([facility] [address] ) 2022    Arthritis 2021    Chronic pain syndrome 2020    Stroke-like symptoms 2020    Elevated troponin 2020    Elevated d-dimer 2020    Acute nasopharyngitis 2020    History of stroke 2020    Rotator cuff tear arthropathy of left shoulder 2020    Rotator cuff tear arthropathy, right 2020    Cervical disc herniation 01/10/2019    History of knee replacement, total, bilateral 2017    Cervical spondylosis 10/12/2016    Acute arterial ischemic stroke, vertebrobasilar, brainstem, right ([facility] [address] ) 10/11/2016    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 [address] [place] [address], [address]  [facility] [address]  Phone: [phone] Fax: [phone]    Primary Care Provider: Angelina Copeland MD    Primary Insurance: [address] [place] [address]  Secondary Insurance:     DISCHARGE DETAILS:    Discharge planning discussed with[de-identified] patient and daughter was called at 09:34 am  Freedom of Choice: Yes  Comments - Freedom of Choice: [facility]   pt requested Shelley HOPKINS contacted family/caregiver?: Yes  Were Treatment Team discharge recommendations reviewed with patient/caregiver?: Yes  Did patient/caregiver verbalize understanding of patient care needs?: Yes  Were patient/caregiver advised of the risks associated with not following Treatment Team discharge recommendations?: Yes    Contacts  Patient Contacts: Luis Asencio  Relationship to Patient[de-identified] Family (daughter)  Contact Method: Phone  Phone Number: [phone]  Reason/Outcome: Discharge [address]         Is the patient interested in [facility] at discharge?: Yes    DME Referral Provided  Referral made for DME?: No (wound vac was delivered to the patients room)    Other Referral/Resources/Interventions Provided:  Interventions: [facility] Randy Montalvo)  Referral Comments: pt was active with RABIA Walsh    Would you like to participate in our [address] service program?  : No - Declined    Treatment Team Recommendation: Home with [address] (home with [facility])  Discharge Destination Plan[de-identified] Home with [address] [place] [address] (home with [address] [place] [address] and family will transport)  [facility] Family member     IMM Given (Date):: 01/25/22  IMM Given to[de-identified] Patient  Family notified[de-identified] daughter will transport the pt home at arround 2:40pm       pt and family are in agreement with the d/c and d/c plan home [facility] and outpt follow up

## 2022-01-25 NOTE — TELEPHONE ENCOUNTER
Patient report of operation along with the wound measurements was faxed to [facility] @ [phone]

## 2022-01-25 NOTE — ASSESSMENT & PLAN NOTE
· This has been going on since September of 2021  The patient was evaluated by [facility] gynecology  She was recommended to follow-up and undergo hysterectomy however has not had a chance to do so  · CT abdomen and pelvis shows thickening of the endometrium     · Hemoglobin stable   · Recommend to follow-up with gynecology outpatient

## 2022-01-25 NOTE — PLAN OF CARE
Problem: Potential for Falls  Goal: Patient will remain free of falls  Description: INTERVENTIONS:  - Educate patient/family on patient safety including physical limitations  - Instruct patient to call for assistance with activity   - Consult OT/PT to assist with strengthening/mobility   - Keep Call bell within reach  - Keep bed low and locked with side rails adjusted as appropriate  - Keep care items and personal belongings within reach  - Initiate and maintain comfort rounds  - Make Fall Risk Sign visible to staff  - Offer Toileting every 4 Hours, in advance of need  - Obtain necessary fall risk management equipment  - Apply yellow socks and bracelet for high fall risk patients  - Consider moving patient to room near nurses station  Outcome: Progressing     Problem: Nutrition/Hydration-ADULT  Goal: Nutrient/Hydration intake appropriate for improving, restoring or maintaining nutritional needs  Description: Monitor and assess patient's nutrition/hydration status for malnutrition  Collaborate with interdisciplinary team and initiate plan and interventions as ordered  Monitor patient's weight and dietary intake as ordered or per policy  Utilize nutrition screening tool and intervene as necessary  Determine patient's food preferences and provide high-protein, high-caloric foods as appropriate       INTERVENTIONS:  - Monitor oral intake, urinary output, labs, and treatment plans  - Assess nutrition and hydration status and recommend course of action  - Evaluate amount of meals eaten  - Assist patient with eating if necessary   - Allow adequate time for meals  - Recommend/ encourage appropriate diets, oral nutritional supplements, and vitamin/mineral supplements  - Order, calculate, and assess calorie counts as needed  - Recommend, monitor, and adjust tube feedings and TPN/PPN based on assessed needs  - Assess need for intravenous fluids  - Provide specific nutrition/hydration education as appropriate  - Include patient/family/caregiver in decisions related to nutrition  Outcome: Progressing     Problem: MOBILITY - ADULT  Goal: Maintain or return to baseline ADL function  Description: INTERVENTIONS:  -  Assess patient's ability to carry out ADLs; assess patient's baseline for ADL function and identify physical deficits which impact ability to perform ADLs (bathing, care of mouth/teeth, toileting, grooming, dressing, etc )  - Assess/evaluate cause of self-care deficits   - Assess range of motion  - Assess patient's mobility; develop plan if impaired  - Assess patient's need for assistive devices and provide as appropriate  - Encourage maximum independence but intervene and supervise when necessary  - Involve family in performance of ADLs  - Assess for home care needs following discharge   - Consider OT consult to assist with ADL evaluation and planning for discharge  - Provide patient education as appropriate  Outcome: Progressing  Goal: Maintains/Returns to pre admission functional level  Description: INTERVENTIONS:  - Perform BMAT or MOVE assessment daily    - Set and communicate daily mobility goal to care team and patient/family/caregiver  - Collaborate with rehabilitation services on mobility goals if consulte  - Reposition patient every 2 hours    - Dangle patient 3 times a day  - Stand patient 3 times a day  - Ambulate patient 3 times a day  - Out of bed to chair 3 times a day   - Out of bed for meals 3 times a day  - Out of bed for toileting  - Record patient progress and toleration of activity level   Outcome: Progressing     Problem: PAIN - ADULT  Goal: Verbalizes/displays adequate comfort level or baseline comfort level  Description: Interventions:  - Encourage patient to monitor pain and request assistance  - Assess pain using appropriate pain scale  - Administer analgesics based on type and severity of pain and evaluate response  - Implement non-pharmacological measures as appropriate and evaluate response  - Consider cultural and social influences on pain and pain management  - Notify physician/advanced practitioner if interventions unsuccessful or patient reports new pain  Outcome: Progressing     Problem: INFECTION - ADULT  Goal: Absence or prevention of progression during hospitalization  Description: INTERVENTIONS:  - Assess and monitor for signs and symptoms of infection  - Monitor lab/diagnostic results  - Monitor all insertion sites, i e  indwelling lines, tubes, and drains  - Monitor endotracheal if appropriate and nasal secretions for changes in amount and color  - [place] appropriate cooling/warming therapies per order  - Administer medications as ordered  - Instruct and encourage patient and family to use good hand hygiene technique  - Identify and instruct in appropriate isolation precautions for identified infection/condition  Outcome: Progressing

## 2022-01-25 NOTE — PLAN OF CARE
Problem: OCCUPATIONAL THERAPY ADULT  Goal: Performs self-care activities at highest level of function for planned discharge setting  See evaluation for individualized goals  Description: Treatment Interventions: ADL retraining,Functional transfer training,UE strengthening/ROM,Endurance training,Patient/family training,Compensatory technique education,Activityengagement          See flowsheet documentation for full assessment, interventions and recommendations  Outcome: Progressing, slowly   Note: Limitation: Decreased ADL status,Decreased UE strength,Decreased endurance,Decreased self-care trans,Decreased high-level ADLs  Prognosis: Good  Assessment: Patient participated in Skilled OT session this date with interventions consisting of ADL re training with the use of correct body mechnaics, Energy Conservation techniques, safety awareness and fall prevention techniques and  therapeutic activities to: increase activity tolerance   Patient agreeable to OT treatment session, upon arrival patient was found seated in bed (back supported)  Patient requiring frequent rest periods and ocassional safety reminders and occasional cues for correct technique; self-care accomplishment impeded today by pain  Patient continues to be functioning below baseline level, occupational performance remains limited secondary to factors listed above and increased risk for falls and injury  From OT standpoint, recommendation at time of d/c would be home with home health rehabilitation  Patient to benefit from continued Occupational Therapy treatment while in the hospital to address deficits as defined above and maximize level of functional independence with ADLs and functional mobility        OT Discharge Recommendation: Home with home health rehabilitation  OT - OK to Discharge: Yes (Once medically cleared )     DANIELA Martinez/LINDA

## 2022-01-25 NOTE — APP STUDENT NOTE
Progress Note - General Surgery   Moni Gutierrez 66 y o  female MRN: [id number]  Unit/Bed#: APU 16 Encounter: [id number]    Assessment:  Luisa Marie is a 69-year-old female with a PMH significant for HTN, CVD, CAD, and arthritis presenting on HOD #3 and POD#1 for a stage III sacral decubitus pressure ulcer  Afebrile  Vital signs stable  Leukocytosis, increased to 13 16 from 11 67  Thrombocytosis, increased to 478 from 451  Hypercarbia, stable at 33  Hypomagnesemia of 1 5, increased from 1 4  Lactic acid WNL  UA with race leukocytes, 1+ ketones, and 3+ blood  CT pelvis without IV contrast indicative of sacral decubitus ulcer with associated soft tissue thickening, fluid and locules of gas extending to the posterior margin of the sacrum without overt destruction to suggest osteomyelitis  Wound culture growing gram + cocci in pairs/chains and gram - rods  Blood culture pending    Plan:  S/p intraoperative debridement and wound VAC of stage III sacral decubitus ulcer  - Continue continuous suction wound VAC  - Confirm home nurse for wound VAC changing  - Continue IV antibiotics  - Continue analgesics PRN pain  - Continue SQ heparin for VTE prophylaxis  - Continue SCDs  - Encourage incentive spirometry  - Continue regular fluid restricted diet  - Continue IVF    Leukocytosis  - Continue IV antibiotics  - Continue to monitor CBC for leukocytosis  - Continue to monitor vitals for SIRS    Hematuria  - Continue IV antibiotics    Subjective/Objective   Subjective: Luisa Marie is a 69-year-old female with a PMH significant for HTN, CVD, CAD, and arthritis presenting on HOD #3 and POD#1 for a stage III sacral decubitus pressure ulcer  Today, the patient is "sore" all over  Admits to persistent pain in the area of the sacral decubitus ulcer, but states the pain is "probably" better than before the debridement   Admits to new pain in the right shoulder since her surgery, but states it is similar to pains associated with rotator cuff disease/arthritis  States she has not slept since she has been in the hospital  Admits to flatus since procedure yesterday, but denies BM  Urinating without pain, burning, or difficulty  States she has not eaten since surgery, but is hungry and would like to eat  Denies fever, chills, chest pain, SOB, abdominal pain, N/V, frequency, urgency, dysuria, hematuria, and calf tenderness  Objective: Pleasant 69-year-old female in NAD  Alert and coherent  Blood pressure 148/65, pulse 67, temperature 98 2 °F (36 8 °C), temperature source Tympanic, resp  rate 18, weight 66 7 kg (147 lb), SpO2 96 %  ,Body mass index is 29 69 kg/m²  Intake/Output Summary (Last 24 hours) at 1/25/2022 0808  Last data filed at 1/25/2022 0500  Gross per 24 hour   Intake 820 ml   Output 400 ml   Net 420 ml       Invasive Devices  Report    Peripheral Intravenous Line            Peripheral IV 01/24/22 Left;Ventral (anterior) Wrist <1 day                Physical Exam: /65 (BP Location: Right arm)   Pulse 67   Temp 98 2 °F (36 8 °C) (Tympanic)   Resp 18   Wt 66 7 kg (147 lb)   SpO2 96%   BMI 29 69 kg/m²   General appearance: alert and oriented, in no acute distress  Lungs: clear to auscultation bilaterally  Heart: regular rate and rhythm, S1, S2 normal, no murmur, click, rub or gallop  Abdomen: soft, non-tender; bowel sounds normal; no masses,  no organomegaly  Extremities: extremities normal, warm and well-perfused; no cyanosis, clubbing, or edema  Pulses: 2+ and symmetric  Skin: Wound VAC applied to sacral decubitus ulcer  MSK: Bilateral shoulders without erythema, bruising, or abnormalities  Right shoulder tender to palpation over glenohumeral and AC joint  Tender with shoulder extension and flexion  Lab, Imaging and other studies:  I have personally reviewed pertinent lab results      CBC:   Lab Results   Component Value Date    WBC 11 67 (H) 01/25/2022    HGB 11 0 (L) 01/25/2022    HCT 35 5 01/25/2022    MCV 95 01/25/2022     (H) 01/25/2022    MCH 29 3 01/25/2022    MCHC 31 0 (L) 01/25/2022    RDW 12 4 01/25/2022    MPV 8 9 01/25/2022    NRBC 0 01/25/2022     CMP:   Lab Results   Component Value Date    SODIUM 139 01/25/2022    K 4 0 01/25/2022     01/25/2022    CO2 33 (H) 01/25/2022    BUN 12 01/25/2022    CREATININE 0 58 (L) 01/25/2022    CALCIUM 9 8 01/25/2022    EGFR 92 01/25/2022     Urinalysis:   Lab Results   Component Value Date    COLORU Yellow 01/24/2022    CLARITYU Hazy 01/24/2022    SPECGRAV 1 025 01/24/2022    PHUR 6 0 01/24/2022    LEUKOCYTESUR Trace (A) 01/24/2022    NITRITE Negative 01/24/2022    GLUCOSEU Negative 01/24/2022    KETONESU 15 (1+) (A) 01/24/2022    BILIRUBINUR Negative 01/24/2022    BLOODU 3+ (A) 01/24/2022     VTE Pharmacologic Prophylaxis: Heparin  VTE Mechanical Prophylaxis: sequential compression device     CHERELLE Ernandez

## 2022-01-25 NOTE — PLAN OF CARE
Problem: Potential for Falls  Goal: Patient will remain free of falls  Description: INTERVENTIONS:  - Educate patient/family on patient safety including physical limitations  - Instruct patient to call for assistance with activity   - Consult OT/PT to assist with strengthening/mobility   - Keep Call bell within reach  - Keep bed low and locked with side rails adjusted as appropriate  - Keep care items and personal belongings within reach  - Initiate and maintain comfort rounds  - Make Fall Risk Sign visible to staff  - Offer Toileting every 2 Hours, in advance of need  - - Apply yellow socks and bracelet for high fall risk patients  - Consider moving patient to room near nurses station  Outcome: Progressing     Problem: Nutrition/Hydration-ADULT  Goal: Nutrient/Hydration intake appropriate for improving, restoring or maintaining nutritional needs  Description: Monitor and assess patient's nutrition/hydration status for malnutrition  Collaborate with interdisciplinary team and initiate plan and interventions as ordered  Monitor patient's weight and dietary intake as ordered or per policy  Utilize nutrition screening tool and intervene as necessary  Determine patient's food preferences and provide high-protein, high-caloric foods as appropriate       INTERVENTIONS:  - Monitor oral intake, urinary output, labs, and treatment plans  - Assess nutrition and hydration status and recommend course of action  - Evaluate amount of meals eaten  - Assist patient with eating if necessary   - Allow adequate time for meals  - Recommend/ encourage appropriate diets, oral nutritional supplements, and vitamin/mineral supplements  - Order, calculate, and assess calorie counts as needed  - Recommend, monitor, and adjust tube feedings and TPN/PPN based on assessed needs  - Assess need for intravenous fluids  - Provide specific nutrition/hydration education as appropriate  - Include patient/family/caregiver in decisions related to nutrition  Outcome: Progressing

## 2022-01-26 ENCOUNTER — OFFICE VISIT (OUTPATIENT)
Dept: SURGERY | Facility: CLINIC | Age: 72
End: 2022-01-26
Payer: COMMERCIAL

## 2022-01-26 VITALS
RESPIRATION RATE: 16 BRPM | TEMPERATURE: 98.3 F | DIASTOLIC BLOOD PRESSURE: 70 MMHG | HEIGHT: 59 IN | BODY MASS INDEX: 29.64 KG/M2 | OXYGEN SATURATION: 97 % | HEART RATE: 78 BPM | WEIGHT: 147 LBS | SYSTOLIC BLOOD PRESSURE: 140 MMHG

## 2022-01-26 DIAGNOSIS — L89.153 STAGE III PRESSURE ULCER OF SACRAL REGION (HCC): Primary | ICD-10-CM

## 2022-01-26 PROCEDURE — 97606 NEG PRS WND THER DME>50 SQCM: CPT | Performed by: PHYSICIAN ASSISTANT

## 2022-01-26 NOTE — ASSESSMENT & PLAN NOTE
Wound vac dressing changed in the office today with good seal and bridge to left hip  Wound is clean without purulence  There is mild slough of skin, deep connective tissue, and thin layer of immature deep tissue necrosis  May be amenable to debridement next week  Continue M/W/F schedule with VNA services  Questions answered, follow-up arranged  Stage II ulceration of left hip noted  Zinc oxide and dry dressing applied  Should continue to offload pressure points, use wedge/pillows for comfort

## 2022-01-26 NOTE — PATIENT INSTRUCTIONS
Wound VAC instructions:  Please change on M/W/F schedule  Will see back in the office weekly for wound checks  Please use single long portion of the large black foam and bridge to the hip  Keep pressure at 125 mmHg  Wound care left hip:  Zinc oxide daily  Dry dressing on top

## 2022-01-26 NOTE — UTILIZATION REVIEW
Notification of Discharge   This is a Notification of Discharge from our facility [address]  Please be advised that this patient has been discharge from our facility  Below you will find the admission and discharge date and time including the patients disposition  UTILIZATION REVIEW CONTACT:  Lord Rainey  Utilization   Network Utilization Review Department  Phone: [phone] x carefully listen to the prompts  All voicemails are confidential   Email: [email or web address]     PHYSICIAN ADVISORY SERVICES:  FOR QIUO-WF-SBVE REVIEW - MEDICAL NECESSITY DENIAL  Phone: [phone]  Fax: [phone]  Email: [email or web address]     PRESENTATION DATE: 1/22/2022 11:12 AM  OBERVATION ADMISSION DATE:  INPATIENT ADMISSION DATE: 1/22/22  3:23 PM   DISCHARGE DATE: 1/25/2022 12:37 PM  DISPOSITION: Home with [place] with [address] [place] [address] Road INFORMATION:  Send all requests for admission clinical reviews, approved or denied determinations and any other requests to dedicated fax number below belonging to the campus where the patient is receiving treatment   List of dedicated fax numbers:  [address] DENIALS (Administrative/Medical Necessity) [phone]   [address] [place] [address] (Maternity/NICU/Pediatrics) [phone]   Ryan Crane [phone]   Particia Notice [phone]   Jaron Rao [phone]   2000 [facility] [address] [place] [address],[address] [phone]   [facility]  [phone]   [address] [place] [address], [facility]  [address] [facility] 1000 [facility] [phone]

## 2022-01-26 NOTE — PROGRESS NOTES
Post-Op Note - General Surgery   Alpesh Apgar 66 y o  female MRN: [id number]  Encounter: [id number]    Assessment/Plan    Stage III pressure ulcer of sacral region [facility])  Wound vac dressing changed in the office today with good seal and bridge to left hip  Wound is clean without purulence  There is mild slough of skin, deep connective tissue, and thin layer of immature deep tissue necrosis  May be amenable to debridement next week  Continue M/W/F schedule with VNA services  Questions answered, follow-up arranged  Stage II ulceration of left hip noted  Zinc oxide and dry dressing applied  Should continue to offload pressure points, use wedge/pillows for comfort  Diagnoses and all orders for this visit:    Stage III pressure ulcer of sacral region [facility])        Subjective      Chief Complaint   Patient presents with    Post-op     wound vac change, po s/p sacral ulcer debridement and wound application 12-     Patient came in today for wound vac change, po s/p sacral ulcer debridement and wound application 37-  No fevers or chills  Becky  O,MA    Pleasant 67 yo F here for VAC change POD #2 s/p debridement of sacral ulcer  Ongoing pain, generalized weakness  No fever  Has started antibiotics  Review of Systems    The following portions of the patient's history were reviewed and updated as appropriate: allergies, current medications, past family history, past medical history, past social history, past surgical history and problem list     Objective      Blood pressure 140/70, pulse 78, temperature 98 3 °F (36 8 °C), temperature source Temporal, resp  rate 16, height 4' 11" (1 499 m), weight 66 7 kg (147 lb), SpO2 97 %  Physical Exam  Vitals and nursing note reviewed  Constitutional:       General: She is not in acute distress  Appearance: She is well-developed  She is not diaphoretic  HENT:      Head: Normocephalic and atraumatic     Eyes:      Conjunctiva/sclera: Conjunctivae normal  Pupils: Pupils are equal, round, and reactive to light  Pulmonary:      Effort: No respiratory distress  Musculoskeletal:         General: Normal range of motion  Cervical back: Normal range of motion  Comments: Frail  Skin:     General: Skin is warm and dry  Capillary Refill: Capillary refill takes less than 2 seconds  Neurological:      Mental Status: She is alert and oriented to person, place, and time     Psychiatric:         Behavior: Behavior normal          Signature:  Rene Nash PA-C  Date: 1/26/2022 Time: 1:09 PM

## 2022-01-27 LAB
BACTERIA BLD CULT: NORMAL
BACTERIA BLD CULT: NORMAL
BACTERIA WND AEROBE CULT: ABNORMAL
GRAM STN SPEC: ABNORMAL

## 2022-02-02 ENCOUNTER — OFFICE VISIT (OUTPATIENT)
Dept: SURGERY | Facility: CLINIC | Age: 72
End: 2022-02-02
Payer: COMMERCIAL

## 2022-02-02 DIAGNOSIS — L89.153 STAGE III PRESSURE ULCER OF SACRAL REGION (HCC): Primary | ICD-10-CM

## 2022-02-02 PROCEDURE — 97605 NEG PRS WND THER DME<=50SQCM: CPT | Performed by: PHYSICIAN ASSISTANT

## 2022-02-02 NOTE — ASSESSMENT & PLAN NOTE
Early granulation tissue formation is noted within wound margins  No new tissue necrosis identified  No signs of active wound infection  VAC replaced in the office with bridge to left hip  Small leak patched at 6 o'clock position  Continue M/W/F schedule with VNA services and office follow-up in 1 week

## 2022-02-08 RX ORDER — OXYCODONE HYDROCHLORIDE 10 MG/1
TABLET ORAL
COMMUNITY
Start: 2022-01-31 | End: 2022-07-21

## 2022-02-09 ENCOUNTER — OFFICE VISIT (OUTPATIENT)
Dept: SURGERY | Facility: CLINIC | Age: 72
End: 2022-02-09
Payer: COMMERCIAL

## 2022-02-09 VITALS
HEIGHT: 59 IN | BODY MASS INDEX: 29.43 KG/M2 | TEMPERATURE: 97.9 F | OXYGEN SATURATION: 97 % | SYSTOLIC BLOOD PRESSURE: 130 MMHG | DIASTOLIC BLOOD PRESSURE: 60 MMHG | HEART RATE: 94 BPM | RESPIRATION RATE: 16 BRPM | WEIGHT: 146 LBS

## 2022-02-09 DIAGNOSIS — N93.9 VAGINAL BLEEDING: ICD-10-CM

## 2022-02-09 DIAGNOSIS — L89.153 STAGE III PRESSURE ULCER OF SACRAL REGION (HCC): Primary | ICD-10-CM

## 2022-02-09 PROCEDURE — 99212 OFFICE O/P EST SF 10 MIN: CPT | Performed by: PHYSICIAN ASSISTANT

## 2022-02-09 PROCEDURE — 97605 NEG PRS WND THER DME<=50SQCM: CPT | Performed by: PHYSICIAN ASSISTANT

## 2022-02-09 NOTE — ASSESSMENT & PLAN NOTE
Patient previously worked up for abnormal vaginal bleeding and is anticipating hysterectomy  She reports ongoing bleeding and generalized fatigue and weakness  Will check a CBC and CMP at this time  Encouraged to maintain follow-up with her OBGYN

## 2022-02-09 NOTE — PROGRESS NOTES
Progress Note - General Surgery   Fairy Spurling 66 y o  female MRN: [id number]  Encounter: [id number]    Assessment/Plan    Stage III pressure ulcer of sacral region [facility])  Patient's sacral ulcer is clean open and moist   No signs of ongoing infection or progressive skin or tissue necrosis  Periwound area with some superficial irritation, no open wounds or ulcerations  Hydrocolloid dressing applied to the 6:00 a m  area of  You wound inflammation  Wound VAC reapplied with bridge to the left hip  Small leak appreciated at conclusion of VAC application  Tolerated well without immediate complications  Will continue VAC changes by VNA services and see back for follow-up in 2 weeks  Vaginal bleeding  Patient previously worked up for abnormal vaginal bleeding and is anticipating hysterectomy  She reports ongoing bleeding and generalized fatigue and weakness  Will check a CBC and CMP at this time  Encouraged to maintain follow-up with her OBGYN  Diagnoses and all orders for this visit:    Stage III pressure ulcer of sacral region ([address] )  -     CBC and differential; Future  -     Comprehensive metabolic panel; Future    Vaginal bleeding    Other orders  -     oxyCODONE (ROXICODONE) 10 MG TABS        Subjective       Chief Complaint   Patient presents with    Follow-up     1 wk wound vac change      Patient came in today for a wound vac change  Patient states she has been suffering from butt pain  I asked her from a scale of 0-10 how would she rate her pain she stated a 11  She notice her butt pain the last time the nurse came to visit to change her dressing  She explain the pain comes and goes, and she feels it more when sitting or laying down  She has no problems going to the bathroom, she has been using the bathroom regularly  She also states she has been losing a lot of blood vaginally and have been feeling really weak   A nurse suggested she should drink pedialyte, and she has been doing that but nothing has changed  JESSICA Pena MA 79-year-old female here for follow-up wound VAC change  Report increased pain to the periwound area  Receiving VAC changes at home by VNA services  Ongoing vaginal bleeding  Generalized weakness and fatigue  Review of Systems    The following portions of the patient's history were reviewed and updated as appropriate: allergies, current medications, past family history, past medical history, past social history, past surgical history and problem list     Objective      Blood pressure 130/60, pulse 94, temperature 97 9 °F (36 6 °C), temperature source Temporal, resp  rate 16, height 4' 11" (1 499 m), weight 66 2 kg (146 lb), SpO2 97 %  Physical Exam  Vitals and nursing note reviewed  Constitutional:       General: She is not in acute distress  Appearance: She is well-developed  She is not diaphoretic  Comments: Frail, using walker  HENT:      Head: Normocephalic and atraumatic  Eyes:      Conjunctiva/sclera: Conjunctivae normal       Pupils: Pupils are equal, round, and reactive to light  Pulmonary:      Effort: No respiratory distress  Genitourinary:     Comments: Vaginal blood noted on maxi-pad  Musculoskeletal:         General: Normal range of motion  Cervical back: Normal range of motion  Skin:     General: Skin is warm and dry  Capillary Refill: Capillary refill takes less than 2 seconds  Neurological:      Mental Status: She is alert and oriented to person, place, and time  Psychiatric:         Behavior: Behavior normal        VAC change:  Positions standing leaning over the table  Existing dressing removed without difficulty  Wound irrigated with wound cleanser  Periwound area inspected revealing superficial irritation of the 6 o'clock position without ulceration  Hydrocolloid dressing applied to this area at the 6 o'clock position  New VAC dressing applied with bridge to the left hip    Minor leak appreciated, unable to be patched however suction maintained  Tolerated well without immediate complications      Signature:  Rene Nash PA-C  Date: 2/9/2022 Time: 2:45 PM

## 2022-02-09 NOTE — PROGRESS NOTES
Progress Note - General Surgery   Leora Goode 66 y o  female MRN: [id number]  Encounter: [id number]    Assessment/Plan    Stage III pressure ulcer of sacral region [facility])  Early granulation tissue formation is noted within wound margins  No new tissue necrosis identified  No signs of active wound infection  VAC replaced in the office with bridge to left hip  Small leak patched at 6 o'clock position  Continue M/W/F schedule with VNA services and office follow-up in 1 week  Diagnoses and all orders for this visit:    Stage III pressure ulcer of sacral region [facility])        Subjective       Chief Complaint   Patient presents with    Post-op     wound vac change      67 yo here for VAC change for sacral ulcer  Reports no problems  Continues with VAC changes on M/W/F with help from VNA services  Review of Systems    The following portions of the patient's history were reviewed and updated as appropriate: allergies, current medications, past family history, past medical history, past social history, past surgical history and problem list     Objective      There were no vitals taken for this visit  Physical Exam  Vitals and nursing note reviewed  Constitutional:       General: She is not in acute distress  Appearance: She is well-developed  She is not diaphoretic  HENT:      Head: Normocephalic and atraumatic  Eyes:      Conjunctiva/sclera: Conjunctivae normal       Pupils: Pupils are equal, round, and reactive to light  Pulmonary:      Effort: No respiratory distress  Musculoskeletal:         General: Normal range of motion  Cervical back: Normal range of motion  Skin:     General: Skin is warm and dry  Capillary Refill: Capillary refill takes less than 2 seconds  Neurological:      Mental Status: She is alert and oriented to person, place, and time     Psychiatric:         Behavior: Behavior normal          Signature:  Rene Nash PA-C  Date: 2/9/2022 Time: 8:34 AM

## 2022-02-09 NOTE — ASSESSMENT & PLAN NOTE
Patient's sacral ulcer is clean open and moist   No signs of ongoing infection or progressive skin or tissue necrosis  Periwound area with some superficial irritation, no open wounds or ulcerations  Hydrocolloid dressing applied to the 6:00 a m  area of  You wound inflammation  Wound VAC reapplied with bridge to the left hip  Small leak appreciated at conclusion of VAC application  Tolerated well without immediate complications  Will continue VAC changes by VNA services and see back for follow-up in 2 weeks

## 2022-02-11 ENCOUNTER — TELEPHONE (OUTPATIENT)
Dept: SURGERY | Facility: CLINIC | Age: 72
End: 2022-02-11

## 2022-02-12 ENCOUNTER — APPOINTMENT (OUTPATIENT)
Dept: LAB | Facility: CLINIC | Age: 72
End: 2022-02-12
Payer: COMMERCIAL

## 2022-02-12 DIAGNOSIS — L89.153 STAGE III PRESSURE ULCER OF SACRAL REGION (HCC): ICD-10-CM

## 2022-02-12 LAB
ALBUMIN SERPL BCP-MCNC: 2.9 G/DL (ref 3.5–5)
ALP SERPL-CCNC: 73 U/L (ref 46–116)
ALT SERPL W P-5'-P-CCNC: 30 U/L (ref 12–78)
ANION GAP SERPL CALCULATED.3IONS-SCNC: 4 MMOL/L (ref 4–13)
AST SERPL W P-5'-P-CCNC: 23 U/L (ref 5–45)
BASOPHILS # BLD AUTO: 0.11 THOUSANDS/ΜL (ref 0–0.1)
BASOPHILS NFR BLD AUTO: 1 % (ref 0–1)
BILIRUB SERPL-MCNC: 1.36 MG/DL (ref 0.2–1)
BUN SERPL-MCNC: 21 MG/DL (ref 5–25)
CALCIUM ALBUM COR SERPL-MCNC: 11 MG/DL (ref 8.3–10.1)
CALCIUM SERPL-MCNC: 10.1 MG/DL (ref 8.3–10.1)
CHLORIDE SERPL-SCNC: 97 MMOL/L (ref 100–108)
CO2 SERPL-SCNC: 33 MMOL/L (ref 21–32)
CREAT SERPL-MCNC: 0.75 MG/DL (ref 0.6–1.3)
EOSINOPHIL # BLD AUTO: 0.71 THOUSAND/ΜL (ref 0–0.61)
EOSINOPHIL NFR BLD AUTO: 8 % (ref 0–6)
ERYTHROCYTE [DISTWIDTH] IN BLOOD BY AUTOMATED COUNT: 13.2 % (ref 11.6–15.1)
GFR SERPL CREATININE-BSD FRML MDRD: 79 ML/MIN/1.73SQ M
GLUCOSE P FAST SERPL-MCNC: 105 MG/DL (ref 65–99)
HCT VFR BLD AUTO: 34.7 % (ref 34.8–46.1)
HGB BLD-MCNC: 10.7 G/DL (ref 11.5–15.4)
IMM GRANULOCYTES # BLD AUTO: 0.04 THOUSAND/UL (ref 0–0.2)
IMM GRANULOCYTES NFR BLD AUTO: 0 % (ref 0–2)
LYMPHOCYTES # BLD AUTO: 2.08 THOUSANDS/ΜL (ref 0.6–4.47)
LYMPHOCYTES NFR BLD AUTO: 23 % (ref 14–44)
MCH RBC QN AUTO: 28.9 PG (ref 26.8–34.3)
MCHC RBC AUTO-ENTMCNC: 30.8 G/DL (ref 31.4–37.4)
MCV RBC AUTO: 94 FL (ref 82–98)
MONOCYTES # BLD AUTO: 1.02 THOUSAND/ΜL (ref 0.17–1.22)
MONOCYTES NFR BLD AUTO: 11 % (ref 4–12)
NEUTROPHILS # BLD AUTO: 5.17 THOUSANDS/ΜL (ref 1.85–7.62)
NEUTS SEG NFR BLD AUTO: 57 % (ref 43–75)
NRBC BLD AUTO-RTO: 0 /100 WBCS
PMV BLD AUTO: 11.1 FL (ref 8.9–12.7)
POTASSIUM SERPL-SCNC: 4.2 MMOL/L (ref 3.5–5.3)
PROT SERPL-MCNC: 7.2 G/DL (ref 6.4–8.2)
RBC # BLD AUTO: 3.7 MILLION/UL (ref 3.81–5.12)
SODIUM SERPL-SCNC: 134 MMOL/L (ref 136–145)
WBC # BLD AUTO: 9.13 THOUSAND/UL (ref 4.31–10.16)

## 2022-02-12 PROCEDURE — 85025 COMPLETE CBC W/AUTO DIFF WBC: CPT

## 2022-02-12 PROCEDURE — 80053 COMPREHEN METABOLIC PANEL: CPT

## 2022-02-12 PROCEDURE — 36415 COLL VENOUS BLD VENIPUNCTURE: CPT

## 2022-02-14 ENCOUNTER — TELEPHONE (OUTPATIENT)
Dept: SURGERY | Facility: CLINIC | Age: 72
End: 2022-02-14

## 2022-02-14 NOTE — TELEPHONE ENCOUNTER
Maxwell Yun from [facility] called to make us aware that she noticed a rash under the wound vac  Msg sent to [facility]

## 2022-02-17 ENCOUNTER — TELEPHONE (OUTPATIENT)
Dept: SURGERY | Facility: CLINIC | Age: 72
End: 2022-02-17

## 2022-02-17 NOTE — TELEPHONE ENCOUNTER
Christine De Jesus from [facility] (Providers of the wound vac) called and had some questions for you  The only info I gave her is that she is being seen in our office  I told her the rest she would have to speak with you  Call back number is [phone] Nathan Correa acct number is [de-identified]

## 2022-02-18 ENCOUNTER — TELEPHONE (OUTPATIENT)
Dept: SURGERY | Facility: CLINIC | Age: 72
End: 2022-02-18

## 2022-02-18 NOTE — TELEPHONE ENCOUNTER
Sent VAC wound progress tracking form to [facility]  P) [phone], F) [phone]

## 2022-02-21 ENCOUNTER — TELEPHONE (OUTPATIENT)
Dept: SURGERY | Facility: CLINIC | Age: 72
End: 2022-02-21

## 2022-02-21 DIAGNOSIS — B36.9 FUNGAL RASH OF TRUNK: Primary | ICD-10-CM

## 2022-02-21 RX ORDER — NYSTATIN 100000 [USP'U]/G
POWDER TOPICAL 3 TIMES DAILY
Qty: 15 G | Refills: 0 | Status: SHIPPED | OUTPATIENT
Start: 2022-02-21 | End: 2022-07-21

## 2022-02-21 NOTE — TELEPHONE ENCOUNTER
Antonio Allen from [facility] called in about fungal infection under tape of wound vac  Requested Nystatin powder  Msg to [facility]  Called Antonio Allen back and let her know he was putting it in to pharmacy

## 2022-02-22 RX ORDER — MORPHINE SULFATE 30 MG/1
30 TABLET, FILM COATED, EXTENDED RELEASE ORAL EVERY 12 HOURS
Status: ON HOLD | COMMUNITY
Start: 2022-02-14 | End: 2022-08-02 | Stop reason: SDUPTHER

## 2022-02-23 ENCOUNTER — OFFICE VISIT (OUTPATIENT)
Dept: SURGERY | Facility: CLINIC | Age: 72
End: 2022-02-23
Payer: COMMERCIAL

## 2022-02-23 VITALS — TEMPERATURE: 98.1 F

## 2022-02-23 DIAGNOSIS — L89.153 STAGE III PRESSURE ULCER OF SACRAL REGION (HCC): Primary | ICD-10-CM

## 2022-02-23 PROCEDURE — 97605 NEG PRS WND THER DME<=50SQCM: CPT | Performed by: PHYSICIAN ASSISTANT

## 2022-02-23 NOTE — ASSESSMENT & PLAN NOTE
Wound clean, open, granulating  Measures 4 cm long, 3 cm wide, and 3 cm deep with tunneling at 1 o'clock position  periwound skin with superficial pustular rash which was cleansed  VAC dressing re-applied using hydrocolloid dressing at 6 o'clock to bridge gluteal crease and with foam bridge moved to right hip  Tolerated well with good seal  Will continue M/W/F and see back in 2 weeks

## 2022-03-05 NOTE — ANESTHESIA POSTPROCEDURE EVALUATION
Palliative Care follow-up  Discussed with Dr. Steele, appreciate input. At this point, Psych recommends working on medication management and stabilizing pt's depression/mood before GOC discussion is had with pt.     Will cancel order, please reconsult PC team once appropriate to have GOC discussion with pt per psych team.         Updated:  Dr. Leslie    Thank you for allowing Palliative Care to participate in this patient's care. Please feel free to call [phone] with any questions or concerns.   Post-Op Assessment Note    CV Status:  Stable  Pain Score: 0    Pain management: adequate     Mental Status:  Alert and awake   Hydration Status:  Euvolemic   PONV Controlled:  Controlled   Airway Patency:  Patent      Post Op Vitals Reviewed: Yes      Staff: CRNA         No complications documented      BP      Temp      Pulse     Resp      SpO2

## 2022-03-09 ENCOUNTER — OFFICE VISIT (OUTPATIENT)
Dept: SURGERY | Facility: CLINIC | Age: 72
End: 2022-03-09
Payer: COMMERCIAL

## 2022-03-09 VITALS — TEMPERATURE: 97.6 F

## 2022-03-09 DIAGNOSIS — L89.153 STAGE III PRESSURE ULCER OF SACRAL REGION (HCC): Primary | ICD-10-CM

## 2022-03-09 PROCEDURE — 97605 NEG PRS WND THER DME<=50SQCM: CPT | Performed by: PHYSICIAN ASSISTANT

## 2022-03-09 RX ORDER — MINOXIDIL 2.5 MG/1
1.25 TABLET ORAL DAILY
COMMUNITY
Start: 2022-02-24 | End: 2022-07-21

## 2022-03-09 NOTE — PROGRESS NOTES
Progress Note - General Surgery   Wash Alt 66 y o  female MRN: [id number]  Encounter: [id number]    Assessment/Plan    Stage III pressure ulcer of sacral region [facility])  Now 6 weeks s/p debridement and VAC application  Wound measures 4 cm x 2 cm externally with tunneling at 1 o'clock position about 3 cm deep  Open, clean, moist, granulating  Surface of tissue in the 1 o'clock tunnel is smooth and may represent epithelialization vs biofilm  Treated with betadine swab and VAC re-application today  Advised continued therapy and follow-up with Dr Columba Holliday in 2 weeks for second opinion  Questions answered, follow-up arranged  Diagnoses and all orders for this visit:    Stage III pressure ulcer of sacral region ([address] )    Other orders  -     minoxidil (LONITEN) 2 5 mg tablet; Take 1 25 mg by mouth daily (Patient not taking: Reported on 3/9/2022 )        Subjective       Chief Complaint   Patient presents with    Follow-up     Wound vac change      Patient came in today for a wound vac change, Patient states there no changes,she still has pain on her butt, but she explain it isn't severe  No fevers or chills  Becky LOPEZ MA     Pleasant 67 yo F here for VAC change now 6 weeks s/p debridement  Reports feeling well, ongoing pain  Tolerating VAC changes at home  Review of Systems    The following portions of the patient's history were reviewed and updated as appropriate: allergies, current medications, past family history, past medical history, past social history, past surgical history and problem list     Objective      Temperature 97 6 °F (36 4 °C), temperature source Temporal    Physical Exam  Vitals and nursing note reviewed  Constitutional:       General: She is not in acute distress  Appearance: She is well-developed  She is not diaphoretic  HENT:      Head: Normocephalic and atraumatic  Eyes:      Conjunctiva/sclera: Conjunctivae normal       Pupils: Pupils are equal, round, and reactive to light  Pulmonary:      Effort: No respiratory distress  Musculoskeletal:         General: Normal range of motion  Cervical back: Normal range of motion  Skin:     General: Skin is warm and dry  Capillary Refill: Capillary refill takes less than 2 seconds  Comments: Wound is clean and open  periwound skin intact without rash  Wound with some superficial granulation tissue, tunneled portion of wound with smooth pale surface  No purulence or signs of infection  4 cm x 2 cm and 3 cm deep  Neurological:      Mental Status: She is alert and oriented to person, place, and time     Psychiatric:         Behavior: Behavior normal          Signature:  Rene Nash PA-C  Date: 3/9/2022 Time: 1:33 PM

## 2022-03-09 NOTE — ASSESSMENT & PLAN NOTE
Now 6 weeks s/p debridement and VAC application  Wound measures 4 cm x 2 cm externally with tunneling at 1 o'clock position about 3 cm deep  Open, clean, moist, granulating  Surface of tissue in the 1 o'clock tunnel is smooth and may represent epithelialization vs biofilm  Treated with betadine swab and VAC re-application today  Advised continued therapy and follow-up with Dr Diann Amador in 2 weeks for second opinion  Questions answered, follow-up arranged

## 2022-03-18 ENCOUNTER — TELEPHONE (OUTPATIENT)
Dept: SURGERY | Facility: CLINIC | Age: 72
End: 2022-03-18

## 2022-03-18 NOTE — TELEPHONE ENCOUNTER
Left a voicemail letting the patient know Dr Dwayne Chavez will be unavailable for 03/22/22 to give us a call to reschedule her appt for Thursday 03/24/22

## 2022-03-21 ENCOUNTER — OFFICE VISIT (OUTPATIENT)
Dept: URGENT CARE | Facility: CLINIC | Age: 72
End: 2022-03-21
Payer: COMMERCIAL

## 2022-03-21 VITALS
TEMPERATURE: 98.2 F | RESPIRATION RATE: 18 BRPM | HEART RATE: 91 BPM | HEIGHT: 61 IN | OXYGEN SATURATION: 98 % | SYSTOLIC BLOOD PRESSURE: 131 MMHG | BODY MASS INDEX: 27.56 KG/M2 | DIASTOLIC BLOOD PRESSURE: 62 MMHG | WEIGHT: 146 LBS

## 2022-03-21 DIAGNOSIS — I88.9 LYMPHADENITIS: Primary | ICD-10-CM

## 2022-03-21 PROCEDURE — 99213 OFFICE O/P EST LOW 20 MIN: CPT

## 2022-03-21 RX ORDER — AMOXICILLIN AND CLAVULANATE POTASSIUM 875; 125 MG/1; MG/1
1 TABLET, FILM COATED ORAL EVERY 12 HOURS SCHEDULED
Qty: 14 TABLET | Refills: 0 | Status: SHIPPED | OUTPATIENT
Start: 2022-03-21 | End: 2022-03-28

## 2022-03-21 NOTE — PATIENT INSTRUCTIONS
Take the antibiotics with food  Take a probiotic or eat yogurt with live cultures to promote gut health  Lymphadenopathy   AMBULATORY CARE:   Lymphadenopathy  is swelling of your lymph nodes  Lymph nodes are small organs that are part of your immune system  Lymph nodes are found throughout your body  They are most easily felt in your neck, under your arms, and near your groin  Lymphadenopathy can occur in one or more areas of your body  Common signs and symptoms include the following: You may have no symptoms, or you may have any of the following:  · A painful, warm, or red lump under your skin    · More tired than usual    · Skin rash    · Unexplained weight loss    · Enlarged spleen (organ that filters blood)    · Fever or night sweats    Seek care immediately if:   · The swollen lymph nodes bleed  · You have swollen lymph nodes in your neck that affect your breathing or swallowing  Contact your healthcare provider if:   · You have a fever  · You have a new swollen and painful lymph node  · You have a skin rash  · Your lymph node remains swollen or painful, or it gets bigger  · Your lymph node has red streaks around it, or the skin around the lymph node is red  · You have questions or concerns about your condition or care  Follow up with your doctor as directed:  Write down your questions so you remember to ask them during your visits  Self-care:   · Do not poke or squeeze  the swollen lymph nodes  · Apply heat to the swollen glands  You may use warm compresses, or an electric heating pad set on low  · Rest as needed  If you have a fever, rest until your temperature returns to normal  Return to your normal daily activities slowly after your fever is gone  © Copyright [address] 2022 Information is for End User's use only and may not be sold, redistributed or otherwise used for commercial purposes   All illustrations and images included in CareNotes® are the copyrighted property of A D A M , Inc  or [facility] Anne-Marie Harper   The above information is an  only  It is not intended as medical advice for individual conditions or treatments  Talk to your doctor, nurse or pharmacist before following any medical regimen to see if it is safe and effective for you

## 2022-03-21 NOTE — PROGRESS NOTES
[facility]        NAME: Oleg Dyson is a 66 y o  female  : 1950    MRN: [id number]  DATE: 2022  TIME: 3:56 PM    Assessment and Plan   Lymphadenitis [I88 9]  1  Lymphadenitis  amoxicillin-clavulanate (AUGMENTIN) 875-125 mg per tablet       I talked with the patient at length about following up with her PCP about the potential causes of lymphadenitis  She verbalized understanding  Patient Instructions     Take the antibiotics with food  Take a probiotic or eat yogurt with live cultures to promote gut health  Follow up with PCP in 3-5 days  Proceed to  ER if symptoms worsen  Chief Complaint     Chief Complaint   Patient presents with    Neck Pain     Pt states neck swelling lympnodes that started on Friday, Has pain in whole body and in the rib area  History of Present Illness       Patient is a 70YOF presenting with left cervical lymph node swelling since Friday  Patient states they are painful to the touch  She denies any fever, chills, cough, sob or recent illness  Patient has a wound vac in place for a chronic sacral wound  She also states she has a surgery scheduled for a hysterectomy but is unsure if it is due to a malignancy  Neck Pain   Pertinent negatives include no chest pain, fever, headaches, numbness or weakness  Review of Systems   Review of Systems   Constitutional: Negative for activity change, appetite change, chills, fatigue and fever  HENT: Negative for congestion  Respiratory: Negative for cough, chest tightness and shortness of breath  Cardiovascular: Negative for chest pain and palpitations  Gastrointestinal: Negative for abdominal pain, diarrhea, nausea and vomiting  Musculoskeletal: Positive for neck pain  Skin: Positive for wound  Negative for rash  Neurological: Negative for dizziness, weakness, numbness and headaches  All other systems reviewed and are negative          Current Medications       Current Outpatient Medications:     artificial tear (LUBRIFRESH P M ) 83-15 % ophthalmic ointment, 1 application daily, Disp: , Rfl:     atorvastatin (LIPITOR) 40 mg tablet, Take 40 mg by mouth daily at bedtime, Disp: , Rfl: 0    azelastine (ASTELIN) 0 1 % nasal spray, 1 spray into each nostril 2 (two) times a day, Disp: 30 mL, Rfl: 11    clopidogrel (PLAVIX) 75 mg tablet, , Disp: , Rfl: 0    famotidine (PEPCID) 20 mg tablet, famotidine 20 mg tablet  Take by oral route for 30 days  , Disp: , Rfl:     gabapentin (NEURONTIN) 600 MG tablet, Take 600 mg by mouth 3 (three) times a day, Disp: , Rfl: 0    lisinopril-hydrochlorothiazide (PRINZIDE,ZESTORETIC) 10-12 5 MG per tablet, take 2 tablets by mouth once daily, Disp: , Rfl:     morphine (MS CONTIN) 30 mg 12 hr tablet, Take 30 mg by mouth every 12 (twelve) hours, Disp: , Rfl:     nystatin (MYCOSTATIN) powder, Apply topically 3 (three) times a day, Disp: 15 g, Rfl: 0    oxyCODONE (ROXICODONE) 10 MG TABS, , Disp: , Rfl:     pantoprazole (PROTONIX) 40 mg tablet, , Disp: , Rfl: 0    TiZANidine (ZANAFLEX) 2 MG capsule, , Disp: , Rfl: 0    traZODone (DESYREL) 50 mg tablet, , Disp: , Rfl: 0    White Petrolatum-Mineral Oil (Soothe Night Time) OINT, Apply 1 application to eye daily at bedtime  , Disp: , Rfl:     zinc gluconate 50 mg tablet, Take 50 mg by mouth daily  , Disp: , Rfl:     amoxicillin-clavulanate (AUGMENTIN) 875-125 mg per tablet, Take 1 tablet by mouth every 12 (twelve) hours for 7 days, Disp: 14 tablet, Rfl: 0    fluticasone (FLONASE) 50 mcg/act nasal spray, 2 sprays into each nostril daily, Disp: 16 g, Rfl: 11    Melatonin 5 MG TABS, Take 5 mg by mouth   (Patient not taking: Reported on 1/26/2022 ), Disp: , Rfl:     minoxidil (LONITEN) 2 5 mg tablet, Take 1 25 mg by mouth daily (Patient not taking: Reported on 3/9/2022 ), Disp: , Rfl:     morphine (MS CONTIN) 15 mg 12 hr tablet, Take 15 mg by mouth 2 (two) times a day (Patient not taking: Reported on 3/9/2022 ), Disp: , Rfl: 0    oxyCODONE (OXY-IR) 5 MG capsule, Take 5 mg by mouth 2 (two) times a day (Patient not taking: Reported on 2/9/2022 ), Disp: , Rfl:     oxyCODONE-acetaminophen (PERCOCET) 7 5-325 MG per tablet, oxycodone-acetaminophen 7 5 mg-325 mg tablet  1 tab Q8h prn (Patient not taking: Reported on 1/26/2022), Disp: , Rfl:     Current Allergies     Allergies as of 03/21/2022    (No Known Allergies)            The following portions of the patient's history were reviewed and updated as appropriate: allergies, current medications, past family history, past medical history, past social history, past surgical history and problem list      Past Medical History:   Diagnosis Date    Anxiety     Arthritis     Bernard's esophagus     [facility]     CVA (cerebral vascular accident) ([facility] ) 10/2016    Fibromyalgia     GERD (gastroesophageal reflux disease)     Hypercholesterolemia     Hypertension     Insomnia     Rheumatoid arthritis ([facility] )     Stroke ([facility] ) 2016       Past Surgical History:   Procedure Laterality Date    APPENDECTOMY      BREAST BIOPSY Bilateral     benign    CARPAL TUNNEL RELEASE Left 10/27/2003    DORSAL COMPARTMENT RELEASE Left 03/09/2006    FINGER SURGERY      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    KNEE ARTHROSCOPY Right     x3(6/91,7/96,7/99)    OTHER SURGICAL HISTORY Left 03/09/2006    tennis elbow release    IA DEBRIDEMENT, SKIN, SUB-Q TISSUE,=<20 SQ CM N/A 1/24/2022    Procedure: SACRAL DEBRIDEMENT WOUND AND DRESSING CHANGE ([address] OUT);   Surgeon: Pritesh Albarado MD;  Location: [facility];  Service: General    REPLACEMENT TOTAL KNEE BILATERAL      rt-11/99, lt-1/13/10    ROTATOR CUFF REPAIR Bilateral     SHOULDER ARTHROSCOPY Right 02/10/2016    SHOULDER ARTHROSCOPY Left     11/26/08,9/99    SHOULDER ARTHROSCOPY Left 03/09/2006    TONSILLECTOMY AND ADENOIDECTOMY      TRIGGER FINGER RELEASE Left 07/01/2002    middle and ring finger    ULNAR NERVE TRANSPOSITION Left        Family History   Problem Relation Age of Onset    Arthritis Mother     Lung cancer Father     Brain cancer Father     No Known Problems Daughter     No Known Problems Maternal Grandmother     No Known Problems Maternal Grandfather     No Known Problems Paternal Grandmother     No Known Problems Paternal Grandfather     No Known Problems Daughter     No Known Problems Maternal Aunt     No Known Problems Paternal Aunt     No Known Problems Paternal Aunt     No Known Problems Paternal Aunt          Medications have been verified  Objective   /62   Pulse 91   Temp 98 2 °F (36 8 °C)   Resp 18   Ht 5' 1" (1 549 m)   Wt 66 2 kg (146 lb)   SpO2 98%   BMI 27 59 kg/m²        Physical Exam     Physical Exam  Vitals and nursing note reviewed  Constitutional:       General: She is not in acute distress  Appearance: She is not ill-appearing or toxic-appearing  HENT:      Right Ear: Tympanic membrane normal       Nose: Nose normal  No congestion  Mouth/Throat:      Pharynx: Oropharynx is clear  Cardiovascular:      Rate and Rhythm: Normal rate and regular rhythm  Musculoskeletal:      Cervical back: Full passive range of motion without pain  Lymphadenopathy:      Cervical: Cervical adenopathy present  Left cervical: Superficial cervical adenopathy and deep cervical adenopathy present  Neurological:      Mental Status: She is alert

## 2022-03-24 ENCOUNTER — OFFICE VISIT (OUTPATIENT)
Dept: SURGERY | Facility: CLINIC | Age: 72
End: 2022-03-24
Payer: COMMERCIAL

## 2022-03-24 DIAGNOSIS — L89.153 STAGE III PRESSURE ULCER OF SACRAL REGION (HCC): Primary | ICD-10-CM

## 2022-03-24 PROCEDURE — 97605 NEG PRS WND THER DME<=50SQCM: CPT | Performed by: SPECIALIST

## 2022-03-24 NOTE — PATIENT INSTRUCTIONS
I believe that the Wound VAC has done a good job in healing the wound, and that you can go without it  I will talk to Dr Jose Tripathi about this as well    If he agrees, I will have the Visiting Nurse remove the wound VAC at the next visit, and begin using Silicone bordered foam dressings on the wound  I will see you in one week to check on your progress

## 2022-03-24 NOTE — PROGRESS NOTES
Assessment/Plan:    Stage III pressure ulcer of sacral region ([address] )      The wound is 100% granulated  I have measured it at 3 9 cm in length, 2 2 cm in width and 1 4 cm in depth  No debridement necessary, wound VAC reapplied with Black sponge  The next step since it is granulated may be to discontinue the wound VAC and go to silicone bordered foam dressings  She assures me that she had a good pad for her seating surfaces  There are no diagnoses linked to this encounter  Subjective:      Patient ID: Oleg Dyson is a 66 y o  female  The patient is a 69-year-old white female who underwent debridement of stage III sacral ulcer 8 weeks prior, and has been treated with a wound VAC postoperatively  The wound dimensions have been steadily improving, and on exam today the wound appears to be 100% granulated  Will discuss with Dr Santy Hidalgo is discontinuing the wound VAC, and going with a silicone bordered foam gauze dressing, to be managed by the visiting nurses for dressing changes, and follow up in the office on a weekly basis until healed  The following portions of the patient's history were reviewed and updated as appropriate: allergies, current medications, past family history, past medical history, past social history, past surgical history and problem list     Review of Systems   Constitutional: Negative  Musculoskeletal: Positive for gait problem (uses walker)  Skin: Positive for wound  Objective: There were no vitals taken for this visit  Physical Exam  Genitourinary:     Comments:  With granulating sacral decubitus ulcer as noted

## 2022-03-24 NOTE — ASSESSMENT & PLAN NOTE
The wound is 100% granulated  I have measured it at 3 9 cm in length, 2 2 cm in width and 1 4 cm in depth  No debridement necessary, wound VAC reapplied with Black sponge  The next step since it is granulated may be to discontinue the wound VAC and go to silicone bordered foam dressings  She assures me that she had a good pad for her seating surfaces

## 2022-03-31 ENCOUNTER — TELEPHONE (OUTPATIENT)
Dept: SURGERY | Facility: CLINIC | Age: 72
End: 2022-03-31

## 2022-03-31 ENCOUNTER — OFFICE VISIT (OUTPATIENT)
Dept: SURGERY | Facility: CLINIC | Age: 72
End: 2022-03-31
Payer: COMMERCIAL

## 2022-03-31 VITALS
SYSTOLIC BLOOD PRESSURE: 120 MMHG | BODY MASS INDEX: 27 KG/M2 | HEART RATE: 73 BPM | HEIGHT: 61 IN | DIASTOLIC BLOOD PRESSURE: 60 MMHG | WEIGHT: 143 LBS | TEMPERATURE: 97.8 F | OXYGEN SATURATION: 97 % | RESPIRATION RATE: 16 BRPM

## 2022-03-31 DIAGNOSIS — L89.153 STAGE III PRESSURE ULCER OF SACRAL REGION (HCC): Primary | ICD-10-CM

## 2022-03-31 PROCEDURE — 99212 OFFICE O/P EST SF 10 MIN: CPT | Performed by: SPECIALIST

## 2022-03-31 NOTE — PATIENT INSTRUCTIONS
Discontinue the Wound VAC    Dress the wound with silicone bordered foam island dressing, change Q 48 - 72 hrs or PRN if soiled  Return on a weekly basis for wound checks

## 2022-03-31 NOTE — ASSESSMENT & PLAN NOTE
Residual wound is 3 5 cm x 1 0 cm by  3 cm depth  Cleaned with soap and water, dressed with optifoam bordered dressing  Wound VAC discontinued  VNA to change 2x/ week and we will see weekly and change dressings as well for 3x/wk dressing changes

## 2022-03-31 NOTE — TELEPHONE ENCOUNTER
Spoke with Chris Hernandez at [facility] at [phone] and Fax [phone]  That I am faxing over new [facility] orders for Eulalio Deherman and that her wound vac was removed

## 2022-03-31 NOTE — PROGRESS NOTES
Assessment/Plan:    No problem-specific Assessment & Plan notes found for this encounter  There are no diagnoses linked to this encounter  Subjective:      Patient ID: Svitlana Drake is a 66 y o  female  The patient presents in follow up regarding her ongoing treatment for her stage III sacral pressure wound  It was decided last week to discontinue the [facility], but the order has not been given to VNA for an alternative  The following portions of the patient's history were reviewed and updated as appropriate: allergies, current medications, past family history, past medical history, past social history, past surgical history and problem list     Review of Systems   Constitutional: Negative  Respiratory: Negative  Gastrointestinal: Negative  Musculoskeletal: Positive for gait problem (ambulates with a walker)  Skin: Positive for wound  Objective:      /60 (BP Location: Left arm, Patient Position: Sitting, Cuff Size: Standard)   Pulse 73   Temp 97 8 °F (36 6 °C) (Temporal)   Resp 16   Ht 5' 1" (1 549 m)   Wt 64 9 kg (143 lb)   SpO2 97%   BMI 27 02 kg/m²          Physical Exam  Genitourinary:     Comments: Clean granulating sacral wound

## 2022-04-01 ENCOUNTER — TELEPHONE (OUTPATIENT)
Dept: SURGERY | Facility: CLINIC | Age: 72
End: 2022-04-01

## 2022-04-01 NOTE — TELEPHONE ENCOUNTER
Shanti Ramirez called with [facility] and state that there is a substantial about of tunneling at the buttock wound and was wondering if they can pack the wound with 1/4 inch plain packing  I stated that I will [employer] Dr Joe Walker and get back  Message sent to Dr Joe Walker

## 2022-04-02 ENCOUNTER — APPOINTMENT (OUTPATIENT)
Dept: LAB | Facility: CLINIC | Age: 72
End: 2022-04-02
Payer: COMMERCIAL

## 2022-04-02 DIAGNOSIS — D64.9 ANEMIA, UNSPECIFIED TYPE: Primary | ICD-10-CM

## 2022-04-02 DIAGNOSIS — D50.8 OTHER IRON DEFICIENCY ANEMIA: ICD-10-CM

## 2022-04-02 DIAGNOSIS — R53.83 OTHER FATIGUE: ICD-10-CM

## 2022-04-02 DIAGNOSIS — Z79.899 ENCOUNTER FOR LONG-TERM (CURRENT) USE OF OTHER MEDICATIONS: ICD-10-CM

## 2022-04-02 DIAGNOSIS — E55.9 AVITAMINOSIS D: ICD-10-CM

## 2022-04-02 LAB
25(OH)D3 SERPL-MCNC: 38.9 NG/ML (ref 30–100)
ALBUMIN SERPL BCP-MCNC: 2.9 G/DL (ref 3.5–5)
ALP SERPL-CCNC: 86 U/L (ref 46–116)
ALT SERPL W P-5'-P-CCNC: 25 U/L (ref 12–78)
ANION GAP SERPL CALCULATED.3IONS-SCNC: 1 MMOL/L (ref 4–13)
AST SERPL W P-5'-P-CCNC: 25 U/L (ref 5–45)
BILIRUB SERPL-MCNC: 0.29 MG/DL (ref 0.2–1)
BUN SERPL-MCNC: 31 MG/DL (ref 5–25)
CALCIUM ALBUM COR SERPL-MCNC: 11.5 MG/DL (ref 8.3–10.1)
CALCIUM SERPL-MCNC: 10.6 MG/DL (ref 8.3–10.1)
CHLORIDE SERPL-SCNC: 98 MMOL/L (ref 100–108)
CO2 SERPL-SCNC: 35 MMOL/L (ref 21–32)
CREAT SERPL-MCNC: 0.94 MG/DL (ref 0.6–1.3)
ERYTHROCYTE [DISTWIDTH] IN BLOOD BY AUTOMATED COUNT: 13.3 % (ref 11.6–15.1)
FERRITIN SERPL-MCNC: 74 NG/ML (ref 8–388)
GFR SERPL CREATININE-BSD FRML MDRD: 60 ML/MIN/1.73SQ M
GLUCOSE SERPL-MCNC: 104 MG/DL (ref 65–140)
HCT VFR BLD AUTO: 36.3 % (ref 34.8–46.1)
HGB BLD-MCNC: 10.7 G/DL (ref 11.5–15.4)
IRON SATN MFR SERPL: 9 % (ref 15–50)
IRON SERPL-MCNC: 26 UG/DL (ref 50–170)
MCH RBC QN AUTO: 28.2 PG (ref 26.8–34.3)
MCHC RBC AUTO-ENTMCNC: 29.5 G/DL (ref 31.4–37.4)
MCV RBC AUTO: 96 FL (ref 82–98)
POTASSIUM SERPL-SCNC: 5.2 MMOL/L (ref 3.5–5.3)
PROT SERPL-MCNC: 7.3 G/DL (ref 6.4–8.2)
RBC # BLD AUTO: 3.8 MILLION/UL (ref 3.81–5.12)
SODIUM SERPL-SCNC: 134 MMOL/L (ref 136–145)
T4 FREE SERPL-MCNC: 1.11 NG/DL (ref 0.76–1.46)
TIBC SERPL-MCNC: 303 UG/DL (ref 250–450)
TSH SERPL DL<=0.05 MIU/L-ACNC: 1.11 UIU/ML (ref 0.36–3.74)
WBC # BLD AUTO: 8.4 THOUSAND/UL (ref 4.31–10.16)

## 2022-04-02 PROCEDURE — 84443 ASSAY THYROID STIM HORMONE: CPT

## 2022-04-02 PROCEDURE — 82747 ASSAY OF FOLIC ACID RBC: CPT

## 2022-04-02 PROCEDURE — 36415 COLL VENOUS BLD VENIPUNCTURE: CPT

## 2022-04-02 PROCEDURE — 83550 IRON BINDING TEST: CPT

## 2022-04-02 PROCEDURE — 82728 ASSAY OF FERRITIN: CPT

## 2022-04-02 PROCEDURE — 85027 COMPLETE CBC AUTOMATED: CPT

## 2022-04-02 PROCEDURE — 82306 VITAMIN D 25 HYDROXY: CPT

## 2022-04-02 PROCEDURE — 84439 ASSAY OF FREE THYROXINE: CPT

## 2022-04-02 PROCEDURE — 80053 COMPREHEN METABOLIC PANEL: CPT

## 2022-04-02 PROCEDURE — 83540 ASSAY OF IRON: CPT

## 2022-04-05 ENCOUNTER — OFFICE VISIT (OUTPATIENT)
Dept: SURGERY | Facility: CLINIC | Age: 72
End: 2022-04-05
Payer: COMMERCIAL

## 2022-04-05 VITALS — TEMPERATURE: 97.8 F

## 2022-04-05 DIAGNOSIS — L89.153 STAGE III PRESSURE ULCER OF SACRAL REGION (HCC): Primary | ICD-10-CM

## 2022-04-05 LAB
FOLATE BLD-MCNC: 511 NG/ML
FOLATE RBC-MCNC: 1460 NG/ML
HCT VFR BLD AUTO: 35 % (ref 34–46.6)

## 2022-04-05 PROCEDURE — 99212 OFFICE O/P EST SF 10 MIN: CPT | Performed by: SPECIALIST

## 2022-04-05 NOTE — ASSESSMENT & PLAN NOTE
The patient notes serous drainage from the sacral ulcer that is overwhelming the Optifoam dressing, which is currently being changed 3 times week  At this point measures 5 cm in length, approximately 2 cm at maximum width, and 1 cm in depth  It is well granulated, however the tissue is now more macerated after discontinuing the wound VAC  We will discontinue the Optifoam dressings, and have the patient shower, wash the wound with soap and water, and keep it covered with a cotton gauze dressing, directed into the wound  She will follow-up in 1 week

## 2022-04-05 NOTE — PATIENT INSTRUCTIONS
Please stop using the Optifoam dressings for your sacral ulcer  Instead, please shower daily, wash the wound with soap and water, then open up a woven cotton gauze 4 x 4 and pack the wound gently with gauze  I would then use a second gauze and tape to keep it in place, or additional gauze in your underwear to control the drainage      Return in one week

## 2022-04-05 NOTE — PROGRESS NOTES
Assessment/Plan:    Stage III pressure ulcer of sacral region [facility])  The patient notes serous drainage from the sacral ulcer that is overwhelming the Optifoam dressing, which is currently being changed 3 times week  At this point measures 5 cm in length, approximately 2 cm at maximum width, and 1 cm in depth  It is well granulated, however the tissue is now more macerated after discontinuing the wound VAC  We will discontinue the Optifoam dressings, and have the patient shower, wash the wound with soap and water, and keep it covered with a cotton gauze dressing, directed into the wound  She will follow-up in 1 week  Diagnoses and all orders for this visit:    Stage III pressure ulcer of sacral region [facility])          Subjective:      Patient ID: Raymonde Rinne is a 66 y o  female  The patient follows up regarding her longstanding stage III sacral decubitus ulcer  She notes serous drainage which is overwhelming the Optifoam dressings  Also in discussion it appears that she has no appetite, though she is forcing herself to eat, and is maintaining her weight  She is also quite concerned regarding presence of swollen cervical and supraclavicular lymph nodes, and upcoming biopsy planned with Dr Napoles Remedies from ENT  The following portions of the patient's history were reviewed and updated as appropriate: allergies, current medications, past family history, past medical history, past social history, past surgical history and problem list     Review of Systems   Constitutional: Negative for chills and fever  Skin: Positive for wound (chronic sacral ulcer)  Objective:      Temp 97 8 °F (36 6 °C) (Temporal)          Physical Exam  Skin:     General: Skin is warm and dry  Findings: Lesion: More maceration of the skin surrounding her chronic sacral ulcer

## 2022-04-12 ENCOUNTER — OFFICE VISIT (OUTPATIENT)
Dept: SURGERY | Facility: CLINIC | Age: 72
End: 2022-04-12
Payer: COMMERCIAL

## 2022-04-12 VITALS
BODY MASS INDEX: 27.56 KG/M2 | DIASTOLIC BLOOD PRESSURE: 50 MMHG | HEART RATE: 75 BPM | OXYGEN SATURATION: 95 % | RESPIRATION RATE: 12 BRPM | TEMPERATURE: 98 F | HEIGHT: 61 IN | WEIGHT: 146 LBS | SYSTOLIC BLOOD PRESSURE: 100 MMHG

## 2022-04-12 DIAGNOSIS — L89.153 STAGE III PRESSURE ULCER OF SACRAL REGION (HCC): Primary | ICD-10-CM

## 2022-04-12 PROBLEM — L89.323 DECUBITUS ULCER OF ISCHIAL AREA, LEFT, STAGE III (HCC): Chronic | Status: ACTIVE | Noted: 2022-04-12

## 2022-04-12 PROCEDURE — 87205 SMEAR GRAM STAIN: CPT | Performed by: SPECIALIST

## 2022-04-12 PROCEDURE — 99212 OFFICE O/P EST SF 10 MIN: CPT | Performed by: SPECIALIST

## 2022-04-12 PROCEDURE — 87186 SC STD MICRODIL/AGAR DIL: CPT | Performed by: SPECIALIST

## 2022-04-12 PROCEDURE — 87070 CULTURE OTHR SPECIMN AEROBIC: CPT | Performed by: SPECIALIST

## 2022-04-12 PROCEDURE — 87077 CULTURE AEROBIC IDENTIFY: CPT | Performed by: SPECIALIST

## 2022-04-12 RX ORDER — GENTAMICIN SULFATE 1 MG/G
OINTMENT TOPICAL 2 TIMES DAILY
Qty: 30 G | Refills: 1 | Status: SHIPPED | OUTPATIENT
Start: 2022-04-12 | End: 2022-05-10 | Stop reason: SDUPTHER

## 2022-04-12 RX ORDER — FUROSEMIDE 20 MG/1
TABLET ORAL
Status: ON HOLD | COMMUNITY
End: 2022-08-02 | Stop reason: SDUPTHER

## 2022-04-12 RX ORDER — AMOXICILLIN AND CLAVULANATE POTASSIUM 875; 125 MG/1; MG/1
1 TABLET, FILM COATED ORAL EVERY 12 HOURS SCHEDULED
Qty: 10 TABLET | Refills: 0 | Status: SHIPPED | OUTPATIENT
Start: 2022-04-12 | End: 2022-04-17

## 2022-04-12 RX ORDER — OMEPRAZOLE 20 MG/1
CAPSULE, DELAYED RELEASE ORAL
COMMUNITY
End: 2022-07-21

## 2022-04-12 NOTE — PROGRESS NOTES
Assessment/Plan:    Stage III pressure ulcer of sacral region (HCC)      2 cm x 4 cm open granulating wound with 2 cm tunneling at superior apex  Not inflamed  Culture taken, dressed with gauze and tape pending culture and empiric Gentamycin ointment  Decubitus ulcer of ischial area, left, stage III ([address] )  Previously described as a "cyst" causing an open wound that VNA had been dressing with optifoam gauze  Recently became more tender and started draining foul smelling fluid  Appears to be chronic, with surrounding inflammation, does not appear to track directly to bone  Culture taken, packed open with iodoform gauze, return tomorrow for dressing change  Diagnoses and all orders for this visit:    Stage III pressure ulcer of sacral region ([address] )  -     gentamicin (GARAMYCIN) 0 1 % ointment; Apply topically 2 (two) times a day Apply sparingly to gauze dressing, then place gauze into sacral wound and secure with Medipore tape  -     amoxicillin-clavulanate (AUGMENTIN) 875-125 mg per tablet; Take 1 tablet by mouth every 12 (twelve) hours for 5 days    Other orders  -     furosemide (LASIX) 20 mg tablet; furosemide 20 mg tablet   Take 1 tablet twice a day by oral route  -     omeprazole (PriLOSEC) 20 mg delayed release capsule; omeprazole 20 mg capsule,delayed release   take 1 capsule by mouth twice a day before meals          Subjective:      Patient ID: Evan Mccracken is a 66 y o  female  The patient presents in follow-up regarding her ongoing treatment for stage III sacral decubitus ulcer  She notes greenish drainage on the dressing, and although the wound is clean and granulating, there is drainage consistent with Pseudomonas  The patient also notes a Jelly Sallies in the cyst of her left thigh  She states that she developed foul-smelling drainage from that over the last several days  On exam this proves to be an infected left ischial decubitus ulcer    A culture was obtained, and empiric prescription for Augmentin provided  It was packed open with iodoform gauze which the patient will return to have changed tomorrow  The following portions of the patient's history were reviewed and updated as appropriate: allergies, current medications, past family history, past medical history, past social history, past surgical history and problem list     Review of Systems   Constitutional: Negative for chills and fever  Skin:        Pain and new foul smelling drainage from Lt ischial decubitus ulcer  Objective:      /50 (BP Location: Left arm, Patient Position: Sitting, Cuff Size: Large)   Pulse 75   Temp 98 °F (36 7 °C) (Temporal)   Resp 12   Ht 5' 1" (1 549 m)   Wt 66 2 kg (146 lb)   SpO2 95%   BMI 27 59 kg/m²          Physical Exam  Genitourinary:     Comments: The sacral ulcer continues to be clean and granulating, but the gauze dressing has a significant amount of greenish drainage consistent with pseudomonas  The patient brings my attention to a chronic wound of the Left ischial tuberosity that is inflamed and appears to be infected

## 2022-04-12 NOTE — ASSESSMENT & PLAN NOTE
2 cm x 4 cm open granulating wound with 2 cm tunneling at superior apex  Not inflamed  Culture taken, dressed with gauze and tape pending culture and empiric Gentamycin ointment

## 2022-04-12 NOTE — ASSESSMENT & PLAN NOTE
Previously described as a "cyst" causing an open wound that VNA had been dressing with optifoam gauze  Recently became more tender and started draining foul smelling fluid  Appears to be chronic, with surrounding inflammation, does not appear to track directly to bone  Culture taken, packed open with iodoform gauze, return tomorrow for dressing change

## 2022-04-12 NOTE — PATIENT INSTRUCTIONS
The wound of your Left thigh buttock is actually an ischial decubitus ulcer, which is infected  I have packed the ischial (thigh) ulcer with iodoform gauze, which needs to be changed tomorrow  I have prescribed Augmentin for the infection, and topical Gentamycin ointment for your sacral ulcer (the wound on your Butt)    The ischial ulcer ultimately will be treated in a similar fashion to the sacral ulcer, but is going to require more care initially  Call if you are having any problems such as fever, chills or increasing pain

## 2022-04-13 ENCOUNTER — OFFICE VISIT (OUTPATIENT)
Dept: SURGERY | Facility: CLINIC | Age: 72
End: 2022-04-13
Payer: COMMERCIAL

## 2022-04-13 VITALS — TEMPERATURE: 98.2 F

## 2022-04-13 DIAGNOSIS — L89.153 STAGE III PRESSURE ULCER OF SACRAL REGION (HCC): ICD-10-CM

## 2022-04-13 DIAGNOSIS — L89.323 DECUBITUS ULCER OF ISCHIAL AREA, LEFT, STAGE III (HCC): Primary | Chronic | ICD-10-CM

## 2022-04-13 PROCEDURE — 88173 CYTOPATH EVAL FNA REPORT: CPT | Performed by: PATHOLOGY

## 2022-04-13 PROCEDURE — 88341 IMHCHEM/IMCYTCHM EA ADD ANTB: CPT | Performed by: PATHOLOGY

## 2022-04-13 PROCEDURE — 88342 IMHCHEM/IMCYTCHM 1ST ANTB: CPT | Performed by: PATHOLOGY

## 2022-04-13 PROCEDURE — 88305 TISSUE EXAM BY PATHOLOGIST: CPT | Performed by: PATHOLOGY

## 2022-04-13 PROCEDURE — 99212 OFFICE O/P EST SF 10 MIN: CPT | Performed by: PHYSICIAN ASSISTANT

## 2022-04-13 PROCEDURE — 88172 CYTP DX EVAL FNA 1ST EA SITE: CPT | Performed by: PATHOLOGY

## 2022-04-13 NOTE — PROGRESS NOTES
Progress Note - General Surgery   Evan Mccracken 66 y o  female MRN: [id number]  Encounter: [id number]    Assessment/Plan    Decubitus ulcer of ischial area, left, stage III ([facility] )  Wound open with mild purulence/cellulitis  Swabbed with betadine, rinsed with soap/water, then dressed with silvasorb gel and island dressing  Patient to  gentamicin ointment at pharmacy and start using it with nursing change Friday  VNA to see M/F, will plan to see in office Wednesdays  Stage III pressure ulcer of sacral region ([facility] )  Wound clean and open  Packing green stained  Swabbed with betadine  Cleansed with soap/water  Then silvasorb gel applied  4x4" gauze lightly packed and island dressing  VNA services to see Fridays and Mondays, we will see on Wednesdays  Will begin gentamicin with next change  Diagnoses and all orders for this visit:    Decubitus ulcer of ischial area, left, stage III ([facility] )    Stage III pressure ulcer of sacral region [facility])        Subjective       Chief Complaint   Patient presents with    Wound Check     Dressing change for ulcer of left ischial area       Patient came into office today for packing change of left buttock and right thigh  States that she is having pain at the thigh wound, but no complaints with the left buttock wound  No fever or chills  EWA Lezama 65 yo F here for wound check of sacral and ischial decubitus ulcers  Unable to  Rx for gentamicin until after 5 pm today  VNA services coming Fridays/Mondays  Reports ongoing pain to left thigh wound  Review of Systems    The following portions of the patient's history were reviewed and updated as appropriate: allergies, current medications, past family history, past medical history, past social history, past surgical history and problem list     Objective      Temperature 98 2 °F (36 8 °C), temperature source Temporal    Physical Exam  Vitals and nursing note reviewed     Constitutional:       General: She is not in acute distress  Appearance: She is well-developed  She is not diaphoretic  HENT:      Head: Normocephalic and atraumatic  Eyes:      Conjunctiva/sclera: Conjunctivae normal       Pupils: Pupils are equal, round, and reactive to light  Pulmonary:      Effort: No respiratory distress  Musculoskeletal:         General: Normal range of motion  Cervical back: Normal range of motion  Skin:     General: Skin is warm and dry  Capillary Refill: Capillary refill takes less than 2 seconds  Comments: Green staining to dressings x 2 noted  Neurological:      Mental Status: She is alert and oriented to person, place, and time     Psychiatric:         Behavior: Behavior normal          Signature:  Rene Nash PA-C  Date: 4/13/2022 Time: 1:10 PM

## 2022-04-13 NOTE — ASSESSMENT & PLAN NOTE
Wound clean and open  Packing green stained  Swabbed with betadine  Cleansed with soap/water  Then silvasorb gel applied  4x4" gauze lightly packed and island dressing  VNA services to see Fridays and Mondays, we will see on Wednesdays  Will begin gentamicin with next change

## 2022-04-13 NOTE — ASSESSMENT & PLAN NOTE
Wound open with mild purulence/cellulitis  Swabbed with betadine, rinsed with soap/water, then dressed with silvasorb gel and island dressing  Patient to  gentamicin ointment at pharmacy and start using it with nursing change Friday  VNA to see M/F, will plan to see in office Wednesdays

## 2022-04-15 LAB
BACTERIA WND AEROBE CULT: ABNORMAL
GRAM STN SPEC: ABNORMAL

## 2022-04-20 ENCOUNTER — HOSPITAL ENCOUNTER (OUTPATIENT)
Dept: CT IMAGING | Facility: HOSPITAL | Age: 72
Discharge: HOME | End: 2022-04-20
Attending: INTERNAL MEDICINE
Payer: COMMERCIAL

## 2022-04-20 ENCOUNTER — OFFICE VISIT (OUTPATIENT)
Dept: SURGERY | Facility: CLINIC | Age: 72
End: 2022-04-20
Payer: COMMERCIAL

## 2022-04-20 VITALS — TEMPERATURE: 98 F

## 2022-04-20 DIAGNOSIS — L89.323 DECUBITUS ULCER OF ISCHIAL AREA, LEFT, STAGE III (HCC): Primary | Chronic | ICD-10-CM

## 2022-04-20 DIAGNOSIS — C78.00 MALIGNANT NEOPLASM METASTATIC TO LUNG, UNSPECIFIED LATERALITY (HCC): ICD-10-CM

## 2022-04-20 DIAGNOSIS — L89.153 STAGE III PRESSURE ULCER OF SACRAL REGION (HCC): ICD-10-CM

## 2022-04-20 PROCEDURE — 70491 CT SOFT TISSUE NECK W/DYE: CPT

## 2022-04-20 PROCEDURE — 74177 CT ABD & PELVIS W/CONTRAST: CPT

## 2022-04-20 PROCEDURE — G1004 CDSM NDSC: HCPCS

## 2022-04-20 PROCEDURE — 99212 OFFICE O/P EST SF 10 MIN: CPT | Performed by: PHYSICIAN ASSISTANT

## 2022-04-20 PROCEDURE — 71260 CT THORAX DX C+: CPT

## 2022-04-20 RX ADMIN — IOHEXOL 100 ML: 350 INJECTION, SOLUTION INTRAVENOUS at 13:17

## 2022-04-20 NOTE — ASSESSMENT & PLAN NOTE
Wound is open with slough of the skin and subcutaneous tissue  This was sharply debrided with 15 blade scalpel and wound cleansed and packed with gentamicin impregnated 1 in strip gauze and bandage applied  Tolerated well, will see back in 1 week for follow-up

## 2022-04-20 NOTE — PROGRESS NOTES
Progress Note - General Surgery   Rina Min 66 y o  female MRN: [id number]  Encounter: [id number]    Assessment/Plan    Stage III pressure ulcer of sacral region [facility])  Wound is clean open and dry  It was swabbed with Betadine cleansed and repacked with 1 in strip gauze impregnated with gentamicin ointment and bandage applied  Will see back in 1 week for recheck  Decubitus ulcer of ischial area, left, stage III (HCC)  Wound is open with slough of the skin and subcutaneous tissue  This was sharply debrided with 15 blade scalpel and wound cleansed and packed with gentamicin impregnated 1 in strip gauze and bandage applied  Tolerated well, will see back in 1 week for follow-up  Diagnoses and all orders for this visit:    Decubitus ulcer of ischial area, left, stage III ([address] )    Stage III pressure ulcer of sacral region [facility])        Subjective       Chief Complaint   Patient presents with    Follow-up     packing change of left buttock and left thigh      Patient came into the office for packing change of left buttock and left thigh  States that the buttock area is doing well with no discharge, however is having greenish and fowl odor from the left thigh  No fever or chills  Natali EWA Avila 78-year-old female here for follow-up wound check for sacral wound and left ischial wound  Reports ongoing drainage from the left ischial wound  Has obtained the gentamicin ointment from the pharmacy and has been used by her visiting nurses  Undergoing workup of left neck mass with upcoming CT scan scheduled for today  Review of Systems    The following portions of the patient's history were reviewed and updated as appropriate: allergies, current medications, past family history, past medical history, past social history, past surgical history and problem list     Objective      Temperature 98 °F (36 7 °C), temperature source Temporal    Physical Exam  Vitals and nursing note reviewed     Constitutional: General: She is not in acute distress  Appearance: She is well-developed  She is not diaphoretic  HENT:      Head: Normocephalic and atraumatic  Eyes:      Conjunctiva/sclera: Conjunctivae normal       Pupils: Pupils are equal, round, and reactive to light  Neck:      Comments: Anterior left neck mass noted  Pulmonary:      Effort: No respiratory distress  Musculoskeletal:         General: Normal range of motion  Skin:     General: Skin is warm and dry  Capillary Refill: Capillary refill takes less than 2 seconds  Neurological:      Mental Status: She is alert and oriented to person, place, and time     Psychiatric:         Behavior: Behavior normal          Signature:  Rene Nash PA-C  Date: 4/20/2022 Time: 2:27 PM

## 2022-04-20 NOTE — ASSESSMENT & PLAN NOTE
Wound is clean open and dry  It was swabbed with Betadine cleansed and repacked with 1 in strip gauze impregnated with gentamicin ointment and bandage applied  Will see back in 1 week for recheck

## 2022-04-21 ENCOUNTER — DOCUMENTATION (OUTPATIENT)
Dept: HEMATOLOGY ONCOLOGY | Facility: CLINIC | Age: 72
End: 2022-04-21

## 2022-04-21 ENCOUNTER — TELEPHONE (OUTPATIENT)
Dept: HEMATOLOGY ONCOLOGY | Facility: CLINIC | Age: 72
End: 2022-04-21

## 2022-04-21 NOTE — PROGRESS NOTES
Received request from the [facility] for guidance on routing intake  Patient underwent FNA of lymph node with pathology positive for non-small cell carcinoma with unknown primary but possibly arising from GI or GYN origin  4/20/22 CT chest, abdomen and pelvis demonstrated a heterogeneous enhancement of endometrial mass which is slowly increasing in size and is highly suspicious for endometrial neoplasm  Imaging also showed Interval development of retroperitoneal/pelvic lymphadenopathy and posterior mediastinal lymphadenopathy suspicious for metastatic donavan tumor  Also, iInterval development of a noncalcified right lower lobe pulmonary parenchymal nodule, 5 x 4 mm in size and indeterminant suspicious for developing pulmonary metastatic disease    Do to the above findings, suggested referral to GYN Onc

## 2022-04-21 NOTE — TELEPHONE ENCOUNTER
Soft Intake Form   Patient Details   Annie Schultz     1950     Reason For Appointment   Who is Calling? Patient   If not patient, Name?  n/a   DID YOU CONFIRM INSURANCE WITH PATIENT? Yes    Who is the Referring Doctor? Shubham Stephen   What is the diagnosis? Malignant neoplasm metastatic to lung, unspecified laterality    Has this diagnosis been confirmed by a biopsy/surgery? If yes, what is the date it was done? Yes,4/13/22   Biopsy done at [place] [address]? If not, where was it done? yes   Was imaging done, and was it done at [facility]? If not, where was it done? Yes, [facility]    Have you been seen by another Oncologist?  If so, who and where (name of facility, city and state) no   For 2nd Opinions Only: Are you currently undergoing treatment, or are you scheduled to start treatment? If yes, name of facility, city and state  n/a   For "History Of" only: Have you completed treatment? n/a    Have you had Genetic Testing done in the past?  If so, advise to bring test results to their visit no   Record Gathering Information   Did you advise to have records faxed to [phone]? no   Did you advise to have disks sent to the proper address with imaging? ("History of" Patients)  5 years of imaging for breast patients-Mammos, US etc no   Scheduling Information   Did you send new patient paperwork? Email or mail? n/a   What is the best call back number?    (If the [facility] is calling, please use their number) [phone]   Miscellaneous Information    Scheduled appointment 5/12/22 11:20 am  Patient request [address] only   CT Scan  Completed 4/20/22

## 2022-04-22 ENCOUNTER — TELEPHONE (OUTPATIENT)
Dept: GYNECOLOGIC ONCOLOGY | Facility: CLINIC | Age: 72
End: 2022-04-22

## 2022-04-22 ENCOUNTER — TELEPHONE (OUTPATIENT)
Dept: HEMATOLOGY ONCOLOGY | Facility: CLINIC | Age: 72
End: 2022-04-22

## 2022-04-22 ENCOUNTER — DOCUMENTATION (OUTPATIENT)
Dept: HEMATOLOGY ONCOLOGY | Facility: CLINIC | Age: 72
End: 2022-04-22

## 2022-04-22 NOTE — TELEPHONE ENCOUNTER
Appointment Confirmation (to confirm pre existing appointments - ONLY)     Appointment with Dr Moni Diaz   Appointment date & time 4/26 @ 245pm   Location [place]   Patient verbilized Understanding yes

## 2022-04-22 NOTE — TELEPHONE ENCOUNTER
Intake received- chart reviewed 4/22/22    Outside records needed? yes    Pathology complete? 4/13/22 [facility]    Imaging complete? CT c/a/p 4/20/22 [facility]    Clinical trials aware? Included on original intake    Patient previously had imaging completed at [facility]  Reports are available from [facility] Everywhere  I called [phone] to have imaging uploaded via [employer] into our system

## 2022-04-22 NOTE — TELEPHONE ENCOUNTER
Call placed to patient to discuss recent CT findings/pathology  Initially, patient scheduled with med-onc  After further review, gyn-onc consultation is more appropriate  Discussed with patient  She agrees to gyn-onc consult  Patient is scheduled Tuesday, 4/26/22

## 2022-04-22 NOTE — PROGRESS NOTES
Intake received- chart reviewed 4/22/22    Outside records needed? yes    Pathology complete? 4/13/22 [facility]    Imaging complete? CT c/a/p 4/20/22 [facility]    Clinical trials aware? Included on original intake    Patient previously had imaging completed at [facility]  Reports are available from Care Everywhere  I called [phone] to have imaging uploaded via [employer] into our system

## 2022-04-25 ENCOUNTER — DOCUMENTATION (OUTPATIENT)
Dept: HEMATOLOGY ONCOLOGY | Facility: CLINIC | Age: 72
End: 2022-04-25

## 2022-04-25 NOTE — TELEPHONE ENCOUNTER
Kennyind received 4/25/22    received from: [facility] [place] uploaded direct via [employer]    Confirmed in PACs

## 2022-04-25 NOTE — PROGRESS NOTES
Kennyind received 4/25/22    received from: [facility] uploaded direct via [employer]    Confirmed in PACs

## 2022-04-26 ENCOUNTER — CONSULT (OUTPATIENT)
Dept: GYNECOLOGIC ONCOLOGY | Facility: CLINIC | Age: 72
End: 2022-04-26
Payer: COMMERCIAL

## 2022-04-26 VITALS
WEIGHT: 150 LBS | SYSTOLIC BLOOD PRESSURE: 136 MMHG | TEMPERATURE: 97 F | HEART RATE: 83 BPM | HEIGHT: 61 IN | DIASTOLIC BLOOD PRESSURE: 70 MMHG | OXYGEN SATURATION: 96 % | RESPIRATION RATE: 16 BRPM | BODY MASS INDEX: 28.32 KG/M2

## 2022-04-26 DIAGNOSIS — L89.153 STAGE III PRESSURE ULCER OF SACRAL REGION (HCC): ICD-10-CM

## 2022-04-26 DIAGNOSIS — C54.1 ENDOMETRIAL CANCER (HCC): Primary | ICD-10-CM

## 2022-04-26 PROCEDURE — 99205 OFFICE O/P NEW HI 60 MIN: CPT | Performed by: OBSTETRICS & GYNECOLOGY

## 2022-04-26 PROCEDURE — 88305 TISSUE EXAM BY PATHOLOGIST: CPT | Performed by: PATHOLOGY

## 2022-04-26 PROCEDURE — 88342 IMHCHEM/IMCYTCHM 1ST ANTB: CPT | Performed by: PATHOLOGY

## 2022-04-26 PROCEDURE — 88341 IMHCHEM/IMCYTCHM EA ADD ANTB: CPT | Performed by: PATHOLOGY

## 2022-04-26 PROCEDURE — 88361 TUMOR IMMUNOHISTOCHEM/COMPUT: CPT | Performed by: PATHOLOGY

## 2022-04-26 NOTE — LETTER
April 27, 2022     Kaci Quan MD  [address] [place] [id number]    Patient: Mayda Navas   YOB: 1950   Date of Visit: 4/26/2022       Dear Dr Shakila Kingsley: Thank you for referring Mayda Navas to me for evaluation  Below are my notes for this consultation  If you have questions, please do not hesitate to call me  I look forward to following your patient along with you  Sincerely,        Qian Mcgowan MD        CC: MD Qian Guerrero MD  4/27/2022  8:06 AM  Sign when Signing Visit  Assessment/Plan:    Problem List Items Addressed This Visit        Genitourinary    Endometrial cancer [facility]) - Primary     59-year-old with 2 stage III pressure ulcers who ambulates with the assistance of a walker, left supraclavicular lymph node biopsy with gynecologic malignancy based on IHC  There is also disease in mediastinal lymph nodes, retroperitoneal lymph nodes including pelvic and periaortic and a new right lower lobe pulmonary nodule  I reviewed her CT of the chest abdomen pelvis from April of 2022 as well as from July of 2021, CBC, CMP  Her imaging and clinical examination are suggestive of primary endometrial cancer, stage IV B  Biopsy of the cervix was obtained in the office  She has asymmetrical left lower extremity swelling which is suggestive of DVT  Her performance status is 3   1  Doppler ultrasound to evaluate for DVT   2  Will follow-up results of cervical biopsy and send specimen for genomic testing  3  I discussed treatment options for metastatic endometrial cancer  She understands that treatment is palliative and that systemic therapy is indicated based on the extent of her disease  4   I discussed the risks and benefits of palliative chemotherapy with carboplatin at AUC 5 and Taxol 135 milligrams/meter squared to be given every 21 days for at least 3 cycles followed by repeat CT imaging to assess disease response  Disease response can also be assessed clinically by evaluating her left supraclavicular lymph nodes  She understands the risks and benefits of chemotherapy and was provided with written information regarding potential side effects of treatment  She also understands the additional risk of infectious complication due to her known decubitus ulcerations and healing wounds  She understands that wound healing will be impaired by chemotherapy  5  If she has a good response to chemotherapy, hysterectomy can be performed  She also understands the hysterectomy may be necessary in the event that she develops significant bleeding requiring admission or blood transfusion  Thank you for the courtesy of this consultation  All questions were answered by the end of the visit           Relevant Orders    Tissue Exam    VAS lower limb venous duplex study, complete bilateral    Ambulatory referral to Interventional Radiology    CBC and differential    Comprehensive metabolic panel    Magnesium    Infusion Calculated Appointment Request    Infusion Calculated Appointment Request    Infusion Calculated Appointment Request    Infusion Calculated Appointment Request    Lesion Biopsy       Other    Stage III pressure ulcer of sacral region [facility])              CHIEF COMPLAINT:  Biopsy-proven gynecologic malignancy          Problem:  Cancer Staging  Endometrial cancer [facility])  Staging form: Corpus Uteri - Carcinoma, AJCC 8th Edition  - Clinical: FIGO Stage IVB (cT2, cN2a, cM1) - Signed by Paulina Olson MD on 4/26/2022      Previous therapy:  Oncology History   Endometrial cancer ([facility] [address] )   4/26/2022 Initial Diagnosis    Endometrial cancer ([facility] [address] )     4/26/2022 -  Cancer Staged    Staging form: Corpus Uteri - Carcinoma, AJCC 8th Edition  - Clinical: FIGO Stage IVB (cT2, cN2a, cM1) - Signed by Paulina Olson MD on 4/26/2022  Histologic grade (G): GX  Histologic grading system: 3 grade system       5/10/2022 -  Chemotherapy palonosetron (ALOXI), 0 25 mg, Intravenous, Once, 0 of 4 cycles  fosaprepitant (EMEND) IVPB, 150 mg, Intravenous, Once, 0 of 4 cycles  CARBOplatin (PARAPLATIN) IVPB (GOG AUC DOSING), , Intravenous, Once, 0 of 4 cycles  PACLItaxel (TAXOL) chemo IVPB, 135 mg/m2 = 225 6 mg (77 1 % of original dose 175 mg/m2), Intravenous, Once, 0 of 4 cycles  Dose modification: 135 mg/m2 (original dose 175 mg/m2, Cycle 1, Reason: Dose modified as per discussion with consulting physician)           Patient ID: Uriel Joe is a 66 y o  female  71-year-old with a history of CVA and 2016 maintained on Plavix, hepatitis-C, rheumatoid arthritis who had a CT scan on 7/30/2021  I reviewed the images  This revealed a thickened endometrium measuring 2 8 cm  There was no lymphadenopathy identified on that CT scan  She then saw gynecology at [facility] in September of 2021  Pelvic ultrasound was performed which revealed thickened endometrium with polypoid structures  She was counseled for endometrial biopsy/D&C  She presented for endometrial biopsy in January of 2022 but due to bleeding, the biopsy was canceled and she was referred to Gynecologic Oncology who saw her on 1/14/2022  She was counseled for robotic assisted total laparoscopic hysterectomy and bilateral salpingo-oophorectomy with preoperative Neurology and medical clearance is  She was then admitted to [facility] from a January 22nd to 1/25/2022 with a stage III sacral decubitus ulcer with overlying infection  She underwent wound debridement and VAC placement intraoperatively on 1/24/2022  CT at that time did not show any evidence of osteomyelitis  She completed her course of antibiotics and is continuing on outpatient wound care for 2 ulcerations, 1 at the left ischium the other at the sacrum  She followed up with her primary care physician on 3/21/2022 for swollen lymph nodes in her neck  She was treated with antibiotics and referred to ENT who saw her on 4/13/2022    FNA of the left supraclavicular lymphadenopathy was performed at that visit  Final pathology of the FNA revealed non-small cell carcinoma which was CK7, PAX8 positive and CK 20, GATA3, TTF1, WT1 negative  This is suggestive of gynecologic primary  Follow-up CT scan of the chest abdomen pelvis on 4/20/2022 revealed extensive retroperitoneal, mediastinal lymphadenopathy with a 6 mm right lower lobe pulmonary nodule  The uterus is completely replaced by mass  I reviewed these images as well  She has been experiencing postmenopausal bleeding since December of 2021 and has been managing her Plavix by taking it every other day in order to mitigate vaginal bleeding  She ambulates with the assistance of a walker  She is able to perform activities of daily living at home  She has some family support  She is referred as a consultation by Dr Piper Gracia to discuss treatment options for her biopsy-proven gynecologic malignancy  Review of Systems   Constitutional: Positive for activity change, appetite change, fatigue and unexpected weight change  HENT: Negative  Cervical lymphadenopathy   Eyes: Negative  Respiratory: Negative  Cardiovascular: Positive for leg swelling  Gastrointestinal: Negative for abdominal distention and abdominal pain  Endocrine: Negative  Genitourinary: Positive for pelvic pain and vaginal bleeding  Musculoskeletal: Positive for arthralgias, back pain and gait problem  Skin: Negative  Allergic/Immunologic: Negative  Neurological: Positive for weakness  Hematological: Positive for adenopathy  Psychiatric/Behavioral: Negative          Current Outpatient Medications   Medication Sig Dispense Refill    artificial tear (LUBRIFRESH P M ) 83-15 % ophthalmic ointment 1 application daily      atorvastatin (LIPITOR) 40 mg tablet Take 40 mg by mouth daily at bedtime  0    azelastine (ASTELIN) 0 1 % nasal spray 1 spray into each nostril 2 (two) times a day 30 mL 11    clopidogrel (PLAVIX) 75 mg tablet   0    famotidine (PEPCID) 20 mg tablet famotidine 20 mg tablet   Take by oral route for 30 days   furosemide (LASIX) 20 mg tablet furosemide 20 mg tablet   Take 1 tablet twice a day by oral route   gabapentin (NEURONTIN) 600 MG tablet Take 600 mg by mouth 3 (three) times a day  0    gentamicin (GARAMYCIN) 0 1 % ointment Apply topically 2 (two) times a day Apply sparingly to gauze dressing, then place gauze into sacral wound and secure with Medipore tape   30 g 1    lisinopril-hydrochlorothiazide (PRINZIDE,ZESTORETIC) 10-12 5 MG per tablet take 2 tablets by mouth once daily      morphine (MS CONTIN) 30 mg 12 hr tablet Take 30 mg by mouth every 12 (twelve) hours      nystatin (MYCOSTATIN) powder Apply topically 3 (three) times a day 15 g 0    omeprazole (PriLOSEC) 20 mg delayed release capsule omeprazole 20 mg capsule,delayed release   take 1 capsule by mouth twice a day before meals      oxyCODONE (ROXICODONE) 10 MG TABS       pantoprazole (PROTONIX) 40 mg tablet   0    TiZANidine (ZANAFLEX) 2 MG capsule   0    traZODone (DESYREL) 50 mg tablet   0    White Petrolatum-Mineral Oil (Soothe Night Time) OINT Apply 1 application to eye daily at bedtime        zinc gluconate 50 mg tablet Take 50 mg by mouth daily        fluticasone (FLONASE) 50 mcg/act nasal spray 2 sprays into each nostril daily (Patient taking differently: 2 sprays into each nostril daily When needed ) 16 g 11    Melatonin 5 MG TABS Take 5 mg by mouth   (Patient not taking: Reported on 1/26/2022 )      minoxidil (LONITEN) 2 5 mg tablet Take 1 25 mg by mouth daily (Patient not taking: Reported on 3/9/2022 )      morphine (MS CONTIN) 15 mg 12 hr tablet Take 15 mg by mouth 2 (two) times a day (Patient not taking: Reported on 3/31/2022 )  0    oxyCODONE (OXY-IR) 5 MG capsule Take 5 mg by mouth 2 (two) times a day (Patient not taking: Reported on 2/9/2022 )      oxyCODONE-acetaminophen (PERCOCET) 7 5-325 MG per tablet oxycodone-acetaminophen 7 5 mg-325 mg tablet   1 tab Q8h prn (Patient not taking: Reported on 2022)       No current facility-administered medications for this visit  No Known Allergies    Past Medical History:   Diagnosis Date    Anxiety     Arthritis     Bernard's esophagus     Bleeds easily ([facility] [address] )     [facility]    Fibromyalgia     GERD (gastroesophageal reflux disease)     Hypercholesterolemia     Hypertension     Insomnia     [facility] arthritis ([facility] )     Stroke ([facility] )        Past Surgical History:   Procedure Laterality Date    APPENDECTOMY      BREAST BIOPSY Bilateral     benign    CARPAL TUNNEL RELEASE Left 10/27/2003    DORSAL COMPARTMENT RELEASE Left 2006    FINGER SURGERY      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    KNEE ARTHROSCOPY Right     x3(,,)    OTHER SURGICAL HISTORY Left 2006    tennis elbow release    GA DEBRIDEMENT, SKIN, SUB-Q TISSUE,=<20 SQ CM N/A 2022    Procedure: SACRAL DEBRIDEMENT WOUND AND DRESSING CHANGE ([address] OUT);   Surgeon: Aide Prather MD;  Location: [facility];  Service: General    REPLACEMENT TOTAL KNEE BILATERAL      rt-, lt-1/13/10    ROTATOR CUFF REPAIR Bilateral     SHOULDER ARTHROSCOPY Right 02/10/2016    SHOULDER ARTHROSCOPY Left     08,    SHOULDER ARTHROSCOPY Left 2006    TONSILLECTOMY AND ADENOIDECTOMY      TRIGGER FINGER RELEASE Left 2002    middle and ring finger    ULNAR NERVE TRANSPOSITION Left        OB History        2    Para   2    Term   2            AB        Living           SAB        IAB        Ectopic        Multiple        Live Births                     Family History   Problem Relation Age of Onset    Arthritis Mother     Lung cancer Father     Brain cancer Father     No Known Problems Daughter     No Known Problems Maternal Grandmother     No Known Problems Maternal Grandfather     No Known Problems Paternal Grandmother     No Known Problems Paternal Grandfather     No Known Problems Daughter     No Known Problems Maternal Aunt     No Known Problems Paternal Aunt     No Known Problems Paternal Aunt     No Known Problems Paternal Aunt        The following portions of the patient's history were reviewed and updated as appropriate: allergies, current medications, past family history, past medical history, past social history, past surgical history and problem list       Objective:    Blood pressure 136/70, pulse 83, temperature (!) 97 °F (36 1 °C), temperature source Temporal, resp  rate 16, height 5' 1" (1 549 m), weight 68 kg (150 lb), SpO2 96 %  Body mass index is 28 34 kg/m²  Physical Exam  Constitutional:       General: She is not in acute distress  Appearance: She is well-developed  She is not ill-appearing, toxic-appearing or diaphoretic  Comments: Thin, frail-appearing   HENT:      Head: Normocephalic and atraumatic  Eyes:      General: No scleral icterus  Right eye: No discharge  Left eye: No discharge  Extraocular Movements: Extraocular movements intact  Conjunctiva/sclera: Conjunctivae normal    Neck:      Thyroid: No thyromegaly  Comments: 10 cm conglomeration of left supraclavicular lymph nodes  Cardiovascular:      Rate and Rhythm: Normal rate and regular rhythm  Heart sounds: Normal heart sounds  Pulmonary:      Effort: Pulmonary effort is normal       Breath sounds: Normal breath sounds  Abdominal:      General: Abdomen is flat  There is no distension  Palpations: Abdomen is soft  There is no mass  Tenderness: There is no abdominal tenderness  There is no guarding or rebound  Hernia: No hernia is present  Genitourinary:     Comments: The sacrum was inspected 1st   There is evidence of wound healing present  There is scarring present  There is a small residual defect at the sacrum    No evidence of erythema induration or purulent discharge  The left ischial wound was then inspected  There was a small amount of packing in the wound  There was exudative discharge present  The external female genitalia are within normal limits  There are no masses, lesions identified  The urethra is grossly normal   Speculum examination reveals a grossly normal vagina with atrophy  There is a small amount of blood present in the posterior fornix  The cervix is grossly abnormal with a polypoid soft tissue mass protruding through the cervical os which is dilated  The mass at the os measures approximately 3 cm in diameter  Bimanual examination reveals a mobile uterus measuring approximately 8 weeks in size  There are no palpable adnexal masses  No evidence of parametrial disease  Musculoskeletal:         General: Swelling and tenderness present  No deformity or signs of injury  Cervical back: Normal range of motion and neck supple  Right lower leg: Edema present  Left lower leg: Edema present  Comments: The left lower extremity is markedly edematous compared to the right  There is 4+ edema on the left  There was also erythema overlying the left lower extremity  Lymphadenopathy:      Cervical: Cervical adenopathy present  Skin:     General: Skin is warm and dry  Coloration: Skin is pale  Skin is not jaundiced  Findings: Erythema present  No bruising or rash  Neurological:      General: No focal deficit present  Mental Status: She is alert and oriented to person, place, and time  Cranial Nerves: No cranial nerve deficit  Motor: Weakness present  Gait: Gait abnormal    Psychiatric:         Mood and Affect: Mood normal          Behavior: Behavior normal          Thought Content:  Thought content normal          Judgment: Judgment normal              No results found for:   Lab Results   Component Value Date    WBC 8 40 04/02/2022    HGB 10 7 (L) 04/02/2022    HCT 36 3 04/02/2022    HCT 35 0 04/02/2022    MCV 96 04/02/2022    PLT  04/02/2022      Comment:      Not reported due to platelet clumping  Lab Results   Component Value Date    K 5 2 04/02/2022    CL 98 (L) 04/02/2022    CO2 35 (H) 04/02/2022    BUN 31 (H) 04/02/2022    CREATININE 0 94 04/02/2022    GLUF 105 (H) 02/12/2022    CALCIUM 10 6 (H) 04/02/2022    CORRECTEDCA 11 5 (H) 04/02/2022    AST 25 04/02/2022    ALT 25 04/02/2022    ALKPHOS 86 04/02/2022    EGFR 60 04/02/2022     Lesion Biopsy    Date/Time: 4/27/2022 8:05 AM  Performed by: Claudeen Gust, MD  Authorized by: Claudeen Gust, MD   Universal Protocol:  Consent: Verbal consent obtained  Consent given by: patient  Patient understanding: patient states understanding of the procedure being performed  Patient identity confirmed: verbally with patient      Procedure Details - Skin Biopsy:     Biopsy tissue type: mucous membrane    Biopsy method: punch biopsy      Body area: Anogenital    Anogenital location: Cervix  Vaginal Lesion: No      Initial size (mm):  5    Final defect size (mm):  5    Malignancy: malignancy unknown       The 3 cm cervical mass was biopsied using a tischler biopsy forcep  Hemostasis was achieved using Monsel's solution and pressure  She tolerated the biopsy well  No complications

## 2022-04-26 NOTE — ASSESSMENT & PLAN NOTE
69-year-old with 2 stage III pressure ulcers who ambulates with the assistance of a walker, left supraclavicular lymph node biopsy with gynecologic malignancy based on IHC  There is also disease in mediastinal lymph nodes, retroperitoneal lymph nodes including pelvic and periaortic and a new right lower lobe pulmonary nodule  I reviewed her CT of the chest abdomen pelvis from April of 2022 as well as from July of 2021, CBC, CMP  Her imaging and clinical examination are suggestive of primary endometrial cancer, stage IV B  Biopsy of the cervix was obtained in the office  She has asymmetrical left lower extremity swelling which is suggestive of DVT  Her performance status is 3   1  Doppler ultrasound to evaluate for DVT   2  Will follow-up results of cervical biopsy and send specimen for genomic testing  3  I discussed treatment options for metastatic endometrial cancer  She understands that treatment is palliative and that systemic therapy is indicated based on the extent of her disease  4   I discussed the risks and benefits of palliative chemotherapy with carboplatin at AUC 5 and Taxol 135 milligrams/meter squared to be given every 21 days for at least 3 cycles followed by repeat CT imaging to assess disease response  Disease response can also be assessed clinically by evaluating her left supraclavicular lymph nodes  She understands the risks and benefits of chemotherapy and was provided with written information regarding potential side effects of treatment  She also understands the additional risk of infectious complication due to her known decubitus ulcerations and healing wounds  She understands that wound healing will be impaired by chemotherapy  5  If she has a good response to chemotherapy, hysterectomy can be performed  She also understands the hysterectomy may be necessary in the event that she develops significant bleeding requiring admission or blood transfusion    Thank you for the courtesy of this consultation  All questions were answered by the end of the visit

## 2022-04-27 NOTE — PROGRESS NOTES
Assessment/Plan:    Problem List Items Addressed This Visit        Genitourinary    Endometrial cancer ([facility] [address] ) - Primary     65-year-old with 2 stage III pressure ulcers who ambulates with the assistance of a walker, left supraclavicular lymph node biopsy with gynecologic malignancy based on IHC  There is also disease in mediastinal lymph nodes, retroperitoneal lymph nodes including pelvic and periaortic and a new right lower lobe pulmonary nodule  I reviewed her CT of the chest abdomen pelvis from April of 2022 as well as from July of 2021, CBC, CMP  Her imaging and clinical examination are suggestive of primary endometrial cancer, stage IV B  Biopsy of the cervix was obtained in the office  She has asymmetrical left lower extremity swelling which is suggestive of DVT  Her performance status is 3   1  Doppler ultrasound to evaluate for DVT   2  Will follow-up results of cervical biopsy and send specimen for genomic testing  3  I discussed treatment options for metastatic endometrial cancer  She understands that treatment is palliative and that systemic therapy is indicated based on the extent of her disease  4   I discussed the risks and benefits of palliative chemotherapy with carboplatin at AUC 5 and Taxol 135 milligrams/meter squared to be given every 21 days for at least 3 cycles followed by repeat CT imaging to assess disease response  Disease response can also be assessed clinically by evaluating her left supraclavicular lymph nodes  She understands the risks and benefits of chemotherapy and was provided with written information regarding potential side effects of treatment  She also understands the additional risk of infectious complication due to her known decubitus ulcerations and healing wounds  She understands that wound healing will be impaired by chemotherapy  5  If she has a good response to chemotherapy, hysterectomy can be performed    She also understands the hysterectomy may be necessary in the event that she develops significant bleeding requiring admission or blood transfusion  Thank you for the courtesy of this consultation  All questions were answered by the end of the visit           Relevant Orders    Tissue Exam    VAS lower limb venous duplex study, complete bilateral    Ambulatory referral to Interventional Radiology    CBC and differential    Comprehensive metabolic panel    Magnesium    Infusion Calculated Appointment Request    Infusion Calculated Appointment Request    Infusion Calculated Appointment Request    Infusion Calculated Appointment Request    Lesion Biopsy       Other    Stage III pressure ulcer of sacral region ([place] [address] )              CHIEF COMPLAINT:  Biopsy-proven gynecologic malignancy          Problem:  Cancer Staging  Endometrial cancer ([place] [address] )  Staging form: Corpus Uteri - Carcinoma, AJCC 8th Edition  - Clinical: FIGO Stage IVB (cT2, cN2a, cM1) - Signed by Leora Kendall MD on 4/26/2022      Previous therapy:  Oncology History   Endometrial cancer ([place] [address] )   4/26/2022 Initial Diagnosis    Endometrial cancer ([place] [address] )     4/26/2022 -  Cancer Staged    Staging form: Corpus Uteri - Carcinoma, AJCC 8th Edition  - Clinical: FIGO Stage IVB (cT2, cN2a, cM1) - Signed by Leora Kendall MD on 4/26/2022  Histologic grade (G): GX  Histologic grading system: 3 grade system       5/10/2022 -  Chemotherapy    palonosetron (ALOXI), 0 25 mg, Intravenous, Once, 0 of 4 cycles  fosaprepitant (EMEND) IVPB, 150 mg, Intravenous, Once, 0 of 4 cycles  CARBOplatin (PARAPLATIN) IVPB (GOG AUC DOSING), , Intravenous, Once, 0 of 4 cycles  PACLItaxel (TAXOL) chemo IVPB, 135 mg/m2 = 225 6 mg (77 1 % of original dose 175 mg/m2), Intravenous, Once, 0 of 4 cycles  Dose modification: 135 mg/m2 (original dose 175 mg/m2, Cycle 1, Reason: Dose modified as per discussion with consulting physician)           Patient ID: Evan Mccracken is a 66 y o  female  69-year-old with a history of CVA and 2016 maintained on Plavix, hepatitis-C, rheumatoid arthritis who had a CT scan on 7/30/2021  I reviewed the images  This revealed a thickened endometrium measuring 2 8 cm  There was no lymphadenopathy identified on that CT scan  She then saw gynecology at [facility] in September of 2021  Pelvic ultrasound was performed which revealed thickened endometrium with polypoid structures  She was counseled for endometrial biopsy/D&C  She presented for endometrial biopsy in January of 2022 but due to bleeding, the biopsy was canceled and she was referred to Gynecologic Oncology who saw her on 1/14/2022  She was counseled for robotic assisted total laparoscopic hysterectomy and bilateral salpingo-oophorectomy with preoperative Neurology and medical clearance is  She was then admitted to [facility] from a January 22nd to 1/25/2022 with a stage III sacral decubitus ulcer with overlying infection  She underwent wound debridement and VAC placement intraoperatively on 1/24/2022  CT at that time did not show any evidence of osteomyelitis  She completed her course of antibiotics and is continuing on outpatient wound care for 2 ulcerations, 1 at the left ischium the other at the sacrum  She followed up with her primary care physician on 3/21/2022 for swollen lymph nodes in her neck  She was treated with antibiotics and referred to ENT who saw her on 4/13/2022  FNA of the left supraclavicular lymphadenopathy was performed at that visit  Final pathology of the FNA revealed non-small cell carcinoma which was CK7, PAX8 positive and CK 20, GATA3, TTF1, WT1 negative  This is suggestive of gynecologic primary  Follow-up CT scan of the chest abdomen pelvis on 4/20/2022 revealed extensive retroperitoneal, mediastinal lymphadenopathy with a 6 mm right lower lobe pulmonary nodule  The uterus is completely replaced by mass  I reviewed these images as well    She has been experiencing postmenopausal bleeding since December of 2021 and has been managing her Plavix by taking it every other day in order to mitigate vaginal bleeding  She ambulates with the assistance of a walker  She is able to perform activities of daily living at home  She has some family support  She is referred as a consultation by Dr Berta Calvillo to discuss treatment options for her biopsy-proven gynecologic malignancy  Review of Systems   Constitutional: Positive for activity change, appetite change, fatigue and unexpected weight change  HENT: Negative  Cervical lymphadenopathy   Eyes: Negative  Respiratory: Negative  Cardiovascular: Positive for leg swelling  Gastrointestinal: Negative for abdominal distention and abdominal pain  Endocrine: Negative  Genitourinary: Positive for pelvic pain and vaginal bleeding  Musculoskeletal: Positive for arthralgias, back pain and gait problem  Skin: Negative  Allergic/Immunologic: Negative  Neurological: Positive for weakness  Hematological: Positive for adenopathy  Psychiatric/Behavioral: Negative  Current Outpatient Medications   Medication Sig Dispense Refill    artificial tear (LUBRIFRESH P M ) 83-15 % ophthalmic ointment 1 application daily      atorvastatin (LIPITOR) 40 mg tablet Take 40 mg by mouth daily at bedtime  0    azelastine (ASTELIN) 0 1 % nasal spray 1 spray into each nostril 2 (two) times a day 30 mL 11    clopidogrel (PLAVIX) 75 mg tablet   0    famotidine (PEPCID) 20 mg tablet famotidine 20 mg tablet   Take by oral route for 30 days   furosemide (LASIX) 20 mg tablet furosemide 20 mg tablet   Take 1 tablet twice a day by oral route   gabapentin (NEURONTIN) 600 MG tablet Take 600 mg by mouth 3 (three) times a day  0    gentamicin (GARAMYCIN) 0 1 % ointment Apply topically 2 (two) times a day Apply sparingly to gauze dressing, then place gauze into sacral wound and secure with Medipore tape   30 g 1    lisinopril-hydrochlorothiazide (PRINZIDE,ZESTORETIC) 10-12 5 MG per tablet take 2 tablets by mouth once daily      morphine (MS CONTIN) 30 mg 12 hr tablet Take 30 mg by mouth every 12 (twelve) hours      nystatin (MYCOSTATIN) powder Apply topically 3 (three) times a day 15 g 0    omeprazole (PriLOSEC) 20 mg delayed release capsule omeprazole 20 mg capsule,delayed release   take 1 capsule by mouth twice a day before meals      oxyCODONE (ROXICODONE) 10 MG TABS       pantoprazole (PROTONIX) 40 mg tablet   0    TiZANidine (ZANAFLEX) 2 MG capsule   0    traZODone (DESYREL) 50 mg tablet   0    White Petrolatum-Mineral Oil (Soothe Night Time) OINT Apply 1 application to eye daily at bedtime        zinc gluconate 50 mg tablet Take 50 mg by mouth daily        fluticasone (FLONASE) 50 mcg/act nasal spray 2 sprays into each nostril daily (Patient taking differently: 2 sprays into each nostril daily When needed ) 16 g 11    Melatonin 5 MG TABS Take 5 mg by mouth   (Patient not taking: Reported on 1/26/2022 )      minoxidil (LONITEN) 2 5 mg tablet Take 1 25 mg by mouth daily (Patient not taking: Reported on 3/9/2022 )      morphine (MS CONTIN) 15 mg 12 hr tablet Take 15 mg by mouth 2 (two) times a day (Patient not taking: Reported on 3/31/2022 )  0    oxyCODONE (OXY-IR) 5 MG capsule Take 5 mg by mouth 2 (two) times a day (Patient not taking: Reported on 2/9/2022 )      oxyCODONE-acetaminophen (PERCOCET) 7 5-325 MG per tablet oxycodone-acetaminophen 7 5 mg-325 mg tablet   1 tab Q8h prn (Patient not taking: Reported on 1/26/2022)       No current facility-administered medications for this visit         No Known Allergies    Past Medical History:   Diagnosis Date    Anxiety     Arthritis     Bernard's esophagus     Bleeds easily ([facility] [address] )     CVA (cerebral vascular accident) ([facility] [address] ) 10/2016    Fibromyalgia     GERD (gastroesophageal reflux disease)     Hypercholesterolemia     Hypertension     Insomnia     Rheumatoid arthritis ([facility] [address] )     [facility] Past Surgical History:   Procedure Laterality Date    APPENDECTOMY      BREAST BIOPSY Bilateral     benign    CARPAL TUNNEL RELEASE Left 10/27/2003    DORSAL COMPARTMENT RELEASE Left 2006    FINGER SURGERY      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    KNEE ARTHROSCOPY Right     x3(,,)    OTHER SURGICAL HISTORY Left 2006    tennis elbow release    MO DEBRIDEMENT, SKIN, SUB-Q TISSUE,=<20 SQ CM N/A 2022    Procedure: SACRAL DEBRIDEMENT WOUND AND DRESSING CHANGE ([address]);   Surgeon: Geneva Marin MD;  Location: [facility];  Service: General    REPLACEMENT TOTAL KNEE BILATERAL      rt-, lt-1/13/10    ROTATOR CUFF REPAIR Bilateral     SHOULDER ARTHROSCOPY Right 02/10/2016    SHOULDER ARTHROSCOPY Left     08,    SHOULDER ARTHROSCOPY Left 2006    TONSILLECTOMY AND ADENOIDECTOMY      TRIGGER FINGER RELEASE Left 2002    middle and ring finger    ULNAR NERVE TRANSPOSITION Left        OB History        2    Para   2    Term   2            AB        Living           SAB        IAB        Ectopic        Multiple        Live Births                     Family History   Problem Relation Age of Onset    Arthritis Mother     Lung cancer Father     Brain cancer Father     No Known Problems Daughter     No Known Problems Maternal Grandmother     No Known Problems Maternal Grandfather     No Known Problems Paternal Grandmother     No Known Problems Paternal Grandfather     No Known Problems Daughter     No Known Problems Maternal Aunt     No Known Problems Paternal Aunt     No Known Problems Paternal Aunt     No Known Problems Paternal Aunt        The following portions of the patient's history were reviewed and updated as appropriate: allergies, current medications, past family history, past medical history, past social history, past surgical history and problem list       Objective:    Blood pressure 136/70, pulse 83, temperature (!) 97 °F (36 1 °C), temperature source Temporal, resp  rate 16, height 5' 1" (1 549 m), weight 68 kg (150 lb), SpO2 96 %  Body mass index is 28 34 kg/m²  Physical Exam  Constitutional:       General: She is not in acute distress  Appearance: She is well-developed  She is not ill-appearing, toxic-appearing or diaphoretic  Comments: Thin, frail-appearing   HENT:      Head: Normocephalic and atraumatic  Eyes:      General: No scleral icterus  Right eye: No discharge  Left eye: No discharge  Extraocular Movements: Extraocular movements intact  Conjunctiva/sclera: Conjunctivae normal    Neck:      Thyroid: No thyromegaly  Comments: 10 cm conglomeration of left supraclavicular lymph nodes  Cardiovascular:      Rate and Rhythm: Normal rate and regular rhythm  Heart sounds: Normal heart sounds  Pulmonary:      Effort: Pulmonary effort is normal       Breath sounds: Normal breath sounds  Abdominal:      General: Abdomen is flat  There is no distension  Palpations: Abdomen is soft  There is no mass  Tenderness: There is no abdominal tenderness  There is no guarding or rebound  Hernia: No hernia is present  Genitourinary:     Comments: The sacrum was inspected 1st   There is evidence of wound healing present  There is scarring present  There is a small residual defect at the sacrum  No evidence of erythema induration or purulent discharge  The left ischial wound was then inspected  There was a small amount of packing in the wound  There was exudative discharge present  The external female genitalia are within normal limits  There are no masses, lesions identified  The urethra is grossly normal   Speculum examination reveals a grossly normal vagina with atrophy  There is a small amount of blood present in the posterior fornix    The cervix is grossly abnormal with a polypoid soft tissue mass protruding through the cervical os which is dilated  The mass at the os measures approximately 3 cm in diameter  Bimanual examination reveals a mobile uterus measuring approximately 8 weeks in size  There are no palpable adnexal masses  No evidence of parametrial disease  Musculoskeletal:         General: Swelling and tenderness present  No deformity or signs of injury  Cervical back: Normal range of motion and neck supple  Right lower leg: Edema present  Left lower leg: Edema present  Comments: The left lower extremity is markedly edematous compared to the right  There is 4+ edema on the left  There was also erythema overlying the left lower extremity  Lymphadenopathy:      Cervical: Cervical adenopathy present  Skin:     General: Skin is warm and dry  Coloration: Skin is pale  Skin is not jaundiced  Findings: Erythema present  No bruising or rash  Neurological:      General: No focal deficit present  Mental Status: She is alert and oriented to person, place, and time  Cranial Nerves: No cranial nerve deficit  Motor: Weakness present  Gait: Gait abnormal    Psychiatric:         Mood and Affect: Mood normal          Behavior: Behavior normal          Thought Content: Thought content normal          Judgment: Judgment normal              No results found for:   Lab Results   Component Value Date    WBC 8 40 04/02/2022    HGB 10 7 (L) 04/02/2022    HCT 36 3 04/02/2022    HCT 35 0 04/02/2022    MCV 96 04/02/2022    PLT  04/02/2022      Comment:      Not reported due to platelet clumping       Lab Results   Component Value Date    K 5 2 04/02/2022    CL 98 (L) 04/02/2022    CO2 35 (H) 04/02/2022    BUN 31 (H) 04/02/2022    CREATININE 0 94 04/02/2022    GLUF 105 (H) 02/12/2022    CALCIUM 10 6 (H) 04/02/2022    CORRECTEDCA 11 5 (H) 04/02/2022    AST 25 04/02/2022    ALT 25 04/02/2022    ALKPHOS 86 04/02/2022    EGFR 60 04/02/2022     Lesion Biopsy    Date/Time: 4/27/2022 8:05 AM  Performed by: Arminda Cano MD  Authorized by: Arminda Cano MD   Universal Protocol:  Consent: Verbal consent obtained  Consent given by: patient  Patient understanding: patient states understanding of the procedure being performed  Patient identity confirmed: verbally with patient      Procedure Details - Skin Biopsy:     Biopsy tissue type: mucous membrane    Biopsy method: punch biopsy      Body area: Anogenital    Anogenital location: Cervix  Vaginal Lesion: No      Initial size (mm):  5    Final defect size (mm):  5    Malignancy: malignancy unknown       The 3 cm cervical mass was biopsied using a [employer] biopsy forcep  Hemostasis was achieved using Monsel's solution and pressure  She tolerated the biopsy well  No complications

## 2022-04-28 ENCOUNTER — TELEPHONE (OUTPATIENT)
Dept: GYNECOLOGIC ONCOLOGY | Facility: CLINIC | Age: 72
End: 2022-04-28

## 2022-04-28 ENCOUNTER — TELEPHONE (OUTPATIENT)
Dept: INTERVENTIONAL RADIOLOGY/VASCULAR | Facility: HOSPITAL | Age: 72
End: 2022-04-28

## 2022-04-28 DIAGNOSIS — C54.1 ENDOMETRIAL CANCER (HCC): Primary | ICD-10-CM

## 2022-04-28 RX ORDER — LORAZEPAM 1 MG/1
1 TABLET ORAL EVERY 8 HOURS PRN
Qty: 30 TABLET | Refills: 0 | Status: SHIPPED | OUTPATIENT
Start: 2022-04-28

## 2022-04-28 RX ORDER — ONDANSETRON HYDROCHLORIDE 8 MG/1
8 TABLET, FILM COATED ORAL EVERY 8 HOURS PRN
Qty: 20 TABLET | Refills: 1 | Status: SHIPPED | OUTPATIENT
Start: 2022-04-28

## 2022-04-28 RX ORDER — NALOXONE HYDROCHLORIDE 4 MG/.1ML
SPRAY NASAL
Qty: 1 EACH | Refills: 1 | Status: SHIPPED | OUTPATIENT
Start: 2022-04-28

## 2022-04-28 NOTE — TELEPHONE ENCOUNTER
Patient called in to return a phone call to Talia Medel regarding scheduling an appointment for next week

## 2022-04-29 ENCOUNTER — HOSPITAL ENCOUNTER (OUTPATIENT)
Dept: NON INVASIVE DIAGNOSTICS | Facility: HOSPITAL | Age: 72
Discharge: HOME | End: 2022-04-29
Attending: OBSTETRICS & GYNECOLOGY
Payer: COMMERCIAL

## 2022-04-29 ENCOUNTER — OFFICE VISIT (OUTPATIENT)
Dept: SURGERY | Facility: CLINIC | Age: 72
End: 2022-04-29
Payer: COMMERCIAL

## 2022-04-29 VITALS — TEMPERATURE: 98.2 F

## 2022-04-29 DIAGNOSIS — L89.153 STAGE III PRESSURE ULCER OF SACRAL REGION (HCC): Primary | ICD-10-CM

## 2022-04-29 DIAGNOSIS — L89.323 DECUBITUS ULCER OF ISCHIAL AREA, LEFT, STAGE III (HCC): Chronic | ICD-10-CM

## 2022-04-29 DIAGNOSIS — C54.1 ENDOMETRIAL CANCER (HCC): ICD-10-CM

## 2022-04-29 PROCEDURE — 93970 EXTREMITY STUDY: CPT | Performed by: SURGERY

## 2022-04-29 PROCEDURE — 93970 EXTREMITY STUDY: CPT

## 2022-04-29 PROCEDURE — 99213 OFFICE O/P EST LOW 20 MIN: CPT | Performed by: SURGERY

## 2022-04-29 NOTE — LETTER
April 29, 2022     Heidi Espitia MD  [address] [place] [id number]    Patient: Oren Siu   YOB: 1950   Date of Visit: 4/29/2022       Dear Dr Alfred Mijares: Thank you for referring Oren Siu to me for evaluation  Below are my notes for this consultation  If you have questions, please do not hesitate to call me  I look forward to following your patient along with you  Sincerely,        Siobhan Wells MD        CC: No Recipients  Siobhan Wells MD  4/29/2022 12:28 PM  Incomplete  Assessment/Plan:    Stage III pressure ulcer of sacral region [facility])  Patient returns for routine follow-up regarding her stage III sacral ulcer and her stage III left ischial ulcer  Patient continues to do daily wound care including packing changes  Today on physical examination she is well-appearing  She is about to initiate chemotherapy for the treatment of her stage IV endometrial carcinoma  She has significant left supraclavicular lymphadenopathy  The left ischial ulcer is clean and granulating  It was probed and remains proximally 3 cm deep  It was packed with 1 in plain gauze packing  The stage III sacral ulcer continues to heal slowly by secondary intention  It was repacked with half-inch plain gauze packing  Patient educated regarding wound care  We look for to seeing her back in a week's time  She has been encouraged to follow up sooner on an as-needed basis  Subjective:      Patient ID: Oren Siu is a 66 y o  female  Patient came in today for packing change of left buttock and left thigh  Patient states the left thigh is a little sore  Patient started seeing a cancer doctor, And the doctor told her the wound on her left thigh need to be watched closely since she needs to start chemotherapy soon  No fevers or chills  Becky LOPEZ MA       The following portions of the patient's history were reviewed and updated as appropriate: allergies, current medications, past family history, past medical history, past social history, past surgical history and problem list     Review of Systems   Constitutional: Negative for chills and fever  HENT: Negative for ear pain and sore throat  Eyes: Negative for pain and visual disturbance  Respiratory: Negative for cough and shortness of breath  Cardiovascular: Negative for chest pain and palpitations  Gastrointestinal: Negative for abdominal pain and vomiting  Genitourinary: Negative for dysuria and hematuria  Musculoskeletal: Negative for arthralgias and back pain  Skin: Negative for color change and rash  Neurological: Negative for seizures and syncope  All other systems reviewed and are negative  Objective:      Temp 98 2 °F (36 8 °C) (Temporal)          Physical Exam  Constitutional:       Appearance: She is well-developed  HENT:      Head: Normocephalic and atraumatic  Eyes:      Conjunctiva/sclera: Conjunctivae normal       Pupils: Pupils are equal, round, and reactive to light  Cardiovascular:      Rate and Rhythm: Normal rate and regular rhythm  Pulmonary:      Effort: Pulmonary effort is normal       Breath sounds: Normal breath sounds  Abdominal:      General: Bowel sounds are normal       Palpations: Abdomen is soft  Musculoskeletal:         General: Normal range of motion  Cervical back: Normal range of motion and neck supple  Skin:     General: Skin is warm and dry  Comments: Skin exam as described above   Neurological:      Mental Status: She is alert and oriented to person, place, and time  Psychiatric:         Behavior: Behavior normal          Thought Content:  Thought content normal          Judgment: Judgment normal

## 2022-04-29 NOTE — ASSESSMENT & PLAN NOTE
Patient returns for routine follow-up regarding her stage III sacral ulcer and her stage III left ischial ulcer  Patient continues to do daily wound care including packing changes  Today on physical examination she is well-appearing  She is about to initiate chemotherapy for the treatment of her stage IV endometrial carcinoma  She has significant left supraclavicular lymphadenopathy  The left ischial ulcer is clean and granulating  It was probed and remains proximally 3 cm deep  It was packed with 1 in plain gauze packing  The stage III sacral ulcer continues to heal slowly by secondary intention  It was repacked with half-inch plain gauze packing  Patient educated regarding wound care  We look for to seeing her back in a week's time  She has been encouraged to follow up sooner on an as-needed basis

## 2022-04-29 NOTE — PROGRESS NOTES
Assessment/Plan:    Stage III pressure ulcer of sacral region [facility])  Patient returns for routine follow-up regarding her stage III sacral ulcer and her stage III left ischial ulcer  Patient continues to do daily wound care including packing changes  Today on physical examination she is well-appearing  She is about to initiate chemotherapy for the treatment of her stage IV endometrial carcinoma  She has significant left supraclavicular lymphadenopathy  The left ischial ulcer is clean and granulating  It was probed and remains proximally 3 cm deep  It was packed with 1 in plain gauze packing  The stage III sacral ulcer continues to heal slowly by secondary intention  It was repacked with half-inch plain gauze packing  Patient educated regarding wound care  We look for to seeing her back in a week's time  She has been encouraged to follow up sooner on an as-needed basis  Subjective:      Patient ID: Mayda Navas is a 66 y o  female  Patient came in today for packing change of left buttock and left thigh  Patient states the left thigh is a little sore  Patient started seeing a cancer doctor, And the doctor told her the wound on her left thigh need to be watched closely since she needs to start chemotherapy soon  No fevers or chills  Becky LOPEZ MA       The following portions of the patient's history were reviewed and updated as appropriate: allergies, current medications, past family history, past medical history, past social history, past surgical history and problem list     Review of Systems   Constitutional: Negative for chills and fever  HENT: Negative for ear pain and sore throat  Eyes: Negative for pain and visual disturbance  Respiratory: Negative for cough and shortness of breath  Cardiovascular: Negative for chest pain and palpitations  Gastrointestinal: Negative for abdominal pain and vomiting  Genitourinary: Negative for dysuria and hematuria  Musculoskeletal: Negative for arthralgias and back pain  Skin: Negative for color change and rash  Neurological: Negative for seizures and syncope  All other systems reviewed and are negative  Objective:      Temp 98 2 °F (36 8 °C) (Temporal)          Physical Exam  Constitutional:       Appearance: She is well-developed  HENT:      Head: Normocephalic and atraumatic  Eyes:      Conjunctiva/sclera: Conjunctivae normal       Pupils: Pupils are equal, round, and reactive to light  Cardiovascular:      Rate and Rhythm: Normal rate and regular rhythm  Pulmonary:      Effort: Pulmonary effort is normal       Breath sounds: Normal breath sounds  Abdominal:      General: Bowel sounds are normal       Palpations: Abdomen is soft  Musculoskeletal:         General: Normal range of motion  Cervical back: Normal range of motion and neck supple  Skin:     General: Skin is warm and dry  Comments: Skin exam as described above   Neurological:      Mental Status: She is alert and oriented to person, place, and time  Psychiatric:         Behavior: Behavior normal          Thought Content:  Thought content normal          Judgment: Judgment normal

## 2022-05-02 ENCOUNTER — TELEPHONE (OUTPATIENT)
Dept: GYNECOLOGIC ONCOLOGY | Facility: CLINIC | Age: 72
End: 2022-05-02

## 2022-05-02 ENCOUNTER — DOCUMENTATION (OUTPATIENT)
Dept: GYNECOLOGIC ONCOLOGY | Facility: CLINIC | Age: 72
End: 2022-05-02

## 2022-05-02 NOTE — PROGRESS NOTES
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     Patient was discussed at the Multidisciplinary Gynecologic Oncology Case review on May 2, 2022   The group recommended to consider treatment with:  systemic chemotherapy and follow up of cervical biopsy (results pending) consideration of genetic testing  NCCN guidelines were available for review  The final treatment plan will be left to the discretion of the patient and the treating physician  DISCLAIMERS:    TO THE TREATING PHYSICIAN:  This conference is a meeting of clinicians from various specialty areas who evaluate and discuss patients for whom a multidisciplinary treatment approach is being considered  Please note that the above opinion was a consensus of the conference attendees and is intended only to assist in quality care of the discussed patient  The responsibility for follow up on the input given during the conference, along with any final decisions regarding plan of care, is that of the patient and the patient's provider  Accordingly, appointments have only been recommended based on this information and have NOT been scheduled unless otherwise noted  TO THE PATIENT:  This summary is a brief record of major aspects of your cancer treatment  You may choose to share a copy with any of your doctors or nurses  However, this is not a detailed or comprehensive record of your care

## 2022-05-02 NOTE — TELEPHONE ENCOUNTER
Patient called in regarding the folder she was given along with questions about scheduling appointment  best call back number [phone]

## 2022-05-03 ENCOUNTER — APPOINTMENT (OUTPATIENT)
Dept: LAB | Facility: CLINIC | Age: 72
End: 2022-05-03
Payer: COMMERCIAL

## 2022-05-03 DIAGNOSIS — C54.1 ENDOMETRIAL CANCER (HCC): ICD-10-CM

## 2022-05-03 LAB
ALBUMIN SERPL BCP-MCNC: 2.6 G/DL (ref 3.5–5)
ALP SERPL-CCNC: 75 U/L (ref 46–116)
ALT SERPL W P-5'-P-CCNC: 32 U/L (ref 12–78)
ANION GAP SERPL CALCULATED.3IONS-SCNC: 3 MMOL/L (ref 4–13)
AST SERPL W P-5'-P-CCNC: 50 U/L (ref 5–45)
BASOPHILS # BLD AUTO: 0.09 THOUSANDS/ΜL (ref 0–0.1)
BASOPHILS NFR BLD AUTO: 1 % (ref 0–1)
BILIRUB SERPL-MCNC: 0.29 MG/DL (ref 0.2–1)
BUN SERPL-MCNC: 47 MG/DL (ref 5–25)
CALCIUM ALBUM COR SERPL-MCNC: 11.5 MG/DL (ref 8.3–10.1)
CALCIUM SERPL-MCNC: 10.4 MG/DL (ref 8.3–10.1)
CANCER AG125 SERPL-ACNC: 141.6 U/ML (ref 0–30)
CHLORIDE SERPL-SCNC: 100 MMOL/L (ref 100–108)
CO2 SERPL-SCNC: 34 MMOL/L (ref 21–32)
CREAT SERPL-MCNC: 1.38 MG/DL (ref 0.6–1.3)
EOSINOPHIL # BLD AUTO: 0.35 THOUSAND/ΜL (ref 0–0.61)
EOSINOPHIL NFR BLD AUTO: 3 % (ref 0–6)
ERYTHROCYTE [DISTWIDTH] IN BLOOD BY AUTOMATED COUNT: 13.4 % (ref 11.6–15.1)
GFR SERPL CREATININE-BSD FRML MDRD: 38 ML/MIN/1.73SQ M
GLUCOSE P FAST SERPL-MCNC: 105 MG/DL (ref 65–99)
HCT VFR BLD AUTO: 33.3 % (ref 34.8–46.1)
HGB BLD-MCNC: 9.9 G/DL (ref 11.5–15.4)
IMM GRANULOCYTES # BLD AUTO: 0.1 THOUSAND/UL (ref 0–0.2)
IMM GRANULOCYTES NFR BLD AUTO: 1 % (ref 0–2)
LYMPHOCYTES # BLD AUTO: 1.77 THOUSANDS/ΜL (ref 0.6–4.47)
LYMPHOCYTES NFR BLD AUTO: 13 % (ref 14–44)
MAGNESIUM SERPL-MCNC: 2 MG/DL (ref 1.6–2.6)
MCH RBC QN AUTO: 28 PG (ref 26.8–34.3)
MCHC RBC AUTO-ENTMCNC: 29.7 G/DL (ref 31.4–37.4)
MCV RBC AUTO: 94 FL (ref 82–98)
MONOCYTES # BLD AUTO: 1.85 THOUSAND/ΜL (ref 0.17–1.22)
MONOCYTES NFR BLD AUTO: 13 % (ref 4–12)
NEUTROPHILS # BLD AUTO: 9.8 THOUSANDS/ΜL (ref 1.85–7.62)
NEUTS SEG NFR BLD AUTO: 69 % (ref 43–75)
NRBC BLD AUTO-RTO: 0 /100 WBCS
PMV BLD AUTO: 11.2 FL (ref 8.9–12.7)
POTASSIUM SERPL-SCNC: 4.5 MMOL/L (ref 3.5–5.3)
PROT SERPL-MCNC: 7.2 G/DL (ref 6.4–8.2)
RBC # BLD AUTO: 3.54 MILLION/UL (ref 3.81–5.12)
SODIUM SERPL-SCNC: 137 MMOL/L (ref 136–145)
WBC # BLD AUTO: 13.96 THOUSAND/UL (ref 4.31–10.16)

## 2022-05-03 PROCEDURE — 83735 ASSAY OF MAGNESIUM: CPT | Performed by: PHYSICIAN ASSISTANT

## 2022-05-03 PROCEDURE — 86304 IMMUNOASSAY TUMOR CA 125: CPT

## 2022-05-03 PROCEDURE — 85025 COMPLETE CBC W/AUTO DIFF WBC: CPT | Performed by: PHYSICIAN ASSISTANT

## 2022-05-03 PROCEDURE — 36415 COLL VENOUS BLD VENIPUNCTURE: CPT | Performed by: PHYSICIAN ASSISTANT

## 2022-05-03 PROCEDURE — 80053 COMPREHEN METABOLIC PANEL: CPT | Performed by: PHYSICIAN ASSISTANT

## 2022-05-04 ENCOUNTER — OFFICE VISIT (OUTPATIENT)
Dept: SURGERY | Facility: CLINIC | Age: 72
End: 2022-05-04
Payer: COMMERCIAL

## 2022-05-04 VITALS — TEMPERATURE: 98.2 F

## 2022-05-04 DIAGNOSIS — L89.153 STAGE III PRESSURE ULCER OF SACRAL REGION (HCC): ICD-10-CM

## 2022-05-04 DIAGNOSIS — L89.323 DECUBITUS ULCER OF ISCHIAL AREA, LEFT, STAGE III (HCC): Primary | Chronic | ICD-10-CM

## 2022-05-04 PROCEDURE — 99212 OFFICE O/P EST SF 10 MIN: CPT | Performed by: PHYSICIAN ASSISTANT

## 2022-05-04 RX ORDER — PALONOSETRON 0.05 MG/ML
0.25 INJECTION, SOLUTION INTRAVENOUS ONCE
Status: CANCELLED | OUTPATIENT
Start: 2022-05-05

## 2022-05-04 RX ORDER — SODIUM CHLORIDE 9 MG/ML
20 INJECTION, SOLUTION INTRAVENOUS ONCE
Status: CANCELLED | OUTPATIENT
Start: 2022-05-05

## 2022-05-04 NOTE — ASSESSMENT & PLAN NOTE
Wound is clean, open, dry  Cleansed and packed with silvasorb gel and dry dressing on top  Will continue wound care three times per week

## 2022-05-04 NOTE — PROGRESS NOTES
Progress Note - General Surgery   Ag Coronel 66 y o  female MRN: [id number]  Encounter: [id number]    Assessment/Plan    Decubitus ulcer of ischial area, left, stage III ([address] )  Wound is open with cloudy discharge  Tunnels about 8 cm towards left hip  CT from 4/20 reviewed showing wound contiguous with area of fat necrosis that extends to the left hip  No evidence of osteomyelitis or abscess  Will continue wound care in our office three times weekly as tolerated by patient  No clear indications for antibiotics at this time  Patient scheduled to begin chemotherapy  Stage III pressure ulcer of sacral region ([address] )  Wound is clean, open, dry  Cleansed and packed with silvasorb gel and dry dressing on top  Will continue wound care three times per week  Diagnoses and all orders for this visit:    Decubitus ulcer of ischial area, left, stage III ([facility] [address] )    Stage III pressure ulcer of sacral region [facility])        Subjective       Chief Complaint   Patient presents with    Wound Check     packing change of left buttock and left thigh      Patient came into to the office for packing change of left buttock and left thigh  Stated that she is having brownish tan discharge at the left buttock and left thigh Denied fever or chills  EWA Apple 65 yo F newly dx stage IV endometrial CA due to begin chemotherapy here for follow-up wound check  Reports ongoing pain/drainage to left hip wound  Ongoing drainage to sacral wound without pain  Has VNA services three times weekly for wound care  Still ambulating with walker, but also driving  Has suppelmented diet with ensure TID for past few weeks       Review of Systems    The following portions of the patient's history were reviewed and updated as appropriate: allergies, current medications, past family history, past medical history, past social history, past surgical history and problem list     Objective      Temperature 98 2 °F (36 8 °C), temperature source Temporal    Physical Exam  Vitals and nursing note reviewed  Constitutional:       General: She is not in acute distress  Appearance: She is well-developed  She is not diaphoretic  HENT:      Head: Normocephalic and atraumatic  Eyes:      Conjunctiva/sclera: Conjunctivae normal       Pupils: Pupils are equal, round, and reactive to light  Pulmonary:      Effort: No respiratory distress  Musculoskeletal:      Cervical back: Normal range of motion  Skin:     General: Skin is warm and dry  Capillary Refill: Capillary refill takes less than 2 seconds  Neurological:      Mental Status: She is alert and oriented to person, place, and time     Psychiatric:         Behavior: Behavior normal          Signature:  Rene Nash PA-C  Date: 5/4/2022 Time: 12:55 PM

## 2022-05-04 NOTE — PROGRESS NOTES
Tiger text send to [facility] JAY regarding patients plts clumped  Per Kyrie Dawson she is okay to treat tomorrow as this is her first treatment

## 2022-05-04 NOTE — ASSESSMENT & PLAN NOTE
Wound is open with cloudy discharge  Tunnels about 8 cm towards left hip  CT from 4/20 reviewed showing wound contiguous with area of fat necrosis that extends to the left hip  No evidence of osteomyelitis or abscess  Will continue wound care in our office three times weekly as tolerated by patient  No clear indications for antibiotics at this time  Patient scheduled to begin chemotherapy

## 2022-05-05 ENCOUNTER — HOSPITAL ENCOUNTER (OUTPATIENT)
Dept: INFUSION CENTER | Facility: HOSPITAL | Age: 72
Discharge: HOME | End: 2022-05-05
Attending: OBSTETRICS & GYNECOLOGY
Payer: COMMERCIAL

## 2022-05-05 VITALS
HEIGHT: 61 IN | HEART RATE: 91 BPM | DIASTOLIC BLOOD PRESSURE: 54 MMHG | WEIGHT: 149.69 LBS | TEMPERATURE: 97.9 F | SYSTOLIC BLOOD PRESSURE: 106 MMHG | BODY MASS INDEX: 28.26 KG/M2 | RESPIRATION RATE: 18 BRPM

## 2022-05-05 DIAGNOSIS — C54.1 ENDOMETRIAL CANCER (HCC): Primary | ICD-10-CM

## 2022-05-05 PROCEDURE — 96375 TX/PRO/DX INJ NEW DRUG ADDON: CPT

## 2022-05-05 PROCEDURE — 96367 TX/PROPH/DG ADDL SEQ IV INF: CPT

## 2022-05-05 PROCEDURE — 96413 CHEMO IV INFUSION 1 HR: CPT

## 2022-05-05 PROCEDURE — 96415 CHEMO IV INFUSION ADDL HR: CPT

## 2022-05-05 PROCEDURE — 96417 CHEMO IV INFUS EACH ADDL SEQ: CPT

## 2022-05-05 RX ORDER — SODIUM CHLORIDE 9 MG/ML
20 INJECTION, SOLUTION INTRAVENOUS ONCE
Status: COMPLETED | OUTPATIENT
Start: 2022-05-05 | End: 2022-05-05

## 2022-05-05 RX ORDER — PALONOSETRON 0.05 MG/ML
0.25 INJECTION, SOLUTION INTRAVENOUS ONCE
Status: COMPLETED | OUTPATIENT
Start: 2022-05-05 | End: 2022-05-05

## 2022-05-05 RX ADMIN — PALONOSETRON HYDROCHLORIDE 0.25 MG: 0.05 INJECTION, SOLUTION INTRAVENOUS at 09:47

## 2022-05-05 RX ADMIN — PACLITAXEL 225.6 MG: 6 INJECTION, SOLUTION INTRAVENOUS at 10:28

## 2022-05-05 RX ADMIN — CARBOPLATIN 287.5 MG: 10 INJECTION, SOLUTION INTRAVENOUS at 13:33

## 2022-05-05 RX ADMIN — DEXAMETHASONE SODIUM PHOSPHATE 20 MG: 10 INJECTION, SOLUTION INTRAMUSCULAR; INTRAVENOUS at 08:35

## 2022-05-05 RX ADMIN — SODIUM CHLORIDE 20 ML/HR: 0.9 INJECTION, SOLUTION INTRAVENOUS at 08:29

## 2022-05-05 RX ADMIN — FAMOTIDINE 20 MG: 10 INJECTION INTRAVENOUS at 09:25

## 2022-05-05 RX ADMIN — FOSAPREPITANT 150 MG: 150 INJECTION, POWDER, LYOPHILIZED, FOR SOLUTION INTRAVENOUS at 09:49

## 2022-05-05 RX ADMIN — DIPHENHYDRAMINE HYDROCHLORIDE 25 MG: 50 INJECTION INTRAMUSCULAR; INTRAVENOUS at 09:03

## 2022-05-05 NOTE — PROGRESS NOTES
Pt tolerated chemotherapy infusion well without incident  Discharged in stable condition and pt aware of next infusion appointment  AVS provided

## 2022-05-09 ENCOUNTER — OFFICE VISIT (OUTPATIENT)
Dept: SURGERY | Facility: CLINIC | Age: 72
End: 2022-05-09
Payer: COMMERCIAL

## 2022-05-09 VITALS
OXYGEN SATURATION: 98 % | HEIGHT: 60 IN | TEMPERATURE: 98.2 F | HEART RATE: 87 BPM | WEIGHT: 147 LBS | RESPIRATION RATE: 18 BRPM | DIASTOLIC BLOOD PRESSURE: 50 MMHG | BODY MASS INDEX: 28.86 KG/M2 | SYSTOLIC BLOOD PRESSURE: 100 MMHG

## 2022-05-09 DIAGNOSIS — L89.323 DECUBITUS ULCER OF ISCHIAL AREA, LEFT, STAGE III (HCC): Primary | Chronic | ICD-10-CM

## 2022-05-09 PROCEDURE — 99212 OFFICE O/P EST SF 10 MIN: CPT | Performed by: PHYSICIAN ASSISTANT

## 2022-05-09 NOTE — PROGRESS NOTES
Progress Note - General Surgery   Mercado [facility] 66 y o  female MRN: [id number]  Encounter: [id number]    Assessment/Plan    Decubitus ulcer of ischial area, left, stage III ([address] )  Sacral wound is clean and dry, however left ischial area wound continues with cloudy discharge and deep tunneling as well as slough on the skin surface  Wound was cleansed and packed with iodoform gauze  I have asked the patient to return to the office tomorrow for further evaluation by Dr Marylee Anchors  Questions answered, patient agreeable  Diagnoses and all orders for this visit:    Decubitus ulcer of ischial area, left, stage III [facility])        Subjective       Chief Complaint   Patient presents with    Follow-up     packing change of left buttock and left thigh      Patient came in today for packing change of left buttock and left thigh  Patient states both wounds have a brownish drainage  Patient states her butt seems to be getting better but her thigh still needs some work  No fevers or chills  Patient also had a recent fall, she has bursing   on her knees, ankles, and arms EWA Duenas 58-year-old female with recently diagnosed metastatic gyn cancer starting on chemotherapy here for follow-up of decubitus ulcers of the sacrum and left ischial area  Reports ongoing foul discharge from the left ischial area with associated pain  No problems from the sacral area  Review of Systems    The following portions of the patient's history were reviewed and updated as appropriate: allergies, current medications, past family history, past medical history, past social history, past surgical history and problem list     Objective      Blood pressure 100/50, pulse 87, temperature 98 2 °F (36 8 °C), temperature source Temporal, resp  rate 18, height 5' (1 524 m), weight 66 7 kg (147 lb), SpO2 98 %  Physical Exam  Vitals and nursing note reviewed  Constitutional:       General: She is not in acute distress       Appearance: She is well-developed  She is not diaphoretic  Comments: Kyphosis  Lymphadenopathy to the left anterior neck is noted  HENT:      Head: Normocephalic and atraumatic  Eyes:      Conjunctiva/sclera: Conjunctivae normal       Pupils: Pupils are equal, round, and reactive to light  Pulmonary:      Effort: No respiratory distress  Musculoskeletal:      Comments: Ambulating with walker  Skin:     General: Skin is warm and dry  Capillary Refill: Capillary refill takes less than 2 seconds  Neurological:      Mental Status: She is alert and oriented to person, place, and time     Psychiatric:         Behavior: Behavior normal          Signature:  Rene Nash PA-C  Date: 5/18/2022 Time: 2:02 PM

## 2022-05-10 ENCOUNTER — APPOINTMENT (OUTPATIENT)
Dept: LAB | Facility: CLINIC | Age: 72
End: 2022-05-10
Payer: COMMERCIAL

## 2022-05-10 ENCOUNTER — OFFICE VISIT (OUTPATIENT)
Dept: SURGERY | Facility: CLINIC | Age: 72
End: 2022-05-10
Payer: COMMERCIAL

## 2022-05-10 VITALS — TEMPERATURE: 98.6 F

## 2022-05-10 DIAGNOSIS — L89.153 STAGE III PRESSURE ULCER OF SACRAL REGION (HCC): Primary | ICD-10-CM

## 2022-05-10 DIAGNOSIS — L89.323 DECUBITUS ULCER OF ISCHIAL AREA, LEFT, STAGE III (HCC): Chronic | ICD-10-CM

## 2022-05-10 PROCEDURE — 99212 OFFICE O/P EST SF 10 MIN: CPT | Performed by: SPECIALIST

## 2022-05-10 RX ORDER — GENTAMICIN SULFATE 1 MG/G
OINTMENT TOPICAL DAILY
Qty: 30 G | Refills: 3 | Status: SHIPPED | OUTPATIENT
Start: 2022-05-10

## 2022-05-10 NOTE — ASSESSMENT & PLAN NOTE
The patient has begun chemotherapy  She is quite fatigued and does not feel that she can make it to the office 3 times a week for wound care  There is some loose necrotic debris which was easily removed  Again the bone is not exposed  The wound was redressed with 2 x 2 gauze impregnated with gentamicin ointment, as empiric treatment for what is more likely colonization than an infection  The patient would like to restart visiting nurses, I think it is reasonable that the see her on Thursday and Saturday, and that I continue to see her on Tuesdays, so that the dressing is changed 3 x per week  Given that she is on chemotherapy, thickened is best to assume the treatment at this point is palliative, and aimed at preventing development of a stage IV infected decubitus ulcer

## 2022-05-10 NOTE — ASSESSMENT & PLAN NOTE
The wound is clean with some granulation tissue, however given the she is starting chemotherapy, I think it is unlikely that it will heal   Proteus and Pseudomonas for previously cultured  I will continue to use gentamicin ointment and dry gauze  Patient feels that she cannot come to the office 3 times a week for wound care, and I will arrange for visiting nurses to see her twice a week, and I will see her back weekly, and esteban Bourgeois

## 2022-05-10 NOTE — PROGRESS NOTES
Assessment/Plan:    No problem-specific Assessment & Plan notes found for this encounter  There are no diagnoses linked to this encounter  Subjective:      Patient ID: Susana Ervin is a 66 y o  female  The patient follows up for wound care for her chronic left ischial, and sacral decubitus ulcers  Unfortunately, there has been interval diagnosis of clear cell carcinoma on cervical biopsy, and non-small cell carcinoma of from supraclavicular lymph node biopsy  The patient is recently started a paclitaxel / carboplatin based chemotherapy regime  She is quite fatigued, and has noted increased drainage from the left ischial ulcer in particular  The overall appearance of the sacral ulcer is much improved with the addition of topical gentamicin  Both Pseudomonas and Proteus had been cultured from this wound  The following portions of the patient's history were reviewed and updated as appropriate: allergies, current medications, past family history, past medical history, past social history, past surgical history and problem list     Review of Systems   Constitutional: Positive for fatigue  Negative for chills and fever  Skin: Positive for wound  Objective:      Temp 98 6 °F (37 °C) (Temporal)          Physical Exam  Constitutional:       General: She is not in acute distress  Appearance: She is ill-appearing  Genitourinary:     Comments: Clean sacral decubitus ulcer not indurated or with malodorous drainage  The sacrum is not exposed  There is also a left ischial decubitus ulcer, which does appear slightly angry in appearance, and does have some loose necrotic debris easily removed with selective debridement, however it is clearly stage III, and again bone is not exposed  Skin:     Findings: Lesion present  Neurological:      General: No focal deficit present     Psychiatric:         Mood and Affect: Mood normal

## 2022-05-10 NOTE — PATIENT INSTRUCTIONS
Follow up weekly on Tuesdays for wound care  I have requested VNA for twice a week wound care  Refill the Gentamycin ointment

## 2022-05-16 ENCOUNTER — TELEPHONE (OUTPATIENT)
Dept: INTERVENTIONAL RADIOLOGY/VASCULAR | Facility: HOSPITAL | Age: 72
End: 2022-05-16

## 2022-05-16 NOTE — PROGRESS NOTES
Spoke with patient to go over pre-procedure information for her port placement on 5/23/22 at the [address] [place] [address]  Plan to arrive for 0900, have a ride to and from, and NPO after midnight the night prior  Patient is able to take morning medications with sips of water  Answered all questions, consult complete

## 2022-05-17 ENCOUNTER — OFFICE VISIT (OUTPATIENT)
Dept: SURGERY | Facility: CLINIC | Age: 72
End: 2022-05-17
Payer: COMMERCIAL

## 2022-05-17 ENCOUNTER — APPOINTMENT (OUTPATIENT)
Dept: LAB | Facility: CLINIC | Age: 72
End: 2022-05-17
Payer: COMMERCIAL

## 2022-05-17 VITALS — TEMPERATURE: 98.6 F

## 2022-05-17 DIAGNOSIS — L89.323 DECUBITUS ULCER OF ISCHIAL AREA, LEFT, STAGE III (HCC): Chronic | ICD-10-CM

## 2022-05-17 DIAGNOSIS — L89.153 STAGE III PRESSURE ULCER OF SACRAL REGION (HCC): Primary | ICD-10-CM

## 2022-05-17 DIAGNOSIS — C54.1 ENDOMETRIAL CANCER (HCC): ICD-10-CM

## 2022-05-17 LAB — CANCER AG125 SERPL-ACNC: 509.7 U/ML (ref 0–30)

## 2022-05-17 PROCEDURE — 99212 OFFICE O/P EST SF 10 MIN: CPT | Performed by: SPECIALIST

## 2022-05-17 PROCEDURE — 86304 IMMUNOASSAY TUMOR CA 125: CPT

## 2022-05-17 PROCEDURE — 36415 COLL VENOUS BLD VENIPUNCTURE: CPT

## 2022-05-17 NOTE — ASSESSMENT & PLAN NOTE
The wound is granulated, but has not made any progress with healing  There is thin drainage from the wound, the bone is not exposed, or palpable with dressing change

## 2022-05-17 NOTE — PROGRESS NOTES
Assessment/Plan:    Stage III pressure ulcer of sacral region ([facility] [address] )  The wound is granulated, but has not made any progress with healing  There is thin drainage from the wound, the bone is not exposed, or palpable with dressing change  Decubitus ulcer of ischial area, left, stage III ([address] )  With similar findings from last week, however, there is more drainage, and the gauze dressing was directed better to the base of the ulcer  The bone is not exposed, and there is no evidence of acute osteomyelitis at this time  Diagnoses and all orders for this visit:    Stage III pressure ulcer of sacral region [facility])    Decubitus ulcer of ischial area, left, stage III ([facility])          Subjective:      Patient ID: Rogelio Keller is a 66 y o  female  The patient is a 34-year-old white female who is being treated for a left ischial, and a sacral decubitus ulcer  The patient currently is on chemotherapy for uterine cancer, and has received 1 cycle to date  She is feeling washed out, but is getting around well with a walker  Her daughter is helping her with dressing changes, and visiting nurses are seeing her twice a week  There is a considerable amount of drainage from the ischial decubitus ulcer in particular, and this is being managed with gauze and a silicone bordered dressing  The wounds were irrigated with wound cleanser, and the dressings changed, and utilizing gentamicin ointment dry gauze and a silicone bordered dressing  Ill continues to back on a weekly basis  There is no evidence of acute osteomyelitis at this time, however, given her comorbidities, and in particular that she is being actively treated with chemotherapy, I doubt the wounds will respond well            The following portions of the patient's history were reviewed and updated as appropriate: allergies, current medications, past family history, past medical history, past social history, past surgical history and problem list     Review of Systems   Constitutional: Positive for fatigue  Negative for chills and fever  Gastrointestinal: Negative  Genitourinary: Negative  Musculoskeletal: Positive for gait problem  Skin: Positive for wound  Objective:      Temp 98 6 °F (37 °C) (Temporal)          Physical Exam  Constitutional:       Appearance: She is ill-appearing  Cardiovascular:      Rate and Rhythm: Normal rate  Pulmonary:      Effort: Pulmonary effort is normal    Musculoskeletal:         General: Normal range of motion  Skin:     General: Skin is warm and dry  Findings: Lesion present  Neurological:      General: No focal deficit present     Psychiatric:         Mood and Affect: Mood normal

## 2022-05-17 NOTE — ASSESSMENT & PLAN NOTE
With similar findings from last week, however, there is more drainage, and the gauze dressing was directed better to the base of the ulcer  The bone is not exposed, and there is no evidence of acute osteomyelitis at this time

## 2022-05-18 NOTE — ASSESSMENT & PLAN NOTE
Sacral wound is clean and dry, however left ischial area wound continues with cloudy discharge and deep tunneling as well as slough on the skin surface  Wound was cleansed and packed with iodoform gauze  I have asked the patient to return to the office tomorrow for further evaluation by Dr Eron Azevedo  Questions answered, patient agreeable

## 2022-05-20 ENCOUNTER — HOSPITAL ENCOUNTER (OUTPATIENT)
Dept: INFUSION CENTER | Facility: HOSPITAL | Age: 72
Discharge: HOME | End: 2022-05-20
Payer: COMMERCIAL

## 2022-05-20 VITALS
RESPIRATION RATE: 18 BRPM | TEMPERATURE: 96.2 F | DIASTOLIC BLOOD PRESSURE: 54 MMHG | SYSTOLIC BLOOD PRESSURE: 129 MMHG | OXYGEN SATURATION: 96 % | HEART RATE: 88 BPM

## 2022-05-20 DIAGNOSIS — E86.0 DEHYDRATION: Primary | ICD-10-CM

## 2022-05-20 PROCEDURE — 96360 HYDRATION IV INFUSION INIT: CPT

## 2022-05-20 RX ADMIN — SODIUM CHLORIDE 1000 ML: 0.9 INJECTION, SOLUTION INTRAVENOUS at 13:44

## 2022-05-20 NOTE — PROGRESS NOTES
Patient presented to unit for hydration  Patient admitted that she had fallen after her last chemo treatment  Patient stated that she did not hit her head and that she was not injured from the fall but she did not go to be evaluated  Patient stated that she feels she fell because she took an Ativan pill and felt very loopy afterwards  Verdene Boeck, PA notified and suggested that patient try half a pill  Patient agreeable to this  Patient tolerated 1 hour hydration without issue   Discharged in stable condition with AVS

## 2022-05-21 ENCOUNTER — TELEPHONE (OUTPATIENT)
Dept: OTHER | Facility: OTHER | Age: 72
End: 2022-05-21

## 2022-05-23 ENCOUNTER — HOSPITAL ENCOUNTER (OUTPATIENT)
Dept: INTERVENTIONAL RADIOLOGY/VASCULAR | Facility: HOSPITAL | Age: 72
Discharge: HOME | End: 2022-05-23
Attending: OBSTETRICS & GYNECOLOGY
Payer: COMMERCIAL

## 2022-05-23 ENCOUNTER — APPOINTMENT (OUTPATIENT)
Dept: LAB | Facility: HOSPITAL | Age: 72
End: 2022-05-23
Payer: COMMERCIAL

## 2022-05-23 ENCOUNTER — TELEPHONE (OUTPATIENT)
Dept: SURGERY | Facility: CLINIC | Age: 72
End: 2022-05-23

## 2022-05-23 ENCOUNTER — TELEMEDICINE (OUTPATIENT)
Dept: GYNECOLOGIC ONCOLOGY | Facility: CLINIC | Age: 72
End: 2022-05-23
Payer: COMMERCIAL

## 2022-05-23 VITALS
WEIGHT: 147 LBS | RESPIRATION RATE: 18 BRPM | SYSTOLIC BLOOD PRESSURE: 102 MMHG | OXYGEN SATURATION: 92 % | HEART RATE: 79 BPM | DIASTOLIC BLOOD PRESSURE: 51 MMHG | HEIGHT: 60 IN | TEMPERATURE: 98.2 F | BODY MASS INDEX: 28.86 KG/M2

## 2022-05-23 DIAGNOSIS — C54.1 ENDOMETRIAL CANCER (HCC): ICD-10-CM

## 2022-05-23 DIAGNOSIS — C54.1 ENDOMETRIAL CANCER (HCC): Primary | ICD-10-CM

## 2022-05-23 DIAGNOSIS — L89.323 DECUBITUS ULCER OF ISCHIAL AREA, LEFT, STAGE III (HCC): Chronic | ICD-10-CM

## 2022-05-23 DIAGNOSIS — R26.9 GAIT DISTURBANCE: ICD-10-CM

## 2022-05-23 DIAGNOSIS — R60.0 LOWER EXTREMITY EDEMA: ICD-10-CM

## 2022-05-23 LAB — CANCER AG125 SERPL-ACNC: 326.3 U/ML (ref 0–30)

## 2022-05-23 PROCEDURE — 86304 IMMUNOASSAY TUMOR CA 125: CPT

## 2022-05-23 PROCEDURE — 99152 MOD SED SAME PHYS/QHP 5/>YRS: CPT

## 2022-05-23 PROCEDURE — 76937 US GUIDE VASCULAR ACCESS: CPT

## 2022-05-23 PROCEDURE — C1788 PORT, INDWELLING, IMP: HCPCS

## 2022-05-23 PROCEDURE — 77001 FLUOROGUIDE FOR VEIN DEVICE: CPT

## 2022-05-23 PROCEDURE — 76937 US GUIDE VASCULAR ACCESS: CPT | Performed by: RADIOLOGY

## 2022-05-23 PROCEDURE — 99213 OFFICE O/P EST LOW 20 MIN: CPT | Performed by: PHYSICIAN ASSISTANT

## 2022-05-23 PROCEDURE — 36561 INSERT TUNNELED CV CATH: CPT

## 2022-05-23 PROCEDURE — 77001 FLUOROGUIDE FOR VEIN DEVICE: CPT | Performed by: RADIOLOGY

## 2022-05-23 PROCEDURE — 36561 INSERT TUNNELED CV CATH: CPT | Performed by: RADIOLOGY

## 2022-05-23 PROCEDURE — 99152 MOD SED SAME PHYS/QHP 5/>YRS: CPT | Performed by: RADIOLOGY

## 2022-05-23 PROCEDURE — 99153 MOD SED SAME PHYS/QHP EA: CPT

## 2022-05-23 RX ORDER — FENTANYL CITRATE 50 UG/ML
INJECTION, SOLUTION INTRAMUSCULAR; INTRAVENOUS CODE/TRAUMA/SEDATION MEDICATION
Status: COMPLETED | OUTPATIENT
Start: 2022-05-23 | End: 2022-05-23

## 2022-05-23 RX ORDER — LIDOCAINE WITH 8.4% SOD BICARB 0.9%(10ML)
SYRINGE (ML) INJECTION CODE/TRAUMA/SEDATION MEDICATION
Status: COMPLETED | OUTPATIENT
Start: 2022-05-23 | End: 2022-05-23

## 2022-05-23 RX ORDER — CEFAZOLIN SODIUM 1 G/50ML
1000 SOLUTION INTRAVENOUS ONCE
Status: COMPLETED | OUTPATIENT
Start: 2022-05-23 | End: 2022-05-23

## 2022-05-23 RX ORDER — MIDAZOLAM HYDROCHLORIDE 2 MG/2ML
INJECTION, SOLUTION INTRAMUSCULAR; INTRAVENOUS CODE/TRAUMA/SEDATION MEDICATION
Status: COMPLETED | OUTPATIENT
Start: 2022-05-23 | End: 2022-05-23

## 2022-05-23 RX ORDER — SODIUM CHLORIDE 9 MG/ML
75 INJECTION, SOLUTION INTRAVENOUS CONTINUOUS
Status: DISCONTINUED | OUTPATIENT
Start: 2022-05-23 | End: 2022-05-27 | Stop reason: HOSPADM

## 2022-05-23 RX ORDER — LIDOCAINE HYDROCHLORIDE AND EPINEPHRINE 10; 10 MG/ML; UG/ML
INJECTION, SOLUTION INFILTRATION; PERINEURAL CODE/TRAUMA/SEDATION MEDICATION
Status: COMPLETED | OUTPATIENT
Start: 2022-05-23 | End: 2022-05-23

## 2022-05-23 RX ORDER — CEFAZOLIN SODIUM 1 G/50ML
SOLUTION INTRAVENOUS
Status: COMPLETED
Start: 2022-05-23 | End: 2022-05-23

## 2022-05-23 RX ADMIN — LIDOCAINE HYDROCHLORIDE,EPINEPHRINE BITARTRATE 10 ML: 10; .01 INJECTION, SOLUTION INFILTRATION; PERINEURAL at 10:19

## 2022-05-23 RX ADMIN — SODIUM CHLORIDE 75 ML/HR: 0.9 INJECTION, SOLUTION INTRAVENOUS at 09:43

## 2022-05-23 RX ADMIN — MIDAZOLAM HYDROCHLORIDE 1 MG: 1 INJECTION, SOLUTION INTRAMUSCULAR; INTRAVENOUS at 10:19

## 2022-05-23 RX ADMIN — Medication 10 ML: at 10:15

## 2022-05-23 RX ADMIN — FENTANYL CITRATE 50 MCG: 50 INJECTION, SOLUTION INTRAMUSCULAR; INTRAVENOUS at 10:20

## 2022-05-23 RX ADMIN — CEFAZOLIN SODIUM 1000 MG: 1 SOLUTION INTRAVENOUS at 09:44

## 2022-05-23 RX ADMIN — FENTANYL CITRATE 50 MCG: 50 INJECTION, SOLUTION INTRAMUSCULAR; INTRAVENOUS at 10:05

## 2022-05-23 RX ADMIN — MIDAZOLAM HYDROCHLORIDE 1 MG: 1 INJECTION, SOLUTION INTRAMUSCULAR; INTRAVENOUS at 10:05

## 2022-05-23 NOTE — TELEPHONE ENCOUNTER
Patient called to stated that she just got her Mediport placed today and will call the office in the am to see if she is up to coming to the office in the afternoon  States that [facility] is in place if she does not make into the office

## 2022-05-23 NOTE — H&P
Interventional Radiology  History and Physical 5/23/2022     Loree Rose   1950   [id number]    Assessment/Plan:  Plan right internal jugular access, ultrasound guidance for access  Fluoroscopic guidance for tip placement  Plan right act sided access as left is much more involve with lymphadenopathy  Problem List Items Addressed This Visit        Genitourinary    Endometrial cancer ([facility] [address] )    Relevant Orders    IR port placement             Subjective:     Patient ID: Loree Rose is a 66 y o  female  History of Present Illness  Endometrial cancer, for chemotherapy  Review of Systems      Past Medical History:   Diagnosis Date    Anxiety     Arthritis     Bernard's esophagus     Bleeds easily ([address] )     CVA (cerebral vascular accident) ([facility] [address] ) 10/2016    Fibromyalgia     GERD (gastroesophageal reflux disease)     Hypercholesterolemia     Hypertension     Insomnia     Rheumatoid arthritis ([facility] [address] )     Stroke ([facility] [address] ) 2016        Past Surgical History:   Procedure Laterality Date    APPENDECTOMY      BREAST BIOPSY Bilateral     benign    CARPAL TUNNEL RELEASE Left 10/27/2003    DORSAL COMPARTMENT RELEASE Left 03/09/2006    FINGER SURGERY      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    KNEE ARTHROSCOPY Right     x3(6/91,7/96,7/99)    OTHER SURGICAL HISTORY Left 03/09/2006    tennis elbow release    UT DEBRIDEMENT, SKIN, SUB-Q TISSUE,=<20 SQ CM N/A 1/24/2022    Procedure: SACRAL DEBRIDEMENT WOUND AND DRESSING CHANGE ([facility]);   Surgeon: Merly Stephenson MD;  Location: [facility];  Service: General    REPLACEMENT TOTAL KNEE BILATERAL      rt-11/99, lt-1/13/10    ROTATOR CUFF REPAIR Bilateral     SHOULDER ARTHROSCOPY Right 02/10/2016    SHOULDER ARTHROSCOPY Left     11/26/08,9/99    SHOULDER ARTHROSCOPY Left 03/09/2006    TONSILLECTOMY AND ADENOIDECTOMY      TRIGGER FINGER RELEASE Left 07/01/2002    middle and ring finger    ULNAR NERVE TRANSPOSITION Left         Social History Tobacco Use   Smoking Status Former Smoker    Types: Cigarettes    Quit date:     Years since quittin 3   Smokeless Tobacco Never Used        Social History     Substance and Sexual Activity   Alcohol Use Yes    Comment: occassionally        Social History     Substance and Sexual Activity   Drug Use Yes    Types: Marijuana    Comment: medical marijuana        No Known Allergies    Current Outpatient Medications   Medication Sig Dispense Refill    artificial tear (LUBRIFRESH P M ) 83-15 % ophthalmic ointment 1 application daily      atorvastatin (LIPITOR) 40 mg tablet Take 40 mg by mouth daily at bedtime  0    clopidogrel (PLAVIX) 75 mg tablet   0    gabapentin (NEURONTIN) 600 MG tablet Take 600 mg by mouth 3 (three) times a day  0    LORazepam (ATIVAN) 1 mg tablet Take 1 tablet (1 mg total) by mouth every 8 (eight) hours as needed for anxiety (nausea or anxiety) 30 tablet 0    morphine (MS CONTIN) 30 mg 12 hr tablet Take 30 mg by mouth every 12 (twelve) hours      oxyCODONE (ROXICODONE) 10 MG TABS       pantoprazole (PROTONIX) 40 mg tablet   0    traZODone (DESYREL) 50 mg tablet   0    azelastine (ASTELIN) 0 1 % nasal spray 1 spray into each nostril 2 (two) times a day 30 mL 11    famotidine (PEPCID) 20 mg tablet famotidine 20 mg tablet   Take by oral route for 30 days   fluticasone (FLONASE) 50 mcg/act nasal spray 2 sprays into each nostril daily (Patient taking differently: 2 sprays into each nostril daily When needed ) 16 g 11    furosemide (LASIX) 20 mg tablet furosemide 20 mg tablet   Take 1 tablet twice a day by oral route   gentamicin (GARAMYCIN) 0 1 % ointment Apply topically in the morning Apply sparingly to gauze dressing, then place gauze into sacral and ischial wounds and secure with Medipore tape  Change daily and PRN if soiled   30 g 3    lisinopril-hydrochlorothiazide (PRINZIDE,ZESTORETIC) 10-12 5 MG per tablet take 2 tablets by mouth once daily      Melatonin 5 MG TABS Take 5 mg by mouth   (Patient not taking: Reported on 1/26/2022 )      minoxidil (LONITEN) 2 5 mg tablet Take 1 25 mg by mouth daily (Patient not taking: Reported on 3/9/2022 )      morphine (MS CONTIN) 15 mg 12 hr tablet Take 15 mg by mouth 2 (two) times a day (Patient not taking: Reported on 3/31/2022 )  0    naloxone (NARCAN) 4 mg/0 1 mL nasal spray Administer 1 spray into a nostril  If no response after 2-3 minutes, give another dose in the other nostril using a new spray   1 each 1    nystatin (MYCOSTATIN) powder Apply topically 3 (three) times a day 15 g 0    omeprazole (PriLOSEC) 20 mg delayed release capsule omeprazole 20 mg capsule,delayed release   take 1 capsule by mouth twice a day before meals (Patient not taking: Reported on 5/4/2022)      ondansetron (ZOFRAN) 8 mg tablet Take 1 tablet (8 mg total) by mouth every 8 (eight) hours as needed for nausea or vomiting 20 tablet 1    oxyCODONE (OXY-IR) 5 MG capsule Take 5 mg by mouth 2 (two) times a day (Patient not taking: Reported on 2/9/2022 )      oxyCODONE-acetaminophen (PERCOCET) 7 5-325 MG per tablet oxycodone-acetaminophen 7 5 mg-325 mg tablet   1 tab Q8h prn (Patient not taking: Reported on 1/26/2022)      TiZANidine (ZANAFLEX) 2 MG capsule   0    White Petrolatum-Mineral Oil (Soothe Night Time) OINT Apply 1 application to eye daily at bedtime   (Patient not taking: Reported on 5/4/2022 )      zinc gluconate 50 mg tablet Take 50 mg by mouth daily   (Patient not taking: Reported on 5/4/2022 )       Current Facility-Administered Medications   Medication Dose Route Frequency Provider Last Rate Last Admin    ceFAZolin (ANCEF) IVPB (premix in dextrose) 1,000 mg 50 mL  1,000 mg Intravenous Once Jake Easley  mL/hr at 05/23/22 0944 1,000 mg at 05/23/22 0944    sodium chloride 0 9 % infusion  75 mL/hr Intravenous Continuous Jake Easley MD 75 mL/hr at 05/23/22 0943 75 mL/hr at 05/23/22 0943          Objective:    Vitals: 05/23/22 0944   BP: 120/60   Pulse: 81   Resp: 20   Temp: 98 8 °F (37 1 °C)   TempSrc: Temporal   SpO2: 94%   Weight: 66 7 kg (147 lb)   Height: 5' (1 524 m)        Physical Exam  awake alert and oriented  The skin over the right chest is clean, dry, and intact  No jugular venous distention or venous collaterals  Regular rate and rhythm  Lab Results   Component Value Date    BNP 77 01/22/2022      Lab Results   Component Value Date    WBC 4 61 05/17/2022    HGB 8 5 (L) 05/17/2022    HCT 28 8 (L) 05/17/2022    MCV 91 05/17/2022    PLT  05/17/2022      Comment:      Not reported due to platelet clumping  Lab Results   Component Value Date    INR 0 96 01/22/2022    INR 1 05 12/28/2021    INR 1 01 10/28/2021    PROTIME 12 7 01/22/2022    PROTIME 13 3 12/28/2021    PROTIME 13 4 10/28/2021     Lab Results   Component Value Date    PTT 30 01/22/2022         I have personally reviewed pertinent imaging and laboratory results  Code Status: Prior  Advance Directive and Living Will:      Power of :    POLST:      This text is generated with voice recognition software  There may be translation, syntax,  or grammatical errors  If you have any questions, please contact the dictating provider

## 2022-05-23 NOTE — DISCHARGE INSTRUCTIONS
[facility]  Interventional Radiology  Dr Chela Mtz  [phone]       Implanted Venous Access Port         WHAT YOU NEED TO KNOW:   An implanted venous access port is a device used to give treatments and take blood  It may also be called a central venous access device (CVAD)  The port is a small container that is placed under your skin, usually in your upper chest  The port is attached to a catheter that enters a large vein  DISCHARGE INSTRUCTIONS:   Resume your normal diet  Small sips of flat soda will help with mild nausea  Prevent an infection:   Wash your hands often  Use soap and water  Clean your hands before and after you care for your port  Remind everyone who cares for your port to wash their hands  Check your skin for infection every day  Look for redness, swelling, or fluid oozing from the port site  Care for your port:   1  You may shower beginning 48 hours after procedure  2   Leave glue in place  3  It is normal for some bruising to occur  4  Use Tylenol for pain  5  Limit use of arm on the side that your port was placed  Lift nothing heavier than 5 pounds for 1 week, and then gradually increase activity as tolerated  6  DO NOT apply ointment, lotion or cream to port site until incision is healed  Allow glue to fall off  DO NOT attempt to peel glue from skin even it it begins to flake  7  After the port incision is healed you may swim, bathe  Notify the Interventional Radiologist if you have any of the followin  Fever above 101 F    2  Increased redness or swelling after 1st day  3  Increased pain after 1st day  4  Any sign of infection (drainage from port site, skin separation, hot to touch)  5  Persistent nausea or vomiting  Contact Interventional Radiology at [phone] [facility] PATIENTS: Contact Interventional Radiology at [phone]) ([address] [facility] [address]: Contact Interventional Radiology at [phone])

## 2022-05-23 NOTE — PROGRESS NOTES
Virtual Regular Visit    Verification of patient location:    Patient is located in the following state in which I hold an active license [place]      Assessment/Plan:    Problem List Items Addressed This Visit        Genitourinary    Endometrial cancer ([facility] [address] ) - Primary     Stage IVB gynecologic malignancy with supraclavicular, mediastinal and retroperitoneal lymph node metastases currently receiving neoadjuvant chemotherapy with taxol 135 mg/m2 and carboplatin AUC 5 every 21 days  She has stage III pressure ulcers and worsening gait disturbance  Overall, tolerated treatment well outside of gait issues  Will plan for STAT MRI brain  CBC/Diff, CMP, MG and  from 5/20/22 reviewed  Renal function improved after IVF on 5/20/22   is declining  Will proceed with cycle 2 of treatment as scheduled Return to the office as per her chemotherapy calendar  Relevant Orders    MRI brain w wo contrast       Other    Decubitus ulcer of ischial area, left, stage III (HCC) (Chronic)     Continue f/u with wound care, VNA  Lower extremity edema     F/U PCP to discuss lasix dosing  Gait disturbance     Worsening gait disturbance/imbalance  No headaches, blurred or double vision  Plan for STAT MRI brain to r/o brain mets  Can also consider referral to PT/OT  Relevant Orders    MRI brain w wo contrast               Reason for visit is   Chief Complaint   Patient presents with    Virtual Regular Visit        Encounter provider Doroteo Chappell PA-C    Provider located at [address] [address] [phone]      Recent Visits  No visits were found meeting these conditions    Showing recent visits within past 7 days and meeting all other requirements  Today's Visits  Date Type Provider Dept   05/23/22 Telemedicine Doroteo Chappell PA-C [facility] [facility]   Showing today's visits and meeting all other requirements  Future Appointments  No visits were found meeting these conditions  Showing future appointments within next 150 days and meeting all other requirements       The patient was identified by name and date of birth  Rogelio Keller was informed that this is a telemedicine visit and that the visit is being conducted through Telephone  My office door was closed  No one else was in the room  She acknowledged consent and understanding of privacy and security of the video platform  The patient has agreed to participate and understands they can discontinue the visit at any time  Patient is aware this is a billable service  Meredith Finnegan is a 66 y o  female   who presents virtually for pre-chemotherapy evaluation  Most of the visit was performed with the daughter, who is the patient's primary caretaker at this time  She notes the patient has been afebrile  Overall, immediately following cycle 1 the patient did well  She had a few days of intermittent diarrhea and fatigue  The patient's daughter notes this weekend, she has experienced several falls and feels unsteady of her feet  The daughter is unsure of the exact cause, but notes her bilateral leg swelling is slightly worse  She notes minimal neuropathy of her fingers and toes, but this is not interfering with ADLs/Gait  The patient denies headaches, blurred vision  The daughter notes wound care/nurses continue to evaluate her pressure ulcer in the home  The daughter is also performing local wound care        Oncology History   Endometrial cancer ([facility] [address] )   4/26/2022 Initial Diagnosis    Endometrial cancer ([facility] [address] )     4/26/2022 -  Cancer Staged    Staging form: Corpus Uteri - Carcinoma, AJCC 8th Edition  - Clinical: FIGO Stage IVB (cT2, cN2a, cM1) - Signed by Rober Awad MD on 4/26/2022  Histologic grade (G): GX  Histologic grading system: 3 grade system       5/5/2022 -  Chemotherapy    Taxol 135 mg/m2 and carboplatin AUC 5 every 21 days  Past Medical History:   Diagnosis Date    Anxiety     Arthritis     Bernard's esophagus     Bleeds easily ([facility] [address] )     CVA (cerebral vascular accident) ([facility] [address] ) 10/2016    Fibromyalgia     GERD (gastroesophageal reflux disease)     Hypercholesterolemia     Hypertension     Insomnia     Rheumatoid arthritis ([facility] [address] )     Stroke ([facility] ) 2016       Past Surgical History:   Procedure Laterality Date    APPENDECTOMY      BREAST BIOPSY Bilateral     benign    CARPAL TUNNEL RELEASE Left 10/27/2003    DORSAL COMPARTMENT RELEASE Left 03/09/2006    FINGER SURGERY      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    KNEE ARTHROSCOPY Right     x3(6/91,7/96,7/99)    OTHER SURGICAL HISTORY Left 03/09/2006    tennis elbow release    WI DEBRIDEMENT, SKIN, SUB-Q TISSUE,=<20 SQ CM N/A 1/24/2022    Procedure: SACRAL DEBRIDEMENT WOUND AND DRESSING CHANGE ([facility]); Surgeon: Jack Martell MD;  Location: [facility];  Service: General    REPLACEMENT TOTAL KNEE BILATERAL      rt-11/99, lt-1/13/10    ROTATOR CUFF REPAIR Bilateral     SHOULDER ARTHROSCOPY Right 02/10/2016    SHOULDER ARTHROSCOPY Left     11/26/08,9/99    SHOULDER ARTHROSCOPY Left 03/09/2006    TONSILLECTOMY AND ADENOIDECTOMY      TRIGGER FINGER RELEASE Left 07/01/2002    middle and ring finger    ULNAR NERVE TRANSPOSITION Left        Current Outpatient Medications   Medication Sig Dispense Refill    artificial tear (LUBRIFRESH P M ) 83-15 % ophthalmic ointment 1 application daily      atorvastatin (LIPITOR) 40 mg tablet Take 40 mg by mouth daily at bedtime  0    azelastine (ASTELIN) 0 1 % nasal spray 1 spray into each nostril 2 (two) times a day 30 mL 11    clopidogrel (PLAVIX) 75 mg tablet   0    famotidine (PEPCID) 20 mg tablet famotidine 20 mg tablet   Take by oral route for 30 days        fluticasone (FLONASE) 50 mcg/act nasal spray 2 sprays into each nostril daily (Patient taking differently: 2 sprays into each nostril daily When needed ) 16 g 11    furosemide (LASIX) 20 mg tablet furosemide 20 mg tablet   Take 1 tablet twice a day by oral route   gabapentin (NEURONTIN) 600 MG tablet Take 600 mg by mouth 3 (three) times a day  0    gentamicin (GARAMYCIN) 0 1 % ointment Apply topically in the morning Apply sparingly to gauze dressing, then place gauze into sacral and ischial wounds and secure with Medipore tape  Change daily and PRN if soiled  30 g 3    lisinopril-hydrochlorothiazide (PRINZIDE,ZESTORETIC) 10-12 5 MG per tablet take 2 tablets by mouth once daily      LORazepam (ATIVAN) 1 mg tablet Take 1 tablet (1 mg total) by mouth every 8 (eight) hours as needed for anxiety (nausea or anxiety) 30 tablet 0    Melatonin 5 MG TABS Take 5 mg by mouth   (Patient not taking: Reported on 1/26/2022 )      minoxidil (LONITEN) 2 5 mg tablet Take 1 25 mg by mouth daily (Patient not taking: Reported on 3/9/2022 )      morphine (MS CONTIN) 15 mg 12 hr tablet Take 15 mg by mouth 2 (two) times a day (Patient not taking: Reported on 3/31/2022 )  0    morphine (MS CONTIN) 30 mg 12 hr tablet Take 30 mg by mouth every 12 (twelve) hours      naloxone (NARCAN) 4 mg/0 1 mL nasal spray Administer 1 spray into a nostril  If no response after 2-3 minutes, give another dose in the other nostril using a new spray   1 each 1    nystatin (MYCOSTATIN) powder Apply topically 3 (three) times a day 15 g 0    omeprazole (PriLOSEC) 20 mg delayed release capsule omeprazole 20 mg capsule,delayed release   take 1 capsule by mouth twice a day before meals (Patient not taking: Reported on 5/4/2022)      ondansetron (ZOFRAN) 8 mg tablet Take 1 tablet (8 mg total) by mouth every 8 (eight) hours as needed for nausea or vomiting 20 tablet 1    oxyCODONE (OXY-IR) 5 MG capsule Take 5 mg by mouth 2 (two) times a day (Patient not taking: Reported on 2/9/2022 )      oxyCODONE (ROXICODONE) 10 MG TABS       oxyCODONE-acetaminophen (PERCOCET) 7 5-325 MG per tablet oxycodone-acetaminophen 7 5 mg-325 mg tablet   1 tab Q8h prn (Patient not taking: Reported on 1/26/2022)      pantoprazole (PROTONIX) 40 mg tablet   0    TiZANidine (ZANAFLEX) 2 MG capsule   0    traZODone (DESYREL) 50 mg tablet   0    White Petrolatum-Mineral Oil (Soothe Night Time) OINT Apply 1 application to eye daily at bedtime   (Patient not taking: Reported on 5/4/2022 )      zinc gluconate 50 mg tablet Take 50 mg by mouth daily   (Patient not taking: Reported on 5/4/2022 )       No current facility-administered medications for this visit  Facility-Administered Medications Ordered in Other Visits   Medication Dose Route Frequency Provider Last Rate Last Admin    sodium chloride 0 9 % infusion  75 mL/hr Intravenous Continuous Clementina Gil MD   Stopped at 05/23/22 1207        No Known Allergies    Review of Systems   Constitutional: Positive for fatigue  Negative for fever  HENT: Negative  Eyes: Negative  Respiratory: Negative  Cardiovascular: Positive for leg swelling  Gastrointestinal: Negative  Genitourinary: Negative  Musculoskeletal: Positive for gait problem, neck pain and neck stiffness  Skin: Negative  Neurological: Positive for weakness (legs) and numbness (minimal )  Negative for dizziness, syncope, speech difficulty and headaches  Psychiatric/Behavioral: Negative  Video Exam    There were no vitals filed for this visit  No video capabilities available  Visit performed via audio only  I spent 25 minutes with patient today in which greater than 50% of the time was spent in counseling/coordination of care regarding chemotheray    VIRTUAL VISIT [address] verbally agrees to participate in [employer]   Pt is aware that [employer] could be limited without vital signs or the ability to perform a full hands-on physical exam  Luda Cr understands she or the provider may request at any time to terminate the video visit and request the patient to seek care or treatment in person

## 2022-05-23 NOTE — PROCEDURES
Interventional Radiology Procedure Note    PATIENT NAME: Venessa Dumont  : 1950  MRN: [id number]     Pre-op Diagnosis:   1  Endometrial cancer ([facility] [address] )      2  Metastatic disease    Post-op Diagnosis:   1  Endometrial cancer ([facility] [address] )      2  Same    Procedure: Port placement using sonographic and fluoroscopic guidance    Surgeon:   Toya Tracey MD  Assistants:     No qualified resident was available, Resident is only observing    Estimated Blood Loss: < 5cc    Findings: Tip location right atrium   Ready to use    Specimens: None     Complications:  None     Anesthesia: Local and monitored intravenous conscious sedation      Toya Tracey MD     Date: 2022  Time: 11:47 AM

## 2022-05-24 NOTE — ASSESSMENT & PLAN NOTE
Stage IVB gynecologic malignancy with supraclavicular, mediastinal and retroperitoneal lymph node metastases currently receiving neoadjuvant chemotherapy with taxol 135 mg/m2 and carboplatin AUC 5 every 21 days  She has stage III pressure ulcers and worsening gait disturbance  Overall, tolerated treatment well outside of gait issues  Will plan for STAT MRI brain  CBC/Diff, CMP, MG and  from 5/20/22 reviewed  Renal function improved after IVF on 5/20/22   is declining  Will proceed with cycle 2 of treatment as scheduled Return to the office as per her chemotherapy calendar

## 2022-05-24 NOTE — ASSESSMENT & PLAN NOTE
Worsening gait disturbance/imbalance  No headaches, blurred or double vision  Plan for STAT MRI brain to r/o brain mets  Can also consider referral to PT/OT

## 2022-05-25 DIAGNOSIS — C54.1 ENDOMETRIAL CANCER (HCC): Primary | ICD-10-CM

## 2022-05-25 RX ORDER — PALONOSETRON 0.05 MG/ML
0.25 INJECTION, SOLUTION INTRAVENOUS ONCE
Status: CANCELLED | OUTPATIENT
Start: 2022-05-26

## 2022-05-25 RX ORDER — SODIUM CHLORIDE 9 MG/ML
20 INJECTION, SOLUTION INTRAVENOUS ONCE
Status: CANCELLED | OUTPATIENT
Start: 2022-05-26

## 2022-05-26 ENCOUNTER — HOSPITAL ENCOUNTER (OUTPATIENT)
Dept: INFUSION CENTER | Facility: HOSPITAL | Age: 72
Discharge: HOME | End: 2022-05-26
Attending: OBSTETRICS & GYNECOLOGY
Payer: COMMERCIAL

## 2022-05-26 ENCOUNTER — TELEPHONE (OUTPATIENT)
Dept: SURGICAL ONCOLOGY | Facility: CLINIC | Age: 72
End: 2022-05-26

## 2022-05-26 ENCOUNTER — HOSPITAL ENCOUNTER (EMERGENCY)
Facility: HOSPITAL | Age: 72
Discharge: HOME | End: 2022-05-26
Attending: EMERGENCY MEDICINE
Payer: COMMERCIAL

## 2022-05-26 VITALS
BODY MASS INDEX: 27.96 KG/M2 | SYSTOLIC BLOOD PRESSURE: 139 MMHG | HEART RATE: 92 BPM | HEIGHT: 60 IN | DIASTOLIC BLOOD PRESSURE: 88 MMHG | TEMPERATURE: 96.9 F | RESPIRATION RATE: 24 BRPM | OXYGEN SATURATION: 99 % | WEIGHT: 142.42 LBS

## 2022-05-26 VITALS
DIASTOLIC BLOOD PRESSURE: 68 MMHG | TEMPERATURE: 98.1 F | OXYGEN SATURATION: 96 % | HEART RATE: 98 BPM | RESPIRATION RATE: 18 BRPM | SYSTOLIC BLOOD PRESSURE: 133 MMHG

## 2022-05-26 DIAGNOSIS — T78.40XA ALLERGIC REACTION, INITIAL ENCOUNTER: Primary | ICD-10-CM

## 2022-05-26 DIAGNOSIS — C54.1 ENDOMETRIAL CANCER (HCC): Primary | ICD-10-CM

## 2022-05-26 PROCEDURE — 96367 TX/PROPH/DG ADDL SEQ IV INF: CPT

## 2022-05-26 PROCEDURE — 94640 AIRWAY INHALATION TREATMENT: CPT

## 2022-05-26 PROCEDURE — 99284 EMERGENCY DEPT VISIT MOD MDM: CPT | Performed by: EMERGENCY MEDICINE

## 2022-05-26 PROCEDURE — 96376 TX/PRO/DX INJ SAME DRUG ADON: CPT

## 2022-05-26 PROCEDURE — 96409 CHEMO IV PUSH SNGL DRUG: CPT

## 2022-05-26 PROCEDURE — 96375 TX/PRO/DX INJ NEW DRUG ADDON: CPT

## 2022-05-26 PROCEDURE — 99283 EMERGENCY DEPT VISIT LOW MDM: CPT

## 2022-05-26 RX ORDER — FAMOTIDINE 20 MG/50ML
20 INJECTION, SOLUTION INTRAVENOUS ONCE
Status: COMPLETED | OUTPATIENT
Start: 2022-05-26 | End: 2022-05-26

## 2022-05-26 RX ORDER — ALBUTEROL SULFATE 2.5 MG/3ML
2.5 SOLUTION RESPIRATORY (INHALATION) ONCE
Status: COMPLETED | OUTPATIENT
Start: 2022-05-26 | End: 2022-05-26

## 2022-05-26 RX ORDER — DIPHENHYDRAMINE HYDROCHLORIDE 50 MG/ML
50 INJECTION INTRAMUSCULAR; INTRAVENOUS ONCE
Status: COMPLETED | OUTPATIENT
Start: 2022-05-26 | End: 2022-05-26

## 2022-05-26 RX ORDER — PREDNISONE 20 MG/1
40 TABLET ORAL DAILY
Qty: 6 TABLET | Refills: 0 | Status: SHIPPED | OUTPATIENT
Start: 2022-05-26 | End: 2022-07-21

## 2022-05-26 RX ORDER — PALONOSETRON 0.05 MG/ML
0.25 INJECTION, SOLUTION INTRAVENOUS ONCE
Status: COMPLETED | OUTPATIENT
Start: 2022-05-26 | End: 2022-05-26

## 2022-05-26 RX ORDER — SODIUM CHLORIDE 9 MG/ML
20 INJECTION, SOLUTION INTRAVENOUS ONCE
Status: COMPLETED | OUTPATIENT
Start: 2022-05-26 | End: 2022-05-26

## 2022-05-26 RX ADMIN — ALBUTEROL SULFATE 2.5 MG: 2.5 SOLUTION RESPIRATORY (INHALATION) at 10:34

## 2022-05-26 RX ADMIN — DIPHENHYDRAMINE HYDROCHLORIDE 50 MG: 50 INJECTION INTRAMUSCULAR; INTRAVENOUS at 10:31

## 2022-05-26 RX ADMIN — FAMOTIDINE 20 MG: 10 INJECTION INTRAVENOUS at 09:17

## 2022-05-26 RX ADMIN — PALONOSETRON 0.25 MG: 0.05 INJECTION, SOLUTION INTRAVENOUS at 09:39

## 2022-05-26 RX ADMIN — FOSAPREPITANT 150 MG: 150 INJECTION, POWDER, LYOPHILIZED, FOR SOLUTION INTRAVENOUS at 09:41

## 2022-05-26 RX ADMIN — DIPHENHYDRAMINE HYDROCHLORIDE 25 MG: 50 INJECTION INTRAMUSCULAR; INTRAVENOUS at 08:25

## 2022-05-26 RX ADMIN — FAMOTIDINE 20 MG: 20 INJECTION, SOLUTION INTRAVENOUS at 10:32

## 2022-05-26 RX ADMIN — SODIUM CHLORIDE 20 ML/HR: 0.9 INJECTION, SOLUTION INTRAVENOUS at 08:21

## 2022-05-26 RX ADMIN — DEXAMETHASONE SODIUM PHOSPHATE 20 MG: 10 INJECTION, SOLUTION INTRAMUSCULAR; INTRAVENOUS at 08:46

## 2022-05-26 RX ADMIN — PACLITAXEL 225.6 MG: 6 INJECTION, SOLUTION INTRAVENOUS at 10:19

## 2022-05-26 RX ADMIN — HYDROCORTISONE SODIUM SUCCINATE 100 MG: 100 INJECTION, POWDER, FOR SOLUTION INTRAMUSCULAR; INTRAVENOUS at 10:32

## 2022-05-26 NOTE — DISCHARGE INSTRUCTIONS
Return to the ER with any new, concerning, worsening issues  Otherwise, please follow-up with your oncologist as far is changes to your treatment after this reaction  You may want to consider using Benadryl every 6 hours as well for the next 2 days  Please do not drive or operate heavy machinery on Benadryl  Please call your oncologist within the next 24-48 hours

## 2022-05-26 NOTE — PROGRESS NOTES
Pt called to go to bathroom at 1030  Face was deep red/prurple  Pt complained of need to urinate, "bad back pain", chest pain, nausea, "I feel like im gonna pass out!"  o2 sat 82% on room air  Placed on 6 liters nc, taxol stopped, hypersensitivty kit given with full doses  Albuterol neb treatment given  Pt still rated pain in back and "hard" chest pressure of 8/10  Oxygen came up slowly to 93% on 6 l at pepcid completion  C/o nausea  No emesis  B/p noted to be significantly higher than baseline (166/100)  Teams messaged lenore Weems while ems summoned  At [phone], pt stated her back pain and chest pain was down to a 2/10  o2 was 99 on 3 liters nc  Facial flushing had dissipated  Pt reported feeling much better but "still a bit of back pain"  Pts dtr notified that EMS had arrived and was taking pt to [facility]  [place] colored bag sent with ems at pts feet however walker was kept here for dtr  Report to pall[facility] ed

## 2022-05-26 NOTE — ED PROVIDER NOTES
History  Chief Complaint   Patient presents with    Allergic Reaction     Patient reports to ED via ems from the infusion center for an allergic reaction to taxol   Patient became red and  O2 saturation decreased        71-year-old female presents from the infusion center after getting her 2nd dose of Taxol for endometrial cancer  The patient apparently had some shortness of breath and was red in color with getting her medication  The patient got steroids Pepcid and Benadryl at the facility and was sent to the emergency department  The patient did not receive epinephrine  The patient also had nebulizer treatment in route and arrives to the ED with no complaints  The patient notes the redness has dissipated and she no longer feels short of breath  Patient denies any other symptoms at this time such as chest pain headache or nausea vomiting  It is unclear whether they finished the dose of Taxol          Prior to Admission Medications   Prescriptions Last Dose Informant Patient Reported? Taking?    LORazepam (ATIVAN) 1 mg tablet  Self No No   Sig: Take 1 tablet (1 mg total) by mouth every 8 (eight) hours as needed for anxiety (nausea or anxiety)   Melatonin 5 MG TABS   Yes No   Sig: Take 5 mg by mouth     Patient not taking: Reported on 2022    TiZANidine (ZANAFLEX) 2 MG capsule  Self Yes No   White Petrolatum-Mineral Oil (Soothe Night Time) OINT   Yes No   Sig: Apply 1 application to eye daily at bedtime     Patient not taking: Reported on 2022    artificial tear (LUBRIFRESH P M ) 83-15 % ophthalmic ointment  Self Yes No   Si application daily   atorvastatin (LIPITOR) 40 mg tablet  Self Yes No   Sig: Take 40 mg by mouth daily at bedtime   azelastine (ASTELIN) 0 1 % nasal spray  Self No No   Si spray into each nostril 2 (two) times a day   clopidogrel (PLAVIX) 75 mg tablet  Self Yes No   famotidine (PEPCID) 20 mg tablet  Self Yes No   Sig: famotidine 20 mg tablet   Take by oral route for 30 days    fluticasone (FLONASE) 50 mcg/act nasal spray  Self No No   Si sprays into each nostril daily   Patient taking differently: 2 sprays into each nostril daily When needed    furosemide (LASIX) 20 mg tablet  Self Yes No   Sig: furosemide 20 mg tablet   Take 1 tablet twice a day by oral route    gabapentin (NEURONTIN) 600 MG tablet  Self Yes No   Sig: Take 600 mg by mouth 3 (three) times a day   gentamicin (GARAMYCIN) 0 1 % ointment   No No   Sig: Apply topically in the morning Apply sparingly to gauze dressing, then place gauze into sacral and ischial wounds and secure with Medipore tape  Change daily and PRN if soiled  lisinopril-hydrochlorothiazide (PRINZIDE,ZESTORETIC) 10-12 5 MG per tablet  Self Yes No   Sig: take 2 tablets by mouth once daily   minoxidil (LONITEN) 2 5 mg tablet   Yes No   Sig: Take 1 25 mg by mouth daily   Patient not taking: Reported on 3/9/2022    morphine (MS CONTIN) 15 mg 12 hr tablet   Yes No   Sig: Take 15 mg by mouth 2 (two) times a day   Patient not taking: Reported on 3/31/2022    morphine (MS CONTIN) 30 mg 12 hr tablet  Self Yes No   Sig: Take 30 mg by mouth every 12 (twelve) hours   naloxone (NARCAN) 4 mg/0 1 mL nasal spray  Self No No   Sig: Administer 1 spray into a nostril  If no response after 2-3 minutes, give another dose in the other nostril using a new spray     nystatin (MYCOSTATIN) powder  Self No No   Sig: Apply topically 3 (three) times a day   omeprazole (PriLOSEC) 20 mg delayed release capsule  Self Yes No   Sig: omeprazole 20 mg capsule,delayed release   take 1 capsule by mouth twice a day before meals   Patient not taking: Reported on 2022   ondansetron (ZOFRAN) 8 mg tablet  Self No No   Sig: Take 1 tablet (8 mg total) by mouth every 8 (eight) hours as needed for nausea or vomiting   oxyCODONE (OXY-IR) 5 MG capsule   Yes No   Sig: Take 5 mg by mouth 2 (two) times a day   Patient not taking: Reported on 2022    oxyCODONE (ROXICODONE) 10 MG TABS  Self Yes No   oxyCODONE-acetaminophen (PERCOCET) 7 5-325 MG per tablet   Yes No   Sig: oxycodone-acetaminophen 7 5 mg-325 mg tablet   1 tab Q8h prn   Patient not taking: Reported on 1/26/2022   pantoprazole (PROTONIX) 40 mg tablet  Self Yes No   traZODone (DESYREL) 50 mg tablet  Self Yes No   zinc gluconate 50 mg tablet   Yes No   Sig: Take 50 mg by mouth daily     Patient not taking: Reported on 5/4/2022       Facility-Administered Medications: None       Past Medical History:   Diagnosis Date    Anxiety     Arthritis     Bernard's esophagus     [facility]eds easily [facility] )     CVA (cerebral vascular accident) ([place] [address] ) 10/2016    Fibromyalgia     GERD (gastroesophageal reflux disease)     Hypercholesterolemia     Hypertension     Insomnia     Rheumatoid arthritis ([place] [address] )     Stroke ([place] [address] ) 2016       Past Surgical History:   Procedure Laterality Date    APPENDECTOMY      BREAST BIOPSY Bilateral     benign    CARPAL TUNNEL RELEASE Left 10/27/2003    DORSAL COMPARTMENT RELEASE Left 03/09/2006    FINGER SURGERY      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    IR PORT PLACEMENT  5/23/2022    KNEE ARTHROSCOPY Right     x3(6/91,7/96,7/99)    OTHER SURGICAL HISTORY Left 03/09/2006    tennis elbow release    VT DEBRIDEMENT, SKIN, SUB-Q TISSUE,=<20 SQ CM N/A 1/24/2022    Procedure: SACRAL DEBRIDEMENT WOUND AND DRESSING CHANGE ([address] OUT);   Surgeon: Sri Lares MD;  [facility];  Service: General    REPLACEMENT TOTAL KNEE BILATERAL      rt-11/99, lt-1/13/10    ROTATOR CUFF REPAIR Bilateral     SHOULDER ARTHROSCOPY Right 02/10/2016    SHOULDER ARTHROSCOPY Left     11/26/08,9/99    SHOULDER ARTHROSCOPY Left 03/09/2006    TONSILLECTOMY AND ADENOIDECTOMY      TRIGGER FINGER RELEASE Left 07/01/2002    middle and ring finger    ULNAR NERVE TRANSPOSITION Left        Family History   Problem Relation Age of Onset    Arthritis Mother     Lung cancer Father     Brain cancer Father     No Known Problems Daughter  No Known Problems Maternal Grandmother     No Known Problems Maternal Grandfather     No Known Problems Paternal Grandmother     No Known Problems Paternal Grandfather     No Known Problems Daughter     No Known Problems Maternal Aunt     No Known Problems Paternal Aunt     No Known Problems Paternal Aunt     No Known Problems Paternal Aunt      I have reviewed and agree with the history as documented  E-Cigarette/Vaping    E-Cigarette Use Never User      E-Cigarette/Vaping Substances    Nicotine No     THC No     CBD No     Flavoring No     Other No     Unknown No      Social History     Tobacco Use    Smoking status: Former Smoker     Types: Cigarettes     Quit date:      Years since quittin 4    Smokeless tobacco: Never Used   Vaping Use    Vaping Use: Never used   Substance Use Topics    Alcohol use: Yes     Comment: occassionally    Drug use: Yes     Types: Marijuana     Comment: medical marijuana       Review of Systems   Constitutional: Negative for chills and fever  HENT: Negative for ear pain and sore throat  Eyes: Negative for pain and visual disturbance  Respiratory: Positive for shortness of breath  Negative for cough  Cardiovascular: Negative for chest pain and palpitations  Gastrointestinal: Negative for abdominal pain and vomiting  Genitourinary: Negative for dysuria and hematuria  Musculoskeletal: Negative for arthralgias and back pain  Skin: Positive for rash  Negative for color change  Neurological: Negative for seizures and syncope  All other systems reviewed and are negative  Physical Exam  Physical Exam  Vitals and nursing note reviewed  Constitutional:       General: She is not in acute distress  Appearance: Normal appearance  She is well-developed  HENT:      Head: Normocephalic and atraumatic        Right Ear: External ear normal       Left Ear: External ear normal       Nose: Nose normal       Mouth/Throat:      Mouth: Mucous membranes are moist       Pharynx: Oropharynx is clear  Comments: No oropharyngeal edema  Eyes:      Conjunctiva/sclera: Conjunctivae normal    Neck:      Comments: No stridor  Cardiovascular:      Rate and Rhythm: Normal rate and regular rhythm  Pulses: Normal pulses  Heart sounds: Normal heart sounds  No murmur heard  Pulmonary:      Effort: Pulmonary effort is normal  No respiratory distress  Breath sounds: Normal breath sounds  No stridor  No wheezing or rhonchi  Abdominal:      General: Bowel sounds are normal       Palpations: Abdomen is soft  Tenderness: There is no abdominal tenderness  Musculoskeletal:         General: No swelling or deformity  Cervical back: Neck supple  No rigidity or tenderness  Skin:     General: Skin is warm and dry  Capillary Refill: Capillary refill takes less than 2 seconds  Neurological:      General: No focal deficit present  Mental Status: She is alert and oriented to person, place, and time  Mental status is at baseline  Vital Signs  ED Triage Vitals [05/26/22 1136]   Temperature Pulse Respirations Blood Pressure SpO2   98 1 °F (36 7 °C) 74 18 141/72 98 %      Temp src Heart Rate Source Patient Position - Orthostatic VS BP Location FiO2 (%)   -- -- Sitting Left arm --      Pain Score       No Pain           Vitals:    05/26/22 1136 05/26/22 1323   BP: 141/72 133/68   Pulse: 74 98   Patient Position - Orthostatic VS: Sitting          Visual Acuity      ED Medications  Medications - No data to display    Diagnostic Studies  Results Reviewed     None                 No orders to display              Procedures  Procedures         ED Course  ED Course as of 05/26/22 1426   Thu May 26, 2022   1137 Patient has been medicated appropriately and is doing better  The patient will be monitored for about 30 minutes  If doing well will discharge    Patient can follow up with her oncologist regarding any change in medication/treatment  The patient will be placed on steroids for 4 days  Is recommended the patient continue to take Benadryl every 6 hours  For to 3 more days                                             MDM    Disposition  Final diagnoses: Allergic reaction, initial encounter     Time reflects when diagnosis was documented in both MDM as applicable and the Disposition within this note     Time User Action Codes Description Comment    5/26/2022 11:37 AM JosieDeya Add [[id number] [id number]] Allergic reaction, initial encounter       ED Disposition     ED Disposition   Discharge    Condition   Stable    Date/Time   Thu May 26, 2022 12:19 PM    Comment   Hossein Kirby discharge to home/self care  Follow-up Information     Follow up With Specialties Details Why Dinh Major MD Internal Medicine On 5/31/2022  [address] [place] [address]  [phone]            Discharge Medication List as of 5/26/2022 12:20 PM      START taking these medications    Details   predniSONE 20 mg tablet Take 2 tablets (40 mg total) by mouth daily Start medication tomorrow 05/27/2022, Starting Thu 5/26/2022, Normal         CONTINUE these medications which have NOT CHANGED    Details   !! artificial tear (LUBRIFRESH P M ) 83-15 % ophthalmic ointment 1 application daily, Historical Med      atorvastatin (LIPITOR) 40 mg tablet Take 40 mg by mouth daily at bedtime, Starting Thu 7/18/2019, Historical Med      azelastine (ASTELIN) 0 1 % nasal spray 1 spray into each nostril 2 (two) times a day, Starting Mon 9/9/2019, Normal      clopidogrel (PLAVIX) 75 mg tablet Starting Fri 6/28/2019, Historical Med      famotidine (PEPCID) 20 mg tablet famotidine 20 mg tablet   Take by oral route for 30 days  , Historical Med      fluticasone (FLONASE) 50 mcg/act nasal spray 2 sprays into each nostril daily, Starting Mon 6/7/2021, Until Mon 5/9/2022, Normal      furosemide (LASIX) 20 mg tablet furosemide 20 mg tablet Take 1 tablet twice a day by oral route , Historical Med      gabapentin (NEURONTIN) 600 MG tablet Take 600 mg by mouth 3 (three) times a day, Starting Mon 8/12/2019, Historical Med      gentamicin (GARAMYCIN) 0 1 % ointment Apply topically in the morning Apply sparingly to gauze dressing, then place gauze into sacral and ischial wounds and secure with Medipore tape  Change daily and PRN if soiled  , Starting Tue 5/10/2022, Normal      lisinopril-hydrochlorothiazide (PRINZIDE,ZESTORETIC) 10-12 5 MG per tablet take 2 tablets by mouth once daily, Historical Med      LORazepam (ATIVAN) 1 mg tablet Take 1 tablet (1 mg total) by mouth every 8 (eight) hours as needed for anxiety (nausea or anxiety), Starting Thu 4/28/2022, Normal      Melatonin 5 MG TABS Take 5 mg by mouth  , Historical Med      minoxidil (LONITEN) 2 5 mg tablet Take 1 25 mg by mouth daily, Starting Thu 2/24/2022, Historical Med      !! morphine (MS CONTIN) 15 mg 12 hr tablet Take 15 mg by mouth 2 (two) times a day, Starting Wed 8/28/2019, Historical Med      !! morphine (MS CONTIN) 30 mg 12 hr tablet Take 30 mg by mouth every 12 (twelve) hours, Starting Mon 2/14/2022, Historical Med      naloxone (NARCAN) 4 mg/0 1 mL nasal spray Administer 1 spray into a nostril   If no response after 2-3 minutes, give another dose in the other nostril using a new spray , Normal      nystatin (MYCOSTATIN) powder Apply topically 3 (three) times a day, Starting Mon 2/21/2022, Normal      omeprazole (PriLOSEC) 20 mg delayed release capsule omeprazole 20 mg capsule,delayed release   take 1 capsule by mouth twice a day before meals, Historical Med      ondansetron (ZOFRAN) 8 mg tablet Take 1 tablet (8 mg total) by mouth every 8 (eight) hours as needed for nausea or vomiting, Starting Thu 4/28/2022, Normal      oxyCODONE (OXY-IR) 5 MG capsule Take 5 mg by mouth 2 (two) times a day, Historical Med      oxyCODONE (ROXICODONE) 10 MG TABS Starting Mon 1/31/2022, Historical Med oxyCODONE-acetaminophen (PERCOCET) 7 5-325 MG per tablet oxycodone-acetaminophen 7 5 mg-325 mg tablet   1 tab Q8h prn, Historical Med      pantoprazole (PROTONIX) 40 mg tablet Starting Mon 8/12/2019, Historical Med      TiZANidine (ZANAFLEX) 2 MG capsule Starting Fri 8/30/2019, Historical Med      traZODone (DESYREL) 50 mg tablet Starting Mon 8/12/2019, Historical Med      !! White Petrolatum-Mineral Oil (Soothe Night Time) OINT Apply 1 application to eye daily at bedtime  , Historical Med      zinc gluconate 50 mg tablet Take 50 mg by mouth daily  , Historical Med       !! - Potential duplicate medications found  Please discuss with provider  No discharge procedures on file      PDMP Review     None          ED Provider  Electronically Signed by           Bogdan Ruiz DO  05/26/22 [id number]

## 2022-05-27 ENCOUNTER — TELEPHONE (OUTPATIENT)
Dept: INFUSION CENTER | Facility: HOSPITAL | Age: 72
End: 2022-05-27

## 2022-05-27 ENCOUNTER — TELEPHONE (OUTPATIENT)
Dept: GYNECOLOGIC ONCOLOGY | Facility: CLINIC | Age: 72
End: 2022-05-27

## 2022-05-27 DIAGNOSIS — T45.1X5A CHEMOTHERAPY INDUCED NEUTROPENIA (HCC): ICD-10-CM

## 2022-05-27 DIAGNOSIS — C54.1 ENDOMETRIAL CANCER (HCC): Primary | ICD-10-CM

## 2022-05-27 DIAGNOSIS — D70.1 CHEMOTHERAPY INDUCED NEUTROPENIA (HCC): ICD-10-CM

## 2022-05-27 RX ORDER — DEXAMETHASONE 4 MG/1
TABLET ORAL
Qty: 30 TABLET | Refills: 0 | Status: SHIPPED | OUTPATIENT
Start: 2022-05-27 | End: 2022-07-13 | Stop reason: SDUPTHER

## 2022-05-27 NOTE — TELEPHONE ENCOUNTER
Return call placed to patient's daughter  Will plan for virtual visit next week for taxotere consent and proceed with treatment on June 2, 2022

## 2022-05-27 NOTE — TELEPHONE ENCOUNTER
Time out with Michelle Mayfield NP  Patient will no longer be getting Taxol treatment  Treatment is changed to Taxotere and Carboplatin  Appointment set up for 6/2/22  Neulasta injection 6/3/22  Pharmacist Gustavo Garcia notified of above

## 2022-05-27 NOTE — TELEPHONE ENCOUNTER
Patient's daughter is requesting a call back to confirm fax received and because patient had an allergic reaction to chemo and was taken to the ER   Would like to know what happens now No

## 2022-05-27 NOTE — TELEPHONE ENCOUNTER
Spoke to Patient's daughter Conner  regarding her [employer] form, I will mail it out directly to the patient's address that Conner [employer] has provided  [employer] understood and agreed  Tee Castrejon

## 2022-05-31 ENCOUNTER — APPOINTMENT (OUTPATIENT)
Dept: LAB | Facility: CLINIC | Age: 72
End: 2022-05-31
Payer: COMMERCIAL

## 2022-05-31 ENCOUNTER — TELEMEDICINE (OUTPATIENT)
Dept: GYNECOLOGIC ONCOLOGY | Facility: CLINIC | Age: 72
End: 2022-05-31
Payer: COMMERCIAL

## 2022-05-31 ENCOUNTER — OFFICE VISIT (OUTPATIENT)
Dept: SURGERY | Facility: CLINIC | Age: 72
End: 2022-05-31
Payer: COMMERCIAL

## 2022-05-31 VITALS — TEMPERATURE: 98.2 F

## 2022-05-31 DIAGNOSIS — C54.1 ENDOMETRIAL CANCER (HCC): Primary | ICD-10-CM

## 2022-05-31 DIAGNOSIS — L89.323 DECUBITUS ULCER OF ISCHIAL AREA, LEFT, STAGE III (HCC): Primary | Chronic | ICD-10-CM

## 2022-05-31 DIAGNOSIS — C54.1 ENDOMETRIAL CANCER (HCC): ICD-10-CM

## 2022-05-31 DIAGNOSIS — L89.153 STAGE III PRESSURE ULCER OF SACRAL REGION (HCC): ICD-10-CM

## 2022-05-31 PROBLEM — I87.2 CHRONIC VENOUS STASIS DERMATITIS OF LEFT LOWER EXTREMITY: Status: ACTIVE | Noted: 2022-01-01

## 2022-05-31 LAB — PLATELET # BLD AUTO: 422 THOUSANDS/UL (ref 149–390)

## 2022-05-31 PROCEDURE — 86304 IMMUNOASSAY TUMOR CA 125: CPT

## 2022-05-31 PROCEDURE — 99213 OFFICE O/P EST LOW 20 MIN: CPT | Performed by: PHYSICIAN ASSISTANT

## 2022-05-31 PROCEDURE — 99212 OFFICE O/P EST SF 10 MIN: CPT | Performed by: SPECIALIST

## 2022-05-31 NOTE — PROGRESS NOTES
Virtual Regular Visit    Verification of patient location:    Patient is located in the following state in which I hold an active license [place]      Assessment/Plan:    Problem List Items Addressed This Visit        Genitourinary    Endometrial cancer ([facility] [address] ) - Primary     Stage IVB gynecologic malignancy with supraclavicular, mediastinal and retroperitoneal lymph node metastases currently receiving neoadjuvant chemotherapy with taxol 135 mg/m2 and carboplatin AUC 5 every 21 days  She has stage III pressure ulcers and worsening gait disturbance  She experienced a severe taxol reaction with cycle 2 of treatment  This required emergency room evaluation for observation       We discussed discontinuation of taxol and treatment with taxotere 65 mg/m2 and carboplatin AUC 5 every 21 days  Possible side-effects of treatment, including, but not limited to pancytopenia, fluid retention, liver/kidney damage were reviewed  Patient verbally consented to proceed with treatment  Dexamethasone rx reviewed and submitted, as well as addition of neulasta reviewed  Plan plan to administer cycle 2 of treatment this week  Patient did not receive full treatment with taxol/carboplatin last week due to reaction  Return to the office as per her chemotherapy calendar                         Reason for visit is   Chief Complaint   Patient presents with    Virtual Regular Visit        Encounter provider Kristal Valle PA-C    Provider located at [address] [address] [phone]      Recent Visits  Date Type Provider Dept   05/31/22 Telemedicine Kristal Valle PA-C [address] [facility]   05/27/22 Telephone [facility]   05/27/22 Telephone Kristal Valle PA-C [address] recent visits within past 7 days and meeting all other requirements  Future Appointments  No visits were found meeting these conditions  Showing future appointments within next 150 days and meeting all other requirements       The patient was identified by name and date of birth  Annie Schultz was informed that this is a telemedicine visit and that the visit is being conducted through Telephone  My office door was closed  No one else was in the room  She acknowledged consent and understanding of privacy and security of the video platform  The patient has agreed to participate and understands they can discontinue the visit at any time  Patient is aware this is a billable service  Gila Bauer is a 66 y o  female   who presents for virtual chemotherapy consent for regimen change  The patient experienced a severe taxol reaction requiring ED evaluation with slow-titration of taxol  Otherwise, she is feeling well, and her symptoms are improving  Her fatigue is improved  Per the patient's daughter, her neck mass has decreased in size  Oncology History   Endometrial cancer ([place] [address] )   4/26/2022 Initial Diagnosis    Endometrial cancer ([place] [address] )     4/26/2022 -  Cancer Staged    Staging form: Corpus Uteri - Carcinoma, AJCC 8th Edition  - Clinical: FIGO Stage IVB (cT2, cN2a, cM1) - Signed by Arminda Cano MD on 4/26/2022  Histologic grade (G): GX  Histologic grading system: 3 grade system       5/5/2022 -  Chemotherapy    Taxol 135 mg/m2 and carboplatin AUC 5 every 21 days  Severe taxol reaction with cycle 2                Past Medical History:   Diagnosis Date    Anxiety     Arthritis     Bernard's esophagus     Bleeds easily ([facility] )     CVA (cerebral vascular accident) ([place] [address] ) 10/2016    Fibromyalgia     GERD (gastroesophageal reflux disease)     Hypercholesterolemia     Hypertension     Insomnia     Rheumatoid arthritis ([facility] )     Stroke ([place] [address] ) 2016       Past Surgical History:   Procedure Laterality Date    APPENDECTOMY      BREAST BIOPSY Bilateral benign    CARPAL TUNNEL RELEASE Left 10/27/2003    DORSAL COMPARTMENT RELEASE Left 03/09/2006    FINGER SURGERY      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    IR PORT PLACEMENT  5/23/2022    KNEE ARTHROSCOPY Right     x3(6/91,7/96,7/99)    OTHER SURGICAL HISTORY Left 03/09/2006    tennis elbow release    WV DEBRIDEMENT, SKIN, SUB-Q TISSUE,=<20 SQ CM N/A 1/24/2022    Procedure: SACRAL DEBRIDEMENT WOUND AND DRESSING CHANGE ([address] OUT); Surgeon: Usman Iraheta MD;  Location: [facility];  Service: General    REPLACEMENT TOTAL KNEE BILATERAL      rt-11/99, lt-1/13/10    ROTATOR CUFF REPAIR Bilateral     SHOULDER ARTHROSCOPY Right 02/10/2016    SHOULDER ARTHROSCOPY Left     11/26/08,9/99    SHOULDER ARTHROSCOPY Left 03/09/2006    TONSILLECTOMY AND ADENOIDECTOMY      TRIGGER FINGER RELEASE Left 07/01/2002    middle and ring finger    ULNAR NERVE TRANSPOSITION Left        Current Outpatient Medications   Medication Sig Dispense Refill    artificial tear (LUBRIFRESH P M ) 83-15 % ophthalmic ointment 1 application daily      atorvastatin (LIPITOR) 40 mg tablet Take 40 mg by mouth daily at bedtime  0    azelastine (ASTELIN) 0 1 % nasal spray 1 spray into each nostril 2 (two) times a day 30 mL 11    clopidogrel (PLAVIX) 75 mg tablet   0    dexamethasone (DECADRON) 4 mg tablet Take 2 tabs PO BID the day prior to chemo, then 2 tabs PO the night after chemo, then 2 tabs PO BID the day after chemo 30 tablet 0    famotidine (PEPCID) 20 mg tablet famotidine 20 mg tablet   Take by oral route for 30 days   fluticasone (FLONASE) 50 mcg/act nasal spray 2 sprays into each nostril daily (Patient taking differently: 2 sprays into each nostril daily When needed ) 16 g 11    furosemide (LASIX) 20 mg tablet furosemide 20 mg tablet   Take 1 tablet twice a day by oral route        gabapentin (NEURONTIN) 600 MG tablet Take 600 mg by mouth 3 (three) times a day  0    gentamicin (GARAMYCIN) 0 1 % ointment Apply topically in the morning Apply sparingly to gauze dressing, then place gauze into sacral and ischial wounds and secure with Medipore tape  Change daily and PRN if soiled  30 g 3    lisinopril-hydrochlorothiazide (PRINZIDE,ZESTORETIC) 10-12 5 MG per tablet take 2 tablets by mouth once daily      LORazepam (ATIVAN) 1 mg tablet Take 1 tablet (1 mg total) by mouth every 8 (eight) hours as needed for anxiety (nausea or anxiety) 30 tablet 0    Melatonin 5 MG TABS Take 5 mg by mouth   (Patient not taking: No sig reported)      minoxidil (LONITEN) 2 5 mg tablet Take 1 25 mg by mouth daily (Patient not taking: No sig reported)      morphine (MS CONTIN) 15 mg 12 hr tablet Take 15 mg by mouth 2 (two) times a day (Patient not taking: No sig reported)  0    morphine (MS CONTIN) 30 mg 12 hr tablet Take 30 mg by mouth every 12 (twelve) hours      naloxone (NARCAN) 4 mg/0 1 mL nasal spray Administer 1 spray into a nostril  If no response after 2-3 minutes, give another dose in the other nostril using a new spray   1 each 1    nystatin (MYCOSTATIN) powder Apply topically 3 (three) times a day 15 g 0    omeprazole (PriLOSEC) 20 mg delayed release capsule omeprazole 20 mg capsule,delayed release   take 1 capsule by mouth twice a day before meals (Patient not taking: No sig reported)      ondansetron (ZOFRAN) 8 mg tablet Take 1 tablet (8 mg total) by mouth every 8 (eight) hours as needed for nausea or vomiting 20 tablet 1    oxyCODONE (OXY-IR) 5 MG capsule Take 5 mg by mouth 2 (two) times a day (Patient not taking: No sig reported)      oxyCODONE (ROXICODONE) 10 MG TABS       oxyCODONE-acetaminophen (PERCOCET) 7 5-325 MG per tablet oxycodone-acetaminophen 7 5 mg-325 mg tablet   1 tab Q8h prn (Patient not taking: No sig reported)      pantoprazole (PROTONIX) 40 mg tablet   0    predniSONE 20 mg tablet Take 2 tablets (40 mg total) by mouth daily Start medication tomorrow 05/27/2022 6 tablet 0    TiZANidine (ZANAFLEX) 2 MG capsule   0    traZODone (DESYREL) 50 mg tablet   0    White Petrolatum-Mineral Oil (Soothe Night Time) OINT Apply 1 application to eye daily at bedtime   (Patient not taking: No sig reported)      zinc gluconate 50 mg tablet Take 50 mg by mouth daily   (Patient not taking: No sig reported)       No current facility-administered medications for this visit  No Known Allergies    Review of Systems   Constitutional: Positive for fatigue  Negative for fever  HENT: Negative  Eyes: Negative  Respiratory: Negative  Cardiovascular: Negative  Gastrointestinal: Negative  Genitourinary: Negative  Musculoskeletal: Negative  Skin: Negative  Neurological: Negative  Psychiatric/Behavioral: Negative  Video Exam    There were no vitals filed for this visit  Visit performed via audio only  No video capabilities available  I spent 20 minutes with patient today in which greater than 50% of the time was spent in counseling/coordination of care regarding chemotherapy    VIRTUAL VISIT [address] verbally agrees to participate in [employer]  Pt is aware that [employer] could be limited without vital signs or the ability to perform a full hands-on physical exam  Luda Smith understands she or the provider may request at any time to terminate the video visit and request the patient to seek care or treatment in person

## 2022-05-31 NOTE — ASSESSMENT & PLAN NOTE
The patient has weeping edema, and surrounding maceration, much worse on the left than the right leg  This was cleansed with chlorhexidine, addressed with Adaptic, and an Ace wrap applied for light compression  The patient's daughter, and visiting nurses will however change this daily  She is also encouraged to elevate her leg as much as possible during the day

## 2022-05-31 NOTE — ASSESSMENT & PLAN NOTE
This is a appears to be stable at a stage III  The patient's daughter is been helping her with dressing changes, including a wick of gauze packing, and leaving foam to manage the drainage  The patient is been using gentamicin ointment to manage the chronic bacterial colonization  This was changed in the office today with SilvaSorb gel, as gentamicin ointment was not available  She will follow up in 1 week

## 2022-05-31 NOTE — PROGRESS NOTES
Assessment/Plan:    Chronic venous stasis dermatitis of left lower extremity      The patient has weeping edema, and surrounding maceration, much worse on the left than the right leg  This was cleansed with chlorhexidine, addressed with Adaptic, and an Ace wrap applied for light compression  The patient's daughter, and visiting nurses will however change this daily  She is also encouraged to elevate her leg as much as possible during the day  Decubitus ulcer of ischial area, left, stage III ([address] )      This is a appears to be stable at a stage III  The patient's daughter is been helping her with dressing changes, including a wick of gauze packing, and leaving foam to manage the drainage  The patient is been using gentamicin ointment to manage the chronic bacterial colonization  This was changed in the office today with SilvaSorb gel, as gentamicin ointment was not available  She will follow up in 1 week  Stage III pressure ulcer of sacral region [facility])      The surrounding maceration is much improved  Visiting nurses, as well as her daughter, or providing daily wound care  The gentamicin ointment also appears to be quite effective in decreasing the bacterial burden  She will continue wound care provided by family and visiting nurses in follow-up in 1 week  Diagnoses and all orders for this visit:    Decubitus ulcer of ischial area, left, stage III (HCC)    Stage III pressure ulcer of sacral region [facility])          Subjective:      Patient ID: Uriel sutton a 66 y o  female  Presents for ongoing care of Lt ischial and sacral decubitus ulcers, as well as new complaint of swelling and weeping edema of the Left lower leg        The following portions of the patient's history were reviewed and updated as appropriate: allergies, current medications, past family history, past medical history, past social history, past surgical history and problem list     Review of Systems   Constitutional: Negative for chills and fever  Cardiovascular: Positive for leg swelling  Musculoskeletal: Positive for gait problem  Skin: Positive for wound  Neurological: Positive for weakness  Objective:      Temp 98 2 °F (36 8 °C) (Temporal)          Physical Exam  Constitutional:       General: She is not in acute distress  Appearance: She is ill-appearing  Musculoskeletal:      Left lower leg: Edema (Weeping edema the left lower leg) present  Skin:     General: Skin is warm and dry

## 2022-05-31 NOTE — PATIENT INSTRUCTIONS
Continue to change the Left hip and buttock dressings daily as you have been, using Gentamicin ointment  For the left leg, with the areas of weeping edema, please elevate your leg as much as possible  Remove the wrap and wash with soap and water daily  Apply Vaseline to the edge of the open areas, then adaptic (non stick dressing you were given) with gauze to keep it in place, and secure with an ace wrap  Follow up in one week

## 2022-05-31 NOTE — ASSESSMENT & PLAN NOTE
The surrounding maceration is much improved  Visiting nurses, as well as her daughter, or providing daily wound care  The gentamicin ointment also appears to be quite effective in decreasing the bacterial burden  She will continue wound care provided by family and visiting nurses in follow-up in 1 week

## 2022-06-01 PROBLEM — D70.1 CHEMOTHERAPY INDUCED NEUTROPENIA (HCC): Status: ACTIVE | Noted: 2022-06-01

## 2022-06-01 PROBLEM — T45.1X5A CHEMOTHERAPY INDUCED NEUTROPENIA (HCC): Status: ACTIVE | Noted: 2022-01-01

## 2022-06-01 LAB — CANCER AG125 SERPL-ACNC: 173.4 U/ML (ref 0–30)

## 2022-06-01 RX ORDER — PALONOSETRON 0.05 MG/ML
0.25 INJECTION, SOLUTION INTRAVENOUS ONCE
Status: CANCELLED | OUTPATIENT
Start: 2022-06-02

## 2022-06-01 RX ORDER — SODIUM CHLORIDE 9 MG/ML
20 INJECTION, SOLUTION INTRAVENOUS ONCE
Status: CANCELLED | OUTPATIENT
Start: 2022-06-02

## 2022-06-02 ENCOUNTER — HOSPITAL ENCOUNTER (OUTPATIENT)
Dept: INFUSION CENTER | Facility: HOSPITAL | Age: 72
Discharge: HOME | End: 2022-06-02
Attending: OBSTETRICS & GYNECOLOGY
Payer: COMMERCIAL

## 2022-06-02 VITALS
BODY MASS INDEX: 27.79 KG/M2 | HEIGHT: 60 IN | WEIGHT: 141.54 LBS | OXYGEN SATURATION: 95 % | SYSTOLIC BLOOD PRESSURE: 138 MMHG | TEMPERATURE: 97.3 F | RESPIRATION RATE: 18 BRPM | HEART RATE: 84 BPM | DIASTOLIC BLOOD PRESSURE: 74 MMHG

## 2022-06-02 DIAGNOSIS — T45.1X5A CHEMOTHERAPY INDUCED NEUTROPENIA (HCC): ICD-10-CM

## 2022-06-02 DIAGNOSIS — C54.1 ENDOMETRIAL CANCER (HCC): Primary | ICD-10-CM

## 2022-06-02 DIAGNOSIS — D70.1 CHEMOTHERAPY INDUCED NEUTROPENIA (HCC): ICD-10-CM

## 2022-06-02 PROCEDURE — 96417 CHEMO IV INFUS EACH ADDL SEQ: CPT

## 2022-06-02 PROCEDURE — 96375 TX/PRO/DX INJ NEW DRUG ADDON: CPT

## 2022-06-02 PROCEDURE — 96413 CHEMO IV INFUSION 1 HR: CPT

## 2022-06-02 PROCEDURE — 96367 TX/PROPH/DG ADDL SEQ IV INF: CPT

## 2022-06-02 RX ORDER — PALONOSETRON 0.05 MG/ML
0.25 INJECTION, SOLUTION INTRAVENOUS ONCE
Status: COMPLETED | OUTPATIENT
Start: 2022-06-02 | End: 2022-06-02

## 2022-06-02 RX ORDER — SODIUM CHLORIDE 9 MG/ML
20 INJECTION, SOLUTION INTRAVENOUS ONCE
Status: COMPLETED | OUTPATIENT
Start: 2022-06-02 | End: 2022-06-02

## 2022-06-02 RX ADMIN — DOCETAXEL 105.4 MG: 20 INJECTION, SOLUTION, CONCENTRATE INTRAVENOUS at 09:46

## 2022-06-02 RX ADMIN — SODIUM CHLORIDE 20 ML/HR: 0.9 INJECTION, SOLUTION INTRAVENOUS at 08:22

## 2022-06-02 RX ADMIN — DIPHENHYDRAMINE HYDROCHLORIDE 25 MG: 50 INJECTION INTRAMUSCULAR; INTRAVENOUS at 08:45

## 2022-06-02 RX ADMIN — PALONOSETRON 0.25 MG: 0.05 INJECTION, SOLUTION INTRAVENOUS at 09:08

## 2022-06-02 RX ADMIN — FOSAPREPITANT 150 MG: 150 INJECTION, POWDER, LYOPHILIZED, FOR SOLUTION INTRAVENOUS at 09:10

## 2022-06-02 RX ADMIN — DEXAMETHASONE SODIUM PHOSPHATE 20 MG: 10 INJECTION, SOLUTION INTRAMUSCULAR; INTRAVENOUS at 08:23

## 2022-06-02 RX ADMIN — CARBOPLATIN 398.5 MG: 10 INJECTION, SOLUTION INTRAVENOUS at 10:54

## 2022-06-02 NOTE — PROGRESS NOTES
Most recent labs reviewed  Ok to proceede noted  Port has excellent blood return  Tolerated todays taxotere and carboplatin well  Port flushed and deaccessed per routine  Discharged ambulatory with avs and walker

## 2022-06-03 ENCOUNTER — HOSPITAL ENCOUNTER (OUTPATIENT)
Dept: INFUSION CENTER | Facility: HOSPITAL | Age: 72
Discharge: HOME | End: 2022-06-03
Attending: OBSTETRICS & GYNECOLOGY
Payer: COMMERCIAL

## 2022-06-03 VITALS — TEMPERATURE: 97.4 F

## 2022-06-03 DIAGNOSIS — D70.1 CHEMOTHERAPY INDUCED NEUTROPENIA (HCC): ICD-10-CM

## 2022-06-03 DIAGNOSIS — C54.1 ENDOMETRIAL CANCER (HCC): Primary | ICD-10-CM

## 2022-06-03 DIAGNOSIS — T45.1X5A CHEMOTHERAPY INDUCED NEUTROPENIA (HCC): ICD-10-CM

## 2022-06-03 PROCEDURE — 96372 THER/PROPH/DIAG INJ SC/IM: CPT

## 2022-06-03 RX ADMIN — PEGFILGRASTIM-BMEZ 6 MG: 6 INJECTION SUBCUTANEOUS at 13:10

## 2022-06-03 NOTE — PROGRESS NOTES
Pt tolerated chemo yesterday very well  Reports no adverse side effects from same  Tolerated todays ziextenzo  to left arm  Discharged ambulatory deferring avs as she received one yesterday

## 2022-06-03 NOTE — PLAN OF CARE
Problem: Potential for Falls  Goal: Patient will remain free of falls  Description: INTERVENTIONS:  - Educate patient/family on patient safety including physical limitations  - Instruct patient to call for assistance with activity   - Consult OT/PT to assist with strengthening/mobility   - Keep Call bell within reach  - Keep bed low and locked with side rails adjusted as appropriate  - Keep care items and personal belongings within reach  - Initiate and maintain comfort rounds  - Make Fall Risk Sign visible to staff    - Apply yellow socks and bracelet for high fall risk patients  - Consider moving patient to room near nurses station  Outcome: Progressing     Problem: SAFETY ADULT  Goal: Patient will remain free of falls  Description: INTERVENTIONS:  - Educate patient/family on patient safety including physical limitations  - Instruct patient to call for assistance with activity   - Consult OT/PT to assist with strengthening/mobility   - Keep Call bell within reach  - Keep bed low and locked with side rails adjusted as appropriate  - Keep care items and personal belongings within reach  - Initiate and maintain comfort rounds  - Make Fall Risk Sign visible to staff    - Apply yellow socks and bracelet for high fall risk patients  - Consider moving patient to room near nurses station  Outcome: Progressing

## 2022-06-07 ENCOUNTER — OFFICE VISIT (OUTPATIENT)
Dept: SURGERY | Facility: CLINIC | Age: 72
End: 2022-06-07
Payer: COMMERCIAL

## 2022-06-07 ENCOUNTER — APPOINTMENT (OUTPATIENT)
Dept: LAB | Facility: CLINIC | Age: 72
End: 2022-06-07
Payer: COMMERCIAL

## 2022-06-07 VITALS — TEMPERATURE: 98.2 F

## 2022-06-07 DIAGNOSIS — L89.153 STAGE III PRESSURE ULCER OF SACRAL REGION (HCC): ICD-10-CM

## 2022-06-07 DIAGNOSIS — C54.1 ENDOMETRIAL CANCER (HCC): ICD-10-CM

## 2022-06-07 DIAGNOSIS — I87.2 CHRONIC VENOUS STASIS DERMATITIS OF LEFT LOWER EXTREMITY: Primary | ICD-10-CM

## 2022-06-07 DIAGNOSIS — L89.323 DECUBITUS ULCER OF ISCHIAL AREA, LEFT, STAGE III (HCC): Chronic | ICD-10-CM

## 2022-06-07 PROCEDURE — 99213 OFFICE O/P EST LOW 20 MIN: CPT | Performed by: SPECIALIST

## 2022-06-07 PROCEDURE — 86304 IMMUNOASSAY TUMOR CA 125: CPT

## 2022-06-07 NOTE — ASSESSMENT & PLAN NOTE
Responding well to light compression  Continue Adaptic nonstick dressing, gauze and light compression with Ace wrap

## 2022-06-07 NOTE — ASSESSMENT & PLAN NOTE
Sacral ulcer appears granulated, with minimal serous drainage  No surrounding erythema or induration  Minimal maceration of the surrounding skin  Continue topical gentamicin, and gauze dressings

## 2022-06-07 NOTE — ASSESSMENT & PLAN NOTE
With thin serous drainage  No odor  Appears completely granulated  No evidence of healing, however  Continue gentamicin ointment and light gauze packing

## 2022-06-07 NOTE — PROGRESS NOTES
Assessment/Plan:    Chronic venous stasis dermatitis of left lower extremity  Responding well to light compression  Continue Adaptic nonstick dressing, gauze and light compression with Ace wrap  Decubitus ulcer of ischial area, left, stage III ([address] )  With thin serous drainage  No odor  Appears completely granulated  No evidence of healing, however  Continue gentamicin ointment and light gauze packing  Stage III pressure ulcer of sacral region ([facility] [address] )  Sacral ulcer appears granulated, with minimal serous drainage  No surrounding erythema or induration  Minimal maceration of the surrounding skin  Continue topical gentamicin, and gauze dressings  Diagnoses and all orders for this visit:    Chronic venous stasis dermatitis of left lower extremity    Decubitus ulcer of ischial area, left, stage III (HCC)    Stage III pressure ulcer of sacral region [facility])          Subjective:      Patient ID: Reuel Hatchet is a 67 y o  female  The patient has ongoing problems with a left ischial decubitus ulcer, as well as a midline sacral ulcer  She is also receiving chemotherapy for metastatic cancer, and is becoming increasingly frail  Her daughter has been providing wound care, using a topical gentamicin ointment and dry gauze to manage the Pseudomonas colonization  The wound care is managed to keep the area from becoming grossly infected  The patient has opted to discontinue office visits, as she cannot make it to the office due to fatigue, and will continue to receive wound care from the visiting nurses, and her daughter  With the edema and stasis dermatitis of her left leg is markedly improved with the use of a light Ace wrap for compression        The following portions of the patient's history were reviewed and updated as appropriate: allergies, current medications, past family history, past medical history, past social history, past surgical history and problem list     Review of Systems Constitutional: Positive for fatigue  Negative for chills and fever  Cardiovascular: Positive for leg swelling  Gastrointestinal: Negative for abdominal pain  Skin: Positive for wound  Neurological: Positive for weakness  Objective:      Temp 98 2 °F (36 8 °C) (Temporal)          Physical Exam  Constitutional:       General: She is not in acute distress  Appearance: She is ill-appearing  Genitourinary:     Comments: Chronic stage III pressure wounds of the left ischial tuberosity, and sacrum  With granulation, and serous drainage  Minimal maceration of the surrounding skin  Skin:     General: Skin is warm and dry  Findings: Lesion present     Neurological:      Gait: Gait abnormal    Psychiatric:         Mood and Affect: Mood normal

## 2022-06-08 LAB — CANCER AG125 SERPL-ACNC: 215.9 U/ML (ref 0–30)

## 2022-06-09 ENCOUNTER — TELEPHONE (OUTPATIENT)
Dept: OTHER | Facility: OTHER | Age: 72
End: 2022-06-09

## 2022-06-09 ENCOUNTER — TELEPHONE (OUTPATIENT)
Dept: SURGERY | Facility: CLINIC | Age: 72
End: 2022-06-09

## 2022-06-09 NOTE — TELEPHONE ENCOUNTER
[facility] [phone] VNS  Left proximal upper leg has a tunnel that measure 10 cm the last measurements on 06/04/2022 was 7 cm  No signs or symptoms of infection present  Wound present with small amount of serosanguanous drainage

## 2022-06-10 ENCOUNTER — TELEPHONE (OUTPATIENT)
Dept: HEMATOLOGY ONCOLOGY | Facility: CLINIC | Age: 72
End: 2022-06-10

## 2022-06-10 ENCOUNTER — HOSPITAL ENCOUNTER (EMERGENCY)
Facility: HOSPITAL | Age: 72
Discharge: HOME | End: 2022-06-10
Attending: EMERGENCY MEDICINE | Admitting: EMERGENCY MEDICINE
Payer: COMMERCIAL

## 2022-06-10 ENCOUNTER — APPOINTMENT (EMERGENCY)
Dept: RADIOLOGY | Facility: HOSPITAL | Age: 72
End: 2022-06-10
Payer: COMMERCIAL

## 2022-06-10 ENCOUNTER — APPOINTMENT (EMERGENCY)
Dept: CT IMAGING | Facility: HOSPITAL | Age: 72
End: 2022-06-10
Payer: COMMERCIAL

## 2022-06-10 VITALS
SYSTOLIC BLOOD PRESSURE: 138 MMHG | HEART RATE: 83 BPM | RESPIRATION RATE: 16 BRPM | WEIGHT: 141 LBS | OXYGEN SATURATION: 93 % | TEMPERATURE: 99.1 F | BODY MASS INDEX: 27.68 KG/M2 | DIASTOLIC BLOOD PRESSURE: 66 MMHG | HEIGHT: 60 IN

## 2022-06-10 DIAGNOSIS — N39.0 UTI (URINARY TRACT INFECTION): ICD-10-CM

## 2022-06-10 DIAGNOSIS — R50.9 FEVER: Primary | ICD-10-CM

## 2022-06-10 LAB
ALBUMIN SERPL BCP-MCNC: 3.3 G/DL (ref 3.5–5)
ALP SERPL-CCNC: 113 U/L (ref 34–104)
ALT SERPL W P-5'-P-CCNC: 9 U/L (ref 7–52)
ANION GAP SERPL CALCULATED.3IONS-SCNC: 8 MMOL/L (ref 4–13)
APTT PPP: 36 SECONDS (ref 23–37)
AST SERPL W P-5'-P-CCNC: 20 U/L (ref 13–39)
BACTERIA UR QL AUTO: ABNORMAL /HPF
BASOPHILS # BLD MANUAL: 0 THOUSAND/UL (ref 0–0.1)
BASOPHILS NFR MAR MANUAL: 0 % (ref 0–1)
BILIRUB SERPL-MCNC: 0.43 MG/DL (ref 0.2–1)
BILIRUB UR QL STRIP: NEGATIVE
BUN SERPL-MCNC: 30 MG/DL (ref 5–25)
CALCIUM ALBUM COR SERPL-MCNC: 10.1 MG/DL (ref 8.3–10.1)
CALCIUM SERPL-MCNC: 9.5 MG/DL (ref 8.4–10.2)
CHLORIDE SERPL-SCNC: 89 MMOL/L (ref 96–108)
CLARITY UR: ABNORMAL
CO2 SERPL-SCNC: 35 MMOL/L (ref 21–32)
COLOR UR: YELLOW
CREAT SERPL-MCNC: 0.81 MG/DL (ref 0.6–1.3)
EOSINOPHIL # BLD MANUAL: 0.19 THOUSAND/UL (ref 0–0.4)
EOSINOPHIL NFR BLD MANUAL: 1 % (ref 0–6)
ERYTHROCYTE [DISTWIDTH] IN BLOOD BY AUTOMATED COUNT: 16.3 % (ref 11.6–15.1)
GFR SERPL CREATININE-BSD FRML MDRD: 72 ML/MIN/1.73SQ M
GLUCOSE SERPL-MCNC: 100 MG/DL (ref 65–140)
GLUCOSE UR STRIP-MCNC: NEGATIVE MG/DL
HCT VFR BLD AUTO: 32.7 % (ref 34.8–46.1)
HGB BLD-MCNC: 9.5 G/DL (ref 11.5–15.4)
HGB UR QL STRIP.AUTO: ABNORMAL
INR PPP: 1 (ref 0.84–1.19)
KETONES UR STRIP-MCNC: NEGATIVE MG/DL
LACTATE SERPL-SCNC: 1.1 MMOL/L (ref 0.5–2)
LEUKOCYTE ESTERASE UR QL STRIP: ABNORMAL
LYMPHOCYTES # BLD AUTO: 14 % (ref 14–44)
LYMPHOCYTES # BLD AUTO: 2.67 THOUSAND/UL (ref 0.6–4.47)
MCH RBC QN AUTO: 26.6 PG (ref 26.8–34.3)
MCHC RBC AUTO-ENTMCNC: 29.1 G/DL (ref 31.4–37.4)
MCV RBC AUTO: 92 FL (ref 82–98)
MONOCYTES # BLD AUTO: 1.52 THOUSAND/UL (ref 0–1.22)
MONOCYTES NFR BLD: 8 % (ref 4–12)
MYELOCYTES NFR BLD MANUAL: 4 % (ref 0–1)
NEUTROPHILS # BLD MANUAL: 13.91 THOUSAND/UL (ref 1.85–7.62)
NEUTS BAND NFR BLD MANUAL: 2 % (ref 0–8)
NEUTS SEG NFR BLD AUTO: 71 % (ref 43–75)
NITRITE UR QL STRIP: NEGATIVE
NON-SQ EPI CELLS URNS QL MICRO: ABNORMAL /HPF
PH UR STRIP.AUTO: 6 [PH]
PLATELET BLD QL SMEAR: ABNORMAL
PMV BLD AUTO: 12 FL (ref 8.9–12.7)
POTASSIUM SERPL-SCNC: 4.2 MMOL/L (ref 3.5–5.3)
PROCALCITONIN SERPL-MCNC: 0.44 NG/ML
PROT SERPL-MCNC: 6.7 G/DL (ref 6.4–8.4)
PROT UR STRIP-MCNC: ABNORMAL MG/DL
PROTHROMBIN TIME: 13.1 SECONDS (ref 11.6–14.5)
RBC # BLD AUTO: 3.57 MILLION/UL (ref 3.81–5.12)
RBC #/AREA URNS AUTO: ABNORMAL /HPF
RBC MORPH BLD: NORMAL
SODIUM SERPL-SCNC: 132 MMOL/L (ref 135–147)
SP GR UR STRIP.AUTO: 1.01 (ref 1–1.03)
UROBILINOGEN UR QL STRIP.AUTO: 0.2 E.U./DL
WBC # BLD AUTO: 19.05 THOUSAND/UL (ref 4.31–10.16)
WBC #/AREA URNS AUTO: ABNORMAL /HPF

## 2022-06-10 PROCEDURE — 85027 COMPLETE CBC AUTOMATED: CPT | Performed by: EMERGENCY MEDICINE

## 2022-06-10 PROCEDURE — 85007 BL SMEAR W/DIFF WBC COUNT: CPT | Performed by: EMERGENCY MEDICINE

## 2022-06-10 PROCEDURE — 71045 X-RAY EXAM CHEST 1 VIEW: CPT

## 2022-06-10 PROCEDURE — 81001 URINALYSIS AUTO W/SCOPE: CPT | Performed by: EMERGENCY MEDICINE

## 2022-06-10 PROCEDURE — 87086 URINE CULTURE/COLONY COUNT: CPT | Performed by: EMERGENCY MEDICINE

## 2022-06-10 PROCEDURE — 99285 EMERGENCY DEPT VISIT HI MDM: CPT | Performed by: EMERGENCY MEDICINE

## 2022-06-10 PROCEDURE — 99284 EMERGENCY DEPT VISIT MOD MDM: CPT

## 2022-06-10 PROCEDURE — 80053 COMPREHEN METABOLIC PANEL: CPT | Performed by: EMERGENCY MEDICINE

## 2022-06-10 PROCEDURE — 83605 ASSAY OF LACTIC ACID: CPT | Performed by: EMERGENCY MEDICINE

## 2022-06-10 PROCEDURE — 87040 BLOOD CULTURE FOR BACTERIA: CPT | Performed by: EMERGENCY MEDICINE

## 2022-06-10 PROCEDURE — 36415 COLL VENOUS BLD VENIPUNCTURE: CPT | Performed by: EMERGENCY MEDICINE

## 2022-06-10 PROCEDURE — 85730 THROMBOPLASTIN TIME PARTIAL: CPT | Performed by: EMERGENCY MEDICINE

## 2022-06-10 PROCEDURE — G1004 CDSM NDSC: HCPCS

## 2022-06-10 PROCEDURE — 85610 PROTHROMBIN TIME: CPT | Performed by: EMERGENCY MEDICINE

## 2022-06-10 PROCEDURE — 74176 CT ABD & PELVIS W/O CONTRAST: CPT

## 2022-06-10 PROCEDURE — 84145 PROCALCITONIN (PCT): CPT | Performed by: EMERGENCY MEDICINE

## 2022-06-10 RX ORDER — ACETAMINOPHEN 325 MG/1
975 TABLET ORAL ONCE
Status: COMPLETED | OUTPATIENT
Start: 2022-06-10 | End: 2022-06-10

## 2022-06-10 RX ORDER — CIPROFLOXACIN 500 MG/1
500 TABLET, FILM COATED ORAL ONCE
Status: COMPLETED | OUTPATIENT
Start: 2022-06-10 | End: 2022-06-10

## 2022-06-10 RX ADMIN — CIPROFLOXACIN HYDROCHLORIDE 500 MG: 500 TABLET, FILM COATED ORAL at 18:20

## 2022-06-10 RX ADMIN — ACETAMINOPHEN 975 MG: 325 TABLET ORAL at 17:16

## 2022-06-10 NOTE — ED PROVIDER NOTES
History  Chief Complaint   Patient presents with    Fever - 9 weeks to 74 years    Abdominal Pain     Patient is a 68 yo F w/ hx of endometrial cancer on Taxol chemotherapy who presents for evaluation of a fever  She says that she had a Tmax of 101 earlier today  Patient is followed by Radha Marcum and was referred to the ED for evaluation by them  Her daughter says that she had been complaining of some abdominal discomfort over the last few days which has since resolved  She also has a stage III decuvitus ulcer of the sacrum which have been being treated by wound care  She had a visiting nurse come yesterday who "was messing with the sacral wound" and since then there has been more drainage from it which daughter describes as "liquidy/bloody "  She says that it is not thick/yellow or purulent  She also has chronic venous stasis dermatitis of the b/l lower legs but her daughter says they actually "look better than normal "  The patient denies any complaints at this time  She denies cough, congestion, chest pain, sob, abdominal pain, n/v, loose stools or urninary symptoms  The patients last chemo session was on  and she received a Nulasta shot on 6/3  Prior to Admission Medications   Prescriptions Last Dose Informant Patient Reported? Taking?    LORazepam (ATIVAN) 1 mg tablet  Self No No   Sig: Take 1 tablet (1 mg total) by mouth every 8 (eight) hours as needed for anxiety (nausea or anxiety)   Melatonin 5 MG TABS  Self Yes No   Sig: Take 5 mg by mouth   TiZANidine (ZANAFLEX) 2 MG capsule  Self Yes No   White Petrolatum-Mineral Oil (Soothe Night Time) OINT  Self Yes No   Sig: Apply 1 application to eye daily at bedtime   artificial tear (LUBRIFRESH P M ) 83-15 % ophthalmic ointment  Self Yes No   Si application daily   atorvastatin (LIPITOR) 40 mg tablet  Self Yes No   Sig: Take 40 mg by mouth daily at bedtime   azelastine (ASTELIN) 0 1 % nasal spray  Self No No   Si spray into each nostril 2 (two) times a day   clopidogrel (PLAVIX) 75 mg tablet  Self Yes No   dexamethasone (DECADRON) 4 mg tablet  Self No No   Sig: Take 2 tabs PO BID the day prior to chemo, then 2 tabs PO the night after chemo, then 2 tabs PO BID the day after chemo   famotidine (PEPCID) 20 mg tablet  Self Yes No   Sig: famotidine 20 mg tablet   Take by oral route for 30 days  fluticasone (FLONASE) 50 mcg/act nasal spray  Self No No   Si sprays into each nostril daily   Patient taking differently: 2 sprays into each nostril daily When needed    furosemide (LASIX) 20 mg tablet  Self Yes No   Sig: furosemide 20 mg tablet   Take 1 tablet twice a day by oral route    gabapentin (NEURONTIN) 600 MG tablet  Self Yes No   Sig: Take 600 mg by mouth 3 (three) times a day   gentamicin (GARAMYCIN) 0 1 % ointment  Self No No   Sig: Apply topically in the morning Apply sparingly to gauze dressing, then place gauze into sacral and ischial wounds and secure with Medipore tape  Change daily and PRN if soiled  lisinopril-hydrochlorothiazide (PRINZIDE,ZESTORETIC) 10-12 5 MG per tablet  Self Yes No   Sig: take 2 tablets by mouth once daily   minoxidil (LONITEN) 2 5 mg tablet  Self Yes No   Sig: Take 1 25 mg by mouth daily   morphine (MS CONTIN) 15 mg 12 hr tablet  Self Yes No   Sig: Take 15 mg by mouth 2 (two) times a day   morphine (MS CONTIN) 30 mg 12 hr tablet  Self Yes No   Sig: Take 30 mg by mouth every 12 (twelve) hours   naloxone (NARCAN) 4 mg/0 1 mL nasal spray  Self No No   Sig: Administer 1 spray into a nostril  If no response after 2-3 minutes, give another dose in the other nostril using a new spray     nystatin (MYCOSTATIN) powder  Self No No   Sig: Apply topically 3 (three) times a day   omeprazole (PriLOSEC) 20 mg delayed release capsule  Self Yes No   Sig: omeprazole 20 mg capsule,delayed release   take 1 capsule by mouth twice a day before meals   ondansetron (ZOFRAN) 8 mg tablet  Self No No   Sig: Take 1 tablet (8 mg total) by mouth every 8 (eight) hours as needed for nausea or vomiting   oxyCODONE (OXY-IR) 5 MG capsule  Self Yes No   Sig: Take 5 mg by mouth 2 (two) times a day   oxyCODONE (ROXICODONE) 10 MG TABS  Self Yes No   oxyCODONE-acetaminophen (PERCOCET) 7 5-325 MG per tablet  Self Yes No   Sig: oxycodone-acetaminophen 7 5 mg-325 mg tablet   1 tab Q8h prn   pantoprazole (PROTONIX) 40 mg tablet  Self Yes No   predniSONE 20 mg tablet  Self No No   Sig: Take 2 tablets (40 mg total) by mouth daily Start medication tomorrow 05/27/2022   traZODone (DESYREL) 50 mg tablet  Self Yes No   zinc gluconate 50 mg tablet  Self Yes No   Sig: Take 50 mg by mouth daily      Facility-Administered Medications: None       Past Medical History:   Diagnosis Date    Anxiety     Arthritis     Bernard's esophagus     [facility] [address] [facility]     CVA (cerebral vascular accident) ([facility] [address] ) 10/2016    Endometrial cancer [facility] [address] [facility]     Fibromyalgia     GERD (gastroesophageal reflux disease)     Hypercholesterolemia     Hypertension     Insomnia     Rheumatoid arthritis ([facility] [address] [facility]     Stroke ([facility] [address] ) 2016       Past Surgical History:   Procedure Laterality Date    APPENDECTOMY      BREAST BIOPSY Bilateral     benign    CARPAL TUNNEL RELEASE Left 10/27/2003    DORSAL COMPARTMENT RELEASE Left 03/09/2006    FINGER SURGERY      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    IR PORT PLACEMENT  5/23/2022    KNEE ARTHROSCOPY Right     x3(6/91,7/96,7/99)    OTHER SURGICAL HISTORY Left 03/09/2006    tennis elbow release    KY DEBRIDEMENT, SKIN, SUB-Q TISSUE,=<20 SQ CM N/A 1/24/2022    Procedure: SACRAL DEBRIDEMENT WOUND AND DRESSING CHANGE ([address] OUT);   Surgeon: Bianca Rooney MD;  Location: [facility];  Service: General    REPLACEMENT TOTAL KNEE BILATERAL      rt-11/99, lt-1/13/10    ROTATOR CUFF REPAIR Bilateral     SHOULDER ARTHROSCOPY Right 02/10/2016    SHOULDER ARTHROSCOPY Left     11/26/08,9/99    SHOULDER ARTHROSCOPY Left 03/09/2006    TONSILLECTOMY AND ADENOIDECTOMY      TRIGGER FINGER RELEASE Left 2002    middle and ring finger    ULNAR NERVE TRANSPOSITION Left        Family History   Problem Relation Age of Onset    Arthritis Mother     Lung cancer Father     Brain cancer Father     No Known Problems Daughter     No Known Problems Maternal Grandmother     No Known Problems Maternal Grandfather     No Known Problems Paternal Grandmother     No Known Problems Paternal Grandfather     No Known Problems Daughter     No Known Problems Maternal Aunt     No Known Problems Paternal Aunt     No Known Problems Paternal Aunt     No Known Problems Paternal Aunt      I have reviewed and agree with the history as documented  E-Cigarette/Vaping    E-Cigarette Use Never User      E-Cigarette/Vaping Substances    Nicotine No     THC No     CBD No     Flavoring No     Other No     Unknown No      Social History     Tobacco Use    Smoking status: Former Smoker     Types: Cigarettes     Quit date:      Years since quittin 4    Smokeless tobacco: Never Used   Vaping Use    Vaping Use: Never used   Substance Use Topics    Alcohol use: Yes     Comment: occassionally    Drug use: Yes     Types: Marijuana     Comment: medical marijuana       Review of Systems   Constitutional: Positive for fever  Negative for unexpected weight change  HENT: Negative for congestion, ear pain, sore throat and trouble swallowing  Eyes: Negative for pain and redness  Respiratory: Negative for cough, chest tightness and shortness of breath  Cardiovascular: Negative for chest pain and leg swelling  Gastrointestinal: Positive for abdominal pain  Negative for abdominal distention, diarrhea and vomiting  Endocrine: Negative for polyuria  Genitourinary: Negative for dysuria, hematuria, pelvic pain and vaginal bleeding  Musculoskeletal: Negative for back pain and myalgias  Skin: Positive for wound (sacral, b/l lower legs)  Negative for rash  Neurological: Negative for dizziness, syncope, weakness, light-headedness and headaches  Physical Exam  Physical Exam  Vitals and nursing note reviewed  Constitutional:       General: She is not in acute distress  Appearance: She is well-developed  HENT:      Head: Normocephalic and atraumatic  Right Ear: External ear normal       Left Ear: External ear normal       Nose: Nose normal       Mouth/Throat:      Mouth: Mucous membranes are moist       Pharynx: No oropharyngeal exudate  Eyes:      Conjunctiva/sclera: Conjunctivae normal       Pupils: Pupils are equal, round, and reactive to light  Cardiovascular:      Rate and Rhythm: Normal rate and regular rhythm  Heart sounds: Normal heart sounds  No murmur heard  No friction rub  No gallop  Pulmonary:      Effort: Pulmonary effort is normal  No respiratory distress  Breath sounds: Normal breath sounds  No wheezing or rales  Abdominal:      General: There is no distension  Palpations: Abdomen is soft  Tenderness: There is no abdominal tenderness  There is no right CVA tenderness, left CVA tenderness, guarding or rebound  Musculoskeletal:         General: No swelling, tenderness or deformity  Normal range of motion  Cervical back: Normal range of motion and neck supple  Right lower leg: No edema  Left lower leg: No edema  Comments: Mild redness to bilateral lower extremities, which is chronic   Lymphadenopathy:      Cervical: No cervical adenopathy  Skin:     General: Skin is warm and dry  Findings: Erythema (mild erythema to b/l lower legs) present  Comments: Small, sacral decubitus ulcer with packing  No surrounding redness, no drainage from the wound  Neurological:      General: No focal deficit present  Mental Status: She is alert and oriented to person, place, and time  Mental status is at baseline  Cranial Nerves: No cranial nerve deficit        Sensory: No sensory deficit  Motor: No weakness or abnormal muscle tone  Coordination: Coordination normal          Vital Signs  ED Triage Vitals [06/10/22 1616]   Temperature Pulse Respirations Blood Pressure SpO2   98 3 °F (36 8 °C) (!) 107 18 149/66 95 %      Temp Source Heart Rate Source Patient Position - Orthostatic VS BP Location FiO2 (%)   Temporal Monitor Sitting Right arm --      Pain Score       No Pain           Vitals:    06/10/22 1616 06/10/22 1809 06/10/22 1815   BP: 149/66  138/66   Pulse: (!) 107 93 83   Patient Position - Orthostatic VS: Sitting           Visual Acuity      ED Medications  Medications   acetaminophen (TYLENOL) tablet 975 mg (975 mg Oral Given 6/10/22 1716)   ciprofloxacin (CIPRO) tablet 500 mg (500 mg Oral Given 6/10/22 1820)       Diagnostic Studies  Results Reviewed     Procedure Component Value Units Date/Time    CBC and differential [[id number]]  (Abnormal) Collected: 06/10/22 1637    Lab Status: Edited Result - FINAL Specimen: Blood from Arm, Right Updated: 06/10/22 2009     WBC 19 05 Thousand/uL      RBC 3 57 Million/uL      Hemoglobin 9 5 g/dL      Hematocrit 32 7 %      MCV 92 fL      MCH 26 6 pg      MCHC 29 1 g/dL      RDW 16 3 %      MPV 12 0 fL      Platelets --    Narrative:      Platelet not measured due to platelet clumping  This is an appended report  These results have been appended to a previously verified report      Manual Differential(PHLEBS Do Not Order) [[id number]]  (Abnormal) Collected: 06/10/22 1637    Lab Status: Edited Result - FINAL Specimen: Blood from Arm, Right Updated: 06/10/22 1809     Segmented % 71 %      Bands % 2 %      Lymphocytes % 14 %      Monocytes % 8 %      Eosinophils, % 1 %      Basophils % 0 %      Myelocytes % 4 %      Absolute Neutrophils 13 91 Thousand/uL      Lymphocytes Absolute 2 67 Thousand/uL      Monocytes Absolute 1 52 Thousand/uL      Eosinophils Absolute 0 19 Thousand/uL      Basophils Absolute 0 00 Thousand/uL      Total Counted -- RBC Morphology Normal     Platelet Estimate Unable to Estimate due to clumped platelets    Urine Microscopic [[id number]]  (Abnormal) Collected: 06/10/22 1704    Lab Status: Final result Specimen: Urine, Clean Catch Updated: 06/10/22 1743     RBC, UA 1-2 /hpf      WBC, UA 20-30 /hpf      Epithelial Cells Occasional /hpf      Bacteria, UA Occasional /hpf     Urine culture [[id number]] Collected: 06/10/22 1704    Lab Status:  In process Specimen: Urine, Clean Catch Updated: 06/10/22 1743    Procalcitonin [[id number]]  (Abnormal) Collected: 06/10/22 1637    Lab Status: Final result Specimen: Blood from Arm, Right Updated: 06/10/22 1727     Procalcitonin 0 44 ng/ml     Comprehensive metabolic panel [[id number]]  (Abnormal) Collected: 06/10/22 1637    Lab Status: Final result Specimen: Blood from Arm, Right Updated: 06/10/22 1719     Sodium 132 mmol/L      Potassium 4 2 mmol/L      Chloride 89 mmol/L      CO2 35 mmol/L      ANION GAP 8 mmol/L      BUN 30 mg/dL      Creatinine 0 81 mg/dL      Glucose 100 mg/dL      Calcium 9 5 mg/dL      Corrected Calcium 10 1 mg/dL      AST 20 U/L      ALT 9 U/L      Alkaline Phosphatase 113 U/L      Total Protein 6 7 g/dL      Albumin 3 3 g/dL      Total Bilirubin 0 43 mg/dL      eGFR 72 ml/min/1 73sq m     Narrative:      [place] guidelines for Chronic Kidney Disease (CKD):     Stage 1 with normal or high GFR (GFR > 90 mL/min/1 73 square meters)    Stage 2 Mild CKD (GFR = 60-89 mL/min/1 73 square meters)    Stage 3A Moderate CKD (GFR = 45-59 mL/min/1 73 square meters)    Stage 3B Moderate CKD (GFR = 30-44 mL/min/1 73 square meters)    Stage 4 Severe CKD (GFR = 15-29 mL/min/1 73 square meters)    Stage 5 End Stage CKD (GFR <15 mL/min/1 73 square meters)  Note: GFR calculation is accurate only with a steady state creatinine    Lactic acid [[id number]]  (Normal) Collected: 06/10/22 1637    Lab Status: Final result Specimen: Blood from Arm, Right Updated: 06/10/22 1718     LACTIC ACID 1 1 mmol/L     Narrative:      Result may be elevated if tourniquet was used during collection  Protime-INR [[id number]]  (Normal) Collected: 06/10/22 1637    Lab Status: Final result Specimen: Blood from Arm, Right Updated: 06/10/22 1710     Protime 13 1 seconds      INR 1 00    APTT [[id number]]  (Normal) Collected: 06/10/22 1637    Lab Status: Final result Specimen: Blood from Arm, Right Updated: 06/10/22 1710     PTT 36 seconds     UA w Reflex to Microscopic w Reflex to Culture [[id number]]  (Abnormal) Collected: 06/10/22 1704    Lab Status: Final result Specimen: Urine, Clean Catch Updated: 06/10/22 1710     Color, UA Yellow     Clarity, UA Cloudy     Specific Gravity, UA 1 015     pH, UA 6 0     Leukocytes, UA 1+     Nitrite, UA Negative     Protein, UA Trace mg/dl      Glucose, UA Negative mg/dl      Ketones, UA Negative mg/dl      Urobilinogen, UA 0 2 E U /dl      Bilirubin, UA Negative     Blood, UA 1+    Blood culture #1 [[id number]] Collected: 06/10/22 1643    Lab Status: In process Specimen: Blood from Hand, Right Updated: 06/10/22 1650    Blood culture #2 [[id number]] Collected: 06/10/22 1637    Lab Status: In process Specimen: Blood from Arm, Right Updated: 06/10/22 1650                 CT abdomen pelvis wo contrast   Final Result by Suyapa Lamb MD (06/10 1747)      No acute inflammatory process identified in the abdomen or pelvis  Stable retrocrural and retroperitoneal adenopathy as described  Known uterine mass not well appreciated without the benefit of intravenous contrast       Sacral decubitus ulceration appears similar without cortical disruption of the sacrum  Previously seen fatty collection posterior to the left hip appears resolved with some air density mixed in extending to the skin surface likely representing interval    drainage        Workstation performed: [id number]         XR chest 1 view portable   Final Result by Maris Atkinson MD (06/10 1657)      No acute cardiopulmonary disease  Workstation performed: [id number]                    Procedures  Procedures         ED Course  ED Course as of 06/10/22 1565   Fri Josemanuel 10, 2022   9805 I spoke with Dr Claudeen Mc of [address] [place] [address] who reviewed the patient's imaging and lab work  He says that as long as patient is not septic, she is okay for p o  antibiotics  Patient's vitals have remained within normal limits  She has no complaints at this time  Will start on ciprofloxacin at his request   Thony Jarvis her it was important to follow-up with the oncologist and return if her symptoms worsen                               SBIRT 22yo+    Flowsheet Row Most Recent Value   SBIRT (25 yo +)    In order to provide better care to our patients, we are screening all of our patients for alcohol and drug use  Would it be okay to ask you these screening questions? Yes Filed at: 06/10/2022 1623   Initial Alcohol Screen: US AUDIT-C     1  How often do you have a drink containing alcohol? 0 Filed at: 06/10/2022 1623   2  How many drinks containing alcohol do you have on a typical day you are drinking? 0 Filed at: 06/10/2022 1623   3a  Male UNDER 65: How often do you have five or more drinks on one occasion? 0 Filed at: 06/10/2022 1623   3b  FEMALE Any Age, or MALE 65+: How often do you have 4 or more drinks on one occassion? 0 Filed at: 06/10/2022 1623   Audit-C Score 0 Filed at: 06/10/2022 1623   ISADORA: How many times in the past year have you    Used an illegal drug or used a prescription medication for non-medical reasons? Never Filed at: 06/10/2022 1623                    MDM  Number of Diagnoses or Management Options  Fever  UTI (urinary tract infection)  Diagnosis management comments: 77-year-old female with history of endometrial cancer on chemo, presenting for evaluation of a fever  Has had a fever off and on over the last 2 days  T-max of 101° at home    Also been complaining some abdominal pain which has since improved  Was told to come in for eval   Patient has no complaints at this time  Will obtain septic workup  Will obtain chest x-ray, CT abdomen pelvis given abdominal pain, UA  Labs within normal limits  White count secondary to Neulasta shot  CT shows no acute abnormality, chest x-ray normal   UA shows signs of infection  Spoke with got a non, who said as long as patient's vitals are within normal limits, would be okay for p o  antibiotics, recommended Cipro  Patient continues to feel well in the department, vitals within normal limits  Patient is okay with discharge on p o  antibiotics and gotten on follow-up  Told to return to the ED if symptoms worsen        Disposition  Final diagnoses:   Fever   UTI (urinary tract infection)     Time reflects when diagnosis was documented in both MDM as applicable and the Disposition within this note     Time User Action Codes Description Comment    6/10/2022  6:11 PM Theta Record Add [R50 9] Fever     6/10/2022  6:11 PM Theta Record Add [N39 0] UTI (urinary tract infection)       ED Disposition     ED Disposition   Discharge    Condition   Stable    Date/Time   Fri Josemanuel 10, 2022  6:11 PM    Comment   Raphael Petties discharge to home/self care                 Follow-up Information     Follow up With Specialties Details Why Contact Info Additional Information    Gordon Botello PA-C Gynecologic Oncology, Physician Assistant Schedule an appointment as soon as possible for a visit  For follow up of symptoms [facility]  [address]  [phone]       [facility] Emergency Department Emergency Medicine Go to  If symptoms worsen [address] [place] [id number] [phone] [facility] Emergency Department, [address] [place] [address] Vinay Swan, [facility]          Discharge Medication List as of 6/10/2022  6:13 PM      CONTINUE these medications which have NOT CHANGED    Details   !! artificial tear (LUBRIFRESH P M ) 83-15 % ophthalmic ointment 1 application daily, Historical Med      atorvastatin (LIPITOR) 40 mg tablet Take 40 mg by mouth daily at bedtime, Starting Thu 7/18/2019, Historical Med      azelastine (ASTELIN) 0 1 % nasal spray 1 spray into each nostril 2 (two) times a day, Starting Mon 9/9/2019, Normal      clopidogrel (PLAVIX) 75 mg tablet Starting Fri 6/28/2019, Historical Med      dexamethasone (DECADRON) 4 mg tablet Take 2 tabs PO BID the day prior to chemo, then 2 tabs PO the night after chemo, then 2 tabs PO BID the day after chemo, Normal      famotidine (PEPCID) 20 mg tablet famotidine 20 mg tablet   Take by oral route for 30 days  , Historical Med      fluticasone (FLONASE) 50 mcg/act nasal spray 2 sprays into each nostril daily, Starting Mon 6/7/2021, Until Mon 5/9/2022, Normal      furosemide (LASIX) 20 mg tablet furosemide 20 mg tablet   Take 1 tablet twice a day by oral route , Historical Med      gabapentin (NEURONTIN) 600 MG tablet Take 600 mg by mouth 3 (three) times a day, Starting Mon 8/12/2019, Historical Med      gentamicin (GARAMYCIN) 0 1 % ointment Apply topically in the morning Apply sparingly to gauze dressing, then place gauze into sacral and ischial wounds and secure with Medipore tape  Change daily and PRN if soiled  , Starting Tue 5/10/2022, Normal      lisinopril-hydrochlorothiazide (PRINZIDE,ZESTORETIC) 10-12 5 MG per tablet take 2 tablets by mouth once daily, Historical Med      LORazepam (ATIVAN) 1 mg tablet Take 1 tablet (1 mg total) by mouth every 8 (eight) hours as needed for anxiety (nausea or anxiety), Starting Thu 4/28/2022, Normal      Melatonin 5 MG TABS Take 5 mg by mouth, Historical Med      minoxidil (LONITEN) 2 5 mg tablet Take 1 25 mg by mouth daily, Starting Thu 2/24/2022, Historical Med      !! morphine (MS CONTIN) 15 mg 12 hr tablet Take 15 mg by mouth 2 (two) times a day, Starting Wed 8/28/2019, Historical Med      !! morphine (MS CONTIN) 30 mg 12 hr tablet Take 30 mg by mouth every 12 (twelve) hours, Starting Mon 2/14/2022, Historical Med      naloxone (NARCAN) 4 mg/0 1 mL nasal spray Administer 1 spray into a nostril  If no response after 2-3 minutes, give another dose in the other nostril using a new spray , Normal      nystatin (MYCOSTATIN) powder Apply topically 3 (three) times a day, Starting Mon 2/21/2022, Normal      omeprazole (PriLOSEC) 20 mg delayed release capsule omeprazole 20 mg capsule,delayed release   take 1 capsule by mouth twice a day before meals, Historical Med      ondansetron (ZOFRAN) 8 mg tablet Take 1 tablet (8 mg total) by mouth every 8 (eight) hours as needed for nausea or vomiting, Starting u 4/28/2022, Normal      oxyCODONE (OXY-IR) 5 MG capsule Take 5 mg by mouth 2 (two) times a day, Historical Med      oxyCODONE (ROXICODONE) 10 MG TABS Starting Mon 1/31/2022, Historical Med      oxyCODONE-acetaminophen (PERCOCET) 7 5-325 MG per tablet oxycodone-acetaminophen 7 5 mg-325 mg tablet   1 tab Q8h prn, Historical Med      pantoprazole (PROTONIX) 40 mg tablet Starting Mon 8/12/2019, Historical Med      predniSONE 20 mg tablet Take 2 tablets (40 mg total) by mouth daily Start medication tomorrow 05/27/2022, Starting Thu 5/26/2022, Normal      TiZANidine (ZANAFLEX) 2 MG capsule Starting Fri 8/30/2019, Historical Med      traZODone (DESYREL) 50 mg tablet Starting Mon 8/12/2019, Historical Med      !! White Petrolatum-Mineral Oil (Soothe Night Time) OINT Apply 1 application to eye daily at bedtime, Historical Med      zinc gluconate 50 mg tablet Take 50 mg by mouth daily, Historical Med       !! - Potential duplicate medications found  Please discuss with provider  No discharge procedures on file      PDMP Review     None          ED Provider  Electronically Signed by           Cal Luu DO  06/10/22 9907

## 2022-06-11 LAB — BACTERIA UR CULT: NORMAL

## 2022-06-11 RX ORDER — CIPROFLOXACIN 500 MG/1
500 TABLET, FILM COATED ORAL EVERY 24 HOURS
Qty: 5 TABLET | Refills: 0 | Status: SHIPPED | OUTPATIENT
Start: 2022-06-11 | End: 2022-06-16

## 2022-06-13 ENCOUNTER — TELEMEDICINE (OUTPATIENT)
Dept: GYNECOLOGIC ONCOLOGY | Facility: CLINIC | Age: 72
End: 2022-06-13
Payer: COMMERCIAL

## 2022-06-13 DIAGNOSIS — C54.1 ENDOMETRIAL CANCER (HCC): Primary | ICD-10-CM

## 2022-06-13 DIAGNOSIS — N30.00 ACUTE CYSTITIS WITHOUT HEMATURIA: ICD-10-CM

## 2022-06-13 PROCEDURE — 99214 OFFICE O/P EST MOD 30 MIN: CPT | Performed by: PHYSICIAN ASSISTANT

## 2022-06-14 ENCOUNTER — APPOINTMENT (OUTPATIENT)
Dept: LAB | Facility: CLINIC | Age: 72
End: 2022-06-14
Payer: COMMERCIAL

## 2022-06-14 DIAGNOSIS — C54.1 ENDOMETRIAL CANCER (HCC): ICD-10-CM

## 2022-06-14 LAB
CANCER AG125 SERPL-ACNC: 323.1 U/ML (ref 0–30)
PLATELET # BLD AUTO: 267 THOUSANDS/UL (ref 149–390)

## 2022-06-14 PROCEDURE — 86304 IMMUNOASSAY TUMOR CA 125: CPT

## 2022-06-14 NOTE — PROGRESS NOTES
Virtual Regular Visit    Verification of patient location:    Patient is located in the following state in which I hold an active license [place]      Assessment/Plan:    Problem List Items Addressed This Visit        Genitourinary    Endometrial cancer ([facility] [address] ) - Primary     Stage IVB gynecologic malignancy with supraclavicular, mediastinal and retroperitoneal lymph node metastases currently receiving neoadjuvant chemotherapy with taxol 135 mg/m2 and carboplatin AUC 5 every 21 days  She has stage III pressure ulcers and worsening gait disturbance  She experienced a severe taxol reaction with cycle 2 of treatment  This required emergency room evaluation for observation  She was transitioned to taxotere 65 mg/m2 and carboplatin AUC 5 with growth factor support every 21 days with cycle 2  Overall, she is tolerating treatment well  Continue with cycle 3 of treatment as planned  Repeat CT imaging to be completed after cycle 3 to evaluate disease response  Return to the office as per her chemotherapy calendar  Acute cystitis without hematuria     Identified at the time of ED evaluation for fever  Started on antibiotics  To completed course  Patient has been afebrile/without urinary symptoms  Reason for visit is   Chief Complaint   Patient presents with    Virtual Regular Visit        Encounter provider Galen Diaz PA-C    Provider located at [address] [address] [phone]      Recent Visits  Date Type Provider Dept   06/13/22 Telemedicine Gaeln Diaz PA-C [id number]   Showing recent visits within past 7 days and meeting all other requirements  Future Appointments  No visits were found meeting these conditions  Showing future appointments within next 150 days and meeting all other requirements       The patient was identified by name and date of birth  Uriel Joe was informed that this is a telemedicine visit and that the visit is being conducted through Telephone  My office door was closed  No one else was in the room  She acknowledged consent and understanding of privacy and security of the video platform  The patient has agreed to participate and understands they can discontinue the visit at any time  Patient is aware this is a billable service  Heidy Bacon is a 67 y o  female   who presents virtually for pre-chemotherapy evaluation  Her daughter is also present for the visit  The patient is without acute complaints  She was evaluated in the ED this weekend for a fever  The patient was found to have a UTI and started on antibiotics  ED records and testing were reviewed  Overall, the patient is feeling well  She has been afebrile since ED evaluation  She denies n/v/abdominal pain  Her appetite is appropriate  She is fatigued  She denies chemotherapy-induced neuropathy  The patient's daughter is caring for her pressure wound, and notes everything is stable  Oncology History   Endometrial cancer ([place] [address] )   4/26/2022 Initial Diagnosis    Endometrial cancer ([place] [address] )     4/26/2022 -  Cancer Staged    Staging form: Corpus Uteri - Carcinoma, AJCC 8th Edition  - Clinical: FIGO Stage IVB (cT2, cN2a, cM1) - Signed by Ari Valdez MD on 4/26/2022  Histologic grade (G): GX  Histologic grading system: 3 grade system       5/5/2022 -  Chemotherapy    Taxol 135 mg/m2 and carboplatin AUC 5 every 21 days  Severe taxol reaction with cycle 2  Transitioned to taxotere 65 mg/m2 and carboplatin AUC 5 every 21 days with cycle 3              Past Medical History:   Diagnosis Date    Anxiety     Arthritis     Bernard's esophagus     Bleeds easily ([place] [address] )     CVA (cerebral vascular accident) ([place] [address] ) 10/2016    Endometrial cancer ([place] [address] )     Fibromyalgia     GERD (gastroesophageal reflux disease)     Hypercholesterolemia     Hypertension  Insomnia     Rheumatoid arthritis ([facility] [address] )     Stroke ([facility] [address] ) 2016       Past Surgical History:   Procedure Laterality Date    APPENDECTOMY      BREAST BIOPSY Bilateral     benign    CARPAL TUNNEL RELEASE Left 10/27/2003    DORSAL COMPARTMENT RELEASE Left 03/09/2006    FINGER SURGERY      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    IR PORT PLACEMENT  5/23/2022    KNEE ARTHROSCOPY Right     x3(6/91,7/96,7/99)    OTHER SURGICAL HISTORY Left 03/09/2006    tennis elbow release    RI DEBRIDEMENT, SKIN, SUB-Q TISSUE,=<20 SQ CM N/A 1/24/2022    Procedure: SACRAL DEBRIDEMENT WOUND AND DRESSING CHANGE ([address] OUT); Surgeon: Kerrie Russell MD;  Location: [facility];  Service: General    REPLACEMENT TOTAL KNEE BILATERAL      rt-11/99, lt-1/13/10    ROTATOR CUFF REPAIR Bilateral     SHOULDER ARTHROSCOPY Right 02/10/2016    SHOULDER ARTHROSCOPY Left     11/26/08,9/99    SHOULDER ARTHROSCOPY Left 03/09/2006    TONSILLECTOMY AND ADENOIDECTOMY      TRIGGER FINGER RELEASE Left 07/01/2002    middle and ring finger    ULNAR NERVE TRANSPOSITION Left        Current Outpatient Medications   Medication Sig Dispense Refill    artificial tear (LUBRIFRESH P M ) 83-15 % ophthalmic ointment 1 application daily      atorvastatin (LIPITOR) 40 mg tablet Take 40 mg by mouth daily at bedtime  0    azelastine (ASTELIN) 0 1 % nasal spray 1 spray into each nostril 2 (two) times a day 30 mL 11    ciprofloxacin (CIPRO) 500 mg tablet Take 1 tablet (500 mg total) by mouth every 24 hours for 5 days 5 tablet 0    clopidogrel (PLAVIX) 75 mg tablet   0    dexamethasone (DECADRON) 4 mg tablet Take 2 tabs PO BID the day prior to chemo, then 2 tabs PO the night after chemo, then 2 tabs PO BID the day after chemo 30 tablet 0    famotidine (PEPCID) 20 mg tablet famotidine 20 mg tablet   Take by oral route for 30 days        fluticasone (FLONASE) 50 mcg/act nasal spray 2 sprays into each nostril daily (Patient taking differently: 2 sprays into each nostril daily When needed ) 16 g 11    furosemide (LASIX) 20 mg tablet furosemide 20 mg tablet   Take 1 tablet twice a day by oral route   gabapentin (NEURONTIN) 600 MG tablet Take 600 mg by mouth 3 (three) times a day  0    gentamicin (GARAMYCIN) 0 1 % ointment Apply topically in the morning Apply sparingly to gauze dressing, then place gauze into sacral and ischial wounds and secure with Medipore tape  Change daily and PRN if soiled  30 g 3    lisinopril-hydrochlorothiazide (PRINZIDE,ZESTORETIC) 10-12 5 MG per tablet take 2 tablets by mouth once daily      LORazepam (ATIVAN) 1 mg tablet Take 1 tablet (1 mg total) by mouth every 8 (eight) hours as needed for anxiety (nausea or anxiety) 30 tablet 0    Melatonin 5 MG TABS Take 5 mg by mouth      minoxidil (LONITEN) 2 5 mg tablet Take 1 25 mg by mouth daily      morphine (MS CONTIN) 15 mg 12 hr tablet Take 15 mg by mouth 2 (two) times a day  0    morphine (MS CONTIN) 30 mg 12 hr tablet Take 30 mg by mouth every 12 (twelve) hours      naloxone (NARCAN) 4 mg/0 1 mL nasal spray Administer 1 spray into a nostril  If no response after 2-3 minutes, give another dose in the other nostril using a new spray   1 each 1    nystatin (MYCOSTATIN) powder Apply topically 3 (three) times a day 15 g 0    omeprazole (PriLOSEC) 20 mg delayed release capsule omeprazole 20 mg capsule,delayed release   take 1 capsule by mouth twice a day before meals      ondansetron (ZOFRAN) 8 mg tablet Take 1 tablet (8 mg total) by mouth every 8 (eight) hours as needed for nausea or vomiting 20 tablet 1    oxyCODONE (OXY-IR) 5 MG capsule Take 5 mg by mouth 2 (two) times a day      oxyCODONE (ROXICODONE) 10 MG TABS       oxyCODONE-acetaminophen (PERCOCET) 7 5-325 MG per tablet oxycodone-acetaminophen 7 5 mg-325 mg tablet   1 tab Q8h prn      pantoprazole (PROTONIX) 40 mg tablet   0    predniSONE 20 mg tablet Take 2 tablets (40 mg total) by mouth daily Start medication tomorrow 05/27/2022 6 tablet 0    TiZANidine (ZANAFLEX) 2 MG capsule   0    traZODone (DESYREL) 50 mg tablet   0    White Petrolatum-Mineral Oil (Soothe Night Time) OINT Apply 1 application to eye daily at bedtime      zinc gluconate 50 mg tablet Take 50 mg by mouth daily       No current facility-administered medications for this visit  No Known Allergies    Review of Systems   Constitutional: Positive for fatigue  Negative for fever  HENT: Negative  Eyes: Negative  Respiratory: Negative  Cardiovascular: Negative  Gastrointestinal: Negative  Genitourinary: Negative  Musculoskeletal: Negative  Skin: Positive for wound (pressure ulcer, chronic)  Neurological: Negative  Hematological: Positive for adenopathy (neck)  Psychiatric/Behavioral: Negative  Video Exam    There were no vitals filed for this visit  Visit performed via audio only  No video capabilities available  I spent 20 minutes with patient today in which greater than 50% of the time was spent in counseling/coordination of care regarding chemotherapy    VIRTUAL VISIT [address] verbally agrees to participate in [employer]  Pt is aware that [employer] could be limited without vital signs or the ability to perform a full hands-on physical exam  Luda iDaz understands she or the provider may request at any time to terminate the video visit and request the patient to seek care or treatment in person

## 2022-06-15 PROBLEM — N30.00 ACUTE CYSTITIS WITHOUT HEMATURIA: Status: ACTIVE | Noted: 2022-01-01

## 2022-06-16 LAB
BACTERIA BLD CULT: NORMAL
BACTERIA BLD CULT: NORMAL

## 2022-06-16 NOTE — ASSESSMENT & PLAN NOTE
Identified at the time of ED evaluation for fever  Started on antibiotics  To completed course  Patient has been afebrile/without urinary symptoms

## 2022-06-16 NOTE — ASSESSMENT & PLAN NOTE
Stage IVB gynecologic malignancy with supraclavicular, mediastinal and retroperitoneal lymph node metastases currently receiving neoadjuvant chemotherapy with taxol 135 mg/m2 and carboplatin AUC 5 every 21 days  She has stage III pressure ulcers and worsening gait disturbance  She experienced a severe taxol reaction with cycle 2 of treatment  This required emergency room evaluation for observation  She was transitioned to taxotere 65 mg/m2 and carboplatin AUC 5 with growth factor support every 21 days with cycle 2  Overall, she is tolerating treatment well  Continue with cycle 3 of treatment as planned  Repeat CT imaging to be completed after cycle 3 to evaluate disease response  Return to the office as per her chemotherapy calendar

## 2022-06-21 ENCOUNTER — APPOINTMENT (OUTPATIENT)
Dept: LAB | Facility: CLINIC | Age: 72
End: 2022-06-21
Payer: COMMERCIAL

## 2022-06-21 DIAGNOSIS — C54.1 ENDOMETRIAL CANCER (HCC): ICD-10-CM

## 2022-06-21 LAB — CANCER AG125 SERPL-ACNC: 225.9 U/ML (ref 0–30)

## 2022-06-21 PROCEDURE — 86304 IMMUNOASSAY TUMOR CA 125: CPT

## 2022-06-23 ENCOUNTER — HOSPITAL ENCOUNTER (OUTPATIENT)
Dept: INFUSION CENTER | Facility: HOSPITAL | Age: 72
Discharge: HOME | End: 2022-06-23
Attending: OBSTETRICS & GYNECOLOGY
Payer: COMMERCIAL

## 2022-06-23 VITALS
DIASTOLIC BLOOD PRESSURE: 70 MMHG | HEART RATE: 82 BPM | TEMPERATURE: 97.1 F | RESPIRATION RATE: 18 BRPM | BODY MASS INDEX: 29.61 KG/M2 | SYSTOLIC BLOOD PRESSURE: 159 MMHG | WEIGHT: 150.79 LBS | OXYGEN SATURATION: 94 % | HEIGHT: 60 IN

## 2022-06-23 DIAGNOSIS — D70.1 CHEMOTHERAPY INDUCED NEUTROPENIA (HCC): ICD-10-CM

## 2022-06-23 DIAGNOSIS — T45.1X5A CHEMOTHERAPY INDUCED NEUTROPENIA (HCC): ICD-10-CM

## 2022-06-23 DIAGNOSIS — C54.1 ENDOMETRIAL CANCER (HCC): Primary | ICD-10-CM

## 2022-06-23 PROCEDURE — 96413 CHEMO IV INFUSION 1 HR: CPT

## 2022-06-23 PROCEDURE — 96375 TX/PRO/DX INJ NEW DRUG ADDON: CPT

## 2022-06-23 PROCEDURE — 96417 CHEMO IV INFUS EACH ADDL SEQ: CPT

## 2022-06-23 PROCEDURE — 96367 TX/PROPH/DG ADDL SEQ IV INF: CPT

## 2022-06-23 RX ORDER — SODIUM CHLORIDE 9 MG/ML
20 INJECTION, SOLUTION INTRAVENOUS ONCE
Status: COMPLETED | OUTPATIENT
Start: 2022-06-23 | End: 2022-06-23

## 2022-06-23 RX ORDER — PALONOSETRON 0.05 MG/ML
0.25 INJECTION, SOLUTION INTRAVENOUS ONCE
Status: COMPLETED | OUTPATIENT
Start: 2022-06-23 | End: 2022-06-23

## 2022-06-23 RX ADMIN — SODIUM CHLORIDE 20 ML/HR: 0.9 INJECTION, SOLUTION INTRAVENOUS at 08:54

## 2022-06-23 RX ADMIN — DIPHENHYDRAMINE HYDROCHLORIDE 25 MG: 50 INJECTION INTRAMUSCULAR; INTRAVENOUS at 08:57

## 2022-06-23 RX ADMIN — DEXAMETHASONE SODIUM PHOSPHATE 20 MG: 10 INJECTION, SOLUTION INTRAMUSCULAR; INTRAVENOUS at 09:19

## 2022-06-23 RX ADMIN — CARBOPLATIN 425 MG: 10 INJECTION, SOLUTION INTRAVENOUS at 11:43

## 2022-06-23 RX ADMIN — PALONOSETRON HYDROCHLORIDE 0.25 MG: 0.05 INJECTION, SOLUTION INTRAVENOUS at 10:21

## 2022-06-23 RX ADMIN — FOSAPREPITANT 150 MG: 150 INJECTION, POWDER, LYOPHILIZED, FOR SOLUTION INTRAVENOUS at 09:43

## 2022-06-23 RX ADMIN — DOCETAXEL 105.4 MG: 20 INJECTION, SOLUTION, CONCENTRATE INTRAVENOUS at 10:28

## 2022-06-23 NOTE — PROGRESS NOTES
Recent labs reviewed  Patient tolerated Taxotere and Carboplatin chemotherapy treatment without reaction or issues  AVS given to patient  Patient ambulated off unit without incident  All personal belongings taken with patient

## 2022-06-24 ENCOUNTER — HOSPITAL ENCOUNTER (OUTPATIENT)
Dept: INFUSION CENTER | Facility: HOSPITAL | Age: 72
Discharge: HOME | End: 2022-06-24
Attending: OBSTETRICS & GYNECOLOGY
Payer: COMMERCIAL

## 2022-06-24 VITALS — TEMPERATURE: 97.2 F

## 2022-06-24 DIAGNOSIS — D70.1 CHEMOTHERAPY INDUCED NEUTROPENIA (HCC): ICD-10-CM

## 2022-06-24 DIAGNOSIS — T45.1X5A CHEMOTHERAPY INDUCED NEUTROPENIA (HCC): ICD-10-CM

## 2022-06-24 DIAGNOSIS — C54.1 ENDOMETRIAL CANCER (HCC): Primary | ICD-10-CM

## 2022-06-24 PROCEDURE — 96372 THER/PROPH/DIAG INJ SC/IM: CPT

## 2022-06-24 RX ADMIN — PEGFILGRASTIM-BMEZ 6 MG: 6 INJECTION SUBCUTANEOUS at 13:13

## 2022-06-24 NOTE — PROGRESS NOTES
Patient tolerated ziextenzo injection to left arm without issue  Discharged in stable condition, declined AVS but aware of next appointment

## 2022-06-28 ENCOUNTER — APPOINTMENT (OUTPATIENT)
Dept: LAB | Facility: CLINIC | Age: 72
End: 2022-06-28
Payer: COMMERCIAL

## 2022-06-28 DIAGNOSIS — C54.1 ENDOMETRIAL CANCER (HCC): ICD-10-CM

## 2022-06-28 LAB — PLATELET # BLD AUTO: 263 THOUSANDS/UL (ref 149–390)

## 2022-07-05 ENCOUNTER — APPOINTMENT (OUTPATIENT)
Dept: LAB | Facility: CLINIC | Age: 72
End: 2022-07-05
Payer: COMMERCIAL

## 2022-07-05 DIAGNOSIS — C54.1 ENDOMETRIAL CANCER (HCC): ICD-10-CM

## 2022-07-05 LAB — PLATELET # BLD AUTO: 309 THOUSANDS/UL (ref 149–390)

## 2022-07-08 ENCOUNTER — HOSPITAL ENCOUNTER (OUTPATIENT)
Dept: CT IMAGING | Facility: HOSPITAL | Age: 72
Discharge: HOME | End: 2022-07-08
Payer: COMMERCIAL

## 2022-07-08 DIAGNOSIS — C54.1 ENDOMETRIAL CANCER (HCC): ICD-10-CM

## 2022-07-08 PROCEDURE — 71250 CT THORAX DX C-: CPT

## 2022-07-08 PROCEDURE — 74176 CT ABD & PELVIS W/O CONTRAST: CPT

## 2022-07-08 PROCEDURE — G1004 CDSM NDSC: HCPCS

## 2022-07-12 ENCOUNTER — APPOINTMENT (OUTPATIENT)
Dept: LAB | Facility: CLINIC | Age: 72
End: 2022-07-12
Payer: COMMERCIAL

## 2022-07-12 DIAGNOSIS — C54.1 ENDOMETRIAL CANCER (HCC): ICD-10-CM

## 2022-07-12 LAB — PLATELET # BLD AUTO: 410 THOUSANDS/UL (ref 149–390)

## 2022-07-13 ENCOUNTER — OFFICE VISIT (OUTPATIENT)
Dept: GYNECOLOGIC ONCOLOGY | Facility: CLINIC | Age: 72
End: 2022-07-13
Payer: COMMERCIAL

## 2022-07-13 VITALS
RESPIRATION RATE: 17 BRPM | HEART RATE: 152 BPM | HEIGHT: 60 IN | SYSTOLIC BLOOD PRESSURE: 162 MMHG | BODY MASS INDEX: 30.73 KG/M2 | DIASTOLIC BLOOD PRESSURE: 82 MMHG | OXYGEN SATURATION: 90 % | TEMPERATURE: 97.6 F | WEIGHT: 156.5 LBS

## 2022-07-13 DIAGNOSIS — C54.1 ENDOMETRIAL CANCER (HCC): Primary | ICD-10-CM

## 2022-07-13 PROCEDURE — 99215 OFFICE O/P EST HI 40 MIN: CPT | Performed by: OBSTETRICS & GYNECOLOGY

## 2022-07-13 RX ORDER — DEXAMETHASONE 4 MG/1
TABLET ORAL
Qty: 30 TABLET | Refills: 0 | Status: SHIPPED | OUTPATIENT
Start: 2022-07-13 | End: 2022-08-02

## 2022-07-13 RX ORDER — PALONOSETRON 0.05 MG/ML
0.25 INJECTION, SOLUTION INTRAVENOUS ONCE
Status: CANCELLED | OUTPATIENT
Start: 2022-07-14

## 2022-07-13 RX ORDER — SODIUM CHLORIDE 9 MG/ML
20 INJECTION, SOLUTION INTRAVENOUS ONCE
Status: CANCELLED | OUTPATIENT
Start: 2022-07-14

## 2022-07-13 NOTE — ASSESSMENT & PLAN NOTE
25-year-old with stage IV B likely clear cell carcinoma with extensive retroperitoneal lymph node and supraclavicular lymph node metastatic disease, pulmonary metastatic disease, inguinal metastases with bilateral lower extremity edema  She is tolerating palliative carboplatin at AUC 5 and Taxotere at 65 milligrams/meter squared well with the exception of chemotherapy-induced anemia, ongoing lower extremity edema, neuropathy  I reviewed her CBC, CMP, , CT of chest abdomen pelvis  Her disease is essentially stable  Her performance status is 3   1  Chemotherapy-induced neuropathy-currently grade 2-we discussed switching to an alternative treatment regimen  She would prefer to stay on this regimen for now  2  Chemotherapy-induced neutropenia and anemia secondary to treatment-continue Neulasta support with transfusions if needed  3  We discussed disease status and ongoing treatment with carboplatin at AUC 5 and Taxotere 65 milligrams/meter squared  She is amenable to continuing for 2 additional treatments followed by short interval follow-up imaging to reassess  With further disease progression, consideration will be given to changing treatment regimens  Pembrolizumab and lenvatinib could be considered  4  Bilateral lower extremity edema likely secondary to retroperitoneal lymph node involvement  This predated treatment with chemotherapy  Although Taxotere can cause lower extremity edema, the mechanism is likely the lymphadenopathy  She will continue ongoing treatment  5  Sacral wound-continue wound care

## 2022-07-13 NOTE — PROGRESS NOTES
Assessment/Plan:    Problem List Items Addressed This Visit        Genitourinary    Endometrial cancer ([facility] [address] ) - Primary     76-year-old with stage IV B likely clear cell carcinoma with extensive retroperitoneal lymph node and supraclavicular lymph node metastatic disease, pulmonary metastatic disease, inguinal metastases with bilateral lower extremity edema  She is tolerating palliative carboplatin at AUC 5 and Taxotere at 65 milligrams/meter squared well with the exception of chemotherapy-induced anemia, ongoing lower extremity edema, neuropathy  I reviewed her CBC, CMP, , CT of chest abdomen pelvis  Her disease is essentially stable  Her performance status is 3   1  Chemotherapy-induced neuropathy-currently grade 2-we discussed switching to an alternative treatment regimen  She would prefer to stay on this regimen for now  2  Chemotherapy-induced neutropenia and anemia secondary to treatment-continue Neulasta support with transfusions if needed  3  We discussed disease status and ongoing treatment with carboplatin at AUC 5 and Taxotere 65 milligrams/meter squared  She is amenable to continuing for 2 additional treatments followed by short interval follow-up imaging to reassess  With further disease progression, consideration will be given to changing treatment regimens  Pembrolizumab and lenvatinib could be considered  4  Bilateral lower extremity edema likely secondary to retroperitoneal lymph node involvement  This predated treatment with chemotherapy  Although Taxotere can cause lower extremity edema, the mechanism is likely the lymphadenopathy  She will continue ongoing treatment  5  Sacral wound-continue wound care                     CHIEF COMPLAINT:  Pre chemotherapy evaluation      Problem:  Cancer Staging  Endometrial cancer [facility])  Staging form: Corpus Uteri - Carcinoma, AJCC 8th Edition  - Clinical: FIGO Stage IVB (cT2, cN2a, cM1) - Signed by Hai Banks MD on 4/26/2022        Previous therapy:  Oncology History   Endometrial cancer ([facility] [address] )   4/26/2022 Initial Diagnosis    Endometrial cancer ([facility] [address] )     4/26/2022 -  Cancer Staged    Staging form: Corpus Uteri - Carcinoma, AJCC 8th Edition  - Clinical: FIGO Stage IVB (cT2, cN2a, cM1) - Signed by Tyson Aparicio MD on 4/26/2022  Histologic grade (G): GX  Histologic grading system: 3 grade system       5/5/2022 -  Chemotherapy    Taxol 135 mg/m2 and carboplatin AUC 5 every 21 days  Severe taxol reaction with cycle 2  Transitioned to taxotere 65 mg/m2 and carboplatin AUC 5 every 21 days with cycle 3  Patient ID: Rut Pressley is a 67 y o  female  Returns for pre chemotherapy evaluation  She continues to have symptomatic bilateral lower extremity edema which is decreasing  She has some leg wraps on  The edema is significant enough where there is some skin breakdown  She ambulates with the assistance of a walker  Overall, she feels that she is tolerating the chemotherapy very well  She does have neuropathy which is symptomatic but has not significantly limited her activities of daily living  Recent labs from 7/5/2022 revealed hyponatremia with a sodium of 130 millimoles per L, hypokalemia, hypercalcemia with a serum albumin of 2 6 grams/deciliter   is 225 9 from prior of 323 1  She notes that the left supraclavicular lymph nodes decreased in size after the 1st treatment but then enlarged the size of a softball and has then decreased in size again  CT of the chest abdomen pelvis without contrast on 7/8/2022 revealed increase of a right lower lobe nodule now measuring 9 mm from prior 5 mm  There was a new intrapulmonary lymph node identified  Retroperitoneal lymphadenopathy, pelvic mass are essentially stable  Very slight increase in size of bilateral inguinal lymphadenopathy  I reviewed the images  She has a poor appetite but is drinking 3 ensure daily          The following portions of the patient's history were reviewed and updated as appropriate: allergies, current medications, past family history, past medical history, past social history, past surgical history and problem list     Review of Systems   Constitutional: Positive for activity change, appetite change and fatigue  Negative for unexpected weight change  HENT: Negative  Eyes: Negative  Respiratory: Negative  Cardiovascular: Positive for leg swelling  Gastrointestinal: Negative for abdominal distention and abdominal pain  Endocrine: Negative  Genitourinary: Negative for pelvic pain and vaginal bleeding  Musculoskeletal: Positive for arthralgias and gait problem  Skin: Negative  Allergic/Immunologic: Negative  Neurological:        Neuropathy   Hematological: Negative  Psychiatric/Behavioral: Negative  Current Outpatient Medications   Medication Sig Dispense Refill    artificial tear (LUBRIFRESH P M ) 83-15 % ophthalmic ointment 1 application daily      atorvastatin (LIPITOR) 40 mg tablet Take 40 mg by mouth daily at bedtime  0    azelastine (ASTELIN) 0 1 % nasal spray 1 spray into each nostril 2 (two) times a day 30 mL 11    clopidogrel (PLAVIX) 75 mg tablet   0    famotidine (PEPCID) 20 mg tablet famotidine 20 mg tablet   Take by oral route for 30 days   furosemide (LASIX) 20 mg tablet furosemide 20 mg tablet   Take 1 tablet twice a day by oral route   gabapentin (NEURONTIN) 600 MG tablet Take 600 mg by mouth 3 (three) times a day  0    gentamicin (GARAMYCIN) 0 1 % ointment Apply topically in the morning Apply sparingly to gauze dressing, then place gauze into sacral and ischial wounds and secure with Medipore tape  Change daily and PRN if soiled   30 g 3    lisinopril-hydrochlorothiazide (PRINZIDE,ZESTORETIC) 10-12 5 MG per tablet take 2 tablets by mouth once daily      LORazepam (ATIVAN) 1 mg tablet Take 1 tablet (1 mg total) by mouth every 8 (eight) hours as needed for anxiety (nausea or anxiety) 30 tablet 0    Melatonin 5 MG TABS Take 5 mg by mouth      minoxidil (LONITEN) 2 5 mg tablet Take 1 25 mg by mouth daily      morphine (MS CONTIN) 15 mg 12 hr tablet Take 15 mg by mouth 2 (two) times a day  0    morphine (MS CONTIN) 30 mg 12 hr tablet Take 30 mg by mouth every 12 (twelve) hours      naloxone (NARCAN) 4 mg/0 1 mL nasal spray Administer 1 spray into a nostril  If no response after 2-3 minutes, give another dose in the other nostril using a new spray  1 each 1    nystatin (MYCOSTATIN) powder Apply topically 3 (three) times a day 15 g 0    omeprazole (PriLOSEC) 20 mg delayed release capsule omeprazole 20 mg capsule,delayed release   take 1 capsule by mouth twice a day before meals      ondansetron (ZOFRAN) 8 mg tablet Take 1 tablet (8 mg total) by mouth every 8 (eight) hours as needed for nausea or vomiting 20 tablet 1    oxyCODONE (OXY-IR) 5 MG capsule Take 5 mg by mouth 2 (two) times a day      oxyCODONE (ROXICODONE) 10 MG TABS       oxyCODONE-acetaminophen (PERCOCET) 7 5-325 MG per tablet oxycodone-acetaminophen 7 5 mg-325 mg tablet   1 tab Q8h prn      pantoprazole (PROTONIX) 40 mg tablet   0    predniSONE 20 mg tablet Take 2 tablets (40 mg total) by mouth daily Start medication tomorrow 05/27/2022 6 tablet 0    TiZANidine (ZANAFLEX) 2 MG capsule   0    traZODone (DESYREL) 50 mg tablet   0    White Petrolatum-Mineral Oil (Soothe Night Time) OINT Apply 1 application to eye daily at bedtime      zinc gluconate 50 mg tablet Take 50 mg by mouth daily      dexamethasone (DECADRON) 4 mg tablet Take 2 tabs PO BID the day prior to chemo, then 2 tabs PO the night after chemo, then 2 tabs PO BID the day after chemo 30 tablet 0    fluticasone (FLONASE) 50 mcg/act nasal spray 2 sprays into each nostril daily (Patient taking differently: 2 sprays into each nostril daily When needed ) 16 g 11     No current facility-administered medications for this visit  Objective:    Blood pressure 162/82, pulse (!) 152, temperature 97 6 °F (36 4 °C), resp  rate 17, height 5' (1 524 m), weight 71 kg (156 lb 8 oz), SpO2 90 %  Body mass index is 30 56 kg/m²  Body surface area is 1 68 meters squared  Physical Exam  Vitals reviewed  Constitutional:       General: She is not in acute distress  Appearance: Normal appearance  She is obese  She is ill-appearing  She is not toxic-appearing or diaphoretic  HENT:      Head: Normocephalic and atraumatic  Eyes:      General: No scleral icterus  Right eye: No discharge  Left eye: No discharge  Conjunctiva/sclera: Conjunctivae normal    Neck:      Comments: Approximate 6 cm mass left neck  Pulmonary:      Effort: Pulmonary effort is normal    Musculoskeletal:      Right lower leg: Edema present  Left lower leg: Edema present  Comments: 4+ with overlying erythema, blistering   Skin:     General: Skin is warm and dry  Coloration: Skin is not jaundiced  Findings: Erythema and lesion present  No rash  Neurological:      General: No focal deficit present  Mental Status: She is alert and oriented to person, place, and time  Mental status is at baseline  Cranial Nerves: No cranial nerve deficit  Motor: Weakness present  Gait: Gait abnormal    Psychiatric:         Mood and Affect: Mood normal          Behavior: Behavior normal          Thought Content:  Thought content normal          Judgment: Judgment normal          Lab Results   Component Value Date     225 9 (H) 06/21/2022     Lab Results   Component Value Date    K 3 2 (L) 07/12/2022    CL 84 (L) 07/12/2022    CO2 42 (H) 07/12/2022    BUN 56 (H) 07/12/2022    CREATININE 0 85 07/12/2022    GLUF 94 07/05/2022    CALCIUM 9 8 07/12/2022    CORRECTEDCA 10 9 (H) 07/12/2022    AST 50 (H) 07/12/2022    ALT 47 07/12/2022    ALKPHOS 186 (H) 07/12/2022    EGFR 68 07/12/2022     Lab Results   Component Value Date    WBC 15 44 (H) 07/12/2022    HGB 9 5 (L) 07/12/2022    HCT 32 4 (L) 07/12/2022    MCV 96 07/12/2022    PLT  07/12/2022      Comment:      Unable to perform due to clumped platelets     Lab Results   Component Value Date    NEUTROABS 11 78 (H) 07/12/2022        Trend:  Lab Results   Component Value Date     225 9 (H) 06/21/2022     323 1 (H) 06/14/2022     215 9 (H) 06/07/2022     173 4 (H) 05/31/2022     326 3 (H) 05/23/2022     509 7 (H) 05/17/2022     141 6 (H) 05/03/2022

## 2022-07-14 ENCOUNTER — HOSPITAL ENCOUNTER (OUTPATIENT)
Dept: INFUSION CENTER | Facility: HOSPITAL | Age: 72
Discharge: HOME | End: 2022-07-14
Attending: OBSTETRICS & GYNECOLOGY
Payer: COMMERCIAL

## 2022-07-14 VITALS
HEIGHT: 60 IN | WEIGHT: 153.66 LBS | SYSTOLIC BLOOD PRESSURE: 132 MMHG | RESPIRATION RATE: 16 BRPM | BODY MASS INDEX: 30.17 KG/M2 | HEART RATE: 78 BPM | DIASTOLIC BLOOD PRESSURE: 57 MMHG | TEMPERATURE: 97.1 F | OXYGEN SATURATION: 94 %

## 2022-07-14 DIAGNOSIS — T45.1X5A CHEMOTHERAPY INDUCED NEUTROPENIA (HCC): ICD-10-CM

## 2022-07-14 DIAGNOSIS — D70.1 CHEMOTHERAPY INDUCED NEUTROPENIA (HCC): ICD-10-CM

## 2022-07-14 DIAGNOSIS — C54.1 ENDOMETRIAL CANCER (HCC): Primary | ICD-10-CM

## 2022-07-14 PROCEDURE — 96413 CHEMO IV INFUSION 1 HR: CPT

## 2022-07-14 PROCEDURE — 96367 TX/PROPH/DG ADDL SEQ IV INF: CPT

## 2022-07-14 PROCEDURE — 96366 THER/PROPH/DIAG IV INF ADDON: CPT

## 2022-07-14 PROCEDURE — 96417 CHEMO IV INFUS EACH ADDL SEQ: CPT

## 2022-07-14 PROCEDURE — 96375 TX/PRO/DX INJ NEW DRUG ADDON: CPT

## 2022-07-14 RX ORDER — PALONOSETRON 0.05 MG/ML
0.25 INJECTION, SOLUTION INTRAVENOUS ONCE
Status: COMPLETED | OUTPATIENT
Start: 2022-07-14 | End: 2022-07-14

## 2022-07-14 RX ORDER — SODIUM CHLORIDE 9 MG/ML
20 INJECTION, SOLUTION INTRAVENOUS ONCE
Status: COMPLETED | OUTPATIENT
Start: 2022-07-14 | End: 2022-07-14

## 2022-07-14 RX ADMIN — POTASSIUM CHLORIDE 20 MEQ: 2 INJECTION, SOLUTION, CONCENTRATE INTRAVENOUS at 08:32

## 2022-07-14 RX ADMIN — SODIUM CHLORIDE 500 ML: 0.9 INJECTION, SOLUTION INTRAVENOUS at 08:31

## 2022-07-14 RX ADMIN — FOSAPREPITANT 150 MG: 150 INJECTION, POWDER, LYOPHILIZED, FOR SOLUTION INTRAVENOUS at 11:53

## 2022-07-14 RX ADMIN — SODIUM CHLORIDE 20 ML/HR: 0.9 INJECTION, SOLUTION INTRAVENOUS at 09:35

## 2022-07-14 RX ADMIN — DOCETAXEL 105.4 MG: 20 INJECTION, SOLUTION, CONCENTRATE INTRAVENOUS at 12:42

## 2022-07-14 RX ADMIN — DIPHENHYDRAMINE HYDROCHLORIDE 25 MG: 50 INJECTION INTRAMUSCULAR; INTRAVENOUS at 11:22

## 2022-07-14 RX ADMIN — CARBOPLATIN 372 MG: 10 INJECTION, SOLUTION INTRAVENOUS at 13:59

## 2022-07-14 RX ADMIN — DEXAMETHASONE SODIUM PHOSPHATE 20 MG: 10 INJECTION, SOLUTION INTRAMUSCULAR; INTRAVENOUS at 10:53

## 2022-07-14 RX ADMIN — PALONOSETRON HYDROCHLORIDE 0.25 MG: 0.05 INJECTION, SOLUTION INTRAVENOUS at 10:50

## 2022-07-14 NOTE — PROGRESS NOTES
Patient tolerated chemotherapy treatment without reaction or issues  AVS given to patient  Patient ambulated off unit without incident  All personal belongings taken with patient

## 2022-07-15 ENCOUNTER — HOSPITAL ENCOUNTER (OUTPATIENT)
Dept: INFUSION CENTER | Facility: HOSPITAL | Age: 72
Discharge: HOME | End: 2022-07-15
Attending: OBSTETRICS & GYNECOLOGY
Payer: COMMERCIAL

## 2022-07-15 VITALS — TEMPERATURE: 96.9 F

## 2022-07-15 DIAGNOSIS — T45.1X5A CHEMOTHERAPY INDUCED NEUTROPENIA (HCC): ICD-10-CM

## 2022-07-15 DIAGNOSIS — C54.1 ENDOMETRIAL CANCER (HCC): Primary | ICD-10-CM

## 2022-07-15 DIAGNOSIS — D70.1 CHEMOTHERAPY INDUCED NEUTROPENIA (HCC): ICD-10-CM

## 2022-07-15 PROCEDURE — 96372 THER/PROPH/DIAG INJ SC/IM: CPT

## 2022-07-15 RX ADMIN — PEGFILGRASTIM-BMEZ 6 MG: 6 INJECTION SUBCUTANEOUS at 14:59

## 2022-07-15 NOTE — PROGRESS NOTES
Pt arrived to dept afebrile  Tolerated ziextenzo injection to left arm without incident   Discharged ambulatory with walker and avs

## 2022-07-19 ENCOUNTER — TELEPHONE (OUTPATIENT)
Dept: SURGICAL ONCOLOGY | Facility: CLINIC | Age: 72
End: 2022-07-19

## 2022-07-19 ENCOUNTER — TELEPHONE (OUTPATIENT)
Dept: OTHER | Facility: OTHER | Age: 72
End: 2022-07-19

## 2022-07-19 ENCOUNTER — APPOINTMENT (OUTPATIENT)
Dept: LAB | Facility: CLINIC | Age: 72
End: 2022-07-19
Payer: COMMERCIAL

## 2022-07-19 DIAGNOSIS — K13.79 MOUTH SORES: Primary | ICD-10-CM

## 2022-07-19 LAB
ALBUMIN SERPL BCP-MCNC: 2.5 G/DL (ref 3.5–5)
ALP SERPL-CCNC: 191 U/L (ref 46–116)
ALT SERPL W P-5'-P-CCNC: 24 U/L (ref 12–78)
ANISOCYTOSIS BLD QL SMEAR: PRESENT
AST SERPL W P-5'-P-CCNC: 39 U/L (ref 5–45)
BASOPHILS # BLD MANUAL: 0 THOUSAND/UL (ref 0–0.1)
BASOPHILS NFR MAR MANUAL: 0 % (ref 0–1)
BILIRUB SERPL-MCNC: 0.63 MG/DL (ref 0.2–1)
BUN SERPL-MCNC: 42 MG/DL (ref 5–25)
CALCIUM ALBUM COR SERPL-MCNC: 10.3 MG/DL (ref 8.3–10.1)
CALCIUM SERPL-MCNC: 9.1 MG/DL (ref 8.3–10.1)
CHLORIDE SERPL-SCNC: 83 MMOL/L (ref 96–108)
CO2 SERPL-SCNC: >45 MMOL/L (ref 21–32)
CREAT SERPL-MCNC: 0.96 MG/DL (ref 0.6–1.3)
EOSINOPHIL # BLD MANUAL: 0 THOUSAND/UL (ref 0–0.4)
EOSINOPHIL NFR BLD MANUAL: 0 % (ref 0–6)
ERYTHROCYTE [DISTWIDTH] IN BLOOD BY AUTOMATED COUNT: 21.7 % (ref 11.6–15.1)
GFR SERPL CREATININE-BSD FRML MDRD: 58 ML/MIN/1.73SQ M
GLUCOSE P FAST SERPL-MCNC: 200 MG/DL (ref 65–99)
HCT VFR BLD AUTO: 31.8 % (ref 34.8–46.1)
HGB BLD-MCNC: 8.9 G/DL (ref 11.5–15.4)
HYPERCHROMIA BLD QL SMEAR: PRESENT
LYMPHOCYTES # BLD AUTO: 0.44 THOUSAND/UL (ref 0.6–4.47)
LYMPHOCYTES # BLD AUTO: 2 % (ref 14–44)
MAGNESIUM SERPL-MCNC: 1.8 MG/DL (ref 1.6–2.6)
MCH RBC QN AUTO: 27.6 PG (ref 26.8–34.3)
MCHC RBC AUTO-ENTMCNC: 28 G/DL (ref 31.4–37.4)
MCV RBC AUTO: 99 FL (ref 82–98)
METAMYELOCYTES NFR BLD MANUAL: 8 % (ref 0–1)
MONOCYTES # BLD AUTO: 0 THOUSAND/UL (ref 0–1.22)
MONOCYTES NFR BLD: 0 % (ref 4–12)
MYELOCYTES NFR BLD MANUAL: 7 % (ref 0–1)
NEUTROPHILS # BLD MANUAL: 18.31 THOUSAND/UL (ref 1.85–7.62)
NEUTS BAND NFR BLD MANUAL: 19 % (ref 0–8)
NEUTS SEG NFR BLD AUTO: 64 % (ref 43–75)
PLATELET # BLD AUTO: 159 THOUSANDS/UL (ref 149–390)
PLATELET # BLD AUTO: 246 THOUSANDS/UL (ref 149–390)
PLATELET BLD QL SMEAR: ADEQUATE
PMV BLD AUTO: 11.1 FL (ref 8.9–12.7)
POLYCHROMASIA BLD QL SMEAR: PRESENT
POTASSIUM SERPL-SCNC: 3.1 MMOL/L (ref 3.5–5.3)
PROT SERPL-MCNC: 6.7 G/DL (ref 6.4–8.4)
RBC # BLD AUTO: 3.23 MILLION/UL (ref 3.81–5.12)
RBC MORPH BLD: PRESENT
SODIUM SERPL-SCNC: 134 MMOL/L (ref 135–147)
WBC # BLD AUTO: 22.06 THOUSAND/UL (ref 4.31–10.16)

## 2022-07-19 PROCEDURE — 80053 COMPREHEN METABOLIC PANEL: CPT | Performed by: PHYSICIAN ASSISTANT

## 2022-07-19 PROCEDURE — 85007 BL SMEAR W/DIFF WBC COUNT: CPT | Performed by: PHYSICIAN ASSISTANT

## 2022-07-19 PROCEDURE — 83735 ASSAY OF MAGNESIUM: CPT | Performed by: PHYSICIAN ASSISTANT

## 2022-07-19 PROCEDURE — 85027 COMPLETE CBC AUTOMATED: CPT | Performed by: PHYSICIAN ASSISTANT

## 2022-07-19 PROCEDURE — 86304 IMMUNOASSAY TUMOR CA 125: CPT | Performed by: PHYSICIAN ASSISTANT

## 2022-07-19 PROCEDURE — 36415 COLL VENOUS BLD VENIPUNCTURE: CPT | Performed by: PHYSICIAN ASSISTANT

## 2022-07-19 NOTE — TELEPHONE ENCOUNTER
Return call placed to patient's daughter  Rx submitted for magic mouthwash  Encouraged patient's daughter to start imodium OTC

## 2022-07-19 NOTE — TELEPHONE ENCOUNTER
Tenisha Gould called in regarding her mother  She has a at home nurse who comes to treats qing  The nurse said there is a prescription Mouth wash because qing has sores in her and she is having bad diarrhea  She is wondering what to do  Please call her back at [phone]

## 2022-07-20 ENCOUNTER — TELEPHONE (OUTPATIENT)
Dept: GYNECOLOGIC ONCOLOGY | Facility: CLINIC | Age: 72
End: 2022-07-20

## 2022-07-20 DIAGNOSIS — R19.7 DIARRHEA, UNSPECIFIED TYPE: Primary | ICD-10-CM

## 2022-07-20 DIAGNOSIS — R06.89 HYPERCAPNEMIA: Primary | ICD-10-CM

## 2022-07-20 LAB — CANCER AG125 SERPL-ACNC: 65.4 U/ML (ref 0–30)

## 2022-07-20 NOTE — TELEPHONE ENCOUNTER
Lab Result: Co2 greater than 45   Date/Time Drawn: 07/19 @1100   Ordering Provider: Chevy Albarado   Lab Personnel's Name: Justin Campos        The following critical/stat result was read back to the lab as stated above and [employer] to the on-call provider

## 2022-07-20 NOTE — TELEPHONE ENCOUNTER
Pulmonary order placed  Call placed to patient's daughter to discuss, LMOM  ----- Message from Daya Multani MD sent at 7/20/2022  7:14 AM EDT -----  I think a pulmonary consult would be good for her   Thanks

## 2022-07-21 ENCOUNTER — APPOINTMENT (EMERGENCY)
Dept: CT IMAGING | Facility: HOSPITAL | Age: 72
DRG: 393 | End: 2022-07-21
Payer: COMMERCIAL

## 2022-07-21 ENCOUNTER — APPOINTMENT (EMERGENCY)
Dept: RADIOLOGY | Facility: HOSPITAL | Age: 72
DRG: 393 | End: 2022-07-21
Payer: COMMERCIAL

## 2022-07-21 ENCOUNTER — HOSPITAL ENCOUNTER (INPATIENT)
Facility: HOSPITAL | Age: 72
LOS: 12 days | Discharge: NURSING, REHAB OR LONG-TERM CARE | DRG: 393 | End: 2022-08-02
Attending: EMERGENCY MEDICINE | Admitting: INTERNAL MEDICINE
Payer: COMMERCIAL

## 2022-07-21 ENCOUNTER — TELEPHONE (OUTPATIENT)
Dept: GYNECOLOGIC ONCOLOGY | Facility: CLINIC | Age: 72
End: 2022-07-21

## 2022-07-21 ENCOUNTER — PATIENT OUTREACH (OUTPATIENT)
Dept: HEMATOLOGY ONCOLOGY | Facility: CLINIC | Age: 72
End: 2022-07-21

## 2022-07-21 ENCOUNTER — LAB (OUTPATIENT)
Dept: LAB | Facility: CLINIC | Age: 72
End: 2022-07-21
Payer: COMMERCIAL

## 2022-07-21 DIAGNOSIS — R19.7 DIARRHEA, UNSPECIFIED TYPE: ICD-10-CM

## 2022-07-21 DIAGNOSIS — C54.1 ENDOMETRIAL CANCER (HCC): Primary | ICD-10-CM

## 2022-07-21 DIAGNOSIS — R09.02 HYPOXIA: ICD-10-CM

## 2022-07-21 DIAGNOSIS — K52.9 COLITIS: ICD-10-CM

## 2022-07-21 DIAGNOSIS — L08.9 WOUND INFECTION: ICD-10-CM

## 2022-07-21 DIAGNOSIS — R19.7 DIARRHEA: ICD-10-CM

## 2022-07-21 DIAGNOSIS — Z71.89 GOALS OF CARE, COUNSELING/DISCUSSION: ICD-10-CM

## 2022-07-21 DIAGNOSIS — R53.1 GENERALIZED WEAKNESS: Primary | ICD-10-CM

## 2022-07-21 DIAGNOSIS — C54.1 ENDOMETRIAL CANCER (HCC): ICD-10-CM

## 2022-07-21 DIAGNOSIS — G93.40 ENCEPHALOPATHY: ICD-10-CM

## 2022-07-21 DIAGNOSIS — T14.8XXA WOUND INFECTION: ICD-10-CM

## 2022-07-21 DIAGNOSIS — R60.0 LEG EDEMA: ICD-10-CM

## 2022-07-21 DIAGNOSIS — I87.2 CHRONIC VENOUS STASIS DERMATITIS OF LEFT LOWER EXTREMITY: ICD-10-CM

## 2022-07-21 DIAGNOSIS — R79.89 ELEVATED PROCALCITONIN: ICD-10-CM

## 2022-07-21 DIAGNOSIS — B37.0 ORAL THRUSH: ICD-10-CM

## 2022-07-21 DIAGNOSIS — A41.9 SEPSIS (HCC): ICD-10-CM

## 2022-07-21 DIAGNOSIS — L03.90 WOUND CELLULITIS: ICD-10-CM

## 2022-07-21 PROBLEM — E87.6 HYPOKALEMIA: Status: ACTIVE | Noted: 2022-07-21

## 2022-07-21 PROBLEM — E87.4 METABOLIC ALKALOSIS WITH RESPIRATORY ACIDOSIS: Status: ACTIVE | Noted: 2022-07-21

## 2022-07-21 LAB
2HR DELTA HS TROPONIN: 4 NG/L
4HR DELTA HS TROPONIN: 0 NG/L
ALBUMIN SERPL BCP-MCNC: 3.2 G/DL (ref 3.5–5)
ALP SERPL-CCNC: 129 U/L (ref 34–104)
ALT SERPL W P-5'-P-CCNC: 14 U/L (ref 7–52)
ANION GAP SERPL CALCULATED.3IONS-SCNC: 4 MMOL/L (ref 4–13)
ANISOCYTOSIS BLD QL SMEAR: PRESENT
APTT PPP: 31 SECONDS (ref 23–37)
AST SERPL W P-5'-P-CCNC: 32 U/L (ref 13–39)
ATRIAL RATE: 75 BPM
BACTERIA UR QL AUTO: ABNORMAL /HPF
BASE EX.OXY STD BLDV CALC-SCNC: 60 % (ref 60–80)
BASE EXCESS BLDV CALC-SCNC: 17.4 MMOL/L
BASOPHILS # BLD MANUAL: 0.04 THOUSAND/UL (ref 0–0.1)
BASOPHILS NFR MAR MANUAL: 1 % (ref 0–1)
BILIRUB SERPL-MCNC: 1.21 MG/DL (ref 0.2–1)
BILIRUB UR QL STRIP: NEGATIVE
BUN SERPL-MCNC: 28 MG/DL (ref 5–25)
BUN SERPL-MCNC: 31 MG/DL (ref 5–25)
C DIFF TOX GENS STL QL NAA+PROBE: NEGATIVE
CALCIUM ALBUM COR SERPL-MCNC: 9.8 MG/DL (ref 8.3–10.1)
CALCIUM SERPL-MCNC: 8 MG/DL (ref 8.4–10.2)
CALCIUM SERPL-MCNC: 9.2 MG/DL (ref 8.4–10.2)
CARDIAC TROPONIN I PNL SERPL HS: 54 NG/L
CARDIAC TROPONIN I PNL SERPL HS: 54 NG/L
CARDIAC TROPONIN I PNL SERPL HS: 58 NG/L
CHLORIDE SERPL-SCNC: 79 MMOL/L (ref 96–108)
CHLORIDE SERPL-SCNC: 86 MMOL/L (ref 96–108)
CK MB SERPL-MCNC: 1.2 % (ref 0–2.5)
CK MB SERPL-MCNC: 1.7 NG/ML (ref 0.6–6.3)
CK SERPL-CCNC: 145 U/L (ref 26–192)
CLARITY UR: CLEAR
CO2 SERPL-SCNC: 43 MMOL/L (ref 21–32)
CO2 SERPL-SCNC: >45 MMOL/L (ref 21–32)
COLOR UR: YELLOW
CREAT SERPL-MCNC: 0.85 MG/DL (ref 0.6–1.3)
CREAT SERPL-MCNC: 1.14 MG/DL (ref 0.6–1.3)
EOSINOPHIL # BLD MANUAL: 0 THOUSAND/UL (ref 0–0.4)
EOSINOPHIL NFR BLD MANUAL: 0 % (ref 0–6)
ERYTHROCYTE [DISTWIDTH] IN BLOOD BY AUTOMATED COUNT: 20.5 % (ref 11.6–15.1)
FLUAV RNA RESP QL NAA+PROBE: NEGATIVE
FLUBV RNA RESP QL NAA+PROBE: NEGATIVE
GFR SERPL CREATININE-BSD FRML MDRD: 47 ML/MIN/1.73SQ M
GFR SERPL CREATININE-BSD FRML MDRD: 68 ML/MIN/1.73SQ M
GLUCOSE SERPL-MCNC: 106 MG/DL (ref 65–140)
GLUCOSE SERPL-MCNC: 117 MG/DL (ref 65–140)
GLUCOSE SERPL-MCNC: 82 MG/DL (ref 65–140)
GLUCOSE UR STRIP-MCNC: NEGATIVE MG/DL
HCO3 BLDV-SCNC: 43.3 MMOL/L (ref 24–30)
HCT VFR BLD AUTO: 30.2 % (ref 34.8–46.1)
HGB BLD-MCNC: 9.1 G/DL (ref 11.5–15.4)
HGB UR QL STRIP.AUTO: NEGATIVE
INR PPP: 1.03 (ref 0.84–1.19)
KETONES UR STRIP-MCNC: NEGATIVE MG/DL
LACTATE SERPL-SCNC: 0.9 MMOL/L (ref 0.5–2)
LEUKOCYTE ESTERASE UR QL STRIP: NEGATIVE
LYMPHOCYTES # BLD AUTO: 0.97 THOUSAND/UL (ref 0.6–4.47)
LYMPHOCYTES # BLD AUTO: 23 % (ref 14–44)
MACROCYTES BLD QL AUTO: PRESENT
MAGNESIUM SERPL-MCNC: 1.9 MG/DL (ref 1.9–2.7)
MCH RBC QN AUTO: 28.5 PG (ref 26.8–34.3)
MCHC RBC AUTO-ENTMCNC: 30.1 G/DL (ref 31.4–37.4)
MCV RBC AUTO: 95 FL (ref 82–98)
METAMYELOCYTES NFR BLD MANUAL: 1 % (ref 0–1)
MONOCYTES # BLD AUTO: 1.22 THOUSAND/UL (ref 0–1.22)
MONOCYTES NFR BLD: 29 % (ref 4–12)
MUCOUS THREADS UR QL AUTO: ABNORMAL
NEUTROPHILS # BLD MANUAL: 1.94 THOUSAND/UL (ref 1.85–7.62)
NEUTS BAND NFR BLD MANUAL: 7 % (ref 0–8)
NEUTS SEG NFR BLD AUTO: 39 % (ref 43–75)
NITRITE UR QL STRIP: NEGATIVE
NON-SQ EPI CELLS URNS QL MICRO: ABNORMAL /HPF
O2 CT BLDV-SCNC: 8 ML/DL
OVALOCYTES BLD QL SMEAR: PRESENT
P AXIS: 37 DEGREES
PCO2 BLDV: 60.6 MM HG (ref 42–50)
PH BLDV: 7.47 [PH] (ref 7.3–7.4)
PH UR STRIP.AUTO: 6 [PH]
PLATELET # BLD AUTO: 218 THOUSANDS/UL (ref 149–390)
PLATELET BLD QL SMEAR: ADEQUATE
PMV BLD AUTO: 9.8 FL (ref 8.9–12.7)
PO2 BLDV: 31 MM HG (ref 35–45)
POTASSIUM SERPL-SCNC: 2.8 MMOL/L (ref 3.5–5.3)
POTASSIUM SERPL-SCNC: 2.9 MMOL/L (ref 3.5–5.3)
PR INTERVAL: 134 MS
PROCALCITONIN SERPL-MCNC: 0.47 NG/ML
PROT SERPL-MCNC: 6.5 G/DL (ref 6.4–8.4)
PROT UR STRIP-MCNC: ABNORMAL MG/DL
PROTHROMBIN TIME: 13.5 SECONDS (ref 11.6–14.5)
QRS AXIS: 57 DEGREES
QRSD INTERVAL: 84 MS
QT INTERVAL: 402 MS
QTC INTERVAL: 448 MS
RBC # BLD AUTO: 3.19 MILLION/UL (ref 3.81–5.12)
RBC #/AREA URNS AUTO: ABNORMAL /HPF
RBC MORPH BLD: PRESENT
RSV RNA RESP QL NAA+PROBE: NEGATIVE
SARS-COV-2 RNA RESP QL NAA+PROBE: NEGATIVE
SODIUM SERPL-SCNC: 132 MMOL/L (ref 135–147)
SODIUM SERPL-SCNC: 133 MMOL/L (ref 135–147)
SP GR UR STRIP.AUTO: 1.01 (ref 1–1.03)
T WAVE AXIS: 63 DEGREES
URATE SERPL-MCNC: 11.1 MG/DL (ref 2–7.5)
UROBILINOGEN UR QL STRIP.AUTO: 0.2 E.U./DL
VENTRICULAR RATE: 75 BPM
WBC # BLD AUTO: 4.22 THOUSAND/UL (ref 4.31–10.16)
WBC #/AREA URNS AUTO: ABNORMAL /HPF

## 2022-07-21 PROCEDURE — 80048 BASIC METABOLIC PNL TOTAL CA: CPT | Performed by: INTERNAL MEDICINE

## 2022-07-21 PROCEDURE — 36415 COLL VENOUS BLD VENIPUNCTURE: CPT | Performed by: EMERGENCY MEDICINE

## 2022-07-21 PROCEDURE — 80053 COMPREHEN METABOLIC PANEL: CPT | Performed by: EMERGENCY MEDICINE

## 2022-07-21 PROCEDURE — 87493 C DIFF AMPLIFIED PROBE: CPT

## 2022-07-21 PROCEDURE — 96367 TX/PROPH/DG ADDL SEQ IV INF: CPT

## 2022-07-21 PROCEDURE — 82553 CREATINE MB FRACTION: CPT | Performed by: EMERGENCY MEDICINE

## 2022-07-21 PROCEDURE — G1004 CDSM NDSC: HCPCS

## 2022-07-21 PROCEDURE — 97166 OT EVAL MOD COMPLEX 45 MIN: CPT

## 2022-07-21 PROCEDURE — 85730 THROMBOPLASTIN TIME PARTIAL: CPT | Performed by: EMERGENCY MEDICINE

## 2022-07-21 PROCEDURE — 96361 HYDRATE IV INFUSION ADD-ON: CPT

## 2022-07-21 PROCEDURE — 93005 ELECTROCARDIOGRAM TRACING: CPT

## 2022-07-21 PROCEDURE — 96374 THER/PROPH/DIAG INJ IV PUSH: CPT

## 2022-07-21 PROCEDURE — 0241U HB NFCT DS VIR RESP RNA 4 TRGT: CPT | Performed by: EMERGENCY MEDICINE

## 2022-07-21 PROCEDURE — 82948 REAGENT STRIP/BLOOD GLUCOSE: CPT

## 2022-07-21 PROCEDURE — 94760 N-INVAS EAR/PLS OXIMETRY 1: CPT

## 2022-07-21 PROCEDURE — 83735 ASSAY OF MAGNESIUM: CPT | Performed by: INTERNAL MEDICINE

## 2022-07-21 PROCEDURE — 87040 BLOOD CULTURE FOR BACTERIA: CPT | Performed by: EMERGENCY MEDICINE

## 2022-07-21 PROCEDURE — 83605 ASSAY OF LACTIC ACID: CPT | Performed by: EMERGENCY MEDICINE

## 2022-07-21 PROCEDURE — 97163 PT EVAL HIGH COMPLEX 45 MIN: CPT

## 2022-07-21 PROCEDURE — 96375 TX/PRO/DX INJ NEW DRUG ADDON: CPT

## 2022-07-21 PROCEDURE — 99285 EMERGENCY DEPT VISIT HI MDM: CPT

## 2022-07-21 PROCEDURE — 81001 URINALYSIS AUTO W/SCOPE: CPT | Performed by: EMERGENCY MEDICINE

## 2022-07-21 PROCEDURE — 85610 PROTHROMBIN TIME: CPT | Performed by: EMERGENCY MEDICINE

## 2022-07-21 PROCEDURE — 93010 ELECTROCARDIOGRAM REPORT: CPT | Performed by: INTERNAL MEDICINE

## 2022-07-21 PROCEDURE — 99285 EMERGENCY DEPT VISIT HI MDM: CPT | Performed by: EMERGENCY MEDICINE

## 2022-07-21 PROCEDURE — 84145 PROCALCITONIN (PCT): CPT | Performed by: EMERGENCY MEDICINE

## 2022-07-21 PROCEDURE — 96365 THER/PROPH/DIAG IV INF INIT: CPT

## 2022-07-21 PROCEDURE — 85007 BL SMEAR W/DIFF WBC COUNT: CPT | Performed by: EMERGENCY MEDICINE

## 2022-07-21 PROCEDURE — 87505 NFCT AGENT DETECTION GI: CPT | Performed by: INTERNAL MEDICINE

## 2022-07-21 PROCEDURE — 94664 DEMO&/EVAL PT USE INHALER: CPT

## 2022-07-21 PROCEDURE — 84484 ASSAY OF TROPONIN QUANT: CPT | Performed by: EMERGENCY MEDICINE

## 2022-07-21 PROCEDURE — 85027 COMPLETE CBC AUTOMATED: CPT | Performed by: EMERGENCY MEDICINE

## 2022-07-21 PROCEDURE — 74176 CT ABD & PELVIS W/O CONTRAST: CPT

## 2022-07-21 PROCEDURE — 84550 ASSAY OF BLOOD/URIC ACID: CPT | Performed by: EMERGENCY MEDICINE

## 2022-07-21 PROCEDURE — 82805 BLOOD GASES W/O2 SATURATION: CPT | Performed by: EMERGENCY MEDICINE

## 2022-07-21 PROCEDURE — 99223 1ST HOSP IP/OBS HIGH 75: CPT | Performed by: INTERNAL MEDICINE

## 2022-07-21 PROCEDURE — 71045 X-RAY EXAM CHEST 1 VIEW: CPT

## 2022-07-21 PROCEDURE — 82550 ASSAY OF CK (CPK): CPT | Performed by: EMERGENCY MEDICINE

## 2022-07-21 RX ORDER — POTASSIUM CHLORIDE 14.9 MG/ML
20 INJECTION INTRAVENOUS
Status: DISCONTINUED | OUTPATIENT
Start: 2022-07-21 | End: 2022-07-21

## 2022-07-21 RX ORDER — MORPHINE SULFATE 30 MG/1
30 TABLET, FILM COATED, EXTENDED RELEASE ORAL EVERY 12 HOURS
Status: DISCONTINUED | OUTPATIENT
Start: 2022-07-21 | End: 2022-07-25

## 2022-07-21 RX ORDER — MINERAL OIL AND PETROLATUM 150; 830 MG/G; MG/G
1 OINTMENT OPHTHALMIC DAILY
Status: DISCONTINUED | OUTPATIENT
Start: 2022-07-21 | End: 2022-08-02 | Stop reason: HOSPADM

## 2022-07-21 RX ORDER — HYDROMORPHONE HCL/PF 1 MG/ML
0.5 SYRINGE (ML) INJECTION ONCE
Status: COMPLETED | OUTPATIENT
Start: 2022-07-21 | End: 2022-07-21

## 2022-07-21 RX ORDER — POTASSIUM CHLORIDE 20 MEQ/1
40 TABLET, EXTENDED RELEASE ORAL ONCE
Status: DISCONTINUED | OUTPATIENT
Start: 2022-07-21 | End: 2022-07-21

## 2022-07-21 RX ORDER — HYDROMORPHONE HCL/PF 1 MG/ML
1 SYRINGE (ML) INJECTION ONCE
Status: COMPLETED | OUTPATIENT
Start: 2022-07-21 | End: 2022-07-21

## 2022-07-21 RX ORDER — OXYCODONE HYDROCHLORIDE 5 MG/1
5 TABLET ORAL 2 TIMES DAILY
Status: DISCONTINUED | OUTPATIENT
Start: 2022-07-21 | End: 2022-07-22

## 2022-07-21 RX ORDER — METRONIDAZOLE 500 MG/100ML
500 INJECTION, SOLUTION INTRAVENOUS EVERY 8 HOURS
Status: DISCONTINUED | OUTPATIENT
Start: 2022-07-21 | End: 2022-07-21

## 2022-07-21 RX ORDER — POTASSIUM CHLORIDE 14.9 MG/ML
20 INJECTION INTRAVENOUS ONCE
Status: COMPLETED | OUTPATIENT
Start: 2022-07-21 | End: 2022-07-22

## 2022-07-21 RX ORDER — ONDANSETRON 2 MG/ML
4 INJECTION INTRAMUSCULAR; INTRAVENOUS EVERY 4 HOURS PRN
Status: DISCONTINUED | OUTPATIENT
Start: 2022-07-21 | End: 2022-08-02 | Stop reason: HOSPADM

## 2022-07-21 RX ORDER — CEFEPIME HYDROCHLORIDE 1 G/50ML
1000 INJECTION, SOLUTION INTRAVENOUS EVERY 12 HOURS
Status: DISCONTINUED | OUTPATIENT
Start: 2022-07-21 | End: 2022-07-22

## 2022-07-21 RX ORDER — CEFEPIME HYDROCHLORIDE 1 G/50ML
1000 INJECTION, SOLUTION INTRAVENOUS ONCE
Status: COMPLETED | OUTPATIENT
Start: 2022-07-21 | End: 2022-07-21

## 2022-07-21 RX ORDER — CEFEPIME HYDROCHLORIDE 1 G/1
1000 INJECTION, POWDER, FOR SOLUTION INTRAMUSCULAR; INTRAVENOUS EVERY 12 HOURS
Status: DISCONTINUED | OUTPATIENT
Start: 2022-07-21 | End: 2022-07-21

## 2022-07-21 RX ORDER — POTASSIUM CHLORIDE 14.9 MG/ML
20 INJECTION INTRAVENOUS
Status: DISPENSED | OUTPATIENT
Start: 2022-07-21 | End: 2022-07-21

## 2022-07-21 RX ORDER — SODIUM CHLORIDE 9 MG/ML
75 INJECTION, SOLUTION INTRAVENOUS CONTINUOUS
Status: DISCONTINUED | OUTPATIENT
Start: 2022-07-21 | End: 2022-07-22

## 2022-07-21 RX ORDER — ACETAMINOPHEN 325 MG/1
650 TABLET ORAL EVERY 6 HOURS PRN
Status: DISCONTINUED | OUTPATIENT
Start: 2022-07-21 | End: 2022-08-02 | Stop reason: HOSPADM

## 2022-07-21 RX ORDER — CLOPIDOGREL BISULFATE 75 MG/1
75 TABLET ORAL DAILY
Status: DISCONTINUED | OUTPATIENT
Start: 2022-07-21 | End: 2022-07-23

## 2022-07-21 RX ORDER — ENOXAPARIN SODIUM 100 MG/ML
40 INJECTION SUBCUTANEOUS DAILY
Status: DISCONTINUED | OUTPATIENT
Start: 2022-07-21 | End: 2022-07-23

## 2022-07-21 RX ORDER — GENTAMICIN SULFATE 1 MG/G
OINTMENT TOPICAL DAILY
Status: DISCONTINUED | OUTPATIENT
Start: 2022-07-21 | End: 2022-08-02 | Stop reason: HOSPADM

## 2022-07-21 RX ORDER — TRAZODONE HYDROCHLORIDE 50 MG/1
50 TABLET ORAL
Status: DISCONTINUED | OUTPATIENT
Start: 2022-07-21 | End: 2022-07-26

## 2022-07-21 RX ORDER — TIZANIDINE 2 MG/1
2 TABLET ORAL DAILY
Status: DISCONTINUED | OUTPATIENT
Start: 2022-07-21 | End: 2022-07-26

## 2022-07-21 RX ORDER — METRONIDAZOLE 500 MG/100ML
500 INJECTION, SOLUTION INTRAVENOUS EVERY 8 HOURS
Status: DISCONTINUED | OUTPATIENT
Start: 2022-07-21 | End: 2022-07-22

## 2022-07-21 RX ORDER — GABAPENTIN 600 MG/1
600 TABLET ORAL 3 TIMES DAILY
Status: DISCONTINUED | OUTPATIENT
Start: 2022-07-21 | End: 2022-07-25

## 2022-07-21 RX ORDER — ATORVASTATIN CALCIUM 40 MG/1
40 TABLET, FILM COATED ORAL
Status: DISCONTINUED | OUTPATIENT
Start: 2022-07-21 | End: 2022-07-21

## 2022-07-21 RX ADMIN — ENOXAPARIN SODIUM 40 MG: 100 INJECTION SUBCUTANEOUS at 12:25

## 2022-07-21 RX ADMIN — METRONIDAZOLE 500 MG: 500 INJECTION, SOLUTION INTRAVENOUS at 17:32

## 2022-07-21 RX ADMIN — CEFEPIME HYDROCHLORIDE 1000 MG: 1 INJECTION, SOLUTION INTRAVENOUS at 21:04

## 2022-07-21 RX ADMIN — LIDOCAINE HYDROCHLORIDE 10 ML: 20 SOLUTION ORAL; TOPICAL at 16:39

## 2022-07-21 RX ADMIN — LIDOCAINE HYDROCHLORIDE 10 ML: 20 SOLUTION ORAL; TOPICAL at 20:28

## 2022-07-21 RX ADMIN — CEFEPIME HYDROCHLORIDE 1000 MG: 1 INJECTION, SOLUTION INTRAVENOUS at 09:32

## 2022-07-21 RX ADMIN — SODIUM CHLORIDE 1000 ML: 0.9 INJECTION, SOLUTION INTRAVENOUS at 08:44

## 2022-07-21 RX ADMIN — MINERAL OIL AND PETROLATUM 1 APPLICATION: 150; 830 OINTMENT OPHTHALMIC at 16:41

## 2022-07-21 RX ADMIN — MORPHINE SULFATE 30 MG: 30 TABLET, EXTENDED RELEASE ORAL at 20:47

## 2022-07-21 RX ADMIN — HYDROMORPHONE HYDROCHLORIDE 0.5 MG: 1 INJECTION, SOLUTION INTRAMUSCULAR; INTRAVENOUS; SUBCUTANEOUS at 08:45

## 2022-07-21 RX ADMIN — GABAPENTIN 600 MG: 600 TABLET, FILM COATED ORAL at 20:47

## 2022-07-21 RX ADMIN — HYDROMORPHONE HYDROCHLORIDE 1 MG: 1 INJECTION, SOLUTION INTRAMUSCULAR; INTRAVENOUS; SUBCUTANEOUS at 09:34

## 2022-07-21 RX ADMIN — METRONIDAZOLE 500 MG: 500 INJECTION, SOLUTION INTRAVENOUS at 10:25

## 2022-07-21 RX ADMIN — GENTAMICIN SULFATE: 1 OINTMENT TOPICAL at 16:45

## 2022-07-21 RX ADMIN — SODIUM CHLORIDE 75 ML/HR: 0.9 INJECTION, SOLUTION INTRAVENOUS at 12:22

## 2022-07-21 RX ADMIN — HYDROMORPHONE HYDROCHLORIDE 0.5 MG: 1 INJECTION, SOLUTION INTRAMUSCULAR; INTRAVENOUS; SUBCUTANEOUS at 20:28

## 2022-07-21 RX ADMIN — POTASSIUM CHLORIDE 20 MEQ: 14.9 INJECTION, SOLUTION INTRAVENOUS at 12:35

## 2022-07-21 RX ADMIN — POTASSIUM CHLORIDE 20 MEQ: 14.9 INJECTION, SOLUTION INTRAVENOUS at 17:35

## 2022-07-21 RX ADMIN — OXYCODONE HYDROCHLORIDE 5 MG: 5 TABLET ORAL at 20:49

## 2022-07-21 RX ADMIN — POTASSIUM CHLORIDE 20 MEQ: 14.9 INJECTION, SOLUTION INTRAVENOUS at 21:41

## 2022-07-21 NOTE — ED NOTES
Patient María Fletcher and Filiberto, fully cleaned the patient up as she had an accident and was covered in fecal matter  The patient was fully cleaned from head to toe with soap and water and warm bath cloths  The patient wounds were cleaned and inspected  The patient had a covered wound on her left hip, a sacral wound and wounds to both lower extremities bilaterally  The patient stated that her legs were just debrided and redressed yesterday  The patient tolerated all cleaning well       Filiberto Munson  07/21/22 9097

## 2022-07-21 NOTE — ED PROVIDER NOTES
History  Chief Complaint   Patient presents with    Weakness - Generalized     68 yo female with active endometrial cancer who receives chemotherapy every   Last received chemo approximately 7 days ago  States since then has had persistent watery diarrhea and generalized weakness to the point of having difficulty even getting herself out of bed by herself  Denies fevers, night sweats, chills, sore throat, cough, CP, sob, N/V, dysuria, hematuria  Patient is covid vaccinated  Prior to Admission Medications   Prescriptions Last Dose Informant Patient Reported? Taking?    LORazepam (ATIVAN) 1 mg tablet  Self No No   Sig: Take 1 tablet (1 mg total) by mouth every 8 (eight) hours as needed for anxiety (nausea or anxiety)   TiZANidine (ZANAFLEX) 2 MG capsule  Self Yes No   White Petrolatum-Mineral Oil (Soothe Night Time) OINT  Self Yes No   Sig: Apply 1 application to eye daily at bedtime   al mag oxide-diphenhydramine-lidocaine viscous (MAGIC MOUTHWASH) 1:1:1 suspension   No No   Sig: Swish and spit 10 mL every 4 (four) hours as needed for mouth pain or discomfort   artificial tear (LUBRIFRESH P M ) 83-15 % ophthalmic ointment  Self Yes No   Si application daily   atorvastatin (LIPITOR) 40 mg tablet  Self Yes No   Sig: Take 40 mg by mouth daily at bedtime   clopidogrel (PLAVIX) 75 mg tablet  Self Yes No   dexamethasone (DECADRON) 4 mg tablet   No No   Sig: Take 2 tabs PO BID the day prior to chemo, then 2 tabs PO the night after chemo, then 2 tabs PO BID the day after chemo   fluticasone (FLONASE) 50 mcg/act nasal spray  Self No No   Si sprays into each nostril daily   Patient taking differently: 2 sprays into each nostril daily When needed    furosemide (LASIX) 20 mg tablet Past Month at Unknown time Self Yes Yes   Sig: furosemide 20 mg tablet   Take 1 tablet twice a day by oral route    gabapentin (NEURONTIN) 600 MG tablet  Self Yes No   Sig: Take 600 mg by mouth 3 (three) times a day gentamicin (GARAMYCIN) 0 1 % ointment  Self No No   Sig: Apply topically in the morning Apply sparingly to gauze dressing, then place gauze into sacral and ischial wounds and secure with Medipore tape  Change daily and PRN if soiled  morphine (MS CONTIN) 30 mg 12 hr tablet  Self Yes No   Sig: Take 30 mg by mouth every 12 (twelve) hours   naloxone (NARCAN) 4 mg/0 1 mL nasal spray  Self No No   Sig: Administer 1 spray into a nostril  If no response after 2-3 minutes, give another dose in the other nostril using a new spray  ondansetron (ZOFRAN) 8 mg tablet  Self No No   Sig: Take 1 tablet (8 mg total) by mouth every 8 (eight) hours as needed for nausea or vomiting   oxyCODONE (OXY-IR) 5 MG capsule  Self Yes No   Sig: Take 5 mg by mouth 2 (two) times a day   traZODone (DESYREL) 50 mg tablet  Self Yes No      Facility-Administered Medications: None       Past Medical History:   Diagnosis Date    Anxiety     Arthritis     Bernard's esophagus     [facility]eds easily [facility]     CVA (cerebral vascular accident) ([facility] [facility] ) 10/2016    Endometrial cancer ([facility] )     Fibromyalgia     GERD (gastroesophageal reflux disease)     Hypercholesterolemia     Hypertension     Insomnia     Rheumatoid arthritis ([facility] )     Stroke ([facility] ) 2016       Past Surgical History:   Procedure Laterality Date    APPENDECTOMY      BREAST BIOPSY Bilateral     benign    CARPAL TUNNEL RELEASE Left 10/27/2003    DORSAL COMPARTMENT RELEASE Left 03/09/2006    FINGER SURGERY      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    IR PORT PLACEMENT  5/23/2022    KNEE ARTHROSCOPY Right     x3(6/91,7/96,7/99)    OTHER SURGICAL HISTORY Left 03/09/2006    tennis elbow release    TX DEBRIDEMENT, SKIN, SUB-Q TISSUE,=<20 SQ CM N/A 1/24/2022    Procedure: SACRAL DEBRIDEMENT WOUND AND DRESSING CHANGE ([address] OUT);   Surgeon: Leah Blue MD;  Location: [facility];  Service: General    REPLACEMENT TOTAL KNEE BILATERAL      rt-11/99, lt-1/13/10    ROTATOR CUFF REPAIR Bilateral     SHOULDER ARTHROSCOPY Right 02/10/2016    SHOULDER ARTHROSCOPY Left     08,    SHOULDER ARTHROSCOPY Left 2006    TONSILLECTOMY AND ADENOIDECTOMY      TRIGGER FINGER RELEASE Left 2002    middle and ring finger    ULNAR NERVE TRANSPOSITION Left        Family History   Problem Relation Age of Onset    Arthritis Mother     Lung cancer Father     Brain cancer Father     No Known Problems Daughter     No Known Problems Maternal Grandmother     No Known Problems Maternal Grandfather     No Known Problems Paternal Grandmother     No Known Problems Paternal Grandfather     No Known Problems Daughter     No Known Problems Maternal Aunt     No Known Problems Paternal Aunt     No Known Problems Paternal Aunt     No Known Problems Paternal Aunt      I have reviewed and agree with the history as documented  E-Cigarette/Vaping    E-Cigarette Use Never User      E-Cigarette/Vaping Substances    Nicotine No     THC No     CBD No     Flavoring No     Other No     Unknown No      Social History     Tobacco Use    Smoking status: Former Smoker     Types: Cigarettes     Quit date:      Years since quittin 5    Smokeless tobacco: Never Used   Vaping Use    Vaping Use: Never used   Substance Use Topics    Alcohol use: Yes     Comment: occassionally    Drug use: Yes     Types: Marijuana     Comment: medical marijuana       Review of Systems   Gastrointestinal: Positive for abdominal pain, nausea and vomiting  Neurological: Positive for weakness  All other systems reviewed and are negative  Physical Exam  Physical Exam  Vitals and nursing note reviewed  Constitutional:       General: She is not in acute distress  Appearance: She is well-developed  She is not diaphoretic  HENT:      Head: Normocephalic and atraumatic        Right Ear: External ear normal       Left Ear: External ear normal       Nose: Nose normal    Eyes:      General: No scleral icterus  Right eye: No discharge  Left eye: No discharge  Conjunctiva/sclera: Conjunctivae normal       Pupils: Pupils are equal, round, and reactive to light  Neck:      Vascular: No JVD  Trachea: No tracheal deviation  Cardiovascular:      Rate and Rhythm: Normal rate and regular rhythm  Heart sounds: Normal heart sounds  No murmur heard  No friction rub  No gallop  Pulmonary:      Effort: Pulmonary effort is normal  No respiratory distress  Breath sounds: Normal breath sounds  No stridor  No wheezing or rales  Abdominal:      General: Bowel sounds are normal  There is no distension  Palpations: Abdomen is soft  There is no mass  Tenderness: There is abdominal tenderness  There is no guarding  Musculoskeletal:         General: No tenderness or deformity  Normal range of motion  Cervical back: Normal range of motion and neck supple  Skin:     General: Skin is warm and dry  Capillary Refill: Capillary refill takes less than 2 seconds  Coloration: Skin is not pale  Findings: No erythema or rash  Comments: Wounds noted to L lateral hip and sacrum   Neurological:      General: No focal deficit present  Mental Status: She is alert and oriented to person, place, and time  Cranial Nerves: No cranial nerve deficit  Sensory: No sensory deficit  Motor: No abnormal muscle tone  Psychiatric:         Behavior: Behavior normal          Thought Content:  Thought content normal          Judgment: Judgment normal          Vital Signs  ED Triage Vitals [07/21/22 0800]   Temperature Pulse Respirations Blood Pressure SpO2   (!) 97 2 °F (36 2 °C) 82 16 112/56 (!) 85 %      Temp Source Heart Rate Source Patient Position - Orthostatic VS BP Location FiO2 (%)   Tympanic Monitor Sitting Left arm --      Pain Score       8           Vitals:    07/21/22 0930 07/21/22 1000 07/21/22 1130 07/21/22 1300   BP: 109/53 102/53 97/55 98/51   Pulse: 69 73 74 70   Patient Position - Orthostatic VS:             Visual Acuity      ED Medications  Medications   metroNIDAZOLE (FLAGYL) IVPB (premix) 500 mg 100 mL (0 mg Intravenous Stopped 7/21/22 1055)   potassium chloride 20 mEq IVPB (premix) ( Intravenous Rate/Dose Change 7/21/22 1329)   potassium chloride (K-DUR,KLOR-CON) CR tablet 40 mEq (40 mEq Oral Not Given 7/21/22 1226)   sodium chloride 0 9 % infusion (75 mL/hr Intravenous New Bag 7/21/22 1222)   al mag oxide-diphenhydramine-lidocaine viscous (MAGIC MOUTHWASH) suspension 10 mL (has no administration in time range)   atorvastatin (LIPITOR) tablet 40 mg (has no administration in time range)   clopidogrel (PLAVIX) tablet 75 mg (75 mg Oral Not Given 7/21/22 1328)   gabapentin (NEURONTIN) tablet 600 mg (has no administration in time range)   gentamicin (GARAMYCIN) 0 1 % topical ointment (has no administration in time range)   morphine (MS CONTIN) ER tablet 30 mg (has no administration in time range)   oxyCODONE (ROXICODONE) IR tablet 5 mg (5 mg Oral Not Given 7/21/22 1328)   artificial tear (LUBRIFRESH P M ) ophthalmic ointment 1 application (has no administration in time range)   tiZANidine (ZANAFLEX) tablet 2 mg (2 mg Oral Not Given 7/21/22 1328)   traZODone (DESYREL) tablet 50 mg (has no administration in time range)   acetaminophen (TYLENOL) tablet 650 mg (has no administration in time range)   ondansetron (ZOFRAN) injection 4 mg (has no administration in time range)   enoxaparin (LOVENOX) subcutaneous injection 40 mg (40 mg Subcutaneous Given 7/21/22 1225)   cefepime (MAXIPIME) IVPB (premix in dextrose) 1,000 mg 50 mL (has no administration in time range)   sodium chloride 0 9 % bolus 1,000 mL (0 mL Intravenous Stopped 7/21/22 1044)   HYDROmorphone (DILAUDID) injection 0 5 mg (0 5 mg Intravenous Given 7/21/22 0845)   HYDROmorphone (DILAUDID) injection 1 mg (1 mg Intravenous Given 7/21/22 7457)   cefepime (MAXIPIME) IVPB (premix in dextrose) 1,000 mg 50 mL (0 mg Intravenous Stopped 7/21/22 1002)       Diagnostic Studies  Results Reviewed     Procedure Component Value Units Date/Time    Blood culture #1 [[id number]] Collected: 07/21/22 0811    Lab Status: Preliminary result Specimen: Blood from Arm, Right Updated: 07/21/22 1303     Blood Culture Received in Microbiology Lab  Culture in Progress  Blood culture #2 [[id number]] Collected: 07/21/22 0811    Lab Status: Preliminary result Specimen: Blood from Hand, Left Updated: 07/21/22 1303     Blood Culture Received in Microbiology Lab  Culture in Progress  HS Troponin I 4hr [[id number]]  (Abnormal) Collected: 07/21/22 1219    Lab Status: Final result Specimen: Blood from Hand, Right Updated: 07/21/22 1254     hs TnI 4hr 54 ng/L      Delta 4hr hsTnI 0 ng/L     Basic metabolic panel [[id number]]     Lab Status: No result Specimen: Blood     Stool Enteric Bacterial Panel by PCR [[id number]]     Lab Status: No result Specimen: Stool     Magnesium [[id number]]  (Normal) Collected: 07/21/22 0811    Lab Status: Final result Specimen: Blood from Arm, Right Updated: 07/21/22 1148     Magnesium 1 9 mg/dL     HS Troponin I 2hr [[id number]]  (Abnormal) Collected: 07/21/22 1015    Lab Status: Final result Specimen: Blood from Hand, Right Updated: 07/21/22 1056     hs TnI 2hr 58 ng/L      Delta 2hr hsTnI 4 ng/L     FLU/RSV/COVID - if FLU/RSV clinically relevant [[id number]]  (Normal) Collected: 07/21/22 0811    Lab Status: Final result Specimen: Nares from Nose Updated: 07/21/22 1026     SARS-CoV-2 Negative     INFLUENZA A PCR Negative     INFLUENZA B PCR Negative     RSV PCR Negative    Narrative:      FOR PEDIATRIC PATIENTS - copy/paste COVID Guidelines URL to browser: [email or web address]    SARS-CoV-2 assay is a Nucleic Acid Amplification assay intended for the  qualitative detection of nucleic acid from SARS-CoV-2 in nasopharyngeal  swabs   Results are for the presumptive identification of SARS-CoV-2 RNA  Positive results are indicative of infection with SARS-CoV-2, the virus  causing COVID-19, but do not rule out bacterial infection or co-infection  with other viruses  Laboratories within the [place] and its  territories are required to report all positive results to the appropriate  public health authorities  Negative results do not preclude SARS-CoV-2  infection and should not be used as the sole basis for treatment or other  patient management decisions  Negative results must be combined with  clinical observations, patient history, and epidemiological information  This test has not been FDA cleared or approved  This test has been authorized by FDA under an Emergency Use Authorization  (EUA)  This test is only authorized for the duration of time the  declaration that circumstances exist justifying the authorization of the  emergency use of an in vitro diagnostic tests for detection of SARS-CoV-2  virus and/or diagnosis of COVID-19 infection under section 564(b)(1) of  the Act, 21 U  S C  004RXY-4(O)(6), unless the authorization is terminated  or revoked sooner  The test has been validated but independent review by FDA  and CLIA is pending  Test performed using [employer] GeneXpert: This RT-PCR assay targets N2,  a region unique to SARS-CoV-2  A conserved region in the E-gene was chosen  for pan-Sarbecovirus detection which includes SARS-CoV-2      Urine Microscopic [[id number]]  (Abnormal) Collected: 07/21/22 0854    Lab Status: Final result Specimen: Urine, Straight Cath Updated: 07/21/22 0928     RBC, UA 0-1 /hpf      WBC, UA 1-2 /hpf      Epithelial Cells Occasional /hpf      Bacteria, UA Occasional /hpf      MUCUS THREADS Occasional    Blood gas, venous [[id number]]  (Abnormal) Collected: 07/21/22 0914    Lab Status: Final result Specimen: Blood from Arm, Right Updated: 07/21/22 0925     pH, Sushil 7 472     pCO2, Sushil 60 6 mm Hg      pO2, Sushil 31 0 mm Hg HCO3, Sushil 43 3 mmol/L      Base Excess, Sushil 17 4 mmol/L      O2 Content, Sushil 8 0 ml/dL      O2 HGB, VENOUS 60 0 %     CKMB [[id number]]  (Normal) Collected: 07/21/22 0818    Lab Status: Final result Specimen: Blood from Arm, Right Updated: 07/21/22 0920     CK-MB Index 1 2 %      CK-MB 1 7 ng/mL     UA w Reflex to Microscopic w Reflex to Culture [[id number]]  (Abnormal) Collected: 07/21/22 0854    Lab Status: Final result Specimen: Urine, Straight Cath Updated: 07/21/22 0906     Color, UA Yellow     Clarity, UA Clear     Specific Gravity, UA 1 010     pH, UA 6 0     Leukocytes, UA Negative     Nitrite, UA Negative     Protein, UA Trace mg/dl      Glucose, UA Negative mg/dl      Ketones, UA Negative mg/dl      Urobilinogen, UA 0 2 E U /dl      Bilirubin, UA Negative     Occult Blood, UA Negative    Comprehensive metabolic panel [[id number]]  (Abnormal) Collected: 07/21/22 0811    Lab Status: Final result Specimen: Blood from Arm, Right Updated: 07/21/22 0901     Sodium 132 mmol/L      Potassium 2 9 mmol/L      Chloride 79 mmol/L      CO2 >45 mmol/L      ANION GAP --     BUN 31 mg/dL      Creatinine 1 14 mg/dL      Glucose 106 mg/dL      Calcium 9 2 mg/dL      Corrected Calcium 9 8 mg/dL      AST 32 U/L      ALT 14 U/L      Alkaline Phosphatase 129 U/L      Total Protein 6 5 g/dL      Albumin 3 2 g/dL      Total Bilirubin 1 21 mg/dL      eGFR 47 ml/min/1 73sq m     Narrative:      Sue guidelines for Chronic Kidney Disease (CKD):     Stage 1 with normal or high GFR (GFR > 90 mL/min/1 73 square meters)    Stage 2 Mild CKD (GFR = 60-89 mL/min/1 73 square meters)    Stage 3A Moderate CKD (GFR = 45-59 mL/min/1 73 square meters)    Stage 3B Moderate CKD (GFR = 30-44 mL/min/1 73 square meters)    Stage 4 Severe CKD (GFR = 15-29 mL/min/1 73 square meters)    Stage 5 End Stage CKD (GFR <15 mL/min/1 73 square meters)  Note: GFR calculation is accurate only with a steady state creatinine Procalcitonin [[id number]]  (Abnormal) Collected: 07/21/22 0811    Lab Status: Final result Specimen: Blood from Arm, Right Updated: 07/21/22 0854     Procalcitonin 0 47 ng/ml     HS Troponin 0hr (reflex protocol) [[id number]]  (Abnormal) Collected: 07/21/22 0811    Lab Status: Final result Specimen: Blood from Arm, Right Updated: 07/21/22 0851     hs TnI 0hr 54 ng/L     CK (with reflex to MB) [[id number]]  (Normal) Collected: 07/21/22 0818    Lab Status: Final result Specimen: Blood from Arm, Right Updated: 07/21/22 0848     Total  U/L     Uric acid [[id number]]  (Abnormal) Collected: 07/21/22 0818    Lab Status: Final result Specimen: Blood from Arm, Right Updated: 07/21/22 0848     Uric Acid 11 1 mg/dL     CBC and differential [[id number]]  (Abnormal) Collected: 07/21/22 0811    Lab Status: Final result Specimen: Blood from Arm, Right Updated: 07/21/22 0847     WBC 4 22 Thousand/uL      RBC 3 19 Million/uL      Hemoglobin 9 1 g/dL      Hematocrit 30 2 %      MCV 95 fL      MCH 28 5 pg      MCHC 30 1 g/dL      RDW 20 5 %      MPV 9 8 fL      Platelets 907 Thousands/uL     Narrative: This is an appended report  These results have been appended to a previously verified report  Lactic acid [[id number]]  (Normal) Collected: 07/21/22 0811    Lab Status: Final result Specimen: Blood from Arm, Right Updated: 07/21/22 0847     LACTIC ACID 0 9 mmol/L     Narrative:      Result may be elevated if tourniquet was used during collection      Manual Differential(PHLEBS Do Not Order) [[id number]]  (Abnormal) Collected: 07/21/22 0811    Lab Status: Final result Specimen: Blood from Arm, Right Updated: 07/21/22 0847     Segmented % 39 %      Bands % 7 %      Lymphocytes % 23 %      Monocytes % 29 %      Eosinophils, % 0 %      Basophils % 1 %      Metamyelocytes% 1 %      Absolute Neutrophils 1 94 Thousand/uL      Lymphocytes Absolute 0 97 Thousand/uL      Monocytes Absolute 1 22 Thousand/uL      Eosinophils Absolute 0 00 Thousand/uL      Basophils Absolute 0 04 Thousand/uL      Total Counted --     RBC Morphology Present     Anisocytosis Present     Macrocytes Present     Ovalocytes Present     Platelet Estimate Adequate    Protime-INR [[id number]]  (Normal) Collected: 07/21/22 0811    Lab Status: Final result Specimen: Blood from Arm, Right Updated: 07/21/22 0841     Protime 13 5 seconds      INR 1 03    APTT [[id number]]  (Normal) Collected: 07/21/22 0811    Lab Status: Final result Specimen: Blood from Arm, Right Updated: 07/21/22 0841     PTT 31 seconds     Fingerstick Glucose (POCT) [[id number]]  (Normal) Collected: 07/21/22 0818    Lab Status: Final result Updated: 07/21/22 0820     POC Glucose 117 mg/dl                  XR chest portable   Final Result by Everett Plummer MD (07/21 7339)      Mild cardiomegaly  Slight vascular congestion      No acute cardiopulmonary process otherwise               Workstation performed: [id number]         CT abdomen pelvis wo contrast   Final Result by Jerod Shabazz MD (07/21 0927)      Slight enlargement of the patient's known uterine mass with decreased size of the previously measured left periaortic lymph node  Thickening of the sigmoid colon wall suggesting colitis  Diverticulosis  Moderate volume stool throughout the colon  Left lower lobe subsegmental atelectasis                          Workstation performed: [id number]                    Procedures  ECG 12 Lead Documentation Only    Date/Time: 7/21/2022 8:53 AM  Performed by: Real Feng DO  Authorized by: Real Feng DO     Interpretation:     Interpretation: normal    Rate:     ECG rate:  75    ECG rate assessment: normal    Rhythm:     Rhythm: sinus rhythm    Ectopy:     Ectopy: none    QRS:     QRS axis:  Normal    QRS intervals:  Normal  Conduction:     Conduction: normal    ST segments:     ST segments:  Normal  T waves:     T waves: normal               ED Course  ED Course as of 07/21/22 1330   Thu Jul 21, 2022   0814 SpO2(!): 84 %  Patient placed on 3L NC which immediately brought her up to 98% SpO2  Slowing trying to wean back off    0856 Procalcitonin(!): 0 47             HEART Risk Score    Flowsheet Row Most Recent Value   Heart Score Risk Calculator    History 0 Filed at: 07/21/2022 1330   ECG 0 Filed at: 07/21/2022 1330   Age 2 Filed at: 07/21/2022 1330   Risk Factors 2 Filed at: 07/21/2022 1330   Troponin 2 Filed at: 07/21/2022 1330   HEART Score 6 Filed at: 07/21/2022 1330                        SBIRT 22yo+    Flowsheet Row Most Recent Value   SBIRT (25 yo +)    In order to provide better care to our patients, we are screening all of our patients for alcohol and drug use  Would it be okay to ask you these screening questions? Yes Filed at: 07/21/2022 6305   Initial Alcohol Screen: US AUDIT-C     1  How often do you have a drink containing alcohol? 0 Filed at: 07/21/2022 0904   2  How many drinks containing alcohol do you have on a typical day you are drinking? 0 Filed at: 07/21/2022 0904   3a  Male UNDER 65: How often do you have five or more drinks on one occasion? 0 Filed at: 07/21/2022 0904   3b  FEMALE Any Age, or MALE 65+: How often do you have 4 or more drinks on one occassion? 0 Filed at: 07/21/2022 0904   Audit-C Score 0 Filed at: 07/21/2022 6458   ISADORA: How many times in the past year have you    Used an illegal drug or used a prescription medication for non-medical reasons? Never Filed at: 07/21/2022 9724                    MDM  Number of Diagnoses or Management Options  Diarrhea  Elevated procalcitonin  Generalized weakness  Hypoxia  Diagnosis management comments: 25-year-old female with underlying endometrial cancer for which he undergoes chemotherapy presenting for worsening diarrhea and generalized weakness  Denies any fevers, obvious sick symptoms other than the diarrhea  Septic workup performed and patient noted be septic  Given cefepime and Flagyl    Concern for possible pneumonia as patient has oxygen man which is not normal however CT scan that showing any obvious pneumonia  Will give Flagyl for diarrhea  Admitted to Medicine at this time  Disposition  Final diagnoses:   Generalized weakness   Hypoxia   Elevated procalcitonin   Diarrhea     Time reflects when diagnosis was documented in both MDM as applicable and the Disposition within this note     Time User Action Codes Description Comment    7/21/2022 10:43 AM Roxianne Peek Add [R53 1] Generalized weakness     7/21/2022 10:43 AM Roxianne Peek Add [R09 02] Hypoxia     7/21/2022 10:43 AM Roxianne Peek Add [R79 89] Elevated procalcitonin     7/21/2022 10:43 AM Roxianne Peek Add [R19 7] Diarrhea       ED Disposition     ED Disposition   Admit    Condition   Stable    Date/Time   Thu Jul 21, 2022 10:42 AM    Comment   Case was discussed with ELMIRA and the patient's admission status was agreed to be Admission Status: inpatient status to the service of Dr Mic Arriaga  Follow-up Information     Follow up With Specialties Details Why Contact [facility] Follow up  [address] [place] [address]  [phone]            Patient's Medications   Discharge Prescriptions    No medications on file       No discharge procedures on file      PDMP Review     None          ED Provider  Electronically Signed by           Angelica Cabrera,   07/21/22 33 57 [facility],   07/21/22 1798

## 2022-07-21 NOTE — PLAN OF CARE
Problem: PHYSICAL THERAPY ADULT  Goal: Performs mobility at highest level of function for planned discharge setting  See evaluation for individualized goals  Description: Treatment/Interventions: LE strengthening/ROM, Functional transfer training, Therapeutic exercise, Endurance training, Patient/family training, Equipment eval/education, Bed mobility, Gait training, Spoke to case management, OT, Spoke to nursing  Equipment Recommended: Neel Ma       See flowsheet documentation for full assessment, interventions and recommendations  Note: Prognosis: Fair  Problem List: Decreased strength, Decreased range of motion, Decreased endurance, Impaired balance, Decreased mobility, Decreased coordination, Obesity, Pain, Decreased skin integrity  Assessment: Pt  admitted to [place] [id number] in [facility] 7/21/22 to rule out c diff  Pt  evaluated by PT and OT on 7/21/22 to assess functional mobility  Pt  was lying supine with HOB elevated at the beginning of the session  Pt  was cognitively oriented and responsive to evaluation questions  During line management, noticed pt  Was wearing a nasal cannula with no O2 turned on  O2 removed, pt  desated during evaluation questions and throughout therapy, lowest O2 level was about 76%  Pain scale on a 0-10 was a 7 in the pts  abdomen, pain increased with movement  Nursing was aware of the pain and working on providing pain medication, PT and OT reiterated pts  Compliant of pain to [facility]  While lying supine skin assessment completed, B LE swollen and red  Due to pain edema was assessed B on medial aspect of the knee, pitting edema +2  Pt  daughter expressed concerns about the wound care, RN Rowena Bentley informed  During bed mobility pt  required supervision from supine to sit and min assistance x2 for sit to supine, needed LE management due to pain  During transfers pt  required supervision   Pt  ambulated 12 feet with RW close CGA x1  Gait pattern during ambulation was forward flexed, impulsive, and short stride  At the end of the session pt  lying supine with HOB elevated with all needs in reach  Pt  will benefit from skilled physical therapy to help increase B LE strength, improve balance and proprioception, improve transfers, increase ambulation distance and endurance, preserve skin integrity, promote proper healing and receive education on current health status and DME usage  PT Discharge Recommendation: (S) Home with home health rehabilitation    See flowsheet documentation for full assessment

## 2022-07-21 NOTE — H&P
[facility] 44  H&P- Jurline Jonathan 1950, 67 y o  female MRN: [id number]  Unit/Bed#: ED 25 Encounter: [id number]  Primary Care Provider: Darlyn Mena MD   Date and time admitted to hospital: 7/21/2022  7:59 AM    * Colitis  Assessment & Plan  · Presented with the complaint of diffuse abdominal pain and diarrhea since 07/17  · Infectious versus inflammatory  · Antibiotic use at the end of June for a cellulitis  · CT abd/pelvis (7/21): Slight enlargement of the patient's known uterine mass with decreased size of the previously measured left periaortic lymph node  Thickening of the sigmoid colon wall suggesting colitis  Diverticulosis  Moderate volume stool throughout the colon  · Stool cultures including C diff pending  · Received Cefepime and Flagyl in the emergency room, will continue      Hypokalemia  Assessment & Plan  · Likely due to diarrhea  · K: 2 9  · Replete and recheck this afternoon    Metabolic alkalosis with respiratory acidosis  Assessment & Plan  · Bicarb greater than 45 on BMP with CO2 of 60 on VBG  · In the setting of Lasix use, significant diarrhea, and hypokalemia  · Will hold home Lasix  · Management for colitis and hypokalemia as above    Endometrial cancer (HCC)  Assessment & Plan  · Stage IV endometrial cancer on palliative chemotherapy  · With associated drug-induced neutropenia and anemia and neuropathy  · Retroperitoneal on, supraclavicular lymphadenopathy with pulmonary and inguinal metastasis  · Last treatment with carboplatin and Taxotere on 07/14  · Continue home gabapentin 600 mg 3 times daily    Chronic pain syndrome  Assessment & Plan  · Continue home morphine 30 mg twice daily and oxycodone 5 mg twice daily  · Continue home trazodone 50 mg q h s  p r n    · Continue home Zanaflex 2 mg daily    History of stroke  Assessment & Plan  · Continue home Plavix 75 mg daily and Lipitor 40 mg daily    Chronic venous stasis dermatitis of bilateral lower extremities  Assessment & Plan  · Patient has chronic lower extremity edema with venous stasis dermatitis and we being due to significant lymphadenopathy  · Intermittently on Lasix and was recently treated for possible cellulitis with Keflex  · Continue local wound care    Decubitus ulcer of ischial area, left, stage III (HCC)  Assessment & Plan  · Present on arrival  · Continue home gentamicin ointment  · Local wound care      VTE Pharmacologic Prophylaxis: VTE Score: 8 High Risk (Score >/= 5) - Pharmacological DVT Prophylaxis Ordered: enoxaparin (Lovenox)  Sequential Compression Devices Ordered  Code Status: Level 1 - Full Code   Discussion with family: Updated  (daughter) at bedside  Anticipated Length of Stay: Patient will be admitted on an inpatient basis with an anticipated length of stay of greater than 2 midnights secondary to Acute colitis and hypokalemia  Total Time for Visit, including Counseling / Coordination of Care: 70 minutes Greater than 50% of this total time spent on direct patient counseling and coordination of care  Chief Complaint:  Abdominal pain and diarrhea    History of Present Illness:  Rexene Apgar is a 67 y o  female with a PMH of stage IV endometrial cancer, history of stroke, chronic sacral and bilateral lower extremity wounds who presents with the complaint of abdominal pain and diarrhea  The patient follows with Oncology in the outpatient setting and is receiving palliative chemotherapy  Her last treatment with carboplatin and Taxotere was on   Unfortunately 3 days following that and  the patient developed significant diarrhea and diffuse abdominal pain  She denies any associated nausea or vomiting, additionally she denies any fever, shortness of breath, or chest pain  She did note associated bright red blood per rectum that seemed to be worse during the initial presentation    Additionally she was recently on Keflex for treatment of cellulitis at the end of June  In the emergency department she was afebrile and without leukocytosis  CT abdomen and pelvis was performed and significant for acute colitis  Additionally labs were significant for hypokalemia and metabolic alkalosis  She was initiated on Flagyl and cefepime and ultimately admitted to the medical floor for further observation  Review of Systems:  Review of Systems   Constitutional: Negative for chills and fever  HENT: Negative for ear pain, sinus pressure and sore throat  Eyes: Negative for pain and visual disturbance  Respiratory: Negative for cough, shortness of breath and wheezing  Cardiovascular: Positive for leg swelling  Negative for chest pain and palpitations  Gastrointestinal: Positive for abdominal pain, blood in stool and diarrhea  Negative for constipation, nausea and vomiting  Genitourinary: Negative for dysuria and hematuria  Musculoskeletal: Negative for arthralgias and back pain  Skin: Positive for wound  Negative for color change and rash  Neurological: Positive for weakness  Negative for dizziness, seizures, syncope and headaches  Psychiatric/Behavioral: Negative for agitation, confusion and hallucinations  All other systems reviewed and are negative        Past Medical and Surgical History:   Past Medical History:   Diagnosis Date    Anxiety     Arthritis     Bernard's esophagus     Bleeds easily ([address] )     [facility]re[facility] ) 10/2016    Endomet[facility] cancer [facility]     Fibromyalgia     GERD (gastroesophageal reflux disease)     Hypercholesterolemia     Hypertension     Insomnia     Rheumatoid arthritis [facility]     Stroke ([facility] ) 2016       Past Surgical History:   Procedure Laterality Date    APPENDECTOMY      BREAST BIOPSY Bilateral     benign    CARPAL TUNNEL RELEASE Left 10/27/2003    DORSAL COMPARTMENT RELEASE Left 03/09/2006    FINGER SURGERY      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    IR PORT PLACEMENT 5/23/2022    KNEE ARTHROSCOPY Right     x3(6/91,7/96,7/99)    OTHER SURGICAL HISTORY Left 03/09/2006    tennis elbow release    MO DEBRIDEMENT, SKIN, SUB-Q TISSUE,=<20 SQ CM N/A 1/24/2022    Procedure: SACRAL DEBRIDEMENT WOUND AND DRESSING CHANGE [facility]); Surgeon: Shantel Mejía MD;  Location: [facility];  Service: General    REPLACEMENT TOTAL KNEE BILATERAL      rt-11/99, lt-1/13/10    ROTATOR CUFF REPAIR Bilateral     SHOULDER ARTHROSCOPY Right 02/10/2016    SHOULDER ARTHROSCOPY Left     11/26/08,9/99    SHOULDER ARTHROSCOPY Left 03/09/2006    TONSILLECTOMY AND ADENOIDECTOMY      TRIGGER FINGER RELEASE Left 07/01/2002    middle and ring finger    ULNAR NERVE TRANSPOSITION Left        Meds/Allergies:  Prior to Admission medications    Medication Sig Start Date End Date Taking? Authorizing Provider   furosemide (LASIX) 20 mg tablet furosemide 20 mg tablet   Take 1 tablet twice a day by oral route     Yes Historical Provider, MD   al mag oxide-diphenhydramine-lidocaine viscous (MAGIC MOUTHWASH) 1:1:1 suspension Swish and spit 10 mL every 4 (four) hours as needed for mouth pain or discomfort 7/19/22   Toribio Padilla PA-C   artificial tear (LUBRIFRESH P M ) 83-15 % ophthalmic ointment 1 application daily    Historical Provider, MD   atorvastatin (LIPITOR) 40 mg tablet Take 40 mg by mouth daily at bedtime 7/18/19   Historical Provider, MD   clopidogrel (PLAVIX) 75 mg tablet  6/28/19   Historical Provider, MD   dexamethasone (DECADRON) 4 mg tablet Take 2 tabs PO BID the day prior to chemo, then 2 tabs PO the night after chemo, then 2 tabs PO BID the day after chemo 7/13/22   Kaitlin Weems PA-C   fluticasone (FLONASE) 50 mcg/act nasal spray 2 sprays into each nostril daily  Patient taking differently: 2 sprays into each nostril daily When needed  6/7/21 5/9/22  Moon Powers DO   gabapentin (NEURONTIN) 600 MG tablet Take 600 mg by mouth 3 (three) times a day 8/12/19   Historical Provider, MD   gentamicin (GARAMYCIN) 0 1 % ointment Apply topically in the morning Apply sparingly to gauze dressing, then place gauze into sacral and ischial wounds and secure with Medipore tape  Change daily and PRN if soiled  5/10/22   Jaime Andujar MD   LORazepam (ATIVAN) 1 mg tablet Take 1 tablet (1 mg total) by mouth every 8 (eight) hours as needed for anxiety (nausea or anxiety) 4/28/22   Ricky Dias PA-C   morphine (MS CONTIN) 30 mg 12 hr tablet Take 30 mg by mouth every 12 (twelve) hours 2/14/22   Historical Provider, MD   naloxone (NARCAN) 4 mg/0 1 mL nasal spray Administer 1 spray into a nostril  If no response after 2-3 minutes, give another dose in the other nostril using a new spray  4/28/22   Andreweatha Meals JAY Weems   ondansetron (ZOFRAN) 8 mg tablet Take 1 tablet (8 mg total) by mouth every 8 (eight) hours as needed for nausea or vomiting 4/28/22   Doreatha Meals JAY Weems   oxyCODONE (OXY-IR) 5 MG capsule Take 5 mg by mouth 2 (two) times a day    Historical Provider, MD   TiZANidine (ZANAFLEX) 2 MG capsule  8/30/19   Historical Provider, MD   traZODone (DESYREL) 50 mg tablet  8/12/19   Historical Provider, MD   White Petrolatum-Mineral Oil (Soothe Night Time) OINT Apply 1 application to eye daily at bedtime    Historical Provider, MD   azelastine (ASTELIN) 0 1 % nasal spray 1 spray into each nostril 2 (two) times a day 9/9/19 7/21/22  Sagrario Powers DO   famotidine (PEPCID) 20 mg tablet famotidine 20 mg tablet   Take by oral route for 30 days    7/21/22  Historical Provider, MD   lisinopril-hydrochlorothiazide (PRINZIDE,ZESTORETIC) 10-12 5 MG per tablet take 2 tablets by mouth once daily 4/3/19 7/21/22  Historical Provider, MD   Melatonin 5 MG TABS Take 5 mg by mouth  7/21/22  Historical Provider, MD   minoxidil (LONITEN) 2 5 mg tablet Take 1 25 mg by mouth daily 2/24/22 7/21/22  Historical Provider, MD   morphine (MS CONTIN) 15 mg 12 hr tablet Take 15 mg by mouth 2 (two) times a day 19  Historical Provider, MD   nystatin (MYCOSTATIN) powder Apply topically 3 (three) times a day 22  Rene Nash PA-C   omeprazole (PriLOSEC) 20 mg delayed release capsule omeprazole 20 mg capsule,delayed release   take 1 capsule by mouth twice a day before meals  22  Historical Provider, MD   oxyCODONE (ROXICODONE) 10 MG TABS  22  Historical Provider, MD   oxyCODONE-acetaminophen (PERCOCET) 7 5-325 MG per tablet oxycodone-acetaminophen 7 5 mg-325 mg tablet   1 tab Q8h prn  22  Historical Provider, MD   pantoprazole (PROTONIX) 40 mg tablet  19  Historical Provider, MD   predniSONE 20 mg tablet Take 2 tablets (40 mg total) by mouth daily Start medication tomorrow 2022  Angel Vela DO   zinc gluconate 50 mg tablet Take 50 mg by mouth daily  22  Historical Provider, MD     I have reviewed home medications with patient personally      Allergies: No Known Allergies    Social History:  Marital Status: /Civil Union   Patient Pre-hospital Living Situation: Home  Patient Pre-hospital Level of Mobility: walks  Patient Pre-hospital Diet Restrictions: None    Substance Use History:   Social History     Substance and Sexual Activity   Alcohol Use Yes    Comment: occassionally     Social History     Tobacco Use   Smoking Status Former Smoker    Types: Cigarettes    Quit date:     Years since quittin 5   Smokeless Tobacco Never Used     Social History     Substance and Sexual Activity   Drug Use Yes    Types: Marijuana    Comment: medical marijuana       Family History:  Family History   Problem Relation Age of Onset    Arthritis Mother     Lung cancer Father     Brain cancer Father     No Known Problems Daughter     No Known Problems Maternal Grandmother     No Known Problems Maternal Grandfather     No Known Problems Paternal Grandmother     No Known Problems Paternal Grandfather     No Known Problems Daughter     No Known Problems Maternal Aunt     No Known Problems Paternal Aunt     No Known Problems Paternal Aunt     No Known Problems Paternal Aunt        Physical Exam:     Vitals:   Blood Pressure: 97/55 (07/21/22 1130)  Pulse: 74 (07/21/22 1130)  Temperature: (!) 97 2 °F (36 2 °C) (07/21/22 0800)  Temp Source: Tympanic (07/21/22 0800)  Respirations: 16 (07/21/22 1130)  Height: 5' (152 4 cm) (07/21/22 0800)  Weight - Scale: 69 4 kg (153 lb) (07/21/22 0800)  SpO2: 92 % (07/21/22 1130)    Physical Exam  Vitals and nursing note reviewed  Constitutional:       General: She is not in acute distress  Appearance: Normal appearance  She is well-developed  She is ill-appearing  HENT:      Head: Normocephalic and atraumatic  Mouth/Throat:      Mouth: Mucous membranes are dry  Pharynx: Oropharynx is clear  Eyes:      Extraocular Movements: Extraocular movements intact  Conjunctiva/sclera: Conjunctivae normal    Cardiovascular:      Rate and Rhythm: Normal rate and regular rhythm  Pulses: Normal pulses  Heart sounds: Murmur heard  Pulmonary:      Effort: Pulmonary effort is normal  No respiratory distress  Breath sounds: Normal breath sounds  No wheezing  Abdominal:      General: Bowel sounds are normal  There is no distension  Palpations: Abdomen is soft  Tenderness: There is abdominal tenderness  Musculoskeletal:      Cervical back: Normal range of motion and neck supple  Right lower leg: Edema present  Left lower leg: Edema present  Skin:     General: Skin is warm and dry  Comments: Bilateral lower extremity wounds and sacral round present on arrival   Neurological:      General: No focal deficit present  Mental Status: She is alert and oriented to person, place, and time  Mental status is at baseline     Psychiatric:         Mood and Affect: Mood normal          Behavior: Behavior normal  Judgment: Judgment normal             Lab Results:  Results from last 7 days   Lab Units 07/21/22  0811   WBC Thousand/uL 4 22*   HEMOGLOBIN g/dL 9 1*   HEMATOCRIT % 30 2*   PLATELETS Thousands/uL 218   BANDS PCT % 7   LYMPHO PCT % 23   MONO PCT % 29*   EOS PCT % 0     Results from last 7 days   Lab Units 07/21/22  0811   SODIUM mmol/L 132*   POTASSIUM mmol/L 2 9*   CHLORIDE mmol/L 79*   CO2 mmol/L >45*   BUN mg/dL 31*   CREATININE mg/dL 1 14   CALCIUM mg/dL 9 2   ALBUMIN g/dL 3 2*   TOTAL BILIRUBIN mg/dL 1 21*   ALK PHOS U/L 129*   ALT U/L 14   AST U/L 32   GLUCOSE RANDOM mg/dL 106     Results from last 7 days   Lab Units 07/21/22  0811   INR  1 03     Results from last 7 days   Lab Units 07/21/22  0818   POC GLUCOSE mg/dl 117         Results from last 7 days   Lab Units 07/21/22  0811   LACTIC ACID mmol/L 0 9   PROCALCITONIN ng/ml 0 47*       Imaging: Reviewed radiology reports from this admission including: chest xray and abdominal/pelvic CT  XR chest portable   Final Result by Karmen Ramos MD (07/21 0622)      Mild cardiomegaly  Slight vascular congestion      No acute cardiopulmonary process otherwise               Workstation performed: [id number]         CT abdomen pelvis wo contrast   Final Result by Suze Fong MD (07/21 3479)      Slight enlargement of the patient's known uterine mass with decreased size of the previously measured left periaortic lymph node  Thickening of the sigmoid colon wall suggesting colitis  Diverticulosis  Moderate volume stool throughout the colon  Left lower lobe subsegmental atelectasis  Workstation performed: [id number]             EKG and Other Studies Reviewed on Admission:   · EKG: NSR  HR 75     ** Please Note: This note has been constructed using a voice recognition system   **

## 2022-07-21 NOTE — RESPIRATORY THERAPY NOTE
RT Protocol Note  Jaleesa Castillo 67 y o  female MRN: [id number]  Unit/Bed#: ED 25 Encounter: [id number]    Assessment    Principal Problem:    Colitis  Active Problems:    Chronic pain syndrome    History of stroke    Decubitus ulcer of ischial area, left, stage III (HCC)    Endometrial cancer (HCC)    Chronic venous stasis dermatitis of bilateral lower extremities    Metabolic alkalosis with respiratory acidosis    Hypokalemia      Home Pulmonary Medications:  none       Past Medical History:   Diagnosis Date    Anxiety     Arthritis     Bernard's esophagus     Bleeds easily ([place] [address] )     CVA (cerebral vascular accident) ([place] [address] ) 10/2016    Endometrial cancer ([place] [address] )     Fibromyalgia     GERD (gastroesophageal reflux disease)     Hypercholesterolemia     Hypertension     Insomnia     Rheumatoid arthritis ([place] [address] )     Stroke ([place] [address] )      Social History     Socioeconomic History    Marital status: /Civil Union     Spouse name: None    Number of children: None    Years of education: None    Highest education level: None   Occupational History    None   Tobacco Use    Smoking status: Former Smoker     Types: Cigarettes     Quit date:      Years since quittin 5    Smokeless tobacco: Never Used   Vaping Use    Vaping Use: Never used   Substance and Sexual Activity    Alcohol use: Yes     Comment: occassionally    Drug use: Yes     Types: Marijuana     Comment: medical marijuana    Sexual activity: Not Currently   Other Topics Concern    None   Social History Narrative    Daily caffeine use- 1 1/2 cups of coffee     Social Determinants of Health     Financial Resource Strain: Not on file   Food Insecurity: No Food Insecurity    Worried About Running Out of Food in the Last Year: Never true    Louann of Food in the Last Year: Never true   Transportation Needs: No Transportation Needs    Lack of Transportation (Medical): No    Lack of Transportation (Non-Medical):  No   Physical Activity: Not on file   Stress: Not on file   Social Connections: Not on file   Intimate Partner Violence: Not on file   Housing Stability: [address] Unable to Pay for Housing in the Last Year: No    Number of Places Lived in the Last Year: 1    Unstable Housing in the Last Year: No       Subjective         Objective    Physical Exam:   Assessment Type: Assess only  General Appearance: Alert, Awake  Respiratory Pattern: Normal  Chest Assessment: Chest expansion symmetrical  Bilateral Breath Sounds: Coarse  O2 Device: 2L NC    Vitals:  Blood pressure 105/54, pulse 70, temperature (!) 97 2 °F (36 2 °C), temperature source Tympanic, resp  rate 16, height 5' (1 524 m), weight 69 4 kg (153 lb), SpO2 97 %  Imaging and other studies: I have personally reviewed pertinent reports        O2 Device: 2L NC     Plan    Respiratory Plan: No distress/Pulmonary history, Discontinue Protocol

## 2022-07-21 NOTE — ASSESSMENT & PLAN NOTE
· Presented with the complaint of diffuse abdominal pain and diarrhea since 07/17  · Infectious versus inflammatory  · Antibiotic use at the end of June for a cellulitis  · CT abd/pelvis (7/21): Slight enlargement of the patient's known uterine mass with decreased size of the previously measured left periaortic lymph node  Thickening of the sigmoid colon wall suggesting colitis  Diverticulosis  Moderate volume stool throughout the colon     · Stool cultures including C diff pending  · Received Cefepime and Flagyl in the emergency room, will continue

## 2022-07-21 NOTE — OCCUPATIONAL THERAPY NOTE
Occupational Therapy Evaluation      Rexjaime Apgar    2022    Principal Problem:    Colitis  Active Problems:    Chronic pain syndrome    History of stroke    Decubitus ulcer of ischial area, left, stage III (HCC)    Endometrial cancer (HCC)    Chronic venous stasis dermatitis of bilateral lower extremities    Metabolic alkalosis with respiratory acidosis    Hypokalemia      Past Medical History:   Diagnosis Date    Anxiety     Arthritis     Bernard's esophagus     Bleeds easily ([facility] )     CVA (cerebral vascular accident) ([facility] ) 10/2016    Endometrial cancer ([facility] )     Fibromyalgia     GERD (gastroesophageal reflux disease)     Hypercholesterolemia     Hypertension     Insomnia     Rheumatoid arthritis ([facility] )     Stroke ([facility] )        Past Surgical History:   Procedure Laterality Date    APPENDECTOMY      BREAST BIOPSY Bilateral     benign    CARPAL TUNNEL RELEASE Left 10/27/2003    DORSAL COMPARTMENT RELEASE Left 2006    FINGER SURGERY      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    IR PORT PLACEMENT  2022    KNEE ARTHROSCOPY Right     x3(,,)    OTHER SURGICAL HISTORY Left 2006    tennis elbow release    [place] DEBRIDEMENT, SKIN, SUB-Q TISSUE,=<20 SQ CM N/A 2022    Procedure: SACRAL DEBRIDEMENT WOUND AND DRESSING CHANGE ([address] OUT); Surgeon: Mamie Gowers, MD;  Location: [facility];  Service: General    REPLACEMENT TOTAL KNEE BILATERAL      rt-, lt-1/13/10    ROTATOR CUFF REPAIR Bilateral     SHOULDER ARTHROSCOPY Right 02/10/2016    SHOULDER ARTHROSCOPY Left     08,    SHOULDER ARTHROSCOPY Left 2006    TONSILLECTOMY AND ADENOIDECTOMY      TRIGGER FINGER RELEASE Left 2002    middle and ring finger    ULNAR NERVE TRANSPOSITION Left         22 1108   OT Last Visit   OT Visit Date 22   Note Type   Note type Evaluation   Restrictions/Precautions   Other Precautions Contact/isolation;Multiple lines;Telemetry; Fall Risk;Pain   Pain Assessment Pain Assessment Tool 0-10   Pain Score 7   Pain Location/Orientation Location: Abdomen; Location: Generalized   Pain Onset/Description Onset: Ongoing;Frequency: Constant/Continuous   Hospital Pain Intervention(s) Repositioned; Ambulation/increased activity; Environmental changes  (Nursing, RN Rosy Valderrama, notified about pain and preparing to provide medication)   Home Living   Type of [address] [place] [address] One level;Performs ADLs on one level; Able to live on main level with bedroom/bathroom; Ramped entrance   [facility] Walk-in shower  (walk-in-tub)   Bathroom Toilet Raised   Bathroom Equipment Grab bars in shower; Shower chair   [address] Walker  (used RW at baseline, obtaining w/c)   Prior Function   Level of [place] Independent with ADLs and functional mobility   Lives With Alone   Receives Help From Family   ADL Assistance Independent   IADLs Needs assistance  (cleaning lady)   Falls in the last 6 months 1 to 4  (~4)   Vocational Retired  (mill)   [address] 5  Supervision/Setup   LB [address] 4  [address] 5  Supervision/Setup    [facility] 3  Moderate Assistance   Bed Mobility   Supine to Sit 5  Supervision   Additional items Assist x 1;HOB elevated; Bedrails;Verbal cues; Increased time required   Sit to Supine 4  Minimal assistance   Additional items Assist x 2;Bedrails; Increased time required;LE management;Verbal cues   Transfers   Sit to Stand 5  Supervision   Additional items Assist x 1; Increased time required;Verbal cues   Stand to Sit 5  Supervision   Additional items Assist x 1; Increased time required;Verbal cues   Balance   Static Sitting Fair   Dynamic Sitting Fair   Static Standing Fair -   Dynamic Standing Poor +   Ambulatory Poor +   Activity Tolerance   Activity Tolerance Patient limited by fatigue;Patient limited by pain  (O2 dropped to 76 at lowest during functional mobility)   Medical Staff Made Aware SANDEEP Mariah   Nurse Made Aware DEZ Ramos   RUE Assessment   RUE Assessment X   RUE Overall AROM   R Shoulder Flexion <90 degrees   RUE Strength   RUE Overall Strength Deficits  (3-/5)   LUE Assessment   LUE Assessment X   LUE Overall AROM   L Shoulder Flexion <90 degrees   LUE Strength   LUE Overall Strength Deficits  (3-/5)   Vision-Basic Assessment   Current Vision Wears glasses all the time   Cognition   Overall Cognitive Status [facility]   Arousal/Participation Alert; Cooperative   Attention Within functional limits   Orientation Level Oriented X4   Memory Within functional limits   Following Commands Follows one step commands with increased time or repetition   Assessment   Limitation Decreased ADL status; Decreased UE ROM; Decreased UE strength;Decreased Safe judgement during ADL;Decreased endurance;Decreased self-care trans;Decreased high-level ADLs   Prognosis Good   Assessment Pt is a 67 y o  female seen for OT evaluation s/p admit to [place] [address] on 7/21/2022 w/ Colitis  Comorbidities affecting pt's functional performance at time of assessment include: Anxiety, Bernard's esophagus, CVA, Fibromyalgia, HTN, Insomnia, RA  Personal factors affecting pt at time of IE include:steps to enter environment, limited home support, difficulty performing ADLS and difficulty performing IADLS   Prior to admission, pt was Mod I with ADLs  Upon evaluation: the following deficits impact occupational performance: decreased ROM, decreased strength, decreased balance, decreased tolerance and increased pain  Pt to benefit from continued skilled OT tx while in the hospital to address deficits as defined above and maximize level of functional independence w ADL's and functional mobility  Occupational Performance areas to address include: bathing/shower, toilet hygiene, dressing, functional mobility and clothing management   From OT standpoint, recommendation at time of d/c would be Home OT vs  STR pending progress  Goals   Patient Goals to decrease pain   Plan   Treatment Interventions ADL retraining;Functional transfer training;UE strengthening/ROM; Endurance training;Patient/family training;Equipment evaluation/education; Compensatory technique education; Energy conservation; Activityengagement   Goal Expiration Date 07/31/22   OT Treatment Day 0   OT Frequency 3-5x/wk   Recommendation   OT Discharge Recommendation Post acute rehabilitation services  (vs Home OT pending progress)   Additional Comments  Pt seen as a co-eval with PT due to the patient's co-morbidities, clinically unstable presentation, and present impairments which are a regression from the patient's baseline  Additional Comments 2 The patient's raw score on the AM-PAC Daily Activity inpatient short form is 17, standardized score is 37 26, less than 39 4  Patients at this level are likely to benefit from discharge to post-acute rehabilitation services  Please refer to the recommendation of the Occupational Therapist for safe discharge planning     AM-PAC Daily Activity Inpatient   Lower Body Dressing 2   Bathing 2   Toileting 3   Upper Body Dressing 3   Grooming 3   Eating 4   Daily Activity Raw Score 17   Daily Activity Standardized Score (Calc for Raw Score >=11) 37 26   AM-PAC Applied Cognition Inpatient   Following a Speech/Presentation 4   Understanding Ordinary Conversation 4   Taking Medications 4   Remembering Where Things Are Placed or Put Away 4   Remembering List of 4-5 Errands 4   Taking Care of Complicated Tasks 4   Applied Cognition Raw Score 24   Applied Cognition Standardized Score 62 21     GOALS:    Pt will achieve the following within specified time frame: STG  Pt will achieve the following goals within 5 days    *ADL transfers with (I) for inc'd independence with ADLs/purposeful tasks    *UB ADL with (I) for inc'd independence with self cares    *LB ADL with Min (A) using AE prn for inc'd independence with self cares    *Toileting with (S) for clothing management and hygiene for return to PLOF with personal care    *Increase static stand balance to F and dyn stand balance to F- for inc'd safety with standing purposeful tasks    *Increase stand tolerance x3 m for inc'd tolerance with standing purposeful tasks    *Participate in 10m UE therex to increase overall stamina/activity tolerance for purposeful tasks    *Bed mobility- CGA for inc'd independence to manage own comfort and initiate EOB & OOB purposeful tasks    Pt will achieve the following within specified time frame: LTG  Pt will achieve the following goals within 10 days    *LB ADL with CGA using AE prn for inc'd independence with self cares    *Toileting with (I) for clothing management and hygiene for return to PLOF with personal care    *Increase static stand balance to F+ and dyn stand balance to F for inc'd safety with standing purposeful tasks    *Increase stand tolerance x5 m for inc'd tolerance with standing purposeful tasks    *Bed mobility- (S) for inc'd independence to manage own comfort and initiate EOB & OOB purposeful tasks      Awa Obando, MS, OTR/L

## 2022-07-21 NOTE — PLAN OF CARE
Problem: OCCUPATIONAL THERAPY ADULT  Goal: Performs self-care activities at highest level of function for planned discharge setting  See evaluation for individualized goals  Description: Treatment Interventions: ADL retraining, Functional transfer training, UE strengthening/ROM, Endurance training, Patient/family training, Equipment evaluation/education, Compensatory technique education, Energy conservation, Activityengagement    See flowsheet documentation for full assessment, interventions and recommendations  Note: Limitation: Decreased ADL status, Decreased UE ROM, Decreased UE strength, Decreased Safe judgement during ADL, Decreased endurance, Decreased self-care trans, Decreased high-level ADLs  Prognosis: Good  Assessment: Pt is a 67 y o  female seen for OT evaluation s/p admit to [place] [address] on 7/21/2022 w/ Colitis  Comorbidities affecting pt's functional performance at time of assessment include: Anxiety, Bernard's esophagus, CVA, Fibromyalgia, HTN, Insomnia, RA  Personal factors affecting pt at time of IE include:steps to enter environment, limited home support, difficulty performing ADLS and difficulty performing IADLS   Prior to admission, pt was Mod I with ADLs  Upon evaluation: the following deficits impact occupational performance: decreased ROM, decreased strength, decreased balance, decreased tolerance and increased pain  Pt to benefit from continued skilled OT tx while in the hospital to address deficits as defined above and maximize level of functional independence w ADL's and functional mobility  Occupational Performance areas to address include: bathing/shower, toilet hygiene, dressing, functional mobility and clothing management  From OT standpoint, recommendation at time of d/c would be Home OT vs  STR pending progress       OT Discharge Recommendation: Post acute rehabilitation services (vs Home OT pending progress)     Awa Ruiz MS, OTR/L

## 2022-07-21 NOTE — ASSESSMENT & PLAN NOTE
· Patient has chronic lower extremity edema with venous stasis dermatitis and we being due to significant lymphadenopathy  · Intermittently on Lasix and was recently treated for possible cellulitis with Keflex  · Continue local wound care

## 2022-07-21 NOTE — ASSESSMENT & PLAN NOTE
· Bicarb greater than 45 on BMP with CO2 of 60 on VBG  · In the setting of Lasix use, significant diarrhea, and hypokalemia  · Will hold home Lasix  · Management for colitis and hypokalemia as above

## 2022-07-21 NOTE — ED NOTES
Pt's dinner tray set at bedside   Pt not hungry at this time     [facility]  07/21/22 8297

## 2022-07-21 NOTE — PHYSICAL THERAPY NOTE
Physical Therapy Evaluation     Patient's Name: Ashley Alberts    Admitting Diagnosis  Weakness generalized [R53 1]    Problem List  Patient Active Problem List   Diagnosis    Rotator cuff tear arthropathy of left shoulder    Rotator cuff tear arthropathy, right    Chronic pain syndrome    Stroke-like symptoms    Elevated troponin    Elevated d-dimer    Acute nasopharyngitis    Essential hypertension    History of stroke    Arthritis    Acute arterial ischemic stroke, vertebrobasilar, brainstem, right (HCC)    Cervical disc herniation    Cervical spondylosis    Coronary artery disease involving native coronary artery of native heart without angina pectoris    GERD (gastroesophageal reflux disease)    History of knee replacement, total, bilateral    Lateral medullary syndrome    Stage III pressure ulcer of sacral region ([facility] )    Hyponatremia    ANDRES (acute kidney injury) ([facility] )    Vaginal bleeding    Lower extremity edema    Decubitus ulcer of ischial area, left, stage III (HCC)    Endometrial cancer ([facility] )    Dehydration    Gait disturbance    Chronic venous stasis dermatitis of bilateral lower extremities    Chemotherapy induced neutropenia (HCC)    Acute cystitis without hematuria    Colitis    Metabolic alkalosis with respiratory acidosis    Hypokalemia       Past Medical History  Past Medical History:   Diagnosis Date    Anxiety     Arthritis     Bernard's esophagus     Bleeds easily ([facility] )     CVA (cerebral vascular accident) ([facility] ) 10/2016    Endometrial cancer ([facility] )     Fibromyalgia     GERD (gastroesophageal reflux disease)     Hypercholesterolemia     Hypertension     Insomnia     Rheumatoid arthritis ([facility] )     Stroke ([facility] ) 2016       Past Surgical History  Past Surgical History:   Procedure Laterality Date    APPENDECTOMY      BREAST BIOPSY Bilateral     benign    CARPAL TUNNEL RELEASE Left 10/27/2003    DORSAL COMPARTMENT RELEASE Left 03/09/2006    FINGER SURGERY      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    IR PORT PLACEMENT  5/23/2022    KNEE ARTHROSCOPY Right     x3(6/91,7/96,7/99)    OTHER SURGICAL HISTORY Left 03/09/2006    tennis elbow release    [place] DEBRIDEMENT, SKIN, SUB-Q TISSUE,=<20 SQ CM N/A 1/24/2022    Procedure: SACRAL DEBRIDEMENT WOUND AND DRESSING CHANGE [facility]); Surgeon: Sloane Strong MD;  Location: [facility];  Service: General    REPLACEMENT TOTAL KNEE BILATERAL      rt-11/99, lt-1/13/10    ROTATOR CUFF REPAIR Bilateral     SHOULDER ARTHROSCOPY Right 02/10/2016    SHOULDER ARTHROSCOPY Left     11/26/08,9/99    SHOULDER ARTHROSCOPY Left 03/09/2006    TONSILLECTOMY AND ADENOIDECTOMY      TRIGGER FINGER RELEASE Left 07/01/2002    middle and ring finger    ULNAR NERVE TRANSPOSITION Left         07/21/22 1106   PT Last Visit   PT Visit Date 07/21/22   Note Type   Note type Evaluation   Pain Assessment   Pain Assessment Tool 0-10   Pain Score 7  (patient stated pain increased with movement, did not provide a number)   Pain Location/Orientation Location: Abdomen; Location: Generalized   Pain Onset/Description Onset: Ongoing;Frequency: Constant/Continuous   Hospital Pain Intervention(s) Repositioned; Ambulation/increased activity; Environmental changes  (Nursing, RN Rabia Ocasio, notified about pain and preparing to provide medication)   Restrictions/Precautions   Other Precautions Contact/isolation; Airborne/isolation;Telemetry;O2;Fall Risk;Pain  (O2 NC; however, not turned on )   Home Living   Type of [address] [place] [address] One level;Ramped entrance; Able to live on main level with bedroom/bathroom; Performs ADLs on one level   Bathroom Shower/Tub Walk-in shower   Bathroom Toilet Raised   Bathroom Equipment Grab bars in shower; Shower chair   Bathroom Accessibility Accessible   Home Equipment Walker  (RW at baseline, obtaining a wheelchair)   Prior Function   Level of [place] Independent with ADLs and functional mobility   Lives With Alone Receives Help From Family   ADL Assistance Independent   IADLs Needs assistance  (cleaning only)   Falls in the last 6 months 1 to 4  (~4 post chemo treatments)   Vocational Retired   Comments -    General   Family/Caregiver Present Yes  (Daughter)   Cognition   Overall Cognitive Status WFL   Arousal/Participation Responsive   Orientation Level Oriented X4   Memory Within functional limits   Following Commands Follows one step commands with increased time or repetition   RLE Assessment   RLE Assessment X  (3-/5 grossly assessed with bed mobility and transfers)   LLE Assessment   LLE Assessment X  (3-/5 grossly assessed with bed mobility and transfers)   Vision-Basic Assessment   Current Vision Wears glasses all the time   Coordination   Sensation X  (Impaired sensation in B hands and feet pt  stated, "chemoneuropathy " Pt  had impaired light touch on B feet )   Light Touch   RLE Light Touch Impaired   LLE Light Touch Impaired   Bed Mobility   Supine to Sit 5  Supervision   Additional items Assist x 1;HOB elevated; Bedrails;Verbal cues; Increased time required   Sit to Supine 4  Minimal assistance   Additional items Assist x 2;Bedrails; Increased time required;LE management;Verbal cues   Additional Comments VCs to initiate UE and LE movement  Needed Vcs to mobilize feet to touch the floor before continuing therapy  Pt  requested assistance for her LE while moving sit to supine due to pain  Transfers   Sit to Stand 5  Supervision   Additional items Assist x 1; Armrests; Increased time required;Verbal cues  (RW)   Stand to Sit 5  Supervision   Additional items Assist x 1; Armrests; Increased time required;Verbal cues   Additional Comments VCs to use one hand to push off from bed while keeping the other on the walker during sit to stand transfer  Verbal cues to use hands to reach for armrests while descending from standing to sit  VCs to sit back as deep as possible, once sitting to scooch butt back deeper into bed  Ambulation/Elevation   Gait pattern Forward Flexion;Decreased foot clearance; Step through pattern; Short stride   Gait Assistance   (Close CGA)   Additional items Verbal cues   Assistive Device Rolling walker   Distance 12 feet  (12 feet total  5 forward and 5 backward  2 side steps )   Ambulation/Elevation Additional Comments VCs for ambulatory direction  Pt  instructed to take side steps toward [facility], did not understand, side stepping discontinued due to patients safety  Balance   Static Sitting Fair   Dynamic Sitting Fair   Static Standing Fair -   Dynamic Standing Poor +   Ambulatory Poor +   Endurance Deficit   Endurance Deficit Yes   Endurance Deficit Description Pt  desated while answering evaluation questions and multiple times throughout therapy  Pt  instructed and implemented pursed lip breathing strategy to bring O2 levels up  Activity Tolerance   Activity Tolerance Patient limited by fatigue;Patient limited by pain   Medical Staff Made Aware OT SANDEEP Billings  (Coassessment completed with Krunal Schultz, due to the complexity of the patient two skilled clinicians were required )   Nurse Made Aware DEZ Aceves   Assessment   Prognosis Fair   Problem List Decreased strength;Decreased range of motion;Decreased endurance; Impaired balance;Decreased mobility; Decreased coordination;Obesity;Pain;Decreased skin integrity   Assessment Pt  admitted to [address] [place] [address] in the [facility] 7/21/22 to rule out c diff  Pt  evaluated by PT and OT on 7/21/22 to assess functional mobility  Pt  was lying supine with HOB elevated at the beginning of the session  Pt  was cognitively oriented and responsive to evaluation questions  During line management, noticed pt  Was wearing a nasal cannula with no O2 turned on  O2 removed, pt  desated during evaluation questions and throughout therapy, lowest O2 level was about 76%  Pain scale on a 0-10 was a 7 in the pts  abdomen, pain increased with movement   Nursing was aware of the pain and working on providing pain medication, PT and OT reiterated pts  Compliant of pain to [facility] [employer]  While lying supine skin assessment completed, B LE swollen and red  Due to pain edema was assessed B on medial aspect of the knee, pitting edema +2  Pt  daughter expressed concerns about the wound care, RN Raiford Shone informed  During bed mobility pt  required supervision from supine to sit and min assistance x2 for sit to supine, needed LE management due to pain  During transfers pt  required supervision  Pt  ambulated 12 feet with RW close CGA x1  Gait pattern during ambulation was forward flexed, impulsive, and short stride  At the end of the session pt  lying supine with HOB elevated with all needs in reach  Pt  will benefit from skilled physical therapy to help increase B LE strength, improve balance and proprioception, improve transfers, increase ambulation distance and endurance, preserve skin integrity, promote proper healing and receive education on current health status and DME usage  Goals   Patient Goals to decrease pain   LTG Expiration Date 07/31/22   Long Term Goal #1 1) Pt  Will increase gross B LE strength by a half a grade to be able to improve ambulation endurance, complete weight bearing activities, and decrease fall risk  2) Pt  Will increase ambulation to 25 ft RW Mod I to be able walk within home environment and navigate in room during duration of the hospital stay  3) Pt  Will improve dynamic standing balance to Fair to be able to reach out of LEYDI, maintain ADLs and complete weight bearing activities  4) Pt  Will improve sit to supine transfer to supervision to be able to decrease burden of care and get in and out of bed  5) Pt  Will completed 2 sets of 20 ankle pumps a day to improve swelling ing B LE  6) Pt  Will be able to static stand within RW with Good balance for five minutes to decrease fall risk and complete weight bearing activities     Plan   Treatment/Interventions LE strengthening/ROM; Functional transfer training; Therapeutic exercise; Endurance training;Patient/family training;Equipment eval/education; Bed mobility;Gait training;Spoke to case management;OT;Spoke to nursing   PT Frequency 3-5x/wk   Recommendation   PT Discharge Recommendation (S)  Home with home health rehabilitation   [address]   Turning in Bed Without Bedrails 3   Lying on Back to Sitting on Edge of Flat Bed 3   Moving Bed to Chair 3   Standing Up From Chair 3   Walk in Room 3   Climb 3-5 Stairs 2   Basic Mobility Inpatient Raw Score 17   Basic Mobility Standardized Score 39 67   Highest Level Of Mobility   -HL Goal 5: Stand one or more mins   -HLM Achieved 6: Walk 10 steps or more   End of Consult   Patient Position at End of Consult Supine  (W/ HOB elevated)     History: GERD, colitis, stroke hx, arthritis, stage 3 pressure ulcer, B rotator cuff tears, hx of cancer and chemotherapy  Body System Impairments: generalized weakness, LE edema, gait disturbance, increased risk of integumentary changes  Clinical Presentation: pain, supplemental O2 and desaturation during session, urine/fecal incontinence, fatigue, impulsive, decreased skin integrity, unable to follow side step gait pattern, decreased activity tolerance, increased fall risk  Based off this pt  clinical presentation is of a high complexity           Aimee Benítez, SPT

## 2022-07-21 NOTE — ED NOTES
Patient repositioned and warm blankets given for patient comfort  Patient resting comfortably at this time with call bell within reach  Lights dimmed for comfort        Rylan Quan RN  07/21/22 6360

## 2022-07-21 NOTE — ED NOTES
Patient was given her artifical tears but the patient stated that she only puts it in her right eye  The patient received the medication in her right eye only       Jossie Palacios  07/21/22 0802

## 2022-07-21 NOTE — ASSESSMENT & PLAN NOTE
· Stage IV endometrial cancer on palliative chemotherapy  · With associated drug-induced neutropenia and anemia and neuropathy  · Retroperitoneal on, supraclavicular lymphadenopathy with pulmonary and inguinal metastasis  · Last treatment with carboplatin and Taxotere on 07/14  · Continue home gabapentin 600 mg 3 times daily

## 2022-07-21 NOTE — ASSESSMENT & PLAN NOTE
· Continue home morphine 30 mg twice daily and oxycodone 5 mg twice daily  · Continue home trazodone 50 mg q h s  p r n    · Continue home Zanaflex 2 mg daily

## 2022-07-21 NOTE — TELEPHONE ENCOUNTER
Patients daughter Gracia Settles called and would like a call back  She has multiple questions  She had to call an ambulance this morning and she is now hospitalized   valeriy can be reached at [phone]

## 2022-07-21 NOTE — CASE MANAGEMENT
Case Management Assessment & Discharge Planning Note    Patient name Wash Alt  Location ED 25/ED 25 MRN [id number]  : 1950 Date 2022       Current Admission Date: 2022  Current Admission Diagnosis:Weakness generalized   Patient Active Problem List    Diagnosis Date Noted    Acute cystitis without hematuria 06/15/2022    Chemotherapy induced neutropenia ([facility] ) 2022    Chronic venous stasis dermatitis of left lower extremity 2022    Gait disturbance 2022    Dehydration 2022    Endometrial cancer ([facility] ) 2022    Decubitus ulcer of ischial area, left, stage III ([facility] ) 2022    Hyponatremia 2022    ANDRES (acute kidney injury) ([facility] ) 2022    Vaginal bleeding 2022    Lower extremity edema 2022    Stage III pressure ulcer of sacral region ([facility] ) 2022    Arthritis 2021    Chronic pain syndrome 2020    Stroke-like symptoms 2020    Elevated troponin 2020    Elevated d-dimer 2020    Acute nasopharyngitis 2020    History of stroke 2020    Rotator cuff tear arthropathy of left shoulder 2020    Rotator cuff tear arthropathy, right 2020    Cervical disc herniation 01/10/2019    History of knee replacement, total, bilateral 2017    Cervical spondylosis 10/12/2016    Acute arterial ischemic stroke, vertebrobasilar, brainstem, right ([facility] ) 10/11/2016    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    GERD (gastroesophageal reflux disease) 10/06/2016    Coronary artery disease involving native coronary artery of native heart without angina pectoris 2015    Essential hypertension 10/01/2013      LOS (days): 0  Geometric Mean LOS (GMLOS) (days):   Days to GMLOS:     OBJECTIVE:         Current admission status: Inpatient  Referral Reason: VNA    Preferred Pharmacy:   [facility] [address]   - [address], [place] [address] [place] [id number]  Phone: [phone] Fax: [phone]    Primary Care Provider: Espinoza Marmolejo MD    Primary Insurance: [address] [place] [address]  Secondary Insurance:     ASSESSMENT:  Nellie Lombardi Proxies    There are no active Health Care Proxies on file  Advance Directives  Does patient have a Health Care POA?: Yes  Does patient have Advance Directives?: Yes  Advance Directives: Living will, Power of  for health care  Primary Contact: Renita Sauer (Daughter)   [phone] (Mobile)    Readmission Root Cause  30 Day Readmission: No    Patient Information  Admitted from[de-identified] Home  Mental Status: Alert  During Assessment patient was accompanied by: Daughter  Assessment information provided by[de-identified] Patient  Primary Caregiver: Family  Caregiver's Name[de-identified] Edison Garcia (Daughter)   [phone] (Mobile)  Caregiver's Relationship to Patient[de-identified] Family Member  Caregiver's Telephone Number[de-identified] Edison Garcia (Daughter)   [phone] (Mobile)  Support Systems: Daughter  [place] of Residence: [address] [place] [address] Avenue do you live in?: [address] [facility] entry access options   Select all that apply : Ramp  Type of Current Residence: [place]  In the last 12 months, was there a time when you were not able to pay the mortgage or rent on time?: No  In the last 12 months, how many places have you lived?: 1  In the last 12 months, was there a time when you did not have a steady place to sleep or slept in a shelter (including now)?: No  Homeless/housing insecurity resource given?: N/A  Living Arrangements: Lives Alone  Is patient a ?: No    Activities of Daily Living Prior to Admission  Functional Status: Assistance  Completes ADLs independently?: No  Level of ADL dependence: Assistance  Ambulates independently?: Yes  Does patient use assisted devices?: Yes  Assisted Devices (DME) used: Yonny Watts  Does patient currently own DME?: Yes  What DME does the patient currently own?: Oleg Crum  Does patient have a history of Outpatient Therapy (PT/OT)?: No  Does the patient have a history of Short-Term Rehab?: No  Does patient have a history of HHC?: Yes Yan Guzman)  Does patient currently have [address]?: Yes    Current Home Health Care  Type of Current Home Care Services: Nurse visit  Current Home Health Agency[de-identified] [facility] Provider[de-identified] PCP    Patient Information Continued  Income Source: Pension/half-way  Does patient have prescription coverage?: Yes  Within the past 12 months, you worried that your food would run out before you got the money to buy more : Never true  Within the past 12 months, the food you bought just didn't last and you didn't have money to get more : Never true  Food insecurity resource given?: N/A  Does patient receive dialysis treatments?: No  Does patient have a history of substance abuse?: No  Does patient have a history of Mental Health Diagnosis?: No    Means of Transportation  Means of Transport to Appts[de-identified] Family transport  In the past 12 months, has lack of transportation kept you from medical appointments or from getting medications?: No  In the past 12 months, has lack of transportation kept you from meetings, work, or from getting things needed for daily living?: No  Was application for public transport provided?: N/A      DISCHARGE DETAILS:    Discharge planning discussed with[de-identified] Patient and dtr Rabia Book  Freedom of Choice: Yes     CM contacted family/caregiver?: Yes  Were Treatment Team discharge recommendations reviewed with patient/caregiver?: Yes  Did patient/caregiver verbalize understanding of patient care needs?: Yes  Were patient/caregiver advised of the risks associated with not following Treatment Team discharge recommendations?: Yes    Contacts  Patient Contacts: Sophie Blanco  Contact Method: Phone  Phone Number: [phone]  Reason/Outcome: Discharge [address]         Is the patient interested in [address] at discharge?: Yes  Via Cookie Metz requested[de-identified] Nursing Lan Carrillo Agency Name[de-identified] [address] Provider[de-identified] PCP  Home Health Services Needed[de-identified] Evaluate Functional Status and Safety, Gait/ADL Training, Strengthening/Theraputic Exercises to Improve Function, Other (comment), Wound/Ostomy Care (Disease mgt/Med instruction)  Homebound Criteria Met[de-identified] Uses an Assist Device (i e  cane, walker, etc)  Supporting Clincal Findings[de-identified] Fatigues Easliy in [employer], Limited Endurance    Other Referral/Resources/Interventions Provided:  Interventions: [facility]    Treatment Team Recommendation: Home with [address] [employer]        Additional Comments: Met with patient and daughter Stiven Bonner at bedside in ED  Patient and dtr asking about other assistance in the home  Dtr did reach out to [employer] for assistance  Discussed waiver services however dtr doesn't feel patient would qualify after she spoke with [employer]  Provided with information on how to apply for Waiver services  Patient is current with [facility] referral made to back to resume services at discharge  Message to OP CM about need for CM thru Hem/Onc for OP services that may be available thru cancer care  CM department following through discharge  intact

## 2022-07-22 ENCOUNTER — APPOINTMENT (INPATIENT)
Dept: CT IMAGING | Facility: HOSPITAL | Age: 72
DRG: 393 | End: 2022-07-22
Payer: COMMERCIAL

## 2022-07-22 PROBLEM — J96.01 ACUTE RESPIRATORY FAILURE WITH HYPOXIA (HCC): Status: ACTIVE | Noted: 2022-01-01

## 2022-07-22 PROBLEM — K52.1 TOXIC GASTROENTERITIS: Status: ACTIVE | Noted: 2022-01-01

## 2022-07-22 LAB
ANION GAP SERPL CALCULATED.3IONS-SCNC: 5 MMOL/L (ref 4–13)
BUN SERPL-MCNC: 23 MG/DL (ref 5–25)
CALCIUM SERPL-MCNC: 8.4 MG/DL (ref 8.4–10.2)
CAMPYLOBACTER DNA SPEC NAA+PROBE: NORMAL
CHLORIDE SERPL-SCNC: 88 MMOL/L (ref 96–108)
CO2 SERPL-SCNC: 41 MMOL/L (ref 21–32)
CREAT SERPL-MCNC: 0.86 MG/DL (ref 0.6–1.3)
ERYTHROCYTE [DISTWIDTH] IN BLOOD BY AUTOMATED COUNT: 20.6 % (ref 11.6–15.1)
GFR SERPL CREATININE-BSD FRML MDRD: 67 ML/MIN/1.73SQ M
GLUCOSE SERPL-MCNC: 80 MG/DL (ref 65–140)
HCT VFR BLD AUTO: 28.8 % (ref 34.8–46.1)
HGB BLD-MCNC: 8.3 G/DL (ref 11.5–15.4)
MAGNESIUM SERPL-MCNC: 1.9 MG/DL (ref 1.9–2.7)
MCH RBC QN AUTO: 28.2 PG (ref 26.8–34.3)
MCHC RBC AUTO-ENTMCNC: 28.8 G/DL (ref 31.4–37.4)
MCV RBC AUTO: 98 FL (ref 82–98)
PLATELET # BLD AUTO: 178 THOUSANDS/UL (ref 149–390)
PMV BLD AUTO: 10.3 FL (ref 8.9–12.7)
POTASSIUM SERPL-SCNC: 3.1 MMOL/L (ref 3.5–5.3)
RBC # BLD AUTO: 2.94 MILLION/UL (ref 3.81–5.12)
SALMONELLA DNA SPEC QL NAA+PROBE: NORMAL
SHIGA TOXIN STX GENE SPEC NAA+PROBE: NORMAL
SHIGELLA DNA SPEC QL NAA+PROBE: NORMAL
SODIUM SERPL-SCNC: 134 MMOL/L (ref 135–147)
WBC # BLD AUTO: 5.74 THOUSAND/UL (ref 4.31–10.16)

## 2022-07-22 PROCEDURE — G1004 CDSM NDSC: HCPCS

## 2022-07-22 PROCEDURE — 92610 EVALUATE SWALLOWING FUNCTION: CPT

## 2022-07-22 PROCEDURE — 71260 CT THORAX DX C+: CPT

## 2022-07-22 PROCEDURE — 99222 1ST HOSP IP/OBS MODERATE 55: CPT | Performed by: PHYSICIAN ASSISTANT

## 2022-07-22 PROCEDURE — 80048 BASIC METABOLIC PNL TOTAL CA: CPT | Performed by: INTERNAL MEDICINE

## 2022-07-22 PROCEDURE — 85027 COMPLETE CBC AUTOMATED: CPT | Performed by: INTERNAL MEDICINE

## 2022-07-22 PROCEDURE — 99233 SBSQ HOSP IP/OBS HIGH 50: CPT | Performed by: INTERNAL MEDICINE

## 2022-07-22 PROCEDURE — 99222 1ST HOSP IP/OBS MODERATE 55: CPT | Performed by: STUDENT IN AN ORGANIZED HEALTH CARE EDUCATION/TRAINING PROGRAM

## 2022-07-22 PROCEDURE — 83735 ASSAY OF MAGNESIUM: CPT | Performed by: INTERNAL MEDICINE

## 2022-07-22 PROCEDURE — 99222 1ST HOSP IP/OBS MODERATE 55: CPT | Performed by: PODIATRIST

## 2022-07-22 RX ORDER — PANTOPRAZOLE SODIUM 40 MG/1
40 TABLET, DELAYED RELEASE ORAL
Status: DISCONTINUED | OUTPATIENT
Start: 2022-07-23 | End: 2022-07-28

## 2022-07-22 RX ORDER — FUROSEMIDE 10 MG/ML
20 INJECTION INTRAMUSCULAR; INTRAVENOUS ONCE
Status: DISCONTINUED | OUTPATIENT
Start: 2022-07-22 | End: 2022-07-22

## 2022-07-22 RX ORDER — OXYCODONE HYDROCHLORIDE 10 MG/1
10 TABLET ORAL 3 TIMES DAILY PRN
Status: DISCONTINUED | OUTPATIENT
Start: 2022-07-22 | End: 2022-07-26

## 2022-07-22 RX ORDER — FLUCONAZOLE 100 MG/1
200 TABLET ORAL DAILY
Status: DISCONTINUED | OUTPATIENT
Start: 2022-07-23 | End: 2022-08-02 | Stop reason: HOSPADM

## 2022-07-22 RX ORDER — LOPERAMIDE HYDROCHLORIDE 2 MG/1
2 CAPSULE ORAL 4 TIMES DAILY PRN
Status: DISCONTINUED | OUTPATIENT
Start: 2022-07-22 | End: 2022-08-02 | Stop reason: HOSPADM

## 2022-07-22 RX ORDER — POTASSIUM CHLORIDE 14.9 MG/ML
20 INJECTION INTRAVENOUS
Status: COMPLETED | OUTPATIENT
Start: 2022-07-22 | End: 2022-07-22

## 2022-07-22 RX ORDER — SODIUM CHLORIDE 9 MG/ML
100 INJECTION, SOLUTION INTRAVENOUS CONTINUOUS
Status: DISCONTINUED | OUTPATIENT
Start: 2022-07-22 | End: 2022-07-24

## 2022-07-22 RX ORDER — ALBUMIN (HUMAN) 12.5 G/50ML
12.5 SOLUTION INTRAVENOUS ONCE
Status: COMPLETED | OUTPATIENT
Start: 2022-07-22 | End: 2022-07-22

## 2022-07-22 RX ORDER — POTASSIUM CHLORIDE 29.8 MG/ML
40 INJECTION INTRAVENOUS ONCE
Status: DISCONTINUED | OUTPATIENT
Start: 2022-07-22 | End: 2022-07-22

## 2022-07-22 RX ADMIN — LOPERAMIDE HYDROCHLORIDE 2 MG: 2 CAPSULE ORAL at 10:48

## 2022-07-22 RX ADMIN — GABAPENTIN 600 MG: 600 TABLET, FILM COATED ORAL at 20:53

## 2022-07-22 RX ADMIN — ENOXAPARIN SODIUM 40 MG: 100 INJECTION SUBCUTANEOUS at 08:27

## 2022-07-22 RX ADMIN — POTASSIUM CHLORIDE 20 MEQ: 14.9 INJECTION, SOLUTION INTRAVENOUS at 08:40

## 2022-07-22 RX ADMIN — SODIUM CHLORIDE 75 ML/HR: 0.9 INJECTION, SOLUTION INTRAVENOUS at 12:06

## 2022-07-22 RX ADMIN — METRONIDAZOLE 500 MG: 500 INJECTION, SOLUTION INTRAVENOUS at 17:56

## 2022-07-22 RX ADMIN — CLOPIDOGREL BISULFATE 75 MG: 75 TABLET ORAL at 08:25

## 2022-07-22 RX ADMIN — GABAPENTIN 600 MG: 600 TABLET, FILM COATED ORAL at 17:49

## 2022-07-22 RX ADMIN — LIDOCAINE HYDROCHLORIDE 10 ML: 20 SOLUTION ORAL; TOPICAL at 14:18

## 2022-07-22 RX ADMIN — METRONIDAZOLE 500 MG: 500 INJECTION, SOLUTION INTRAVENOUS at 09:00

## 2022-07-22 RX ADMIN — IOHEXOL 70 ML: 350 INJECTION, SOLUTION INTRAVENOUS at 09:59

## 2022-07-22 RX ADMIN — CEFEPIME HYDROCHLORIDE 1000 MG: 1 INJECTION, SOLUTION INTRAVENOUS at 10:51

## 2022-07-22 RX ADMIN — OXYCODONE HYDROCHLORIDE 10 MG: 10 TABLET ORAL at 14:31

## 2022-07-22 RX ADMIN — LIDOCAINE HYDROCHLORIDE 10 ML: 20 SOLUTION ORAL; TOPICAL at 07:59

## 2022-07-22 RX ADMIN — ALBUMIN (HUMAN) 12.5 G: 0.25 INJECTION, SOLUTION INTRAVENOUS at 01:54

## 2022-07-22 RX ADMIN — MORPHINE SULFATE 30 MG: 30 TABLET, EXTENDED RELEASE ORAL at 20:53

## 2022-07-22 RX ADMIN — SODIUM CHLORIDE 75 ML/HR: 0.9 INJECTION, SOLUTION INTRAVENOUS at 14:07

## 2022-07-22 RX ADMIN — GABAPENTIN 600 MG: 600 TABLET, FILM COATED ORAL at 08:25

## 2022-07-22 RX ADMIN — OXYCODONE HYDROCHLORIDE 10 MG: 10 TABLET ORAL at 06:30

## 2022-07-22 RX ADMIN — GENTAMICIN SULFATE 1 APPLICATION: 1 OINTMENT TOPICAL at 12:47

## 2022-07-22 RX ADMIN — LIDOCAINE HYDROCHLORIDE 10 ML: 20 SOLUTION ORAL; TOPICAL at 00:40

## 2022-07-22 RX ADMIN — POTASSIUM CHLORIDE 20 MEQ: 14.9 INJECTION, SOLUTION INTRAVENOUS at 12:03

## 2022-07-22 RX ADMIN — TIZANIDINE 2 MG: 2 TABLET ORAL at 08:34

## 2022-07-22 RX ADMIN — METRONIDAZOLE 500 MG: 500 INJECTION, SOLUTION INTRAVENOUS at 00:45

## 2022-07-22 RX ADMIN — TRAZODONE HYDROCHLORIDE 50 MG: 50 TABLET ORAL at 00:52

## 2022-07-22 RX ADMIN — LOPERAMIDE HYDROCHLORIDE 2 MG: 2 CAPSULE ORAL at 17:50

## 2022-07-22 RX ADMIN — MORPHINE SULFATE 30 MG: 30 TABLET, EXTENDED RELEASE ORAL at 08:25

## 2022-07-22 NOTE — NURSING NOTE
Pt encouraged to try and swallow once received magic mouth wash  Pt able to take pills crushed in applesauce

## 2022-07-22 NOTE — UTILIZATION REVIEW
Initial Clinical Review    Admission: Date/Time/Statement:   Admission Orders (From admission, onward)     Ordered        07/21/22 1042  INPATIENT ADMISSION  Once                      Orders Placed This Encounter   Procedures    INPATIENT ADMISSION     Standing Status:   Standing     Number of Occurrences:   1     Order Specific Question:   Level of Care     Answer:   Med Surg [16]     Order Specific Question:   Estimated length of stay     Answer:   More than 2 Midnights     Order Specific Question:   Certification     Answer:   I certify that inpatient services are medically necessary for this patient for a duration of greater than two midnights  See H&P and MD Progress Notes for additional information about the patient's course of treatment  ED Arrival Information     Expected   -    Arrival   7/21/2022 07:58    Acuity   Urgent            Means of arrival   Ambulance    Escorted by   [facility]    Service   Hospitalist    Admission type   Urgent            Arrival complaint   weakness           Chief Complaint   Patient presents with    Weakness - Generalized       Initial Presentation: 67 y o  female urgently to ED by EMS presents with ABD pain w diarrhea  PMH of stage IV endometrial cancer, stroke, chronic sacral and bilateral lower extremity wounds follow OP oncology & receiving palliative CHEMO last treatment 7/14 w 3 days following w significant diarrhea & diffuse ABD pain  Reports gen weakness w/  difficulty getting OOB due to weakness  EXAM  Leukocytosis, CT reveals acute colitis & bilateral  Opacities w consolidation left greater than right suggest infiltrate/aspiration, hypoxic & NC begun; labs show hypokalemia & metab alkalosis  Stage III ulcer of ischial area L  Bilateral venous statis dermatitis of LE w multiple wounds     PLAN  Admit Inpatient for eval & treatment of Colitis, Hypokalemia, Hypoxia  IV Flagyl, IV Cefepime, replete & monitor potassium, hold home Lasix, supplemental O2; cont home meds, local wound care    Nursing note:  2 L NC, patient fell asleep & saturating 70s-low 80s  NC incr to 4 L   Date: 7/22/2022   Day 2:   Attending MD  Acute respiratory failure w Hypoxia: Requiring 4 L NC & does Not use supplemental O2 as baseline  Cont IVF, IV antibx, Imodium for colitis; stool cultures incl CDIFF are negative  Consult GI  Wound Care recommending Podiatry & Gen surgical consults for multiple LE wounds w venous stasis  EXAM oral thrush; 4 L NC, ABD distention & tenderness  BL LE wounds & sacral wond   POA daughter notes significant improvement & do not appear grossly infected   ED Triage Vitals [07/21/22 0800]   Temperature Pulse Respirations Blood Pressure SpO2   (!) 97 2 °F (36 2 °C) 82 16 112/56 (!) 85 %      Temp Source Heart Rate Source Patient Position - Orthostatic VS BP Location FiO2 (%)   Tympanic Monitor Sitting Left arm --      Pain Score       8          Wt Readings from Last 1 Encounters:   07/21/22 65 4 kg (144 lb 2 9 oz)     Additional Vital Signs:   Date/Time Temp Pulse Resp BP MAP (mmHg) SpO2 Calculated FIO2 (%) - Nasal Cannula O2 Flow Rate (L/min) Nasal Cannula O2 Flow Rate (L/min) O2 Device Patient Position - Orthostatic VS   07/22/22 11:58:01 98 2 °F (36 8 °C) 82 17 102/55 71 94 % -- -- -- -- --       Date/Time Temp Pulse Resp BP MAP (mmHg) SpO2 Calculated FIO2 (%) - Nasal Cannula O2 Flow Rate (L/min) Nasal Cannula O2 Flow Rate (L/min) O2 Device Patient Position - Orthostatic VS   07/22/22 0733 -- -- -- 94/42 Abnormal  -- -- -- -- -- -- Lying   07/22/22 07:31:22 98 7 °F (37 1 °C) 75 16 75/40 Abnormal  52 Abnormal  96 % 36 -- 4 L/min Nasal cannula --   07/22/22 02:59:51 98 3 °F (36 8 °C) 75 21 101/47 Abnormal  65 95 % -- -- -- -- --   07/22/22 0130 -- -- -- -- -- 94 % 36 -- 4 L/min Nasal cannula --   07/22/22 0124 -- -- -- -- -- 81 % Abnormal   28 -- 2 L/min Nasal cannula --   SpO2: Hospitalist made aware at 07/22/22 0124 07/22/22 01:00:23 -- 73 -- 101/45 Abnormal  64 Abnormal  94 % 28 -- 2 L/min Nasal cannula --   07/21/22 22:47:20 98 5 °F (36 9 °C) 79 -- 95/45 Abnormal  62 Abnormal  94 % -- -- -- -- --   07/21/22 2028 -- -- -- -- -- -- 28 -- 2 L/min Nasal cannula --   07/21/22 19:44:37 98 4 °F (36 9 °C) 76 20 106/49 Abnormal  68 95 % 28 -- 2 L/min Nasal cannula Lying   07/21/22 1830 -- 71 18 103/53 75 92 % -- -- -- -- --   07/21/22 1800 -- 69 18 96/54 72 92 % -- -- -- -- --   07/21/22 1700 -- 69 18 104/55 77 97 % -- -- -- -- --   07/21/22 1600 -- 69 18 111/56 79 97 % -- -- -- -- --   07/21/22 1504 -- 70 16 -- -- 97 % 28 2 L/min 2 L/min Nasal cannula --   07/21/22 1430 -- 69 16 105/54 74 98 % -- -- -- -- --   07/21/22 1330 -- 73 18 97/56 71 95 % -- -- -- -- --   07/21/22 1300 -- 70 18 98/51 71 94 % -- -- -- -- --   07/21/22 1130 -- 74 16 97/55 73 92 % -- -- -- -- --   07/21/22 1000 -- 73 16 102/53 75 99 % -- -- -- -- --   07/21/22 0930 -- 69 18 109/53 77 100 % -- -- -- -- --   07/21/22 0915 -- -- -- -- -- -- -- -- -- Nasal cannula --   07/21/22 0845 -- 76 18 129/60 86 98 % -- -- -- -- --   07/21/22 0802 -- -- -- -- -- 84 % Abnormal  -- -- -- -- --   07/21/22 0800 97 2 °F (36 2 °C) Abnormal  82 16 112/56 -- 85 % Abnormal  -- -- -- None (Room air) Sitting       Weights (last 14 days)    Date/Time Weight Weight Method Height   07/21/22 19:44:37 65 4 kg (144 lb 2 9 oz) -- 4' 11" (1 499 m)   07/21/22 0800 69 4 kg (153 lb) Stated 5' (1 524 m)       Pertinent Labs/Diagnostic Test Results:   CT chest w contrast   Final Result by Molly Domingo MD (07/22 1102)   There are new patchy opacity in the both lungs with mild bilateral basilar consolidation left greater than right suggest infiltrate/aspiration    Follow-up suggested in 6-8 weeks to demonstrate resolution   Previously noted the lung nodules in the right upper lobe, right lower lobe and left lower lobe remains stable   Mass in the lower left neck extending into the left axilla, as seen on the previous studies   Cirrhotic liver XR chest portable   Final Result by Mendel Haff, MD (07/21 6064)      Mild cardiomegaly  Slight vascular congestion      No acute cardiopulmonary process otherwise      CT abdomen pelvis wo contrast   Final Result by Suad Domingo MD (07/21 2071)      Slight enlargement of the patient's known uterine mass with decreased size of the previously measured left periaortic lymph node  Thickening of the sigmoid colon wall suggesting colitis  Diverticulosis  Moderate volume stool throughout the colon  Left lower lobe subsegmental atelectasis       Results from last 7 days   Lab Units 07/21/22  0811   SARS-COV-2  Negative     Results from last 7 days   Lab Units 07/22/22  0626 07/21/22  0811 07/19/22  1058   WBC Thousand/uL 5 74 4 22* 22 06*   HEMOGLOBIN g/dL 8 3* 9 1* 8 9*   HEMATOCRIT % 28 8* 30 2* 31 8*   PLATELETS Thousands/uL 178 218 159   BANDS PCT %  --  7 19*         Results from last 7 days   Lab Units 07/22/22  0626 07/21/22  1614 07/21/22  0811 07/19/22  1058   SODIUM mmol/L 134* 133* 132* 134*   POTASSIUM mmol/L 3 1* 2 8* 2 9* 3 1*   CHLORIDE mmol/L 88* 86* 79* 83*   CO2 mmol/L 41* 43* >45* >45*   ANION GAP mmol/L 5 4  --   --    BUN mg/dL 23 28* 31* 42*   CREATININE mg/dL 0 86 0 85 1 14 0 96   EGFR ml/min/1 73sq m 67 68 47 58   CALCIUM mg/dL 8 4 8 0* 9 2 9 1   MAGNESIUM mg/dL 1 9  --  1 9 1 8     Results from last 7 days   Lab Units 07/21/22  0811 07/19/22  1058   AST U/L 32 39   ALT U/L 14 24   ALK PHOS U/L 129* 191*   TOTAL PROTEIN g/dL 6 5 6 7   ALBUMIN g/dL 3 2* 2 5*   TOTAL BILIRUBIN mg/dL 1 21* 0 63     Results from last 7 days   Lab Units 07/21/22  0818   POC GLUCOSE mg/dl 117     Results from last 7 days   Lab Units 07/22/22  0626 07/21/22  1614 07/21/22  0811   GLUCOSE RANDOM mg/dL 80 82 106             No results found for: BETA-HYDROXYBUTYRATE       Results from last 7 days   Lab Units 07/21/22  0914   PH [place]  7 472*   PCO2 [place] mm Hg 60 6*   PO2 [place] mm Hg 31 0*   HCO3 [place] mmol/L 43 3*   BASE EXC [place] mmol/L 17 4   O2 CONTENT [place] ml/dL 8 0   O2 HGB, VENOUS % 60 0         Results from last 7 days   Lab Units 07/21/22  0818   CK TOTAL U/L 145   CK MB INDEX % 1 2   CK MB ng/mL 1 7     Results from last 7 days   Lab Units 07/21/22  1219 07/21/22  1015 07/21/22  0811   HS TNI 0HR ng/L  --   --  54*   HS TNI 2HR ng/L  --  58*  --    HSTNI D2 ng/L  --  4  --    HS TNI 4HR ng/L 54*  --   --    HSTNI D4 ng/L 0  --   --          Results from last 7 days   Lab Units 07/21/22  0811   PROTIME seconds 13 5   INR  1 03   PTT seconds 31         Results from last 7 days   Lab Units 07/21/22  0811   PROCALCITONIN ng/ml 0 47*     Results from last 7 days   Lab Units 07/21/22  0811   LACTIC ACID mmol/L 0 9       Results from last 7 days   Lab Units 07/21/22  0854   CLARITY UA  Clear   COLOR UA  Yellow   SPEC GRAV UA  1 010   PH UA  6 0   GLUCOSE UA mg/dl Negative   KETONES UA mg/dl Negative   BLOOD UA  Negative   PROTEIN UA mg/dl Trace*   NITRITE UA  Negative   BILIRUBIN UA  Negative   UROBILINOGEN UA E U /dl 0 2   LEUKOCYTES UA  Negative   WBC UA /hpf 1-2   RBC UA /hpf 0-1   BACTERIA UA /hpf Occasional   EPITHELIAL CELLS WET PREP /hpf Occasional   MUCUS THREADS  Occasional*     Results from last 7 days   Lab Units 07/21/22  0811   INFLUENZA A PCR  Negative   INFLUENZA B PCR  Negative   RSV PCR  Negative                 Results from last 7 days   Lab Units 07/21/22  0800   C DIFF TOXIN B BY PCR  Negative             Results from last 7 days   Lab Units 07/21/22  0811   BLOOD CULTURE  Received in Microbiology Lab  Culture in Progress  Received in Microbiology Lab  Culture in Progress       7/21 ekg nsr     ED Treatment:   Medication Administration from 07/21/2022 0758 to 07/21/2022 1942       Date/Time Order Dose Route Action     07/21/2022 0844 sodium chloride 0 9 % bolus 1,000 mL 1,000 mL Intravenous New Bag     07/21/2022 0845 HYDROmorphone (DILAUDID) injection 0 5 mg 0 5 mg Intravenous Given 07/21/2022 0934 HYDROmorphone (DILAUDID) injection 1 mg 1 mg Intravenous Given     07/21/2022 0932 cefepime (MAXIPIME) IVPB (premix in dextrose) 1,000 mg 50 mL 1,000 mg Intravenous New Bag     07/21/2022 1732 metroNIDAZOLE (FLAGYL) IVPB (premix) 500 mg 100 mL 500 mg Intravenous New Bag     07/21/2022 1025 metroNIDAZOLE (FLAGYL) IVPB (premix) 500 mg 100 mL 500 mg Intravenous New Bag     07/21/2022 1329 potassium chloride 20 mEq IVPB (premix)   Intravenous Rate/Dose Change     07/21/2022 1235 potassium chloride 20 mEq IVPB (premix) 20 mEq Intravenous New Bag     07/21/2022 1222 sodium chloride 0 9 % infusion 75 mL/hr Intravenous New Bag     07/21/2022 1639 al mag oxide-diphenhydramine-lidocaine viscous (MAGIC MOUTHWASH) suspension 10 mL 10 mL Swish & Swallow Given     07/21/2022 1645 gentamicin (GARAMYCIN) 0 1 % topical ointment   Topical Given     07/21/2022 1328 oxyCODONE (ROXICODONE) IR tablet 5 mg 5 mg Oral Not Given     07/21/2022 1641 artificial tear (LUBRIFRESH P M ) ophthalmic ointment 1 application 1 application Both Eyes Given     07/21/2022 1225 enoxaparin (LOVENOX) subcutaneous injection 40 mg 40 mg Subcutaneous Given     07/21/2022 1735 potassium chloride 20 mEq IVPB (premix) 20 mEq Intravenous New Bag        Past Medical History:   Diagnosis Date    Anxiety     Arthritis     Bernard's esophagus     Bleeds easily [facility] )     [facility]    [facility]omet[facility]     Fibromyalgia     GERD (gastroesophageal reflux disease)     Hypercholesterolemia     Hypertension     Insomnia     Rheumatoid arthritis ([facility] )     Stroke [facility] ) 2016     Present on Admission:   Chronic pain syndrome   Endometrial [facility]   Chronic venous stasis dermatitis of bilateral lower extremities   Decubitus ulcer of ischial area, left, stage III (HCC)      Admitting Diagnosis: Diarrhea [R19 7]  Weakness generalized [R53 1]  Hypoxia [R09 02]  Generalized weakness [R53 1]  Elevated procalcitonin [R79 89]  Age/Sex: 67 y o  female  Admission Orders:  Tele  Cl liq diet  SCD    Scheduled Medications:  artificial tear, 1 application, Both Eyes, Daily  cefepime, 1,000 mg, Intravenous, Q12H  clopidogrel, 75 mg, Oral, Daily  enoxaparin, 40 mg, Subcutaneous, Daily  gabapentin, 600 mg, Oral, TID  gentamicin, , Topical, Daily  metroNIDAZOLE, 500 mg, Intravenous, Q8H  morphine, 30 mg, Oral, Q12H  potassium chloride, 20 mEq, Intravenous, Q2H  tiZANidine, 2 mg, Oral, Daily    Continuous IV Infusions:     PRN Meds:  acetaminophen, 650 mg, Oral, Q6H PRN  al mag oxide-diphenhydramine-lidocaine viscous, 10 mL, Swish & Swallow, Q4H PRN  loperamide, 2 mg, Oral, 4x Daily PRN  ondansetron, 4 mg, Intravenous, Q4H PRN  oxyCODONE, 10 mg, Oral, TID PRN  traZODone, 50 mg, Oral, HS PRN  Network Utilization Review Department  ATTENTION: Please call with any questions or concerns to [phone] and carefully listen to the prompts so that you are directed to the right person  All voicemails are confidential   Miguel Huang all requests for admission clinical reviews, approved or denied determinations and any other requests to dedicated fax number below belonging to the campus where the patient is receiving treatment   List of dedicated fax numbers for the Facilities:  [address] DENIALS (Administrative/Medical Necessity) [phone]   [address] (Maternity/NICU/Pediatrics) [phone]   [address] [place] [address] 40 125 [facility]  [phone]   [address] [address] [address] [facility] [phone]   [place] [address] [phone]

## 2022-07-22 NOTE — SPEECH THERAPY NOTE
Speech Language/Pathology  Speech/Language Pathology  Assessment    Patient Name: Oliver Rizvi  KHHGI'E Date: 7/22/2022     Problem List  Principal Problem:    Colitis  Active Problems:    Chronic pain syndrome    History of stroke    Decubitus ulcer of ischial area, left, stage III (HCC)    Endometrial cancer (HCC)    Chronic venous stasis dermatitis of bilateral lower extremities    Metabolic alkalosis with respiratory acidosis    Hypokalemia    Past Medical History  Past Medical History:   Diagnosis Date    Anxiety     Arthritis     Bernard's esophagus     Bleeds easily ([facility] )     CVA (cerebral vascular accident) ([facility] ) 10/2016    Endometrial cancer ([facility] )     Fibromyalgia     GERD (gastroesophageal reflux disease)     Hypercholesterolemia     Hypertension     Insomnia     Rheumatoid arthritis ([facility] )     Stroke ([facility] ) 2016     Past Surgical History  Past Surgical History:   Procedure Laterality Date    APPENDECTOMY      BREAST BIOPSY Bilateral     benign    CARPAL TUNNEL RELEASE Left 10/27/2003    DORSAL COMPARTMENT RELEASE Left 03/09/2006    FINGER SURGERY      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    IR PORT PLACEMENT  5/23/2022    KNEE ARTHROSCOPY Right     x3(6/91,7/96,7/99)    OTHER SURGICAL HISTORY Left 03/09/2006    tennis elbow release    [place] DEBRIDEMENT, SKIN, SUB-Q TISSUE,=<20 SQ CM N/A 1/24/2022    Procedure: SACRAL DEBRIDEMENT WOUND AND DRESSING CHANGE ([address] OUT);   Surgeon: Leonie Green MD;  Location: [facility];  Service: General    REPLACEMENT TOTAL KNEE BILATERAL      rt-11/99, lt-1/13/10    ROTATOR CUFF REPAIR Bilateral     SHOULDER ARTHROSCOPY Right 02/10/2016    SHOULDER ARTHROSCOPY Left     11/26/08,9/99    SHOULDER ARTHROSCOPY Left 03/09/2006    TONSILLECTOMY AND ADENOIDECTOMY      TRIGGER FINGER RELEASE Left 07/01/2002    middle and ring finger    ULNAR NERVE TRANSPOSITION Left           07/22/22 0800   Patient Information   Current Medical diarrhea, weakness, hypoxia   Past Medical History active [place] dx with treatment   Swallow Information   Current Risks for Dysphagia & Aspiration Weak cough;Weak voicing;General debilitation   Current Symptoms/Concerns With pills;Pain with swallowing   Current Diet Clear liquid   Baseline Diet Dyphagia advanced; Thin liquids   Baseline Assessment   Behavior/Cognition Alert; Cooperative; Interactive   Speech/Language Status WFL   Patient Positioning Upright in bed   Consistencies Assessed and Performance   Materials Admnistered Puree/Level 1; Thin liquid   Oral Stage WFL   Phargngeal Stage WFL   Swallow Mechanics WFL;Swallow initation; Appears prompt;Good Larygneal rise   Strategies and Efficacy Small bites, small sips, slow rate   Summary   Swallow Summary Patient received sitting upright in bed with clear liquids breakfast tray  Patient reporting to clinician that she has "a lot of pain" in her mouth, referencing her tongue and throat specifically  Pt also reporting she is very "dry" orally and does use magic mouthwash which helps, but still dryness remains  Patient reporting that she feels her oral pain and dryness are contributing to the function of her swallow  Patient was observed tolerating puree items from tray as well as clear thin liquids with appropriate oral reception, AP transport and swallow timing/ROM felt WFL  Patient's swallow is facing barriers of dryness and pain, as well as abdominal pain discouraging pt from eating despite hunger  Patient on clears by care team, please consider diet upgrade to mechanical soft solids and thin liquids once medically appropriate as swallow is safe and functional   Patient should cont to have pills crushed in puree for comfort- pt could likely swallow them whole but barriers effecting completion  ST to f/u x1 to ensure diet tolerance and improvement in overall symptoms     Recommendations   Risk for Aspiration Mild   Recommendations Consider oral diet   Diet Solid Recommendation Level 2 Dysphagia/ mechanical soft/altered   Diet Liquid Recommendation Thin liquid   Recommended Form of Meds Crushed; With puree   General Precautions Feed only when alert;Minimize distractions;Upright as possible for all oral intake;Remain upright for 45 mins after meals   Compensatory Swallowing Strategies Alternate solids and liquids   Further Evaluations Dietician   Results Reviewed with RN;PT/Family/Caregiver   Speech Therapy Prognosis   Prognosis Good

## 2022-07-22 NOTE — PLAN OF CARE
Problem: Potential for Falls  Goal: Patient will remain free of falls  Description: INTERVENTIONS:  - Educate patient/family on patient safety including physical limitations  - Instruct patient to call for assistance with activity   - Consult OT/PT to assist with strengthening/mobility   - Keep Call bell within reach  - Keep bed low and locked with side rails adjusted as appropriate  - Keep care items and personal belongings within reach  - Initiate and maintain comfort rounds  - Make Fall Risk Sign visible to staff  - Offer Toileting every 2 Hours, in advance of need  - Initiate/Maintain bed alarm  - Obtain necessary fall risk management equipment: bed alarm  - Apply yellow socks and bracelet for high fall risk patients  - Consider moving patient to room near nurses station  Outcome: Progressing     Problem: MOBILITY - ADULT  Goal: Maintain or return to baseline ADL function  Description: INTERVENTIONS:  -  Assess patient's ability to carry out ADLs; assess patient's baseline for ADL function and identify physical deficits which impact ability to perform ADLs (bathing, care of mouth/teeth, toileting, grooming, dressing, etc )  - Assess/evaluate cause of self-care deficits   - Assess range of motion  - Assess patient's mobility; develop plan if impaired  - Assess patient's need for assistive devices and provide as appropriate  - Encourage maximum independence but intervene and supervise when necessary  - Involve family in performance of ADLs  - Assess for home care needs following discharge   - Consider OT consult to assist with ADL evaluation and planning for discharge  - Provide patient education as appropriate  Outcome: Progressing  Goal: Maintains/Returns to pre admission functional level  Description: INTERVENTIONS:  - Perform BMAT or MOVE assessment daily    - Set and communicate daily mobility goal to care team and patient/family/caregiver     - Collaborate with rehabilitation services on mobility goals if consulted  - Perform Range of Motion 3 times a day  - Reposition patient every 2 hours    - Dangle patient 3 times a day  - Stand patient 3 times a day  - Ambulate patient 3 times a day  - Out of bed to chair 3 times a day   - Out of bed for meals 3 times a day  - Out of bed for toileting  - Record patient progress and toleration of activity level   Outcome: Progressing

## 2022-07-22 NOTE — ASSESSMENT & PLAN NOTE
· Stage IV endometrial cancer on palliative chemotherapy  · With associated drug-induced neutropenia, anemia, and neuropathy  · Retroperitoneal and supraclavicular lymphadenopathy with pulmonary and inguinal metastasis  · Last treatment with carboplatin and Taxotere on 07/14  · Continue home gabapentin 600 mg 3 times daily

## 2022-07-22 NOTE — CONSULTS
Consultation - General Surgery   Elizabeth Maradiaga 67 y o  female MRN: [id number]  Unit/Bed#: -01 Encounter: [id number]    Assessment/Plan     Assessment/Plan:  76yo F originally presenting with colitis, general surgery consulted for chronic ulceration of L ischium and sacral region   - Afeb, VSS on 4L via O2 via NC   - Was following with Dr Louisa Thapa as outpatient for wound management, would care has since been taken over by [address] following during this hospitalization   -No acute surgical intervention at this time  Continue local wound care to L hip and sacral wounds per wound care nursing recs: cleanse with wound cleanser, pat dry  Apply gentamicin to wound beds, pack lightly with moistened gauze and cover with Allevyn pink pad     Chronic venous stasis dermatitis of bilateral lower extremities   - Podiatry consult pending, cont local wound care to bilat lower extremities: cleanse with wound cleanser, pat dry, apply hydraguard to periwound area, apply xeroform over wound beds, cover with ABDs and wrap  - wound care nursing following, appreciate recs     PMH: Stage IV endometrial ca on palliative chemotherapy, chronic pain, CVA on plavix, hep-C    To be discussed with Dr Bony Crouch     History of Present Illness   HPI:  Elizabeth Maradiaga is a 67 y o  female w/ PMH of stage IV endometrial cancer, chronic pain syndrome, and hx of stroke  General surgery was consulted for evaluation of chronic sacral and left ischial wounds  Patient seen in room with daughter at bedside who state these wounds are chronic in nature and appear greatly improved when compared to previous encounters  Pt denies any active drainage from wound beds, reports that they are not malodorous  She was following with Dr Louisa Thapa through June 2022 but has decided to pursue further care at her nursing facility       Inpatient consult to Acute Care Surgery  Consult performed by: Sandro Arriaga PA-C  Consult ordered by: Laurel Wu DO      Review of Systems   Constitutional: Negative for chills and fever  HENT: Negative for congestion  Respiratory: Negative for cough  Cardiovascular: Positive for leg swelling  Gastrointestinal: Positive for abdominal pain  Skin: Positive for wound  Psychiatric/Behavioral: Negative for confusion  All other ROS negative unless stated above     Historical Information   Past Medical History:   Diagnosis Date    Anxiety     Arthritis     Bernard's esophagus     [facility] )     CVA (cerebral vascular accident) ([place] [address] ) 10/2016    Endometrial cancer ([place] [address] )     Fibromyalgia     GERD (gastroesophageal reflux disease)     Hypercholesterolemia     Hypertension     Insomnia     Rheumatoid arthritis ([facility] )     Stroke ([place] [address] ) 2016     Past Surgical History:   Procedure Laterality Date    APPENDECTOMY      BREAST BIOPSY Bilateral     benign    CARPAL TUNNEL RELEASE Left 10/27/2003    DORSAL COMPARTMENT RELEASE Left 03/09/2006    FINGER SURGERY      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    IR PORT PLACEMENT  5/23/2022    KNEE ARTHROSCOPY Right     x3(6/91,7/96,7/99)    OTHER SURGICAL HISTORY Left 03/09/2006    tennis elbow release    NE DEBRIDEMENT, SKIN, SUB-Q TISSUE,=<20 SQ CM N/A 1/24/2022    Procedure: SACRAL DEBRIDEMENT WOUND AND DRESSING CHANGE ([address] OUT);   Surgeon: Tatiana Reynolds MD;  [facility];  Service: General    REPLACEMENT TOTAL KNEE BILATERAL      rt-11/99, lt-1/13/10    ROTATOR CUFF REPAIR Bilateral     SHOULDER ARTHROSCOPY Right 02/10/2016    SHOULDER ARTHROSCOPY Left     11/26/08,9/99    SHOULDER ARTHROSCOPY Left 03/09/2006    TONSILLECTOMY AND ADENOIDECTOMY      TRIGGER FINGER RELEASE Left 07/01/2002    middle and ring finger    ULNAR NERVE TRANSPOSITION Left      Social History   Social History     Substance and Sexual Activity   Alcohol Use Yes    Comment: occassionally     Social History     Substance and Sexual Activity Drug Use Yes    Types: Marijuana    Comment: medical marijuana     E-Cigarette/Vaping    E-Cigarette Use Never User      E-Cigarette/Vaping Substances    Nicotine No     THC No     CBD No     Flavoring No     Other No     Unknown No      Social History     Tobacco Use   Smoking Status Former Smoker    Types: Cigarettes    Quit date:     Years since quittin 5   Smokeless Tobacco Never Used     Family History: non-contributory    Meds/Allergies   all current active meds have been reviewed  No Known Allergies    Objective   First Vitals:   Blood Pressure: 112/56 (22 0800)  Pulse: 82 (22 0800)  Temperature: (!) 97 2 °F (36 2 °C) (22 0800)  Temp Source: Tympanic (22 0800)  Respirations: 16 (22 0800)  Height: 5' (152 4 cm) (22 0800)  Weight - Scale: 69 4 kg (153 lb) (22 0800)  SpO2: (!) 85 % (22 0800)    Current Vitals:   Blood Pressure: 102/55 (22 1158)  Pulse: 82 (22 1158)  Temperature: 98 2 °F (36 8 °C) (22 1158)  Temp Source: Oral (22 0259)  Respirations: 17 (22 1158)  Height: 4' 11" (149 9 cm) (22)  Weight - Scale: 65 4 kg (144 lb 2 9 oz) (22)  SpO2: 94 % (22 1158)      Intake/Output Summary (Last 24 hours) at 2022 1345  Last data filed at 2022 1121  Gross per 24 hour   Intake 650 ml   Output 525 ml   Net 125 ml       Invasive Devices  Report    Central Venous Catheter Line  Duration           Port A Cath 22 Right Subclavian 60 days          Peripheral Intravenous Line  Duration           Peripheral IV 22 Right Antecubital 1 day    Peripheral IV 22 Left;Ventral (anterior) Forearm <1 day                Physical Exam  Vitals and nursing note reviewed  Constitutional:       General: She is awake  She is not in acute distress  Appearance: She is ill-appearing (chronically ill appearing)  She is not toxic-appearing     HENT:      Head: Normocephalic and atraumatic  Nose: Nose normal  No congestion  Mouth/Throat:      Mouth: Mucous membranes are moist       Pharynx: No oropharyngeal exudate  Eyes:      General: No scleral icterus  Conjunctiva/sclera: Conjunctivae normal       Pupils: Pupils are equal, round, and reactive to light  Cardiovascular:      Pulses: Normal pulses  Heart sounds: Murmur heard  Pulmonary:      Effort: Pulmonary effort is normal       Breath sounds: Normal breath sounds  No wheezing, rhonchi or rales  Comments: On 4 L via NC  Abdominal:      General: Abdomen is flat  Bowel sounds are normal  There is no distension  Tenderness: There is abdominal tenderness  There is no guarding or rebound  Musculoskeletal:      Cervical back: Neck supple  No tenderness  Right lower leg: Edema present  Left lower leg: Edema present  Skin:     Comments: B/L LE wounds and L ischial and sacral wounds all POA  L ischial and sacral wounds appear non-infected  Not malodorous  L ischial ulceration tunnels 5cm at the superior boarder   Photos under media tab  Neurological:      General: No focal deficit present  Mental Status: She is alert and oriented to person, place, and time  Psychiatric:         Mood and Affect: Mood normal          Behavior: Behavior normal  Behavior is cooperative  Lab Results:   I have personally reviewed pertinent lab results    , CBC:   Lab Results   Component Value Date    WBC 5 74 07/22/2022    HGB 8 3 (L) 07/22/2022    HCT 28 8 (L) 07/22/2022    MCV 98 07/22/2022     07/22/2022    MCH 28 2 07/22/2022    MCHC 28 8 (L) 07/22/2022    RDW 20 6 (H) 07/22/2022    MPV 10 3 07/22/2022   , CMP:   Lab Results   Component Value Date    SODIUM 134 (L) 07/22/2022    K 3 1 (L) 07/22/2022    CL 88 (L) 07/22/2022    CO2 41 (H) 07/22/2022    BUN 23 07/22/2022    CREATININE 0 86 07/22/2022    CALCIUM 8 4 07/22/2022    EGFR 67 07/22/2022     Imaging: I have personally reviewed pertinent reports  EKG, Pathology, and Other Studies: I have personally reviewed pertinent reports  Counseling / Coordination of Care  Total floor / unit time spent today 20 minutes  Greater than 50% of total time was spent with the patient and / or family counseling and / or coordination of care       Taty Boone PA-C  07/22/22

## 2022-07-22 NOTE — CASE MANAGEMENT
Case Management Discharge Planning Note    Patient name Yani Higgins  Location /-01 MRN [id number]  : 1950 Date 2022       Current Admission Date: 2022  Current Admission Diagnosis:Colitis   Patient Active Problem List    Diagnosis Date Noted    Acute respiratory failure with hypoxia ([facility] ) 2022    Colitis     Metabolic alkalosis with respiratory acidosis 2022    Hypokalemia 2022    Acute cystitis without hematuria 06/15/2022    Chemotherapy induced neutropenia ([facility] ) 2022    Chronic venous stasis dermatitis of bilateral lower extremities 2022    Gait disturbance 2022    Dehydration 2022    Endometrial cancer ([facility] ) 2022    Decubitus ulcer of ischial area, left, stage III ([facility] ) 2022    Hyponatremia 2022    ANDRES (acute kidney injury) ([facility] ) 2022    Vaginal bleeding 2022    Lower extremity edema 2022    Stage III pressure ulcer of sacral region ([facility] ) 2022    Arthritis 2021    Chronic pain syndrome 2020    Stroke-like symptoms 2020    Elevated troponin 2020    Elevated d-dimer 2020    Acute nasopharyngitis 2020    History of stroke 2020    Rotator cuff tear arthropathy of left shoulder 2020    Rotator cuff tear arthropathy, right 2020    Cervical disc herniation 01/10/2019    History of knee replacement, total, bilateral 2017    Cervical spondylosis 10/12/2016    Acute arterial ischemic stroke, vertebrobasilar, brainstem, right ([address] ) 10/11/2016    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    GERD (gastroesophageal reflux disease) 10/06/2016    Coronary artery disease involving native coronary artery of native heart without angina pectoris 2015    Essential hypertension 10/01/2013      LOS (days): 1  Geometric Mean LOS (GMLOS) (days): 3 70  Days to GMLOS:2 5     OBJECTIVE:  Risk of Unplanned Readmission Score: 21 75         Current admission status: Inpatient   Preferred Pharmacy:   [address]   [place], [address]  [facility] [address]  Phone: [phone] Fax: [phone]    Primary Care Provider: Ai Ellis MD    Primary Insurance: [address] REP  Secondary Insurance:     DISCHARGE DETAILS:    Discharge planning discussed with[de-identified] cm spoke with Renita(daughter) at 16:12pm  Freedom of Choice: Yes  Comments - Freedom of Choice: pt is active with [facility]  home care   she would like a transport  she was given a list of dme companies  she stated referral can be sent to [facility] for a transport chair  CM contacted family/caregiver?: Yes             Contacts  Patient Contacts: Dary Cardona  Relationship to Patient[de-identified] Family (daughter)  Contact Method: Phone  Phone Number: [phone]  Reason/Outcome: Discharge [address]         Is the patient interested in [place] [address] at discharge?: Yes    DME Referral Provided  Referral made for DME?: Yes  DME referral completed for the following items[de-identified] Wheelchair, Other (transport chair)  DME Supplier Name[de-identified] [employer]    Other Referral/Resources/Interventions Provided:  Interventions: HHC, DME  Referral Comments: daughter requested a transport chair - order sent to [employer] as requested by the daughter    Would you like to participate in our [address] service program?  : No - Declined    Treatment Team Recommendation: Home with [address] [employer] [facility] (home alone with family support , active with [facility] [facility], transport chair ordered- family will transport)

## 2022-07-22 NOTE — NURSING NOTE
Pt arrived to floor 1940  Pt stating having issues swallowing at this time and can not take oral medications  Hospitalist made aware, IV pain medication ordered, swallow eval ordered

## 2022-07-22 NOTE — NURSING NOTE
Pt 3BM's so far since coming up to floor  No voids noticed, pt stated she knew when she had to pee but doesn't remember last time she went  Pt bladder scanned at this time for 510mls  Last documented void via strt cath noted in charting  Hospitalist made aware, urinary retention protocol ordered, straight cath indicated at this time

## 2022-07-22 NOTE — ASSESSMENT & PLAN NOTE
· Requiring 4L NC and does not use supplemental oxygen at baseline  · CT C w contrast (7/22): Patchy opacity in the both lungs with mild bilateral basilar consolidation left greater than right suggest infiltrate/aspiration   Follow-up suggested in 6-8 weeks to demonstrate resolution  Previously noted the lung nodules in the right upper lobe, right lower lobe and left lower lobe remains stable  Mass in the lower left neck extending into the left axilla, as seen on the previous studies   Cirrhotic liver   · Abx as above  · Wean oxygen as tolerated

## 2022-07-22 NOTE — ASSESSMENT & PLAN NOTE
· Presented with the complaint of diffuse abdominal pain and diarrhea since 07/17  · Infectious versus inflammatory  · CT abd/pelvis (7/21): Slight enlargement of the patient's known uterine mass with decreased size of the previously measured left periaortic lymph node  Thickening of the sigmoid colon wall suggesting colitis  Diverticulosis  Moderate volume stool throughout the colon     · Stool cultures including C diff pending negative  · Continue Cefepime and Flagyl  · Further supportive care with IVF and Imodium   · Consult GI

## 2022-07-22 NOTE — TELEPHONE ENCOUNTER
Patient is admitted  Cdiff negative  Message sent to Dr Price Levin yesterday to make him aware  Juan Case spoke with daughter, Ish Aguilera

## 2022-07-22 NOTE — NURSING NOTE
Pt on 2L nasal cannula since being admitted  Finally able to fall asleep and now desatting when sleeping into high 70's low 80's  Nasal cannula increased to 4L to help maintain sats above 90%  Hospitalist made aware

## 2022-07-22 NOTE — ASSESSMENT & PLAN NOTE
· Present on arrival  · Continue home gentamicin ointment  · Local wound care  · Wound care nurse recommending general surgery consultation

## 2022-07-22 NOTE — PROGRESS NOTES
[facility]  Progress Note - Kody Harrington 1950, 67 y o  female MRN: [id number]  Unit/Bed#: -[facility] Encounter: [id number]  Primary Care Provider: Donnell Avendano MD   Date and time admitted to hospital: 7/21/2022  7:59 AM    * Colitis  Assessment & Plan  · Presented with the complaint of diffuse abdominal pain and diarrhea since 07/17  · Infectious versus inflammatory  · CT abd/pelvis (7/21): Slight enlargement of the patient's known uterine mass with decreased size of the previously measured left periaortic lymph node  Thickening of the sigmoid colon wall suggesting colitis  Diverticulosis  Moderate volume stool throughout the colon  · Stool cultures including C diff pending negative  · Continue Cefepime and Flagyl  · Further supportive care with IVF and Imodium   · Consult GI      Acute respiratory failure with hypoxia ([address] )  Assessment & Plan  · Requiring 4L NC and does not use supplemental oxygen at baseline  · CT C w contrast (7/22): Patchy opacity in the both lungs with mild bilateral basilar consolidation left greater than right suggest infiltrate/aspiration   Follow-up suggested in 6-8 weeks to demonstrate resolution  Previously noted the lung nodules in the right upper lobe, right lower lobe and left lower lobe remains stable  Mass in the lower left neck extending into the left axilla, as seen on the previous studies   Cirrhotic liver   · Abx as above  · Wean oxygen as tolerated     Hypokalemia  Assessment & Plan  · Likely due to diarrhea  · K: 3 1  · Continue to replete and recheck as needed     Chronic venous stasis dermatitis of bilateral lower extremities  Assessment & Plan  · Patient has chronic lower extremity edema with venous stasis dermatitis/wounds  · Intermittently on Lasix and was recently treated for possible cellulitis with Keflex  · Was following outpatient with [facility] wound [facility]; daughter notes all of her mothers wounds appear better   · Continue local wound care  · Wound care nurse recommending Podiatry and General surgery consultations    Decubitus ulcer of ischial area, left, stage III [facility])  Assessment & Plan  · Present on arrival  · Continue home gentamicin ointment  · Local wound care  · Wound care nurse recommending general surgery consultation     Endometrial cancer [facility])  Assessment & Plan  · Stage IV endometrial cancer on palliative chemotherapy  · With associated drug-induced neutropenia, anemia, and neuropathy  · Retroperitoneal and supraclavicular lymphadenopathy with pulmonary and inguinal metastasis  · Last treatment with carboplatin and Taxotere on 07/14  · Continue home gabapentin 600 mg 3 times daily    History of stroke  Assessment & Plan  · Continue home Plavix 75 mg daily and Lipitor 40 mg daily    Chronic pain syndrome  Assessment & Plan  · Continue home morphine 30 mg twice daily and oxycodone 5 mg twice daily  · Continue home trazodone 50 mg q h s  p r n  · Continue home Zanaflex 2 mg daily      VTE Pharmacologic Prophylaxis: VTE Score: 8 High Risk (Score >/= 5) - Pharmacological DVT Prophylaxis Ordered: enoxaparin (Lovenox)  Sequential Compression Devices Ordered  Patient Centered Rounds: I performed bedside rounds with nursing staff today  Discussions with Specialists or Other Care Team Provider: Bedside nurse, wound care nurse, and CM  Attempted to reach the patient's outpatient Oncologist, Dr Rico Moran, but was unable to reach  Education and Discussions with Family / Patient: Updated  (daughter) via phone  Time Spent for Care: 60 minutes  More than 50% of total time spent on counseling and coordination of care as described above  Current Length of Stay: 1 day(s)  Current Patient Status: Inpatient   Certification Statement: The patient will continue to require additional inpatient hospital stay due to colitis, hypoxic respiratory failure, and multiple chronic wounds  Discharge Plan:  To be determined based on clinical improvement  Code Status: Level 1 - Full Code    Subjective:   Patient examined in bed with the complaint of continued diffuse diarrhea and pain  Overnight she had increasing oxygen requirements and was placed on 4 L nasal cannula  Chest x-ray did demonstrate some pulmonary vascular congestion and Lasix was ordered but not given as she was hypotensive  C diff and further stool cultures have been negative for bacterial infection  Objective:     Vitals:   Temp (24hrs), Av 4 °F (36 9 °C), Min:98 2 °F (36 8 °C), Max:98 7 °F (37 1 °C)    Temp:  [98 2 °F (36 8 °C)-98 7 °F (37 1 °C)] 98 2 °F (36 8 °C)  HR:  [69-82] 82  Resp:  [16-21] 17  BP: ()/(40-56) 102/55  SpO2:  [81 %-98 %] 94 %  Body mass index is 29 12 kg/m²  Input and Output Summary (last 24 hours): Intake/Output Summary (Last 24 hours) at 2022 1230  Last data filed at 2022 1121  Gross per 24 hour   Intake 650 ml   Output 525 ml   Net 125 ml       Physical Exam:   Physical Exam  Vitals and nursing note reviewed  Constitutional:       Appearance: She is well-developed  She is ill-appearing  HENT:      Head: Normocephalic and atraumatic  Mouth/Throat:      Mouth: Mucous membranes are dry  Comments: Oral thrush  Eyes:      Extraocular Movements: Extraocular movements intact  Conjunctiva/sclera: Conjunctivae normal    Cardiovascular:      Rate and Rhythm: Normal rate and regular rhythm  Heart sounds: Murmur heard  Pulmonary:      Effort: Pulmonary effort is normal  No respiratory distress  Breath sounds: Normal breath sounds  Comments: 4L NC  Abdominal:      General: There is distension  Palpations: Abdomen is soft  Tenderness: There is abdominal tenderness  Musculoskeletal:      Cervical back: Normal range of motion and neck supple  Right lower leg: Edema present  Left lower leg: Edema present     Skin:     Comments: B/L LE wounds and sacral wound, POA- daughter notes significant improvement and do not appear grossly infected   Neurological:      General: No focal deficit present  Mental Status: She is alert and oriented to person, place, and time  Psychiatric:         Mood and Affect: Mood normal          Behavior: Behavior normal          Labs:  Results from last 7 days   Lab Units 07/22/22  0626 07/21/22  0811   WBC Thousand/uL 5 74 4 22*   HEMOGLOBIN g/dL 8 3* 9 1*   HEMATOCRIT % 28 8* 30 2*   PLATELETS Thousands/uL 178 218   BANDS PCT %  --  7   LYMPHO PCT %  --  23   MONO PCT %  --  29*   EOS PCT %  --  0     Results from last 7 days   Lab Units 07/22/22  0626 07/21/22  1614 07/21/22  0811   SODIUM mmol/L 134*   < > 132*   POTASSIUM mmol/L 3 1*   < > 2 9*   CHLORIDE mmol/L 88*   < > 79*   CO2 mmol/L 41*   < > >45*   BUN mg/dL 23   < > 31*   CREATININE mg/dL 0 86   < > 1 14   ANION GAP mmol/L 5   < >  --    CALCIUM mg/dL 8 4   < > 9 2   ALBUMIN g/dL  --   --  3 2*   TOTAL BILIRUBIN mg/dL  --   --  1 21*   ALK PHOS U/L  --   --  129*   ALT U/L  --   --  14   AST U/L  --   --  32   GLUCOSE RANDOM mg/dL 80   < > 106    < > = values in this interval not displayed       Results from last 7 days   Lab Units 07/21/22  0811   INR  1 03     Results from last 7 days   Lab Units 07/21/22  0818   POC GLUCOSE mg/dl 117         Results from last 7 days   Lab Units 07/21/22  0811   LACTIC ACID mmol/L 0 9   PROCALCITONIN ng/ml 0 47*       Lines/Drains:  Invasive Devices  Report    Central Venous Catheter Line  Duration           Port A Cath 05/23/22 Right Subclavian 60 days          Peripheral Intravenous Line  Duration           Peripheral IV 07/21/22 Right Antecubital 1 day    Peripheral IV 07/22/22 Left;Ventral (anterior) Forearm <1 day                Telemetry:  Telemetry Orders (From admission, onward)             48 Hour Telemetry Monitoring  Continuous x 48 hours        References:    Telemetry Guidelines   Question:  Reason for 48 Hour Telemetry  Answer:  Acute MI, chest pain - R/O MI, or unstable angina                 Telemetry Reviewed: Normal Sinus Rhythm  Indication for Continued Telemetry Use: Metabolic/electrolyte disturbance with high probability of dysrhythmia    Imaging: Reviewed radiology reports from this admission including: chest CT scan    Recent Cultures (last 7 days):   Results from last 7 days   Lab Units 07/21/22  0811 07/21/22  0800   BLOOD CULTURE  No Growth at 24 hrs  No Growth at 24 hrs    --    C DIFF TOXIN B BY PCR   --  Negative       Last 24 Hours Medication List:   Current Facility-Administered Medications   Medication Dose Route Frequency Provider Last Rate    acetaminophen  650 mg Oral Q6H PRN [facility], DO      al mag oxide-diphenhydramine-lidocaine viscous  10 mL Swish & Swallow Q4H PRN [facility], DO      artificial tear  1 application Both Eyes Daily [facility], [address]      cefepime  1,000 mg Intravenous Q12H [facility], DO Stopped (07/22/22 1121)    clopidogrel  75 mg Oral Daily [facility], DO      enoxaparin  40 mg Subcutaneous Daily [facility], [address]      gabapentin  600 mg Oral TID [facility], DO      gentamicin   Topical Daily [facility], [address]      loperamide  2 mg Oral 4x Daily PRN [facility], DO      metroNIDAZOLE  500 mg Intravenous Q8H Bryan Duke,  Stopped (07/22/22 0940)    morphine  30 mg Oral Q12H [facility], DO      ondansetron  4 mg Intravenous Q4H PRN [facility], DO      oxyCODONE  10 mg Oral TID PRN Yannick Rodriguez PA-C      potassium chloride  20 mEq Intravenous Q2H [facility], DO 20 mEq (07/22/22 1203)    sodium chloride  75 mL/hr Intravenous Continuous [facility], DO 75 mL/hr (07/22/22 1206)    tiZANidine  2 mg Oral Daily [facility], DO      traZODone  50 mg Oral HS PRN [facility], DO          Today, Patient Was Seen By: Bryant Red DO    **Please Note: This note may have been constructed using a voice recognition system  **

## 2022-07-22 NOTE — NURSING NOTE
[place] not available to take pictures of pts wounds  Wounds present are on sacrum, lt hip, B/L LE's  Wound consult placed prior to pt coming to floor

## 2022-07-22 NOTE — ASSESSMENT & PLAN NOTE
· Patient has chronic lower extremity edema with venous stasis dermatitis/wounds  · Intermittently on Lasix and was recently treated for possible cellulitis with Keflex  · Was following outpatient with [facility] wound [facility]; daughter notes all of her mothers wounds appear better   · Continue local wound care  · Wound care nurse recommending Podiatry and General surgery consultations

## 2022-07-22 NOTE — CONSULTS
Consultation -  [facility]  Jaleesa Castillo 67 y o  female MRN: [id number]  Unit/Bed#: -01 Encounter: [id number]        Inpatient consult to gastroenterology  Consult performed by: Trish Miramontes PA-C  Consult ordered by: [id number] [facility],[address],           Reason for Consult / Principal Problem:     Diarrhea  Colitis      ASSESSMENT AND PLAN:      Patient is a 67 y o  female with PMH significant for stage IV endometrial cancer with lymph nodes, lungs, and inguinal metastasis receiving palliative chemotherapy history of CVA on Plavix, hepatitis-C, RA, and chronic sacral bilateral lower extremity wounds admitted on 07/21 for complaints regarding abdominal pain and diarrhea with imaging suggestive of sigmoid colitis  CTA/P notable for slight enlargement of known uterine mass; thickening of the sigmoid colon wall suggesting colitis; diverticulosis; moderate volume stool throughout the colon; left lower lobe segmental atelectasis  CT chest notable for new patchy opacity in both lungs with mild bilateral basilar consolidation left greater than right suggestive of infiltrate/aspiration; known lobe nodules; previously demonstrated mass of lower left neck extending into the axilla; cirrhotic liver  Labs notable for resolved leukocytosis, chronic normocytic anemia, multiple lyte abnormalities, mildly elevated alk phos and T bili (129, 1 21), stable renal function  Stool studies including C diff in stool enteric bacterial panel negative  Patient intermittently hypotensive and with increased oxygen requirements currently on 4 L O2 via NC  1) Colitits - Clinical picture most suspicious for ischemic colitis in the setting of hypotension and aggressive diuresis  Stool studies negative for C diff or other infectious colitis  Less likely inflammatory colitis given unremarkable previous colonoscopy   Given patient's with acute respiratory failure and hypoxia not ideal candidate for anesthesia required for endoscopic evaluation  Recommend to continue with supportive care including IV fluids w/ correction of lytes, avoiding hypotension, antiemetics/analgesics as needed, monitor hgb and monitoring stools for further signs of overt GI bleeding  If no clinical improvement with supportive care, can consider colonoscopy once patient has been optimized from respiratory standpoint    - IV fluids w/ correction of lytes  - Avoiding hypotension  - Antiemetics/analgesics PRN  - Monitor hgb; Transfuse for hgb <7 0  - Monitor stools for further signs of overt GI bleeding     2) GERD/Dysphagia - Patient reports new significant reflux and intermittent dysphagia, likely due to known thrush/mouth sores  Less suspicious for esophageal stricture/dysmotility  Again, patient is not an ideal candidate for endoscopic evaluation, and will therefore conservatively with pantoprazole 40 mg once daily, Magic Mouthwash PRN, and empirically treat for Candida esophagitis with fluconazole 200 mg once daily x14 days given symptoms and immunocompromised state  If no clinical improvement with supportive care, can consider endoscopy with esophageal biopsies to r/o CMV once patient has been optimized from respiratory standpoint    - Oral pantoprazole once daily   - Magic Mouthwash PRN  - Oral fluconazole 200 mg once daily x14 days    Of note, patient unlikely to have cirrhosis as incidentally noted on chest CT given serial imaging without evidence and appropriate synthetic function  GI will continue to follow  ______________________________________________________________________    HPI: Patient is a 67 y o  female with PMH significant for stage IV endometrial cancer with lymph nodes, lungs, and inguinal metastasis receiving palliative chemotherapy history of CVA on Plavix, hepatitis-C, RA, and chronic sacral bilateral lower extremity wounds who presented to the ED on 07/21 for complaints regarding abdominal pain and diarrhea  Patient's daughter present at bedside who helps provide history  Patient follows with gynecologic oncology currently receiving palliative chemotherapy on carboplatin AUC 5 and Taxol, most recent infusion on 07/15  Daughter states the patient typically complains of abdominal pain following chemotherapy  However, patient states this pain is different - Described as intermittent lower abdominal cramping  Patient also started with diarrhea described as multiple profuse watery bowel movements starting Sunday (7/17)  She also notes large amount of dark and bright red blood per rectum on Sunday, Monday, and Tuesday  States rectal bleeding has since resolved, but diarrhea persists  Denies fever/chills, nausea/vomiting, melena, or unintentional weight loss  Admits to significant reflux and new intermittent dysphagia, although attributes this to known sores in her mouth and throat  Daughter notes that patient's blood pressures typically run low, and she was recently on Lasix and metolazone for bilateral lower extremity edema with a 20 lb weight loss and less than 1 week     Patient also noted to be intermittently hypotensive upon arrival   Denies recent travel outside the country or eating undercooked/poorly prepared foods  Patient did complete course of Keflex for cellulitis approximately 1 month prior  During evaluation in the ED, patient was found to have thickening of the sigmoid colon wall suggesting colitis, as well as, moderate volume stool throughout the colon suggestive of constipation  Patient remains afebrile and without leukocytosis  However, labs are notable for multiple lyte abnormalities and metabolic alkalosis  Patient was initiated on Flagyl and cefepime  Stool studies including C diff and stool enteric bacterial panel were negative  REVIEW OF SYSTEMS:    CONSTITUTIONAL: Denies any fever, chills, rigors, and weight loss  HEENT: No earache or tinnitus   Denies hearing loss or visual disturbances  CARDIOVASCULAR: No chest pain or palpitations  RESPIRATORY: Denies any cough, hemoptysis, shortness of breath or dyspnea on exertion  GASTROINTESTINAL: As noted in the History of Present Illness  GENITOURINARY: No problems with urination  Denies any hematuria or dysuria  NEUROLOGIC: No dizziness or vertigo, denies headaches  MUSCULOSKELETAL: Denies any muscle or joint pain  SKIN: Denies skin rashes or itching  ENDOCRINE: Denies excessive thirst  Denies intolerance to heat or cold  PSYCHOSOCIAL: Denies depression or anxiety  Denies any recent memory loss  Historical Information   Past Medical History:   Diagnosis Date    Anxiety     Arthritis     Bernard's esophagus     [facility] [address] [facility]     [facility]    [facility]     Fibromyalgia     GERD (gastroesophageal reflux disease)     Hypercholesterolemia     Hypertension     Insomnia     [place] ([place] [address] )     Stroke ([place] [address] ) 2016     Past Surgical History:   Procedure Laterality Date    APPENDECTOMY      BREAST BIOPSY Bilateral     benign    CARPAL TUNNEL RELEASE Left 10/27/2003    DORSAL COMPARTMENT RELEASE Left 03/09/2006    FINGER SURGERY      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    IR PORT PLACEMENT  5/23/2022    KNEE ARTHROSCOPY Right     x3(6/91,7/96,7/99)    OTHER SURGICAL HISTORY Left 03/09/2006    tennis elbow release    TN DEBRIDEMENT, SKIN, SUB-Q TISSUE,=<20 SQ CM N/A 1/24/2022    Procedure: SACRAL DEBRIDEMENT WOUND AND DRESSING CHANGE ([address]);   Surgeon: Ary Najjar, MD;  Location: [facility];  Service: General    REPLACEMENT TOTAL KNEE BILATERAL      rt-11/99, lt-1/13/10    ROTATOR CUFF REPAIR Bilateral     SHOULDER ARTHROSCOPY Right 02/10/2016    SHOULDER ARTHROSCOPY Left     11/26/08,9/99    SHOULDER ARTHROSCOPY Left 03/09/2006    TONSILLECTOMY AND ADENOIDECTOMY      TRIGGER FINGER RELEASE Left 07/01/2002    middle and ring finger    ULNAR NERVE TRANSPOSITION Left      Social History   Social History     Substance and Sexual Activity   Alcohol Use Yes    Comment: occassionally     Social History     Substance and Sexual Activity   Drug Use Yes    Types: Marijuana    Comment: medical marijuana     Social History     Tobacco Use   Smoking Status Former Smoker    Types: Cigarettes    Quit date:     Years since quittin 5   Smokeless Tobacco Never Used     Family History   Problem Relation Age of Onset    Arthritis Mother     Lung cancer Father     Brain cancer Father     No Known Problems Daughter     No Known Problems Maternal Grandmother     No Known Problems Maternal Grandfather     No Known Problems Paternal Grandmother     No Known Problems Paternal Grandfather     No Known Problems Daughter     No Known Problems Maternal Aunt     No Known Problems Paternal Aunt     No Known Problems Paternal Aunt     No Known Problems Paternal Aunt        Meds/Allergies     Medications Prior to Admission   Medication    al mag oxide-diphenhydramine-lidocaine viscous (MAGIC MOUTHWASH) 1:1:1 suspension    artificial tear (LUBRIFRESH P M ) 83-15 % ophthalmic ointment    furosemide (LASIX) 20 mg tablet    atorvastatin (LIPITOR) 40 mg tablet    clopidogrel (PLAVIX) 75 mg tablet    dexamethasone (DECADRON) 4 mg tablet    fluticasone (FLONASE) 50 mcg/act nasal spray    gabapentin (NEURONTIN) 600 MG tablet    gentamicin (GARAMYCIN) 0 1 % ointment    LORazepam (ATIVAN) 1 mg tablet    morphine (MS CONTIN) 30 mg 12 hr tablet    naloxone (NARCAN) 4 mg/0 1 mL nasal spray    ondansetron (ZOFRAN) 8 mg tablet    oxyCODONE (OXY-IR) 5 MG capsule    TiZANidine (ZANAFLEX) 2 MG capsule    traZODone (DESYREL) 50 mg tablet    White Petrolatum-Mineral Oil (Soothe Night Time) OINT     Current Facility-Administered Medications   Medication Dose Route Frequency    acetaminophen (TYLENOL) tablet 650 mg  650 mg Oral Q6H PRN    al [employer] oxide-diphenhydramine-lidocaine viscous (MAGIC MOUTHWASH) suspension 10 mL  10 mL Swish & Swallow Q4H PRN    artificial tear (LUBRIFRESH P M ) ophthalmic ointment 1 application  1 application Both Eyes Daily    cefepime (MAXIPIME) IVPB (premix in dextrose) 1,000 mg 50 mL  1,000 mg Intravenous Q12H    clopidogrel (PLAVIX) tablet 75 mg  75 mg Oral Daily    enoxaparin (LOVENOX) subcutaneous injection 40 mg  40 mg Subcutaneous Daily    gabapentin (NEURONTIN) tablet 600 mg  600 mg Oral TID    gentamicin (GARAMYCIN) 0 1 % topical ointment   Topical Daily    loperamide (IMODIUM) capsule 2 mg  2 mg Oral 4x Daily PRN    metroNIDAZOLE (FLAGYL) IVPB (premix) 500 mg 100 mL  500 mg Intravenous Q8H    morphine (MS CONTIN) ER tablet 30 mg  30 mg Oral Q12H    ondansetron (ZOFRAN) injection 4 mg  4 mg Intravenous Q4H PRN    oxyCODONE (ROXICODONE) immediate release tablet 10 mg  10 mg Oral TID PRN    potassium chloride 20 mEq IVPB (premix)  20 mEq Intravenous Q2H    sodium chloride 0 9 % infusion  75 mL/hr Intravenous Continuous    tiZANidine (ZANAFLEX) tablet 2 mg  2 mg Oral Daily    traZODone (DESYREL) tablet 50 mg  50 mg Oral HS PRN       No Known Allergies        Objective     Blood pressure 102/55, pulse 82, temperature 98 2 °F (36 8 °C), resp  rate 17, height 4' 11" (1 499 m), weight 65 4 kg (144 lb 2 9 oz), SpO2 94 %  Body mass index is 29 12 kg/m²  Intake/Output Summary (Last 24 hours) at 7/22/2022 1304  Last data filed at 7/22/2022 1121  Gross per 24 hour   Intake 650 ml   Output 525 ml   Net 125 ml         PHYSICAL EXAM:      General Appearance:   +Chronically ill-appearing; Alert, cooperative, no distress   HEENT:   Normocephalic, atraumatic, anicteric  Neck:  Supple, symmetrical, trachea midline   Lungs:   Clear to auscultation bilaterally; no rales, rhonchi or wheezing; respirations unlabored    Heart[de-identified]   Regular rate and rhythm; no murmur, rub, or gallop     Abdomen:   Soft with mild abd distention and moderate b/l lower abd TTP with voluntary guarding without rebound or rigidity; normal bowel sounds; no masses, no organomegaly    Genitalia:   Deferred    Rectal:   Deferred    Extremities:  No cyanosis, clubbing or edema    Pulses:  2+ and symmetric all extremities    Skin:  No jaundice, rashes, or lesions    Lymph nodes:  No palpable cervical lymphadenopathy        Lab Results:   Admission on 07/21/2022   Component Date Value    WBC 07/21/2022 4 22 (A)    RBC 07/21/2022 3 19 (A)    Hemoglobin 07/21/2022 9 1 (A)    Hematocrit 07/21/2022 30 2 (A)    MCV 07/21/2022 95     MCH 07/21/2022 28 5     MCHC 07/21/2022 30 1 (A)    RDW 07/21/2022 20 5 (A)    MPV 07/21/2022 9 8     Platelets 27/20/6618 218     Sodium 07/21/2022 132 (A)    Potassium 07/21/2022 2 9 (A)    Chloride 07/21/2022 79 (A)    CO2 07/21/2022 >45 (A)    ANION GAP 07/21/2022      BUN 07/21/2022 31 (A)    Creatinine 07/21/2022 1 14     Glucose 07/21/2022 106     Calcium 07/21/2022 9 2     Corrected Calcium 07/21/2022 9 8     AST 07/21/2022 32     ALT 07/21/2022 14     Alkaline Phosphatase 07/21/2022 129 (A)    Total Protein 07/21/2022 6 5     Albumin 07/21/2022 3 2 (A)    Total Bilirubin 07/21/2022 1 21 (A)    eGFR 07/21/2022 47     LACTIC ACID 07/21/2022 0 9     Procalcitonin 07/21/2022 0 47 (A)    Protime 07/21/2022 13 5     INR 07/21/2022 1 03     PTT 07/21/2022 31     Blood Culture 07/21/2022 No Growth at 24 hrs   Blood Culture 07/21/2022 No Growth at 24 hrs       Color, UA 07/21/2022 Yellow     Clarity, UA 07/21/2022 Clear     Specific Gravity, UA 07/21/2022 1 010     pH, UA 07/21/2022 6 0     Leukocytes, UA 07/21/2022 Negative     Nitrite, UA 07/21/2022 Negative     Protein, UA 07/21/2022 Trace (A)    Glucose, UA 07/21/2022 Negative     Ketones, UA 07/21/2022 Negative     Urobilinogen, UA 07/21/2022 0 2     Bilirubin, UA 07/21/2022 Negative     Occult Blood, UA 07/21/2022 Negative     SARS-CoV-2 07/21/2022 Negative     INFLUENZA A PCR 07/21/2022 Negative     INFLUENZA B PCR 07/21/2022 Negative     RSV PCR 07/21/2022 Negative     hs TnI 0hr 07/21/2022 54 (A)    Total CK 07/21/2022 145     Uric Acid 07/21/2022 11 1 (A)    POC Glucose 07/21/2022 117     Segmented % 07/21/2022 39 (A)    Bands % 07/21/2022 7     Lymphocytes % 07/21/2022 23     Monocytes % 07/21/2022 29 (A)    Eosinophils, % 07/21/2022 0     Basophils % 07/21/2022 1     Metamyelocytes% 07/21/2022 1     Absolute Neutrophils 07/21/2022 1 94     Lymphocytes Absolute 07/21/2022 0 97     Monocytes Absolute 07/21/2022 1 22     Eosinophils Absolute 07/21/2022 0 00     Basophils Absolute 07/21/2022 0 04     RBC Morphology 07/21/2022 Present     Anisocytosis 07/21/2022 Present     Macrocytes 07/21/2022 Present     Ovalocytes 07/21/2022 Present     Platelet Estimate 72/86/2203 Adequate     CK-MB Index 07/21/2022 1 2     CK-MB 07/21/2022 1 7     hs TnI 2hr 07/21/2022 58 (A)    Delta 2hr hsTnI 07/21/2022 4     pH, Sushil 07/21/2022 7 472 (A)    pCO2, Sushil 07/21/2022 60 6 (A)    pO2, Sushil 07/21/2022 31 0 (A)    HCO3, Sushil 07/21/2022 43 3 (A)    Base Excess, Sushil 07/21/2022 17 4     O2 Content, Sushil 07/21/2022 8 0     O2 HGB, VENOUS 07/21/2022 60 0     RBC, UA 07/21/2022 0-1     WBC, UA 07/21/2022 1-2     Epithelial Cells 07/21/2022 Occasional     Bacteria, UA 07/21/2022 Occasional     MUCUS THREADS 07/21/2022 Occasional (A)    hs TnI 4hr 07/21/2022 54 (A)    Delta 4hr hsTnI 07/21/2022 0     Magnesium 07/21/2022 1 9     Salmonella sp PCR 07/21/2022 None Detected     Shigella sp/Enteroinvasi* 07/21/2022 None Detected     Campylobacter sp (jejuni* 07/21/2022 None Detected     Shiga toxin 1/Shiga toxi* 07/21/2022 None Detected     Sodium 07/21/2022 133 (A)    Potassium 07/21/2022 2 8 (A)    Chloride 07/21/2022 86 (A)    CO2 07/21/2022 43 (A)    ANION GAP 07/21/2022 4     BUN 07/21/2022 28 (A)    Creatinine 07/21/2022 0 85     Glucose 07/21/2022 82     Calcium 07/21/2022 8 0 (A)    eGFR 07/21/2022 68     Ventricular Rate 07/21/2022 75     Atrial Rate 07/21/2022 75     [place] Interval 07/21/2022 134     QRSD Interval 07/21/2022 84     QT Interval 07/21/2022 402     QTC Interval 07/21/2022 448     P Axis 07/21/2022 37     QRS Axis 07/21/2022 57     T Wave Axis 07/21/2022 63     Sodium 07/22/2022 134 (A)    Potassium 07/22/2022 3 1 (A)    Chloride 07/22/2022 88 (A)    CO2 07/22/2022 41 (A)    ANION GAP 07/22/2022 5     BUN 07/22/2022 23     Creatinine 07/22/2022 0 86     Glucose 07/22/2022 80     Calcium 07/22/2022 8 4     eGFR 07/22/2022 67     Magnesium 07/22/2022 1 9     WBC 07/22/2022 5 74     RBC 07/22/2022 2 94 (A)    Hemoglobin 07/22/2022 8 3 (A)    Hematocrit 07/22/2022 28 8 (A)    MCV 07/22/2022 98     MCH 07/22/2022 28 2     MCHC 07/22/2022 28 8 (A)    RDW 07/22/2022 20 6 (A)    MPV 07/22/2022 10 3     Platelets 25/49/6307 178        Imaging Studies: I have personally reviewed pertinent imaging studies

## 2022-07-22 NOTE — PLAN OF CARE
Problem: Potential for Falls  Goal: Patient will remain free of falls  Description: INTERVENTIONS:  - Educate patient/family on patient safety including physical limitations  - Instruct patient to call for assistance with activity   - Consult OT/PT to assist with strengthening/mobility   - Keep Call bell within reach  - Keep bed low and locked with side rails adjusted as appropriate  - Keep care items and personal belongings within reach  - Initiate and maintain comfort rounds  - Make Fall Risk Sign visible to staff  - Offer Toileting every 2 Hours, in advance of need  - Initiate/Maintain bed alarm  - Obtain necessary fall risk management equipment: yellow socks applied, bed in lowest and locked position  - Apply yellow socks and bracelet for high fall risk patients  - Consider moving patient to room near nurses station  Outcome: Progressing     Problem: Prexisting or High Potential for Compromised Skin Integrity  Goal: Skin integrity is maintained or improved  Description: INTERVENTIONS:  - Identify patients at risk for skin breakdown  - Assess and monitor skin integrity  - Assess and monitor nutrition and hydration status  - Monitor labs   - Assess for incontinence   - Turn and reposition patient  - Assist with mobility/ambulation  - Relieve pressure over bony prominences  - Avoid friction and shearing  - Provide appropriate hygiene as needed including keeping skin clean and dry  - Evaluate need for skin moisturizer/barrier cream  - Collaborate with interdisciplinary team   - Patient/family teaching  - Consider wound care consult   Outcome: Progressing     Problem: Nutrition/Hydration-ADULT  Goal: Nutrient/Hydration intake appropriate for improving, restoring or maintaining nutritional needs  Description: Monitor and assess patient's nutrition/hydration status for malnutrition  Collaborate with interdisciplinary team and initiate plan and interventions as ordered    Monitor patient's weight and dietary intake as ordered or per policy  Utilize nutrition screening tool and intervene as necessary  Determine patient's food preferences and provide high-protein, high-caloric foods as appropriate       INTERVENTIONS:  - Monitor oral intake, urinary output, labs, and treatment plans  - Assess nutrition and hydration status and recommend course of action  - Evaluate amount of meals eaten  - Assist patient with eating if necessary   - Allow adequate time for meals  - Recommend/ encourage appropriate diets, oral nutritional supplements, and vitamin/mineral supplements  - Order, calculate, and assess calorie counts as needed  - Recommend, monitor, and adjust tube feedings and TPN/PPN based on assessed needs  - Assess need for intravenous fluids  - Provide specific nutrition/hydration education as appropriate  - Include patient/family/caregiver in decisions related to nutrition  Outcome: Progressing

## 2022-07-22 NOTE — CONSULTS
[address] [facility]iat[facility] - Consultation    Patient Information:   Rexene Apgar 67 y o  female MRN: [id number]  Unit/Bed#: -01 Encounter: [id number]  PCP: Ai Ellis MD  Date of Admission:  2022  Date of Consultation: 22  Requesting Physician: Basia Basilio, *      ASSESSMENT:    Rexene Apgar is a 67 y o  female with:    1  Bilateral venous stasis ulceration limited to skin breakdown  2  Edema bilateral lower extremities    PLAN:    · Thankfully these ulcerations are stable with no need for surgical intervention  · -she is to follow up on an outpatient basis in wound care with either I or Dr Cindy Dent immediately following discharge either in [facility] or [place]  · She does have Xeroform, 4x4s, ABDs, Derian Foot applied to bilateral lower extremities with change daily  · Rest of care per primary team     SUBJECTIVE    History of Present Illness:    Rexene Apgar is a 67 y o  female with past medical history of bilateral venous stasis wounds who presents with bilateral venous stasis wounds have been present for a elongated mouth time, patient is somewhat a poor historian  She states that she receives home care from her family, she has not seen a doctor for these a long time  Denies any acute changes they are painful for  her      Review of Systems:    Constitutional: Negative  HENT: Negative  Eyes: Negative  Respiratory: Negative  Cardiovascular: Negative  Gastrointestinal: Negative  Musculoskeletal:  Skin:   Neurological: neg   Psych: Negative       Past Medical and Surgical History:     Past Medical History:   Diagnosis Date    Anxiety     Arthritis     Bernard's esophagus     [facility]     CVA (cerebral vascular accident) ([facility] [address] ) 10/2016    Endometrial cancer [facility] [address] [facility] )     Fibromyalgia     GERD (gastroesophageal reflux disease)     Hypercholesterolemia     Hypertension     Insomnia     Rheumatoid arthritis ([facility] [address] [facility]     Stroke ([facility] [address] ) 2016       Past Surgical History:   Procedure Laterality Date    APPENDECTOMY      BREAST BIOPSY Bilateral     benign    CARPAL TUNNEL RELEASE Left 10/27/2003    DORSAL COMPARTMENT RELEASE Left 03/09/2006    FINGER SURGERY      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    IR PORT PLACEMENT  5/23/2022    KNEE ARTHROSCOPY Right     x3(6/91,7/96,7/99)    OTHER SURGICAL HISTORY Left 03/09/2006    tennis elbow release    WV DEBRIDEMENT, SKIN, SUB-Q TISSUE,=<20 SQ CM N/A 1/24/2022    Procedure: SACRAL DEBRIDEMENT WOUND AND DRESSING CHANGE ([address] OUT);   Surgeon: Meaghna Neves MD;  Location: [facility];  Service: General    REPLACEMENT TOTAL KNEE BILATERAL      rt-11/99, lt-1/13/10    ROTATOR CUFF REPAIR Bilateral     SHOULDER ARTHROSCOPY Right 02/10/2016    SHOULDER ARTHROSCOPY Left     11/26/08,9/99    SHOULDER ARTHROSCOPY Left 03/09/2006    TONSILLECTOMY AND ADENOIDECTOMY      TRIGGER FINGER RELEASE Left 07/01/2002    middle and ring finger    ULNAR NERVE TRANSPOSITION Left        Meds/Allergies:    Medications Prior to Admission   Medication    al mag oxide-diphenhydramine-lidocaine viscous (MAGIC MOUTHWASH) 1:1:1 suspension    artificial tear (LUBRIFRESH P M ) 83-15 % ophthalmic ointment    furosemide (LASIX) 20 mg tablet    atorvastatin (LIPITOR) 40 mg tablet    clopidogrel (PLAVIX) 75 mg tablet    dexamethasone (DECADRON) 4 mg tablet    fluticasone (FLONASE) 50 mcg/act nasal spray    gabapentin (NEURONTIN) 600 MG tablet    gentamicin (GARAMYCIN) 0 1 % ointment    LORazepam (ATIVAN) 1 mg tablet    morphine (MS CONTIN) 30 mg 12 hr tablet    naloxone (NARCAN) 4 mg/0 1 mL nasal spray    ondansetron (ZOFRAN) 8 mg tablet    oxyCODONE (OXY-IR) 5 MG capsule    TiZANidine (ZANAFLEX) 2 MG capsule    traZODone (DESYREL) 50 mg tablet    White Petrolatum-Mineral Oil (Soothe Night Time) OINT       No Known Allergies    Social History:     Marital Status: /Civil Union    Substance Use History:   Social History Substance and Sexual Activity   Alcohol Use Yes    Comment: occassionally     Social History     Tobacco Use   Smoking Status Former Smoker    Types: Cigarettes    Quit date:     Years since quittin 5   Smokeless Tobacco Never Used     Social History     Substance and Sexual Activity   Drug Use Yes    Types: Marijuana    Comment: medical marijuana       Family History:    Family History   Problem Relation Age of Onset    Arthritis Mother     Lung cancer Father     Brain cancer Father     No Known Problems Daughter     No Known Problems Maternal Grandmother     No Known Problems Maternal Grandfather     No Known Problems Paternal Grandmother     No Known Problems Paternal Grandfather     No Known Problems Daughter     No Known Problems Maternal Aunt     No Known Problems Paternal Aunt     No Known Problems Paternal Aunt     No Known Problems Paternal Aunt          OBJECTIVE:    Vitals:   Blood Pressure: 112/51 (22 151)  Pulse: 86 (22)  Temperature: 99 1 °F (37 3 °C) (22)  Temp Source: Oral (22 0259)  Respirations: 20 (22)  Height: 4' 11" (149 9 cm) (22)  Weight - Scale: 65 4 kg (144 lb 2 9 oz) (22)  SpO2: 97 % (22)    Physical Exam:     General Appearance: Alert, cooperative, no distress  HEENT: Head normocephalic, atraumatic, without obvious abnormality  Heart: Normal rate and rhythm  Lungs: Non-labored breathing  No respiratory distress  Abdomen: Without distension  Psychiatric: AAOx3  Lower Extremity:  Vascular:   DP and PT are lightly palpable b/l LE    Musculoskeletal:  MMT is 5/5 in all muscle compartments bilaterally  Dermatological:  There is desquamation of the skin on the anterior aspect of the legs with serous weeping from this  Pain with palpation of the areas  Neurological:  Gross sensation is intact  Protective sensation is intact             Additional Data:     Lab Results: I have personally reviewed pertinent labs including:    Results from last 7 days   Lab Units 07/22/22  0626 07/21/22  0811   WBC Thousand/uL 5 74 4 22*   HEMOGLOBIN g/dL 8 3* 9 1*   HEMATOCRIT % 28 8* 30 2*   PLATELETS Thousands/uL 178 218   LYMPHO PCT %  --  23   MONO PCT %  --  29*   EOS PCT %  --  0     Results from last 7 days   Lab Units 07/22/22  0626 07/21/22  1614 07/21/22  0811   POTASSIUM mmol/L 3 1*   < > 2 9*   CHLORIDE mmol/L 88*   < > 79*   CO2 mmol/L 41*   < > >45*   BUN mg/dL 23   < > 31*   CREATININE mg/dL 0 86   < > 1 14   CALCIUM mg/dL 8 4   < > 9 2   ALK PHOS U/L  --   --  129*   ALT U/L  --   --  14   AST U/L  --   --  32    < > = values in this interval not displayed  Results from last 7 days   Lab Units 07/21/22  0811   INR  1 03       Cultures: I have personally reviewed pertinent cultures including:    Results from last 7 days   Lab Units 07/21/22  0811 07/21/22  0800   BLOOD CULTURE  No Growth at 24 hrs  No Growth at 24 hrs  --    C DIFF TOXIN B BY PCR   --  Negative           Imaging: I have personally reviewed pertinent films in PACS  EKG, Pathology, and Other Studies: I have personally reviewed pertinent reports  ** Please Note: Portions of the record may have been created with voice recognition software  Occasional wrong word or "sound a like" substitutions may have occurred due to the inherent limitations of voice recognition software  Read the chart carefully and recognize, using context, where substitutions have occurred   **

## 2022-07-22 NOTE — WOUND OSTOMY CARE
Consult Note - Wound   Jurline Jonathan 67 y o  female MRN: [id number]  Unit/Bed#: -01 Encounter: [id number]    History and Present Illness:  67year old female patient admitted with colitis  Wound care consulted for sacrum, right buttock and lower extremity wounds  Patient had been following with Dr Adal Reeves for the wounds but has not been seen since 6/07/2022  Patient history significant for chronic pain syndrome, stroke, stage 3 pressure injury of the left ischium, endometrial CA, stage 3 pressure injury of the sacrum, bilateral lower extremity venous stasis dermatitis, HTN and colitis and gastroenteritis d/t chemotherapy  Assessment Findings:   Patient in bed for assessment  Patient is awake, alert and oriented, cooperative with imaging of wounds and treatments  Patient is an assist to turn in the bed, she is able to feed herself  Requested surgical consult for the left ischial and sacral stage 3 pressure injuries and podiatry for the lower extremities since patient has not had her wounds evaluated by a physician since her last visit with Dr Adal Reeves  She has [facility] that changes her leg dressings 2X week and her daughter helps daily  Patient has been incontinent of stool, receiving imodium, not currently having stools  Unknown at this time if patient is incontinent of urine  1  POA-stage 3 pressure injury to the sacrum  Wound bed is clean, granulaton tissue, small amount of slough to the proximal edge of the wound  No tunneling, undermining noted  Small amount of yellow drainage on dressing when removed  Hyperpigmented an scarring noted to the periwound from prior stage 3 wound  Appears wound began as a deep tissue injury in the scarred area from the prior stage 3 wound  2  POA-stage 3 pressure injury to the left lateral thigh-ischium  Wound bed beefy red, clean, granulation tissue, tunneling noted to at 12:00, small amount of yellow drainage on dressing when removed   Hyperpigmented and scarring to the periwound  3  Right and left lower legs, anterior and posterior venous stasis wound with desquamation of the skin and weeping from these areas, small to moderate amounts of serosanguineous drainage  Patient c/o severe pain with dressing changes    See flowsheet for further documentation of wounds  No odor, fluctuance, induration or erythema to the sacral and ischial wounds  Skin and Wound Care Plan:   1  Apply skin nourishing cream to the skin daily  2  Ehob offloading cushion to chair when OOB  3  Turn and reposition patient Q2 hours  4  Cleanse left lateral thigh and sacral wound with Wound Cleanser, pat dry  Apply Gentamicin to wound beds, pack wound beds lightly with moistened 1" gauze and cover with Allevyn life silicone bordered foam dressings, change daily and PRN  5  Cleanse wounds to the anterior and posterior lower legs gently with Wound Cleanser and gauze  Apply Hydraguard to the periwounds, place Xeroform on the wound beds, top with ABD pads and wrap with max  Change daily and PRN  6  Allevyn life heel foams to bilateral heels, kirk with P, date, and peel back daily for skin assessment, change every 3 days or with soilage or dislodgement  7  P-500 low air loss therapy surface    Wounds:  Wound 01/22/22 Pressure Injury Sacrum Right (Active)   Wound Image   07/22/22 0825   Wound Description Beefy red;Dark edges;Fragile 07/22/22 0825   Allie-wound Assessment Excoriated;Pink 07/22/22 0825   Drainage Amount None 07/22/22 0825   Treatments Cleansed 07/22/22 0825   Dressing Foam, Silicon (eg  Allevyn, etc) 07/22/22 0825   Dressing Status Clean;Dry; Intact 07/22/22 0825       Wound 01/22/22 Pressure Injury Hip Left;Lateral (Active)       Wound 01/22/22 Buttocks Left (Active)   Wound Image   07/22/22 1110   Wound Description Beefy red;Fragile 07/22/22 1110   Allie-wound Assessment Brown;Fragile 07/22/22 1110   Drainage Amount None 07/22/22 1110   Treatments Cleansed;Elevated 07/22/22 1110   Dressing Foam, Silicon (eg  Allevyn, etc) 07/22/22 1110   Dressing Status Clean;Dry; Intact 07/22/22 1110       Wound 01/24/22 Buttocks N/A (Active)       Wound 07/22/22 Venous Ulcer Pretibial Left (Active)   Wound Image   07/22/22 1206   Wound Description Beefy red 07/22/22 1209   Allie-wound Assessment Yellow-brown; Intact 07/22/22 1209   Wound Length (cm) 17 5 cm 07/22/22 1209   Wound Width (cm) 9 5 cm 07/22/22 1209   Wound Depth (cm) 0 1 cm 07/22/22 1209   Wound Surface Area (cm^2) 166 25 cm^2 07/22/22 1209   Wound Volume (cm^3) 16 625 cm^3 07/22/22 1209   Calculated Wound Volume (cm^3) 16 63 cm^3 07/22/22 1209   Undermining 0 07/22/22 1209   Drainage Amount Moderate 07/22/22 1209   Drainage Description Serosanguineous 07/22/22 1209   Non-staged Wound Description Partial thickness 07/22/22 1209   Treatments Cleansed;Site care 07/22/22 1818   Dressing ABD;Xeroform;Gauze 07/22/22 1818   Wound packed? No 07/22/22 1209   Dressing Changed Changed 07/22/22 1818   Patient Tolerance Tolerated well 07/22/22 1818   Dressing Status Clean;Dry; Intact 07/22/22 1818       Wound 07/22/22 Tibial Left;Posterior (Active)   Wound Image   07/22/22 1208   Wound Description Beefy red 07/22/22 1208   Allie-wound Assessment Intact; Yellow-brown 07/22/22 1208   Wound Length (cm) 5 cm 07/22/22 1208   Wound Width (cm) 3 cm 07/22/22 1208   Wound Depth (cm) 0 1 cm 07/22/22 1208   Wound Surface Area (cm^2) 15 cm^2 07/22/22 1208   Wound Volume (cm^3) 1 5 cm^3 07/22/22 1208   Calculated Wound Volume (cm^3) 1 5 cm^3 07/22/22 1208   Undermining 0 07/22/22 1208   Drainage Amount Scant 07/22/22 1208   Drainage Description Serosanguineous 07/22/22 1208   Non-staged Wound Description Partial thickness 07/22/22 1208   Treatments Cleansed;Site care 07/22/22 1818   Dressing ABD;Gauze; Xeroform 07/22/22 1818   Dressing Changed Changed 07/22/22 1818   Patient Tolerance Tolerated well 07/22/22 1818   Dressing Status Clean;Dry; Intact 07/22/22 1818       Wound 07/22/22 Venous Ulcer Pretibial Right (Active)   Wound Image    07/22/22 1216   Wound Description Beefy red 07/22/22 1216   Allie-wound Assessment Intact; Yellow-brown 07/22/22 1216   Wound Length (cm) 14 5 cm 07/22/22 1216   Wound Width (cm) 19 cm 07/22/22 1216   Wound Depth (cm) 0 1 cm 07/22/22 1216   Wound Surface Area (cm^2) 275 5 cm^2 07/22/22 1216   Wound Volume (cm^3) 27 55 cm^3 07/22/22 1216   Calculated Wound Volume (cm^3) 27 55 cm^3 07/22/22 1216   Undermining 0 07/22/22 1216   Drainage Amount Small 07/22/22 1216   Drainage Description Serosanguineous 07/22/22 1216   Non-staged Wound Description Partial thickness 07/22/22 1216   Treatments Cleansed;Site care 07/22/22 1818   Dressing ABD;Gauze; Xeroform 07/22/22 1818   Dressing Changed Changed 07/22/22 1818   Patient Tolerance Tolerated well 07/22/22 1818   Dressing Status Clean;Dry; Intact 07/22/22 1818       Wound 07/22/22 Pressure Injury Thigh Anterior;Left;Proximal (Active)   Wound Image   07/22/22 1228   Wound Description Beefy red;Granulation tissue 07/22/22 1226   Pressure Injury Stage 3 07/22/22 1226   Allie-wound Assessment Intact; Hyperpigmented 07/22/22 1226   Wound Length (cm) 1 4 cm 07/22/22 1226   Wound Width (cm) 1 8 cm 07/22/22 1226   Wound Depth (cm) 0 7 cm 07/22/22 1226   Wound Surface Area (cm^2) 2 52 cm^2 07/22/22 1226   Wound Volume (cm^3) 1 764 cm^3 07/22/22 1226   Calculated Wound Volume (cm^3) 1 76 cm^3 07/22/22 1226   Tunneling 5 9 cm 07/22/22 1226   Tunneling in depth located at 1200 07/22/22 1226   Drainage Amount Small 07/22/22 1226   Drainage Description Yellow 07/22/22 1226   Treatments Cleansed 07/22/22 1226   Dressing Foam, Silicon (eg  Allevyn, etc); Packings; Other (Comment) 07/22/22 1226   Wound packed?  Yes 07/22/22 1226   Packing- # removed 0 07/22/22 1226   Packing- # inserted 1 07/22/22 1226   Dressing Changed Changed 07/22/22 1226   Patient Tolerance Tolerated well 07/22/22 1226       Wound 07/22/22 Pressure Injury Coccyx (Active) Wound Image    07/22/22 1233   Wound Description Beefy red;Light purple;Granulation tissue 07/22/22 1233   Pressure Injury Stage 3 07/22/22 1233   Allie-wound Assessment Intact; Hyperpigmented 07/22/22 1233   Wound Length (cm) 2 7 cm 07/22/22 1233   Wound Width (cm) 1 cm 07/22/22 1233   Wound Depth (cm) 1 3 cm 07/22/22 1233   Wound Surface Area (cm^2) 2 7 cm^2 07/22/22 1233   Wound Volume (cm^3) 3 51 cm^3 07/22/22 1233   Calculated Wound Volume (cm^3) 3 51 cm^3 07/22/22 1233   Tunneling 0 cm 07/22/22 1233   Undermining 1 9 07/22/22 1233   Undermining is depth extending from 6-1 07/22/22 1233   Drainage Amount Small 07/22/22 1233   Drainage Description Yellow 07/22/22 1233   Treatments Cleansed 07/22/22 1233   Dressing Other (Comment) 07/22/22 1233   Wound packed?  Yes 07/22/22 1233   Packing- # removed 0 07/22/22 1233   Packing- # inserted 1 07/22/22 1233   Dressing Changed Changed 07/22/22 1233   Patient Tolerance Tolerated well 07/22/22 1233     Reviewed plan of care with primary RN [facility]  Recommendations written as orders  Wound care team to follow weekly while admitted  Questions or concerns [address] [facility] Nurse    Hillary JOYCEN, RN, [facility]

## 2022-07-23 PROBLEM — B37.0 ORAL THRUSH: Status: ACTIVE | Noted: 2022-01-01

## 2022-07-23 LAB
ANION GAP SERPL CALCULATED.3IONS-SCNC: 3 MMOL/L (ref 4–13)
BUN SERPL-MCNC: 18 MG/DL (ref 5–25)
CALCIUM SERPL-MCNC: 8.5 MG/DL (ref 8.4–10.2)
CHLORIDE SERPL-SCNC: 92 MMOL/L (ref 96–108)
CO2 SERPL-SCNC: 40 MMOL/L (ref 21–32)
CREAT SERPL-MCNC: 0.71 MG/DL (ref 0.6–1.3)
ERYTHROCYTE [DISTWIDTH] IN BLOOD BY AUTOMATED COUNT: 20.4 % (ref 11.6–15.1)
GFR SERPL CREATININE-BSD FRML MDRD: 84 ML/MIN/1.73SQ M
GLUCOSE SERPL-MCNC: 96 MG/DL (ref 65–140)
HCT VFR BLD AUTO: 26 % (ref 34.8–46.1)
HGB BLD-MCNC: 7.4 G/DL (ref 11.5–15.4)
MCH RBC QN AUTO: 28.2 PG (ref 26.8–34.3)
MCHC RBC AUTO-ENTMCNC: 28.5 G/DL (ref 31.4–37.4)
MCV RBC AUTO: 99 FL (ref 82–98)
PLATELET # BLD AUTO: 190 THOUSANDS/UL (ref 149–390)
PMV BLD AUTO: 9.6 FL (ref 8.9–12.7)
POTASSIUM SERPL-SCNC: 3.4 MMOL/L (ref 3.5–5.3)
PROCALCITONIN SERPL-MCNC: 0.31 NG/ML
RBC # BLD AUTO: 2.62 MILLION/UL (ref 3.81–5.12)
SODIUM SERPL-SCNC: 135 MMOL/L (ref 135–147)
WBC # BLD AUTO: 10.37 THOUSAND/UL (ref 4.31–10.16)

## 2022-07-23 PROCEDURE — 84145 PROCALCITONIN (PCT): CPT | Performed by: INTERNAL MEDICINE

## 2022-07-23 PROCEDURE — 99232 SBSQ HOSP IP/OBS MODERATE 35: CPT | Performed by: STUDENT IN AN ORGANIZED HEALTH CARE EDUCATION/TRAINING PROGRAM

## 2022-07-23 PROCEDURE — 99233 SBSQ HOSP IP/OBS HIGH 50: CPT | Performed by: INTERNAL MEDICINE

## 2022-07-23 PROCEDURE — 85027 COMPLETE CBC AUTOMATED: CPT | Performed by: INTERNAL MEDICINE

## 2022-07-23 PROCEDURE — 80048 BASIC METABOLIC PNL TOTAL CA: CPT | Performed by: INTERNAL MEDICINE

## 2022-07-23 RX ORDER — POTASSIUM CHLORIDE 14.9 MG/ML
20 INJECTION INTRAVENOUS
Status: COMPLETED | OUTPATIENT
Start: 2022-07-23 | End: 2022-07-23

## 2022-07-23 RX ORDER — MINERAL OIL AND WHITE PETROLATUM 30; 940 MG/G; MG/G
1 OINTMENT OPHTHALMIC
Status: DISCONTINUED | OUTPATIENT
Start: 2022-07-23 | End: 2022-08-02 | Stop reason: HOSPADM

## 2022-07-23 RX ORDER — MINERAL OIL AND WHITE PETROLATUM 150; 830 MG/G; MG/G
1 OINTMENT OPHTHALMIC
Status: DISCONTINUED | OUTPATIENT
Start: 2022-07-23 | End: 2022-08-02 | Stop reason: HOSPADM

## 2022-07-23 RX ADMIN — GENTAMICIN SULFATE: 1 OINTMENT TOPICAL at 09:01

## 2022-07-23 RX ADMIN — POTASSIUM CHLORIDE 20 MEQ: 14.9 INJECTION, SOLUTION INTRAVENOUS at 08:43

## 2022-07-23 RX ADMIN — GABAPENTIN 600 MG: 600 TABLET, FILM COATED ORAL at 21:53

## 2022-07-23 RX ADMIN — MORPHINE SULFATE 30 MG: 30 TABLET, EXTENDED RELEASE ORAL at 21:53

## 2022-07-23 RX ADMIN — SODIUM CHLORIDE 100 ML/HR: 0.9 INJECTION, SOLUTION INTRAVENOUS at 17:28

## 2022-07-23 RX ADMIN — FLUCONAZOLE 200 MG: 100 TABLET ORAL at 08:56

## 2022-07-23 RX ADMIN — SODIUM CHLORIDE 75 ML/HR: 0.9 INJECTION, SOLUTION INTRAVENOUS at 04:11

## 2022-07-23 RX ADMIN — POTASSIUM CHLORIDE 20 MEQ: 14.9 INJECTION, SOLUTION INTRAVENOUS at 11:38

## 2022-07-23 RX ADMIN — GABAPENTIN 600 MG: 600 TABLET, FILM COATED ORAL at 16:06

## 2022-07-23 RX ADMIN — MORPHINE SULFATE 30 MG: 30 TABLET, EXTENDED RELEASE ORAL at 08:57

## 2022-07-23 RX ADMIN — TIZANIDINE 2 MG: 2 TABLET ORAL at 08:56

## 2022-07-23 RX ADMIN — MINERAL OIL AND WHITE PETROLATUM 1 INCH: 30; 940 OINTMENT OPHTHALMIC at 22:17

## 2022-07-23 RX ADMIN — LIDOCAINE HYDROCHLORIDE 10 ML: 20 SOLUTION ORAL; TOPICAL at 11:41

## 2022-07-23 RX ADMIN — OXYCODONE HYDROCHLORIDE 10 MG: 10 TABLET ORAL at 12:52

## 2022-07-23 RX ADMIN — PANTOPRAZOLE SODIUM 40 MG: 40 TABLET, DELAYED RELEASE ORAL at 05:23

## 2022-07-23 RX ADMIN — MINERAL OIL AND PETROLATUM 1 APPLICATION: 150; 830 OINTMENT OPHTHALMIC at 09:00

## 2022-07-23 RX ADMIN — LIDOCAINE HYDROCHLORIDE 10 ML: 20 SOLUTION ORAL; TOPICAL at 16:06

## 2022-07-23 RX ADMIN — MINERAL OIL AND WHITE PETROLATUM 1 INCH: 150; 830 OINTMENT OPHTHALMIC at 22:17

## 2022-07-23 RX ADMIN — GABAPENTIN 600 MG: 600 TABLET, FILM COATED ORAL at 08:57

## 2022-07-23 NOTE — ASSESSMENT & PLAN NOTE
· Presented with the complaint of diffuse abdominal pain and diarrhea since 07/17  · Toxic inflammatory colitis due to chemotherapy vs  Ischemic colitis due to hypotension   · Diarrhea noted to be improved this AM  · CT abd/pelvis (7/21): Slight enlargement of the patient's known uterine mass with decreased size of the previously measured left periaortic lymph node  Thickening of the sigmoid colon wall suggesting colitis  Diverticulosis  Moderate volume stool throughout the colon     · Stool cultures including C diff pending negative  · Monitor off Abx  · Further supportive care with IVF and Imodium   · Appreciate GI recs

## 2022-07-23 NOTE — ASSESSMENT & PLAN NOTE
· Patient has chronic lower extremity edema with venous stasis dermatitis/wounds  · Intermittently on Lasix and was recently treated for possible cellulitis with Keflex  · Was following outpatient with [facility] wound [facility]; daughter notes all of her mothers wounds appear better   · Continue local wound care  · No surgical intervention recommended by Podiatry or General surgery

## 2022-07-23 NOTE — ASSESSMENT & PLAN NOTE
· Requiring 4L NC and does not use supplemental oxygen at baseline  · CT C w contrast (7/22): Patchy opacity in the both lungs with mild bilateral basilar consolidation left greater than right suggest infiltrate/aspiration   Follow-up suggested in 6-8 weeks to demonstrate resolution  Previously noted the lung nodules in the right upper lobe, right lower lobe and left lower lobe remains stable  Mass in the lower left neck extending into the left axilla, as seen on the previous studies   Cirrhotic liver   · Monitor off abx  · Wean oxygen as tolerated

## 2022-07-23 NOTE — PROGRESS NOTES
Progress Note- Kody Harrington 67 y o  female MRN: [id number]    Unit/Bed#: -01 Encounter: [id number]      Assessment and Plan:    50-year-old female with metastatic endometrial cancer, CVA on Plavix, RA, hepatitis-C who presented on 07/21 with abdominal pain and diarrhea, found to have sigmoid colitis and acute respiratory failure  Diarrhea has now resolved  Endorsing oral discomfort, reflux, and abdominal pain   -continue pantoprazole 40 mg daily, Magic mouthwash, and 14 days of fluconazole  -does not need antibiotics for colitis  Antibiotics as per Medicine for pneumonia  -if patient's abdominal pain gets worse, could consider therapeutic paracentesis  Even though patient is not massively distended, malignant ascites tends to be less well tolerated than cirrhotic ascites    GI will sign off  Please call with any questions or concerns    ______________________________________________________________________    Subjective:     Doing okay  Having some abdominal pain and a lot of burping    Mouth is very sore but magic mouthwash helps    Medication Administration - last 24 hours from 07/22/2022 1638 to 07/23/2022 1638       Date/Time Order Dose Route Action Action by     07/22/2022 1756 metroNIDAZOLE (FLAGYL) IVPB (premix) 500 mg 100 mL 500 mg Intravenous New Bag Carol Tiwari RN     07/23/2022 1606 al mag oxide-diphenhydramine-lidocaine viscous (MAGIC MOUTHWASH) suspension 10 mL 10 mL Swish & Swallow Given Marion Quezada RN     07/23/2022 1141 al mag oxide-diphenhydramine-lidocaine viscous (MAGIC MOUTHWASH) suspension 10 mL 10 mL Swish & Swallow Given Nicholas Gomez LPN     17/73/7798 1164 gabapentin (NEURONTIN) tablet 600 mg 600 mg Oral Given Dana Mina RN     07/23/2022 0857 gabapentin (NEURONTIN) tablet 600 mg 600 mg Oral Given Nicholas Gomez LPN     44/01/4757 8492 gabapentin (NEURONTIN) tablet 600 mg 600 mg Oral Given Christian Geller RN     07/22/2022 8357 gabapentin (NEURONTIN) tablet 600 mg 600 mg Oral Given Giana Begum, DEZ     07/23/2022 0901 gentamicin (GARAMYCIN) 0 1 % topical ointment   Topical Given Chase Jordan, ARTURN     93/14/1426 4785 morphine (MS CONTIN) ER tablet 30 mg 30 mg Oral Given Nicholas Gomez, KAYLA     63/74/5404 5582 morphine (MS CONTIN) ER tablet 30 mg 30 mg Oral Given Parish Forrester, DEZ     07/23/2022 0900 artificial tear (LUBRIFRESH P M ) ophthalmic ointment 1 application 1 application Both Eyes Given Nicholas Gomez LPN     12/68/9469 9439 tiZANidine (ZANAFLEX) tablet 2 mg 2 mg Oral Given Nicholas Gomez LPN     42/54/4355 6619 oxyCODONE (ROXICODONE) immediate release tablet 10 mg 10 mg Oral Given Nelda Donahue, DEZ     07/22/2022 1750 loperamide (IMODIUM) capsule 2 mg 2 mg Oral Given Giana Begum, DEZ     07/23/2022 0911 sodium chloride 0 9 % infusion 100 mL/hr Intravenous Rate/Dose Change Dana Mina, DEZ     07/23/2022 0411 sodium chloride 0 9 % infusion 75 mL/hr Intravenous [address]     07/23/2022 0856 fluconazole (DIFLUCAN) tablet 200 mg 200 mg Oral Given Nicholas Gomez LPN     70/29/7089 2056 pantoprazole (PROTONIX) EC tablet 40 mg 40 mg Oral Given Parish Forrester, DEZ     07/23/2022 1138 potassium chloride 20 mEq IVPB (premix) 20 mEq Intravenous [employer], LPN     59/67/5891 9557 potassium chloride 20 mEq IVPB (premix) 20 mEq Intravenous New Bag Nicholas Gomez LPN          Objective:     Vitals: Blood pressure 94/54, pulse 74, temperature 98 3 °F (36 8 °C), resp  rate 18, height 4' 11" (1 499 m), weight 65 4 kg (144 lb 2 9 oz), SpO2 98 %  ,Body mass index is 29 12 kg/m²        Intake/Output Summary (Last 24 hours) at 7/23/2022 1638  Last data filed at 7/23/2022 1200  Gross per 24 hour   Intake 1720 ml   Output 525 ml   Net 1195 ml       Physical Exam:   General Appearance: Awake and alert, in no acute distress appears chronically ill  Abdomen: Soft, mild distention, mild diffuse tenderness; bowel sounds normal; no masses or no organomegaly    Invasive Devices Report    Central Venous Catheter Line  Duration           Port A Cath 05/23/22 Right Subclavian 61 days          Peripheral Intravenous Line  Duration           Peripheral IV 07/21/22 Right Antecubital 2 days    Peripheral IV 07/22/22 Left;Ventral (anterior) Forearm 1 day                Lab Results:  Admission on 07/21/2022   Component Date Value    WBC 07/21/2022 4 22 (A)    RBC 07/21/2022 3 19 (A)    Hemoglobin 07/21/2022 9 1 (A)    Hematocrit 07/21/2022 30 2 (A)    MCV 07/21/2022 95     MCH 07/21/2022 28 5     MCHC 07/21/2022 30 1 (A)    RDW 07/21/2022 20 5 (A)    MPV 07/21/2022 9 8     Platelets 86/86/0563 218     Sodium 07/21/2022 132 (A)    Potassium 07/21/2022 2 9 (A)    Chloride 07/21/2022 79 (A)    CO2 07/21/2022 >45 (A)    ANION GAP 07/21/2022      BUN 07/21/2022 31 (A)    Creatinine 07/21/2022 1 14     Glucose 07/21/2022 106     Calcium 07/21/2022 9 2     Corrected Calcium 07/21/2022 9 8     AST 07/21/2022 32     ALT 07/21/2022 14     Alkaline Phosphatase 07/21/2022 129 (A)    Total Protein 07/21/2022 6 5     Albumin 07/21/2022 3 2 (A)    Total Bilirubin 07/21/2022 1 21 (A)    eGFR 07/21/2022 47     LACTIC ACID 07/21/2022 0 9     Procalcitonin 07/21/2022 0 47 (A)    Protime 07/21/2022 13 5     INR 07/21/2022 1 03     PTT 07/21/2022 31     Blood Culture 07/21/2022 No Growth at 48 hrs   Blood Culture 07/21/2022 No Growth at 48 hrs       Color, UA 07/21/2022 Yellow     Clarity, UA 07/21/2022 Clear     Specific Gravity, UA 07/21/2022 1 010     pH, UA 07/21/2022 6 0     Leukocytes, UA 07/21/2022 Negative     Nitrite, UA 07/21/2022 Negative     Protein, UA 07/21/2022 Trace (A)    Glucose, UA 07/21/2022 Negative     Ketones, UA 07/21/2022 Negative     Urobilinogen, UA 07/21/2022 0 2     Bilirubin, UA 07/21/2022 Negative     Occult Blood, UA 07/21/2022 Negative     SARS-CoV-2 07/21/2022 Negative     INFLUENZA A PCR 07/21/2022 Negative     INFLUENZA B PCR 07/21/2022 Negative     RSV PCR 07/21/2022 Negative     hs TnI 0hr 07/21/2022 54 (A)    Total CK 07/21/2022 145     Uric Acid 07/21/2022 11 1 (A)    POC Glucose 07/21/2022 117     Segmented % 07/21/2022 39 (A)    Bands % 07/21/2022 7     Lymphocytes % 07/21/2022 23     Monocytes % 07/21/2022 29 (A)    Eosinophils, % 07/21/2022 0     Basophils % 07/21/2022 1     Metamyelocytes% 07/21/2022 1     Absolute Neutrophils 07/21/2022 1 94     Lymphocytes Absolute 07/21/2022 0 97     Monocytes Absolute 07/21/2022 1 22     Eosinophils Absolute 07/21/2022 0 00     Basophils Absolute 07/21/2022 0 04     RBC Morphology 07/21/2022 Present     Anisocytosis 07/21/2022 Present     Macrocytes 07/21/2022 Present     Ovalocytes 07/21/2022 Present     Platelet Estimate 40/31/4207 Adequate     CK-MB Index 07/21/2022 1 2     CK-MB 07/21/2022 1 7     hs TnI 2hr 07/21/2022 58 (A)    Delta 2hr hsTnI 07/21/2022 4     pH, Sushil 07/21/2022 7 472 (A)    pCO2, Sushil 07/21/2022 60 6 (A)    pO2, Sushil 07/21/2022 31 0 (A)    HCO3, Sushil 07/21/2022 43 3 (A)    Base Excess, Sushil 07/21/2022 17 4     O2 Content, Sushil 07/21/2022 8 0     O2 HGB, VENOUS 07/21/2022 60 0     RBC, UA 07/21/2022 0-1     WBC, UA 07/21/2022 1-2     Epithelial Cells 07/21/2022 Occasional     Bacteria, UA 07/21/2022 Occasional     MUCUS THREADS 07/21/2022 Occasional (A)    hs TnI 4hr 07/21/2022 54 (A)    Delta 4hr hsTnI 07/21/2022 0     Magnesium 07/21/2022 1 9     Salmonella sp PCR 07/21/2022 None Detected     Shigella sp/Enteroinvasi* 07/21/2022 None Detected     Campylobacter sp (jejuni* 07/21/2022 None Detected     Shiga toxin 1/Shiga toxi* 07/21/2022 None Detected     Sodium 07/21/2022 133 (A)    Potassium 07/21/2022 2 8 (A)    Chloride 07/21/2022 86 (A)    CO2 07/21/2022 43 (A)    ANION GAP 07/21/2022 4     BUN 07/21/2022 28 (A)    Creatinine 07/21/2022 0 85     Glucose 07/21/2022 82     Calcium 07/21/2022 8 0 (A)    eGFR 07/21/2022 68     Ventricular Rate 07/21/2022 75     Atrial Rate 07/21/2022 75     IL Interval 07/21/2022 134     QRSD Interval 07/21/2022 84     QT Interval 07/21/2022 402     QTC Interval 07/21/2022 448     P Axis 07/21/2022 37     QRS Axis 07/21/2022 57     T Wave Axis 07/21/2022 63     Sodium 07/22/2022 134 (A)    Potassium 07/22/2022 3 1 (A)    Chloride 07/22/2022 88 (A)    CO2 07/22/2022 41 (A)    ANION GAP 07/22/2022 5     BUN 07/22/2022 23     Creatinine 07/22/2022 0 86     Glucose 07/22/2022 80     Calcium 07/22/2022 8 4     eGFR 07/22/2022 67     Magnesium 07/22/2022 1 9     WBC 07/22/2022 5 74     RBC 07/22/2022 2 94 (A)    Hemoglobin 07/22/2022 8 3 (A)    Hematocrit 07/22/2022 28 8 (A)    MCV 07/22/2022 98     MCH 07/22/2022 28 2     MCHC 07/22/2022 28 8 (A)    RDW 07/22/2022 20 6 (A)    MPV 07/22/2022 10 3     Platelets 20/90/1158 178     WBC 07/23/2022 10 37 (A)    RBC 07/23/2022 2 62 (A)    Hemoglobin 07/23/2022 7 4 (A)    Hematocrit 07/23/2022 26 0 (A)    MCV 07/23/2022 99 (A)    MCH 07/23/2022 28 2     MCHC 07/23/2022 28 5 (A)    RDW 07/23/2022 20 4 (A)    Platelets 48/04/2622 190     MPV 07/23/2022 9 6     Sodium 07/23/2022 135     Potassium 07/23/2022 3 4 (A)    Chloride 07/23/2022 92 (A)    CO2 07/23/2022 40 (A)    ANION GAP 07/23/2022 3 (A)    BUN 07/23/2022 18     Creatinine 07/23/2022 0 71     Glucose 07/23/2022 96     Calcium 07/23/2022 8 5     eGFR 07/23/2022 84     Procalcitonin 07/23/2022 0 31 (A)       Imaging Studies: I have personally reviewed pertinent imaging studies

## 2022-07-23 NOTE — ASSESSMENT & PLAN NOTE
· Present on arrival  · Continue home gentamicin ointment  · Local wound care  · No surgical intervention recommended by Podiatry or General surgery

## 2022-07-23 NOTE — ASSESSMENT & PLAN NOTE
· Oral thrush and likely esophagitis  · Continue Magic mouthwash  · Protonix 40 mg daily initiated by GI  · Continue Diflucan 200 mg daily to complete 14 day as

## 2022-07-23 NOTE — PROGRESS NOTES
[facility]  Progress Note - Bonney Landau 1950, 67 y o  female MRN: [id number]  Unit/Bed#: -01 Encounter: [id number]  Primary Care Provider: Cong Nj MD   Date and time admitted to hospital: 7/21/2022  7:59 AM    * Colitis  Assessment & Plan  · Presented with the complaint of diffuse abdominal pain and diarrhea since 07/17  · Toxic inflammatory colitis due to chemotherapy vs  Ischemic colitis due to hypotension   · Diarrhea noted to be improved this AM  · CT abd/pelvis (7/21): Slight enlargement of the patient's known uterine mass with decreased size of the previously measured left periaortic lymph node  Thickening of the sigmoid colon wall suggesting colitis  Diverticulosis  Moderate volume stool throughout the colon  · Stool cultures including C diff pending negative  · Monitor off Abx  · Further supportive care with IVF and Imodium   · Appreciate GI recs      Acute respiratory failure with hypoxia (HCC)  Assessment & Plan  · Requiring 4L NC and does not use supplemental oxygen at baseline  · CT C w contrast (7/22): Patchy opacity in the both lungs with mild bilateral basilar consolidation left greater than right suggest infiltrate/aspiration   Follow-up suggested in 6-8 weeks to demonstrate resolution  Previously noted the lung nodules in the right upper lobe, right lower lobe and left lower lobe remains stable  Mass in the lower left neck extending into the left axilla, as seen on the previous studies   Cirrhotic liver   · Monitor off abx  · Wean oxygen as tolerated   · May need to evaluate for home oxygen given metastatic disease with pulmonary nodules    Hypokalemia  Assessment & Plan  · Likely due to diarrhea  · K: 3 4  · Continue to replete and recheck as needed     Oral thrush  Assessment & Plan  · Oral thrush and likely esophagitis  · Continue Magic mouthwash  · Protonix 40 mg daily initiated by GI  · Continue Diflucan 200 mg daily to complete 14 day as    History of stroke  Assessment & Plan  · Hold home Plavix due to drop in hemoglobin  · Continue Lipitor 40 mg daily    Chronic pain syndrome  Assessment & Plan  · Continue home morphine 30 mg twice daily and oxycodone 5 mg twice daily  · Continue home trazodone 50 mg q h s  p r n  · Continue home Zanaflex 2 mg daily    Endometrial cancer [facility])  Assessment & Plan  · Stage IV endometrial cancer on palliative chemotherapy  · With associated drug-induced neutropenia, anemia, and neuropathy  · Retroperitoneal and supraclavicular lymphadenopathy with pulmonary and inguinal metastasis  · Last treatment with carboplatin and Taxotere on 07/14  · Continue home gabapentin 600 mg 3 times daily    Chronic venous stasis dermatitis of bilateral lower extremities  Assessment & Plan  · Patient has chronic lower extremity edema with venous stasis dermatitis/wounds  · Intermittently on Lasix and was recently treated for possible cellulitis with Keflex  · Was following outpatient with [facility] wound [facility]; daughter notes all of her mothers wounds appear better   · Continue local wound care  · No surgical intervention recommended by Podiatry or General surgery    Decubitus ulcer of ischial area, left, stage III ([address] )  Assessment & Plan  · Present on arrival  · Continue home gentamicin ointment  · Local wound care  · No surgical intervention recommended by Podiatry or General surgery      VTE Pharmacologic Prophylaxis: VTE Score: 8 High Risk (Score >/= 5) - Pharmacological DVT Prophylaxis Contraindicated  Sequential Compression Devices Ordered  Patient Centered Rounds: I performed bedside rounds with nursing staff today  Discussions with Specialists or Other Care Team Provider: Nursing and CM    Education and Discussions with Family / Patient: Updated  (daughter) at bedside  Time Spent for Care: 45 minutes  More than 50% of total time spent on counseling and coordination of care as described above      Current Length of Stay: 2 day(s)  Current Patient Status: Inpatient   Certification Statement: The patient will continue to require additional inpatient hospital stay due to ischemic/inflammatory colitis requiring IVF resuscitation and electrolyte replacement  Discharge Plan: Anticipate discharge in 24-48 hrs to home with home services  Code Status: Level 1 - Full Code    Subjective:   Patient resting comfortably in bed  She notes improvement in her diarrhea this morning  She is continuing to have lower extremity pain particularly after dressing change yesterday afternoon  No significant overnight events reported by nursing  Objective:     Vitals:   Temp (24hrs), Av °F (37 2 °C), Min:97 8 °F (36 6 °C), Max:99 9 °F (37 7 °C)    Temp:  [97 8 °F (36 6 °C)-99 9 °F (37 7 °C)] 97 8 °F (36 6 °C)  HR:  [70-86] 70  Resp:  [18-20] 20  BP: ()/(42-51) 90/44  SpO2:  [94 %-98 %] 96 %  Body mass index is 29 12 kg/m²  Input and Output Summary (last 24 hours): Intake/Output Summary (Last 24 hours) at 2022 1222  Last data filed at 2022 0501  Gross per 24 hour   Intake 1470 ml   Output 1025 ml   Net 445 ml       Physical Exam:   Physical Exam  Vitals and nursing note reviewed  Constitutional:       General: She is not in acute distress  Appearance: She is well-developed  She is ill-appearing  HENT:      Head: Normocephalic and atraumatic  Mouth/Throat:      Mouth: Mucous membranes are dry  Pharynx: Oropharynx is clear  Eyes:      Conjunctiva/sclera: Conjunctivae normal    Cardiovascular:      Rate and Rhythm: Normal rate and regular rhythm  Heart sounds: Murmur heard  Pulmonary:      Effort: Pulmonary effort is normal  No respiratory distress  Breath sounds: Normal breath sounds  Comments: 4L NC  Abdominal:      General: Bowel sounds are normal  There is distension  Palpations: Abdomen is soft  Tenderness: There is no abdominal tenderness     Musculoskeletal: Cervical back: Normal range of motion and neck supple  Right lower leg: Edema present  Left lower leg: Edema present  Skin:     General: Skin is warm and dry  Comments: Chronic sacral and B/L LE wounds appear well healing  Neurological:      General: No focal deficit present  Mental Status: She is alert and oriented to person, place, and time  Mental status is at baseline  Psychiatric:         Mood and Affect: Mood normal          Behavior: Behavior normal          Judgment: Judgment normal           Labs:  Results from last 7 days   Lab Units 07/23/22  0453 07/22/22  0626 07/21/22  0811   WBC Thousand/uL 10 37*   < > 4 22*   HEMOGLOBIN g/dL 7 4*   < > 9 1*   HEMATOCRIT % 26 0*   < > 30 2*   PLATELETS Thousands/uL 190   < > 218   BANDS PCT %  --   --  7   LYMPHO PCT %  --   --  23   MONO PCT %  --   --  29*   EOS PCT %  --   --  0    < > = values in this interval not displayed  Results from last 7 days   Lab Units 07/23/22  0453 07/21/22  1614 07/21/22  0811   SODIUM mmol/L 135   < > 132*   POTASSIUM mmol/L 3 4*   < > 2 9*   CHLORIDE mmol/L 92*   < > 79*   CO2 mmol/L 40*   < > >45*   BUN mg/dL 18   < > 31*   CREATININE mg/dL 0 71   < > 1 14   ANION GAP mmol/L 3*   < >  --    CALCIUM mg/dL 8 5   < > 9 2   ALBUMIN g/dL  --   --  3 2*   TOTAL BILIRUBIN mg/dL  --   --  1 21*   ALK PHOS U/L  --   --  129*   ALT U/L  --   --  14   AST U/L  --   --  32   GLUCOSE RANDOM mg/dL 96   < > 106    < > = values in this interval not displayed       Results from last 7 days   Lab Units 07/21/22  0811   INR  1 03     Results from last 7 days   Lab Units 07/21/22  0818   POC GLUCOSE mg/dl 117         Results from last 7 days   Lab Units 07/23/22  0453 07/21/22  0811   LACTIC ACID mmol/L  --  0 9   PROCALCITONIN ng/ml 0 31* 0 47*       Lines/Drains:  Invasive Devices  Report    Central Venous Catheter Line  Duration           Port A Cath 05/23/22 Right Subclavian 61 days          Peripheral Intravenous Line  Duration           Peripheral IV 07/21/22 Right Antecubital 2 days    Peripheral IV 07/22/22 Left;Ventral (anterior) Forearm 1 day                Imaging:  No new imaging  Recent Cultures (last 7 days):   Results from last 7 days   Lab Units 07/21/22  0811 07/21/22  0800   BLOOD CULTURE  No Growth at 48 hrs  No Growth at 48 hrs  --    C DIFF TOXIN B BY PCR   --  Negative       Last 24 Hours Medication List:   Current Facility-Administered Medications   Medication Dose Route Frequency Provider Last Rate    acetaminophen  650 mg Oral Q6H PRN [facility], DO      al mag oxide-diphenhydramine-lidocaine viscous  10 mL Swish & Swallow Q4H PRN [facility], DO      artificial tear  1 application Both Eyes Daily [facility], [place]      fluconazole  200 mg Oral Daily [address] [facility] [address] [place], [place]      gabapentin  600 mg Oral TID [facility], DO      gentamicin   Topical Daily [place], [place]      loperamide  2 mg Oral 4x Daily PRN [facility], DO      morphine  30 mg Oral Q12H [facility], DO      ondansetron  4 mg Intravenous Q4H PRN [facility], DO      oxyCODONE  10 mg Oral TID PRN Star Mckeon PA-C      pantoprazole  40 mg Oral Early Morning [facility] [address] [place], [place]      potassium chloride  20 mEq Intravenous Q2H [facility], DO 20 mEq (07/23/22 1138)    sodium chloride  100 mL/hr Intravenous Continuous [facility],  mL/hr (07/23/22 0911)    tiZANidine  2 mg Oral Daily [facility], DO      traZODone  50 mg Oral HS PRN [facility], DO          Today, Patient Was Seen By: Rylee Powers DO    **Please Note: This note may have been constructed using a voice recognition system  **

## 2022-07-24 LAB
ANION GAP SERPL CALCULATED.3IONS-SCNC: 2 MMOL/L (ref 4–13)
BUN SERPL-MCNC: 16 MG/DL (ref 5–25)
CALCIUM SERPL-MCNC: 8.5 MG/DL (ref 8.4–10.2)
CHLORIDE SERPL-SCNC: 93 MMOL/L (ref 96–108)
CO2 SERPL-SCNC: 39 MMOL/L (ref 21–32)
CREAT SERPL-MCNC: 0.67 MG/DL (ref 0.6–1.3)
ERYTHROCYTE [DISTWIDTH] IN BLOOD BY AUTOMATED COUNT: 20.4 % (ref 11.6–15.1)
GFR SERPL CREATININE-BSD FRML MDRD: 87 ML/MIN/1.73SQ M
GLUCOSE SERPL-MCNC: 107 MG/DL (ref 65–140)
HCT VFR BLD AUTO: 28.5 % (ref 34.8–46.1)
HGB BLD-MCNC: 8.1 G/DL (ref 11.5–15.4)
MCH RBC QN AUTO: 28.5 PG (ref 26.8–34.3)
MCHC RBC AUTO-ENTMCNC: 28.4 G/DL (ref 31.4–37.4)
MCV RBC AUTO: 100 FL (ref 82–98)
PLATELET # BLD AUTO: 191 THOUSANDS/UL (ref 149–390)
PMV BLD AUTO: 9.5 FL (ref 8.9–12.7)
POTASSIUM SERPL-SCNC: 3.9 MMOL/L (ref 3.5–5.3)
PROCALCITONIN SERPL-MCNC: 0.22 NG/ML
RBC # BLD AUTO: 2.84 MILLION/UL (ref 3.81–5.12)
SODIUM SERPL-SCNC: 134 MMOL/L (ref 135–147)
WBC # BLD AUTO: 12.38 THOUSAND/UL (ref 4.31–10.16)

## 2022-07-24 PROCEDURE — 84145 PROCALCITONIN (PCT): CPT | Performed by: INTERNAL MEDICINE

## 2022-07-24 PROCEDURE — 80048 BASIC METABOLIC PNL TOTAL CA: CPT | Performed by: INTERNAL MEDICINE

## 2022-07-24 PROCEDURE — 85027 COMPLETE CBC AUTOMATED: CPT | Performed by: INTERNAL MEDICINE

## 2022-07-24 PROCEDURE — 99232 SBSQ HOSP IP/OBS MODERATE 35: CPT | Performed by: INTERNAL MEDICINE

## 2022-07-24 PROCEDURE — 97530 THERAPEUTIC ACTIVITIES: CPT

## 2022-07-24 RX ADMIN — GABAPENTIN 600 MG: 600 TABLET, FILM COATED ORAL at 08:48

## 2022-07-24 RX ADMIN — TRAZODONE HYDROCHLORIDE 50 MG: 50 TABLET ORAL at 01:07

## 2022-07-24 RX ADMIN — SODIUM CHLORIDE 100 ML/HR: 0.9 INJECTION, SOLUTION INTRAVENOUS at 03:14

## 2022-07-24 RX ADMIN — MINERAL OIL AND WHITE PETROLATUM 1 INCH: 30; 940 OINTMENT OPHTHALMIC at 22:01

## 2022-07-24 RX ADMIN — GENTAMICIN SULFATE: 1 OINTMENT TOPICAL at 08:49

## 2022-07-24 RX ADMIN — MINERAL OIL AND PETROLATUM 1 APPLICATION: 150; 830 OINTMENT OPHTHALMIC at 08:49

## 2022-07-24 RX ADMIN — MORPHINE SULFATE 30 MG: 30 TABLET, EXTENDED RELEASE ORAL at 20:41

## 2022-07-24 RX ADMIN — MINERAL OIL AND WHITE PETROLATUM 1 INCH: 150; 830 OINTMENT OPHTHALMIC at 22:01

## 2022-07-24 RX ADMIN — GABAPENTIN 600 MG: 600 TABLET, FILM COATED ORAL at 20:48

## 2022-07-24 RX ADMIN — GABAPENTIN 600 MG: 600 TABLET, FILM COATED ORAL at 16:47

## 2022-07-24 RX ADMIN — MORPHINE SULFATE 30 MG: 30 TABLET, EXTENDED RELEASE ORAL at 08:48

## 2022-07-24 RX ADMIN — FLUCONAZOLE 200 MG: 100 TABLET ORAL at 08:48

## 2022-07-24 RX ADMIN — TIZANIDINE 2 MG: 2 TABLET ORAL at 08:49

## 2022-07-24 RX ADMIN — OXYCODONE HYDROCHLORIDE 10 MG: 10 TABLET ORAL at 14:03

## 2022-07-24 NOTE — ASSESSMENT & PLAN NOTE
· Presented with the complaint of diffuse abdominal pain and diarrhea since 07/17; no further diarrhea  · Toxic inflammatory colitis due to chemotherapy vs  Ischemic colitis due to hypotension   · CT abd/pelvis (7/21): Slight enlargement of the patient's known uterine mass with decreased size of the previously measured left periaortic lymph node  Thickening of the sigmoid colon wall suggesting colitis  Diverticulosis  Moderate volume stool throughout the colon     · Stool cultures including C diff pending negative  · Further supportive care with IVF and Imodium   · Appreciate GI recs

## 2022-07-24 NOTE — PHYSICAL THERAPY NOTE
Physical Therapy Treatment Note       07/24/22 1335   PT Last Visit   PT Visit Date 07/24/22   Note Type   Note Type Treatment   Pain Assessment   Pain Assessment Tool 0-10   Pain Score 10 - Worst Possible Pain   Pain Location/Orientation Location: Generalized   Hospital Pain Intervention(s) Repositioned; Ambulation/increased activity; Emotional support  (RN aware of pt's pain report)   Restrictions/Precautions   Weight Bearing Precautions Per Order No   Other Precautions Chair Alarm; Bed Alarm; Fall Risk  (4L O2 NC)   General   Chart Reviewed Yes   Response to Previous Treatment Patient with no complaints from previous session  Family/Caregiver Present No   Cognition   Overall Cognitive Status WFL   Arousal/Participation Alert; Cooperative   Attention Within functional limits   Orientation Level Oriented to person;Oriented to place;Oriented to time   Memory Within functional limits   Following Commands Follows one step commands with increased time or repetition   Comments pt agreeable to PT session   Subjective   Subjective "don't let me fall"   Bed Mobility   Supine to Sit 4  Minimal assistance   Additional items Assist x 1;HOB elevated; Increased time required;Verbal cues;LE management   Transfers   Sit to Stand 4  Minimal assistance   Additional items Assist x 2;Armrests; Increased time required;Verbal cues   Stand to Sit 4  Minimal assistance   Additional items Armrests; Increased time required;Verbal cues; Assist x 1   Additional Comments pt requiring verbal cueing for appropriate hand/foot placement during STS from various surfaces, vc for facilitating upright posture/balance, pt able to tolerate static standing x2-3min duration while NSG assisting pt with inessa care/ hygiene task  Pt requiring frequent vc for RW management, proper technique/ sequencing  Ambulation/Elevation   Gait pattern Forward Flexion;Decreased foot clearance; Step through pattern; Short stride   Gait Assistance 4  Minimal assist   Additional items Assist x 1;Verbal cues; Tactile cues  (A of 2nd for O2 tank management)   Assistive Device Rolling walker   Distance 15' (several standing rest breaks between)   Balance   Static Sitting Fair   Dynamic Sitting Fair   Static Standing Fair -   Dynamic Standing Poor +   Ambulatory Poor +   Endurance Deficit   Endurance Deficit Yes   Endurance Deficit Description vc for breathing technique prn; pt remained on 4L O2 NC t/o session, MAJOR observed post mobility, stable SPO2   Activity Tolerance   Activity Tolerance Patient limited by fatigue;Patient limited by pain   Nurse Made Aware RN Lena Bocanegra present t/o session   Assessment   Prognosis Fair   Problem List Decreased strength;Decreased range of motion;Decreased endurance; Impaired balance;Decreased mobility; Decreased coordination;Obesity;Pain;Decreased skin integrity   Assessment Pt seen for PT treatment session this date, consisting of ther act focused on bed mobility, transfers, household distance gait trial with use of RW, facilitating static standing balance/posture / tolerance during inessa care/ hygiene  Since previous session, pt has made no progress in terms of requiring increased A for all phases of mobility  Balance scores remained the same; slightly improved household distance tolerance, however requiring several standing rest breaks t/o  Pt requiring frequent encouragement for continuing with mobility trials  Pertinent barriers during this session include pain, fatigue  Current goals and POC remain appropriate, pt continues to have rehab potential and is making progress towards STGs  Pt prognosis for achieving goals is fair, pending pt progress with hospitalization/medical status improvements, and indicated by [facility], ability to follow directions and recent onset  Pt limited d/t the presence of intractable pain, fear of pain provocation, fear of falling and self limiting  PT recommends post acute rehabilitation services upon discharge   Pt continues to be functioning below baseline level, and remains limited 2* factors listed above  PT will continue to see pt during current hospitalization in order to address the deficits listed above and provide interventions consistent w/ POC in effort to achieve STGs  Barriers to Discharge Inaccessible home environment;Decreased caregiver support   Barriers to Discharge Comments pt reports dtr lives down the street from her, not there all the time   Goals   Patient Goals to decrease the pain   PT Treatment Day 1   Plan   Treatment/Interventions Functional transfer training;LE strengthening/ROM; Therapeutic exercise; Endurance training;Patient/family training;Equipment eval/education; Bed mobility;Gait training;Spoke to nursing   Progress Slow progress, decreased activity tolerance   PT Frequency 3-5x/wk   Recommendation   PT Discharge Recommendation Post acute rehabilitation services   Equipment Recommended Walker  (RW)   [address] Mobility Inpatient   Turning in Bed Without Bedrails 3   Lying on Back to Sitting on Edge of Flat Bed 3   Moving Bed to Chair 3   Standing Up From Chair 2   Walk in Room 3   Climb 3-5 Stairs 1   Basic Mobility Inpatient Raw Score 15   Basic Mobility Standardized Score 36 97   Highest Level Of Mobility   JH-HLM Goal 4: Move to chair/commode   JH-HLM Achieved 6: Walk 10 steps or more   Education   Education Provided Mobility training;Assistive device   Patient Reinforcement needed   End of Consult   Patient Position at End of Consult Bedside chair; All needs within reach             Guille Mode, PT, DPT

## 2022-07-24 NOTE — ASSESSMENT & PLAN NOTE
· Oral thrush and likely esophagitis  · Continue Magic mouthwash and Protonix 40 mg daily   · Continue Diflucan 200 mg daily to complete 14 day as  · Tolerating dysphagia level 2 diet and thin liquids

## 2022-07-24 NOTE — ASSESSMENT & PLAN NOTE
Patient has chronic lower extremity edema with venous stasis dermatitis/wounds  · Intermittently on Lasix and was recently treated for possible cellulitis with Keflex  · Was following outpatient with [facility] wound [facility]; daughter notes all of her mothers wounds appear better   · Continue local wound care  · No surgical intervention recommended by Podiatry or General surgery

## 2022-07-24 NOTE — PROGRESS NOTES
Bess U  65   Progress Note - Timothy Caldwell 1950, 67 y o  female MRN: [id number]  Unit/Bed#: -01 Encounter: [id number]  Primary Care Provider: Jefferson Padron MD   Date and time admitted to hospital: 7/21/2022  7:59 AM    * Colitis  Assessment & Plan  · Presented with the complaint of diffuse abdominal pain and diarrhea since 07/17; no further diarrhea  · Toxic inflammatory colitis due to chemotherapy vs  Ischemic colitis due to hypotension   · CT abd/pelvis (7/21): Slight enlargement of the patient's known uterine mass with decreased size of the previously measured left periaortic lymph node  Thickening of the sigmoid colon wall suggesting colitis  Diverticulosis  Moderate volume stool throughout the colon  · Stool cultures including C diff pending negative  · Appreciate GI recs      Acute respiratory failure with hypoxia (HCC)  Assessment & Plan  · Requiring 4L NC and does not use supplemental oxygen at baseline  · Afebrile with mild leukocytosis this morning  · CT C w contrast (7/22): Patchy opacity in the both lungs with mild bilateral basilar consolidation left greater than right suggest infiltrate/aspiration   Follow-up suggested in 6-8 weeks to demonstrate resolution  Previously noted the lung nodules in the right upper lobe, right lower lobe and left lower lobe remains stable  Mass in the lower left neck extending into the left axilla, as seen on the previous studies   Cirrhotic liver   · Repeat chest x-ray and procalcitonin in the a m   · Monitor off abx, if leukocytosis continues to increase may consider treatment for aspiration  · Given metastatic pulmonary nodules, may possibly require home oxygen  · Wean oxygen as tolerated     Oral thrush  Assessment & Plan  · Oral thrush and likely esophagitis  · Continue Magic mouthwash and Protonix 40 mg daily   · Continue Diflucan 200 mg daily to complete 14 day as  · Tolerating dysphagia level 2 diet and thin liquids    History of stroke  Assessment & Plan  · Hold home Plavix due to drop in hemoglobin  · Continue Lipitor 40 mg daily    Chronic pain syndrome  Assessment & Plan  · Continue home morphine 30 mg twice daily and oxycodone 5 mg twice daily  · Continue home trazodone 50 mg q h s  p r n  · Continue home Zanaflex 2 mg daily    Endometrial cancer [facility])  Assessment & Plan  · Stage IV endometrial cancer on palliative chemotherapy  · With associated drug-induced neutropenia, anemia, and neuropathy  · Retroperitoneal and supraclavicular lymphadenopathy with pulmonary and inguinal metastasis  · Last treatment with carboplatin and Taxotere on 07/14  · Continue home gabapentin 600 mg 3 times daily    Chronic venous stasis dermatitis of bilateral lower extremities  Assessment & Plan  · Patient has chronic lower extremity edema with venous stasis dermatitis/wounds  · Intermittently on Lasix and was recently treated for possible cellulitis with Keflex  · Was following outpatient with [facility] wound [facility]; daughter notes all of her mothers wounds appear better   · Continue local wound care  · No surgical intervention recommended by Podiatry or General surgery    Decubitus ulcer of ischial area, left, stage III ([address] )  Assessment & Plan  · Present on arrival  · Continue home gentamicin ointment  · Local wound care  · No surgical intervention recommended by Podiatry or General surgery      VTE Pharmacologic Prophylaxis: VTE Score: 8 High Risk (Score >/= 5) - Pharmacological DVT Prophylaxis Contraindicated  Sequential Compression Devices Ordered  Patient Centered Rounds: I performed bedside rounds with nursing staff today  Discussions with Specialists or Other Care Team Provider: Nursing     Education and Discussions with Family / Patient: Attempted to update  (daughter) via phone  Left voicemail  Time Spent for Care: 30 minutes   More than 50% of total time spent on counseling and coordination of care as described above     Current Length of Stay: 3 day(s)  Current Patient Status: Inpatient   Certification Statement: The patient will continue to require additional inpatient hospital stay due to Colitis and hypoxic respiratory failure  Discharge Plan: Anticipate discharge in 24-48 hrs to home with home services  Code Status: Level 3 - DNAR and DNI    Subjective:   Patient resting comfortably in bed without any acute complaints  She reports complete resolution of her diarrhea at this time  She is still requiring 4 L nasal cannula but denies any cough or feeling short of breath  No significant overnight events reported by nursing  Objective:     Vitals:   Temp (24hrs), Av 4 °F (36 9 °C), Min:98 3 °F (36 8 °C), Max:98 5 °F (36 9 °C)    Temp:  [98 3 °F (36 8 °C)-98 5 °F (36 9 °C)] 98 4 °F (36 9 °C)  HR:  [74-83] 83  Resp:  [18-20] 18  BP: ()/(47-58) 120/58  SpO2:  [93 %-99 %] 93 %  Body mass index is 29 12 kg/m²  Input and Output Summary (last 24 hours): Intake/Output Summary (Last 24 hours) at 2022 1205  Last data filed at 2022 0456  Gross per 24 hour   Intake 1885 ml   Output 600 ml   Net 1285 ml       Physical Exam:   Physical Exam  Vitals and nursing note reviewed  Constitutional:       General: She is not in acute distress  Appearance: She is well-developed  She is obese  She is ill-appearing  HENT:      Head: Normocephalic and atraumatic  Mouth/Throat:      Mouth: Mucous membranes are moist       Pharynx: Oropharynx is clear  Eyes:      Extraocular Movements: Extraocular movements intact  Conjunctiva/sclera: Conjunctivae normal    Cardiovascular:      Rate and Rhythm: Normal rate and regular rhythm  Pulses: Normal pulses  Heart sounds: Murmur heard  Pulmonary:      Effort: Pulmonary effort is normal  No respiratory distress  Breath sounds: Normal breath sounds        Comments: 4 L nasal cannula  Abdominal:      General: Bowel sounds are normal  There is distension  Palpations: Abdomen is soft  Tenderness: There is no abdominal tenderness  Musculoskeletal:      Cervical back: Normal range of motion and neck supple  Right lower leg: Edema present  Left lower leg: Edema present  Skin:     General: Skin is warm and dry  Comments: Chronic sacral and bilateral lower extremity wounds appear to be well healing   Neurological:      General: No focal deficit present  Mental Status: She is alert and oriented to person, place, and time  Mental status is at baseline  Psychiatric:         Mood and Affect: Mood normal          Behavior: Behavior normal          Judgment: Judgment normal          Labs:  Results from last 7 days   Lab Units 07/24/22  0450 07/22/22  0626 07/21/22  0811   WBC Thousand/uL 12 38*   < > 4 22*   HEMOGLOBIN g/dL 8 1*   < > 9 1*   HEMATOCRIT % 28 5*   < > 30 2*   PLATELETS Thousands/uL 191   < > 218   BANDS PCT %  --   --  7   LYMPHO PCT %  --   --  23   MONO PCT %  --   --  29*   EOS PCT %  --   --  0    < > = values in this interval not displayed  Results from last 7 days   Lab Units 07/24/22  0450 07/21/22  1614 07/21/22  0811   SODIUM mmol/L 134*   < > 132*   POTASSIUM mmol/L 3 9   < > 2 9*   CHLORIDE mmol/L 93*   < > 79*   CO2 mmol/L 39*   < > >45*   BUN mg/dL 16   < > 31*   CREATININE mg/dL 0 67   < > 1 14   ANION GAP mmol/L 2*   < >  --    CALCIUM mg/dL 8 5   < > 9 2   ALBUMIN g/dL  --   --  3 2*   TOTAL BILIRUBIN mg/dL  --   --  1 21*   ALK PHOS U/L  --   --  129*   ALT U/L  --   --  14   AST U/L  --   --  32   GLUCOSE RANDOM mg/dL 107   < > 106    < > = values in this interval not displayed       Results from last 7 days   Lab Units 07/21/22  0811   INR  1 03     Results from last 7 days   Lab Units 07/21/22  0818   POC GLUCOSE mg/dl 117         Results from last 7 days   Lab Units 07/24/22  0450 07/23/22  0453 07/21/22  0811   LACTIC ACID mmol/L  --   --  0 9   PROCALCITONIN ng/ml 0 22 0 31* 0 47* Lines/Drains:  Invasive Devices  Report    Central Venous Catheter Line  Duration           Port A Cath 05/23/22 Right Subclavian 62 days          Peripheral Intravenous Line  Duration           Peripheral IV 07/22/22 Left;Ventral (anterior) Forearm 2 days                Imaging:  No new imaging  Recent Cultures (last 7 days):   Results from last 7 days   Lab Units 07/21/22  0811 07/21/22  0800   BLOOD CULTURE  No Growth at 72 hrs  No Growth at 72 hrs  --    C DIFF TOXIN B BY PCR   --  Negative       Last 24 Hours Medication List:   Current Facility-Administered Medications   Medication Dose Route Frequency Provider Last Rate    acetaminophen  650 mg Oral Q6H [facility]      al mag oxide-diphenhydramine-lidocaine viscous  10 mL Swish & Swallow Q4H PRN [facility] Childs, DO      artificial tear  1 application Both Eyes Daily [facility], [place]      fluconazole  200 mg Oral Daily [address] [facility] [address] [place], [place]      gabapentin  600 mg Oral TID [facility], DO      gentamicin   Topical Daily [facility], [place]      loperamide  2 mg Oral 4x Daily PRN [facility], DO      mineral oil-white petrolatum  1 inch Both Eyes HS [facility], [place]      morphine  30 mg Oral Q12H [facility], DO      ondansetron  4 mg Intravenous Q4H PRN [facility], DO      oxyCODONE  10 mg Oral TID PRN Laney Betancourt PA-C      pantoprazole  40 mg Oral Early Morning [address] [facility] [address] [place] [address]      sodium chloride  100 mL/hr Intravenous Continuous Luke Childs,  100 mL/hr (07/24/22 0314)    Systane Nighttime  1 inch Both Eyes HS [facility] Childs, DO      tiZANidine  2 mg Oral Daily [facility], [place]      traZODone  50 mg Oral HS PRN [facility], [facility]          Today, Patient Was Seen By: Joe Marcum DO    **Please Note: This note may have been constructed using a voice recognition system  **

## 2022-07-24 NOTE — PLAN OF CARE
Problem: PHYSICAL THERAPY ADULT  Goal: Performs mobility at highest level of function for planned discharge setting  See evaluation for individualized goals  Description: Treatment/Interventions: LE strengthening/ROM, Functional transfer training, Therapeutic exercise, Endurance training, Patient/family training, Equipment eval/education, Bed mobility, Gait training, Spoke to case management, OT, Spoke to nursing  Equipment Recommended: Nuvia Wilcox       See flowsheet documentation for full assessment, interventions and recommendations  Outcome: Progressing  Note: Prognosis: Fair  Problem List: Decreased strength, Decreased range of motion, Decreased endurance, Impaired balance, Decreased mobility, Decreased coordination, Obesity, Pain, Decreased skin integrity  Assessment: Pt seen for PT treatment session this date, consisting of ther act focused on bed mobility, transfers, household distance gait trial with use of RW, facilitating static standing balance/posture / tolerance during inessa care/ hygiene  Since previous session, pt has made no progress in terms of requiring increased A for all phases of mobility  Balance scores remained the same; slightly improved household distance tolerance, however requiring several standing rest breaks t/o  Pt requiring frequent encouragement for continuing with mobility trials  Pertinent barriers during this session include pain, fatigue  Current goals and POC remain appropriate, pt continues to have rehab potential and is making progress towards STGs  Pt prognosis for achieving goals is fair, pending pt progress with hospitalization/medical status improvements, and indicated by [facility], ability to follow directions and recent onset  Pt limited d/t the presence of intractable pain, fear of pain provocation, fear of falling and self limiting  PT recommends post acute rehabilitation services upon discharge   Pt continues to be functioning below baseline level, and remains limited 2* factors listed above  PT will continue to see pt during current hospitalization in order to address the deficits listed above and provide interventions consistent w/ POC in effort to achieve STGs  Barriers to Discharge: Inaccessible home environment, Decreased caregiver support  Barriers to Discharge Comments: pt reports dtr lives down the street from her, not there all the time  PT Discharge Recommendation: Post acute rehabilitation services    See flowsheet documentation for full assessment

## 2022-07-24 NOTE — ASSESSMENT & PLAN NOTE
· Requiring 4L NC and does not use supplemental oxygen at baseline  · Afebrile with mild leukocytosis this morning  · CT C w contrast (7/22): Patchy opacity in the both lungs with mild bilateral basilar consolidation left greater than right suggest infiltrate/aspiration   Follow-up suggested in 6-8 weeks to demonstrate resolution  Previously noted the lung nodules in the right upper lobe, right lower lobe and left lower lobe remains stable  Mass in the lower left neck extending into the left axilla, as seen on the previous studies   Cirrhotic liver   · Repeat chest x-ray and procalcitonin in the a m   · Monitor off abx, if leukocytosis continues to increase may consider treatment for aspiration  · Given metastatic pulmonary nodules, may possibly require home oxygen  · Wean oxygen as tolerated

## 2022-07-25 ENCOUNTER — APPOINTMENT (INPATIENT)
Dept: RADIOLOGY | Facility: HOSPITAL | Age: 72
DRG: 393 | End: 2022-07-25
Payer: COMMERCIAL

## 2022-07-25 ENCOUNTER — APPOINTMENT (INPATIENT)
Dept: CT IMAGING | Facility: HOSPITAL | Age: 72
DRG: 393 | End: 2022-07-25
Payer: COMMERCIAL

## 2022-07-25 PROBLEM — A41.9 SEPSIS (HCC): Status: ACTIVE | Noted: 2022-07-25

## 2022-07-25 PROBLEM — G93.40 ENCEPHALOPATHY: Status: ACTIVE | Noted: 2022-01-01

## 2022-07-25 LAB
ALBUMIN SERPL BCP-MCNC: 2.5 G/DL (ref 3.5–5)
ALP SERPL-CCNC: 93 U/L (ref 34–104)
ALT SERPL W P-5'-P-CCNC: 14 U/L (ref 7–52)
AMMONIA PLAS-SCNC: 53 UMOL/L (ref 18–72)
ANION GAP SERPL CALCULATED.3IONS-SCNC: 3 MMOL/L (ref 4–13)
AST SERPL W P-5'-P-CCNC: 19 U/L (ref 13–39)
BACTERIA UR QL AUTO: ABNORMAL /HPF
BILIRUB DIRECT SERPL-MCNC: 0.11 MG/DL (ref 0–0.2)
BILIRUB SERPL-MCNC: 0.38 MG/DL (ref 0.2–1)
BILIRUB UR QL STRIP: NEGATIVE
BUN SERPL-MCNC: 11 MG/DL (ref 5–25)
CALCIUM SERPL-MCNC: 9.3 MG/DL (ref 8.4–10.2)
CHLORIDE SERPL-SCNC: 93 MMOL/L (ref 96–108)
CLARITY UR: CLEAR
CO2 SERPL-SCNC: 39 MMOL/L (ref 21–32)
COLOR UR: ABNORMAL
CREAT SERPL-MCNC: 0.63 MG/DL (ref 0.6–1.3)
ERYTHROCYTE [DISTWIDTH] IN BLOOD BY AUTOMATED COUNT: 20.3 % (ref 11.6–15.1)
GFR SERPL CREATININE-BSD FRML MDRD: 89 ML/MIN/1.73SQ M
GLUCOSE SERPL-MCNC: 123 MG/DL (ref 65–140)
GLUCOSE UR STRIP-MCNC: NEGATIVE MG/DL
HCT VFR BLD AUTO: 31.4 % (ref 34.8–46.1)
HGB BLD-MCNC: 8.7 G/DL (ref 11.5–15.4)
HGB UR QL STRIP.AUTO: ABNORMAL
KETONES UR STRIP-MCNC: NEGATIVE MG/DL
LACTATE SERPL-SCNC: 0.5 MMOL/L (ref 0.5–2)
LEUKOCYTE ESTERASE UR QL STRIP: NEGATIVE
MCH RBC QN AUTO: 28.3 PG (ref 26.8–34.3)
MCHC RBC AUTO-ENTMCNC: 27.7 G/DL (ref 31.4–37.4)
MCV RBC AUTO: 102 FL (ref 82–98)
NITRITE UR QL STRIP: NEGATIVE
NON-SQ EPI CELLS URNS QL MICRO: ABNORMAL /HPF
PH UR STRIP.AUTO: 5.5 [PH]
PLATELET # BLD AUTO: 153 THOUSANDS/UL (ref 149–390)
PMV BLD AUTO: 11.1 FL (ref 8.9–12.7)
POTASSIUM SERPL-SCNC: 4.1 MMOL/L (ref 3.5–5.3)
PROCALCITONIN SERPL-MCNC: 0.2 NG/ML
PROCALCITONIN SERPL-MCNC: 0.21 NG/ML
PROT SERPL-MCNC: 5.3 G/DL (ref 6.4–8.4)
PROT UR STRIP-MCNC: NEGATIVE MG/DL
RBC # BLD AUTO: 3.07 MILLION/UL (ref 3.81–5.12)
RBC #/AREA URNS AUTO: ABNORMAL /HPF
SODIUM SERPL-SCNC: 135 MMOL/L (ref 135–147)
SP GR UR STRIP.AUTO: 1.02 (ref 1–1.03)
UROBILINOGEN UR QL STRIP.AUTO: 0.2 E.U./DL
WBC # BLD AUTO: 12.21 THOUSAND/UL (ref 4.31–10.16)
WBC #/AREA URNS AUTO: ABNORMAL /HPF

## 2022-07-25 PROCEDURE — 80048 BASIC METABOLIC PNL TOTAL CA: CPT | Performed by: INTERNAL MEDICINE

## 2022-07-25 PROCEDURE — 87081 CULTURE SCREEN ONLY: CPT | Performed by: NURSE PRACTITIONER

## 2022-07-25 PROCEDURE — 83605 ASSAY OF LACTIC ACID: CPT | Performed by: NURSE PRACTITIONER

## 2022-07-25 PROCEDURE — G1004 CDSM NDSC: HCPCS

## 2022-07-25 PROCEDURE — 80076 HEPATIC FUNCTION PANEL: CPT | Performed by: NURSE PRACTITIONER

## 2022-07-25 PROCEDURE — 81001 URINALYSIS AUTO W/SCOPE: CPT | Performed by: NURSE PRACTITIONER

## 2022-07-25 PROCEDURE — 92526 ORAL FUNCTION THERAPY: CPT

## 2022-07-25 PROCEDURE — 84145 PROCALCITONIN (PCT): CPT | Performed by: INTERNAL MEDICINE

## 2022-07-25 PROCEDURE — 84145 PROCALCITONIN (PCT): CPT | Performed by: NURSE PRACTITIONER

## 2022-07-25 PROCEDURE — 71045 X-RAY EXAM CHEST 1 VIEW: CPT

## 2022-07-25 PROCEDURE — 70450 CT HEAD/BRAIN W/O DYE: CPT

## 2022-07-25 PROCEDURE — 82140 ASSAY OF AMMONIA: CPT | Performed by: NURSE PRACTITIONER

## 2022-07-25 PROCEDURE — 87040 BLOOD CULTURE FOR BACTERIA: CPT | Performed by: NURSE PRACTITIONER

## 2022-07-25 PROCEDURE — 85027 COMPLETE CBC AUTOMATED: CPT | Performed by: INTERNAL MEDICINE

## 2022-07-25 RX ORDER — GABAPENTIN 300 MG/1
300 CAPSULE ORAL
Status: DISCONTINUED | OUTPATIENT
Start: 2022-07-25 | End: 2022-07-29

## 2022-07-25 RX ORDER — CLOPIDOGREL BISULFATE 75 MG/1
75 TABLET ORAL DAILY
Status: DISCONTINUED | OUTPATIENT
Start: 2022-07-25 | End: 2022-08-02 | Stop reason: HOSPADM

## 2022-07-25 RX ORDER — CEFEPIME HYDROCHLORIDE 2 G/50ML
2000 INJECTION, SOLUTION INTRAVENOUS EVERY 12 HOURS
Status: DISCONTINUED | OUTPATIENT
Start: 2022-07-25 | End: 2022-07-26

## 2022-07-25 RX ORDER — FUROSEMIDE 10 MG/ML
40 INJECTION INTRAMUSCULAR; INTRAVENOUS ONCE
Status: COMPLETED | OUTPATIENT
Start: 2022-07-25 | End: 2022-07-25

## 2022-07-25 RX ORDER — VANCOMYCIN HYDROCHLORIDE 1 G/200ML
15 INJECTION, SOLUTION INTRAVENOUS EVERY 12 HOURS
Status: DISCONTINUED | OUTPATIENT
Start: 2022-07-25 | End: 2022-07-25

## 2022-07-25 RX ADMIN — ACETAMINOPHEN 650 MG: 325 TABLET ORAL at 22:33

## 2022-07-25 RX ADMIN — PANTOPRAZOLE SODIUM 40 MG: 40 TABLET, DELAYED RELEASE ORAL at 05:37

## 2022-07-25 RX ADMIN — CLOPIDOGREL BISULFATE 75 MG: 75 TABLET ORAL at 12:11

## 2022-07-25 RX ADMIN — SODIUM CHLORIDE 1000 ML: 9 INJECTION, SOLUTION INTRAVENOUS at 10:49

## 2022-07-25 RX ADMIN — OXYCODONE HYDROCHLORIDE 10 MG: 10 TABLET ORAL at 19:34

## 2022-07-25 RX ADMIN — OXYCODONE HYDROCHLORIDE 10 MG: 10 TABLET ORAL at 13:00

## 2022-07-25 RX ADMIN — FUROSEMIDE 40 MG: 10 INJECTION, SOLUTION INTRAMUSCULAR; INTRAVENOUS at 03:41

## 2022-07-25 RX ADMIN — GABAPENTIN 300 MG: 300 CAPSULE ORAL at 21:13

## 2022-07-25 RX ADMIN — MINERAL OIL AND PETROLATUM 1 APPLICATION: 150; 830 OINTMENT OPHTHALMIC at 09:10

## 2022-07-25 RX ADMIN — CEFEPIME HYDROCHLORIDE 2000 MG: 2 INJECTION, SOLUTION INTRAVENOUS at 09:02

## 2022-07-25 RX ADMIN — VANCOMYCIN HYDROCHLORIDE 750 MG: 750 INJECTION, SOLUTION INTRAVENOUS at 11:11

## 2022-07-25 RX ADMIN — CEFEPIME HYDROCHLORIDE 2000 MG: 2 INJECTION, SOLUTION INTRAVENOUS at 21:45

## 2022-07-25 RX ADMIN — SODIUM CHLORIDE 1000 ML: 9 INJECTION, SOLUTION INTRAVENOUS at 08:58

## 2022-07-25 RX ADMIN — GENTAMICIN SULFATE: 1 OINTMENT TOPICAL at 15:14

## 2022-07-25 RX ADMIN — MINERAL OIL AND WHITE PETROLATUM 1 INCH: 30; 940 OINTMENT OPHTHALMIC at 21:15

## 2022-07-25 RX ADMIN — MINERAL OIL AND WHITE PETROLATUM 1 INCH: 150; 830 OINTMENT OPHTHALMIC at 21:16

## 2022-07-25 RX ADMIN — FLUCONAZOLE 200 MG: 100 TABLET ORAL at 09:04

## 2022-07-25 RX ADMIN — OXYCODONE HYDROCHLORIDE 10 MG: 10 TABLET ORAL at 23:30

## 2022-07-25 RX ADMIN — TIZANIDINE 2 MG: 2 TABLET ORAL at 09:08

## 2022-07-25 NOTE — SPEECH THERAPY NOTE
Patient received reclined in bed today with patient's daughter present  Altered mental status today compared to my last session with pt, very lethargic and resting  Pt with minimal PO intake on mechanical soft diet and thin liquids  Imaging and diagnostic work up concerned for aspiration pneumonia vs pneumonia however L lung consolidation greater then R and no overt s/s during evaluation 7/22 (though pt notably more alert on eval day)  PO diet to remain unchanged, upright position, feed when fully alert, remain upright following a meal   Pt may require assistance with meals if AMS continues  Education provided to daughter, ST to cont follow up care

## 2022-07-25 NOTE — ASSESSMENT & PLAN NOTE
With worsening confusion noted today  The patient without focal neurological deficit        · In the setting of metastatic endometrial cancer will obtain CT scan head rule out any possible metastatic to brain  · Suspects that this is related to toxic metabolic in etiology   · Hold MS contin for now; gabapentin at HS  · Frequent neuro check  · Supportive care   · Discussed with her daughter, the patient appears to be more confused in the morning

## 2022-07-25 NOTE — DISCHARGE INSTR - OTHER ORDERS
Skin and Wound Care Plan:   1  Apply skin nourishing cream to the skin daily  2  Ehob offloading cushion to chair when OOB  3  Turn and reposition patient Q2 hours  4  Cleanse left lateral thigh and sacral wound with Wound Cleanser, pat dry  Apply Gentamicin to wound beds, pack wound beds lightly with moistened 1" gauze and cover with Allevyn life silicone bordered foam dressings, change daily and PRN  5  Cleanse wounds to the anterior and posterior lower legs gently with Wound Cleanser and gauze  Apply Hydraguard to the periwounds, place Xeroform on the wound beds, top with ABD pads and wrap with max  Change daily and PRN  6  Allevyn life heel foams to bilateral heels, kirk with P, date, and peel back daily for skin assessment, change every 3 days or with soilage or dislodgement  7   P-500 low air loss therapy surface

## 2022-07-25 NOTE — ASSESSMENT & PLAN NOTE
· Requiring 4-6L NC and does not use supplemental oxygen at baseline  · Afebrile with mild leukocytosis  · CT chest w contrast (7/22): Patchy opacity in the both lungs with mild bilateral basilar consolidation left greater than right suggest infiltrate/aspiration   Follow-up suggested in 6-8 weeks to demonstrate resolution  Previously noted the lung nodules in the right upper lobe, right lower lobe and left lower lobe remains stable  Mass in the lower left neck extending into the left axilla, as seen on the previous studies  Cirrhotic liver   · 7/25 chest x-ray shows mild pulmonary vascular congestion  Received lasix IV 40 mg x 1 with good urine output     · Given metastatic pulmonary nodules, may possibly require home oxygen  · Wean oxygen as tolerated   · Please see treatment outlined above

## 2022-07-25 NOTE — CASE MANAGEMENT
Case Management Discharge Planning Note    Patient name Edmund Sommer  Location /-01 MRN [id number]  : 1950 Date 2022       Current Admission Date: 2022  Current Admission Diagnosis:Colitis   Patient Active Problem List    Diagnosis Date Noted    Sepsis ([facility] [facility] ) 2022    Encephalopathy 2022    Oral thrush 2022    Acute respiratory failure with hypoxia ([facility] [address] ) 2022    Toxic gastroenteritis and colitis due to chemotherapy 2022    Colitis     Metabolic alkalosis with respiratory acidosis 2022    Hypokalemia 2022    Acute cystitis without hematuria 06/15/2022    Chemotherapy induced neutropenia ([facility] [address] ) 2022    Chronic venous stasis dermatitis of bilateral lower extremities 2022    Gait disturbance 2022    Dehydration 2022    Endometrial cancer ([facility] [facility] ) 2022    Decubitus ulcer of ischial area, left, stage III ([facility] [facility] ) 2022    Hyponatremia 2022    ANDRES (acute kidney injury) ([facility] [address] [facility] ) 2022    Vaginal bleeding 2022    Lower extremity edema 2022    Stage III pressure ulcer of sacral region ([facility] [address] ) 2022    Arthritis 2021    Chronic pain syndrome 2020    Stroke-like symptoms 2020    Elevated troponin 2020    Elevated d-dimer 2020    Acute nasopharyngitis 2020    History of stroke 2020    Rotator cuff tear arthropathy of left shoulder 2020    Rotator cuff tear arthropathy, right 2020    Cervical disc herniation 01/10/2019    History of knee replacement, total, bilateral 2017    Cervical spondylosis 10/12/2016    Acute arterial ischemic stroke, vertebrobasilar, brainstem, right ([address] ) 10/11/2016    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    GERD (gastroesophageal reflux disease) 10/06/2016    Coronary artery disease involving native coronary artery of native heart without angina pectoris 2015    Essential hypertension 10/01/2013      LOS (days): 4  Geometric Mean LOS (GMLOS) (days): 4 30  Days to GMLOS:0 1     OBJECTIVE:  Risk of Unplanned Readmission Score: 21 58         Current admission status: Inpatient   Preferred Pharmacy:   [address] [place] [address] [id number] - Raina Rivera, [address]  [address] [place] [id number]  Phone: [phone] Fax: [phone]    Primary Care Provider: Kallie Woodard MD    Primary Insurance: [address] [place] [address]  Secondary Insurance:     DISCHARGE DETAILS:    Discharge planning discussed with[de-identified] diony Chanel  at 15:35pm  Freedom of Choice: Yes  Comments - Freedom of Choice: pt is active with [address] [place] [address]   rehab has been recommend   daughter gave permission to place a blanket referral  CM contacted family/caregiver?: Yes             Contacts  Patient Contacts: Alfonso Franco  Relationship to Patient[de-identified] Family (daughter)  Contact Method:  In Person  Reason/Outcome: Discharge [address]         Is the patient interested in [facility] at discharge?: Yes    DME Referral Provided  Referral made for DME?: Yes  DME referral completed for the following items[de-identified] Other (transport chair)  DME Supplier Name[de-identified] [employer]    Other Referral/Resources/Interventions Provided:  Interventions: Short Term Rehab  Referral Comments: referals sent through sara waiting on responses    Would you like to participate in our [address] service program?  : No - Declined    Treatment Team Recommendation:  (d/c plan tbd- transport tbd)

## 2022-07-25 NOTE — PROGRESS NOTES
[address]  Progress Note - Rut Pressley 1950, 67 y o  female MRN: [id number]  Unit/Bed#: -Carol Encounter: [id number]  Primary Care Provider: Juni Campa MD   Date and time admitted to hospital: 7/21/2022  7:59 AM    * Colitis  Assessment & Plan  Presented with the complaint of diffuse abdominal pain and diarrhea since 07/17; no further diarrhea    · Toxic inflammatory colitis due to chemotherapy vs  Ischemic colitis due to hypotension   · CT abd/pelvis (7/21): Slight enlargement of the patient's known uterine mass with decreased size of the previously measured left periaortic lymph node  Thickening of the sigmoid colon wall suggesting colitis  Diverticulosis  Moderate volume stool throughout the colon  · Stool cultures including C diff- negative  · Further supportive care with IVF and Imodium   · GI input appreciated       Acute respiratory failure with hypoxia (HCC)  Assessment & Plan  · Requiring 4-6L NC and does not use supplemental oxygen at baseline  · Afebrile with mild leukocytosis  · CT chest w contrast (7/22): Patchy opacity in the both lungs with mild bilateral basilar consolidation left greater than right suggest infiltrate/aspiration   Follow-up suggested in 6-8 weeks to demonstrate resolution  Previously noted the lung nodules in the right upper lobe, right lower lobe and left lower lobe remains stable  Mass in the lower left neck extending into the left axilla, as seen on the previous studies  Cirrhotic liver   · 7/25 chest x-ray shows mild pulmonary vascular congestion  Received lasix IV 40 mg x 1 with good urine output  · Given metastatic pulmonary nodules, may possibly require home oxygen  · Wean oxygen as tolerated   · Please see treatment outlined above     Sepsis [facility])  Assessment & Plan  Meets sepsis criteria with tachycardia and leukocytosis suspects underlying pneumonia/aspiration pneumonia/pneumonitis on 7/25 with worsening confusion   Sepsis alert was called  · Procalcitonin- has been negative; repeat procalcitonin  · Continue with leukocytosis  · The patient is receiving IV fluid per sepsis protocol  · Follow-up with cultures; obtain MRSA culture  · Started on cefepime and vancomycin; if procalcitonin continues to be negative will discontinue antibiotics  The patient may need steroids for possible aspiration pneumonitis  Encephalopathy  Assessment & Plan  With worsening confusion noted today  The patient without focal neurological deficit  · In the setting of metastatic endometrial cancer will obtain CT scan head rule out any possible metastatic to brain  · Suspects that this is related to toxic metabolic in etiology   · Hold MS contin for now; gabapentin at HS  · Frequent neuro check  · Supportive care     Oral thrush  Assessment & Plan  · Oral thrush and likely esophagitis  · GI input appreciated   · Continue Magic mouthwash and Protonix 40 mg daily   · Continue Diflucan 200 mg daily to complete 14 days  · Tolerating dysphagia level 2 diet and thin liquids    Decubitus ulcer of ischial area, left, stage III (HCC)  Assessment & Plan  · Present on arrival  · Continue home gentamicin ointment  · Local wound care   · No surgical intervention recommended by Podiatry or General surgery    History of stroke  Assessment & Plan  · Plavix was held due to slight drop in hemoglobin- as there is no evidence of active bleeding will resume  · Continue Lipitor 40 mg daily     Chronic pain syndrome  Assessment & Plan  · On morphine 30 mg twice daily, oxycodone 5 mg twice daily and gabapentin at home  · Will hold off for now due to acute encephalopathy          VTE Prophylaxis:  Enoxaparin (Lovenox)    Patient Centered Rounds: I have performed bedside rounds with nursing staff today      Discussions with Specialists or Other Care Team Provider: n/a   Education and Discussions with Family / Patient: patient and daughter via phone     Current Length of Stay: 4 day(s)    Current Patient Status: Inpatient   Certification Statement: The patient will continue to require additional inpatient hospital stay due to colitis     Discharge Plan: pending hospital course     Code Status: Level 3 - DNAR and DNI    Subjective:   Confused per staff  Had some coughing overnight with "frothy sputum"  Received 1 dose of IV lasix  Objective:     Vitals:   Temp (24hrs), Av 4 °F (37 4 °C), Min:98 9 °F (37 2 °C), Max:100 2 °F (37 9 °C)    Temp:  [98 9 °F (37 2 °C)-100 2 °F (37 9 °C)] 100 2 °F (37 9 °C)  HR:  [] 106  Resp:  [15-18] 15  BP: (108-127)/(43-81) 127/47  SpO2:  [88 %-99 %] 96 %  Body mass index is 29 12 kg/m²  Input and Output Summary (last 24 hours): Intake/Output Summary (Last 24 hours) at 2022 0954  Last data filed at 2022 0919  Gross per 24 hour   Intake 120 ml   Output 2880 ml   Net -2760 ml       Physical Exam:   Physical Exam  Vitals and nursing note reviewed  Constitutional:       General: She is not in acute distress  Appearance: Normal appearance  HENT:      Head: Normocephalic and atraumatic  Right Ear: External ear normal       Left Ear: External ear normal       Nose: Nose normal  No rhinorrhea  Mouth/Throat:      Mouth: Mucous membranes are moist       Pharynx: Oropharynx is clear  Eyes:      General:         Right eye: No discharge  Left eye: No discharge  Pupils: Pupils are equal, round, and reactive to light  Cardiovascular:      Rate and Rhythm: Normal rate and regular rhythm  Pulses: Normal pulses  Heart sounds: Normal heart sounds  No murmur heard  Pulmonary:      Effort: Pulmonary effort is normal  No respiratory distress  Comments: Decreased in bases    Abdominal:      General: Bowel sounds are normal  There is no distension  Palpations: Abdomen is soft  There is no mass  Tenderness: There is no abdominal tenderness     Musculoskeletal:         General: No swelling or tenderness  Normal range of motion  Cervical back: Normal range of motion and neck supple  No muscular tenderness  Skin:     General: Skin is warm and dry  Capillary Refill: Capillary refill takes less than 2 seconds  Findings: No erythema or rash  Neurological:      General: No focal deficit present  Mental Status: She is alert  She is disoriented and confused  GCS: GCS eye subscore is 3  GCS verbal subscore is 4  GCS motor subscore is 5  Motor: No weakness  Comments: No facial droop or slurred speech    Psychiatric:         Mood and Affect: Mood normal          Behavior: Behavior normal          Additional Data:     Labs:    Results from last 7 days   Lab Units 07/25/22  0543 07/22/22  0626 07/21/22  0811   WBC Thousand/uL 12 21*   < > 4 22*   HEMOGLOBIN g/dL 8 7*   < > 9 1*   HEMATOCRIT % 31 4*   < > 30 2*   PLATELETS Thousands/uL 153   < > 218   LYMPHO PCT %  --   --  23   MONO PCT %  --   --  29*   EOS PCT %  --   --  0    < > = values in this interval not displayed  Results from last 7 days   Lab Units 07/25/22  0543 07/21/22  1614 07/21/22  0811   SODIUM mmol/L 135   < > 132*   POTASSIUM mmol/L 4 1   < > 2 9*   CHLORIDE mmol/L 93*   < > 79*   CO2 mmol/L 39*   < > >45*   BUN mg/dL 11   < > 31*   CREATININE mg/dL 0 63   < > 1 14   CALCIUM mg/dL 9 3   < > 9 2   ALK PHOS U/L  --   --  129*   ALT U/L  --   --  14   AST U/L  --   --  32    < > = values in this interval not displayed  Results from last 7 days   Lab Units 07/21/22  0811   INR  1 03     Results from last 7 days   Lab Units 07/21/22  0818   POC GLUCOSE mg/dl 117           * I Have Reviewed All Lab Data Listed Above  * Additional Pertinent Lab Tests Reviewed: [address] Admission  Reviewed    Imaging:  Imaging Reports Reviewed Today Include: cxr    Recent Cultures (last 7 days):     Results from last 7 days   Lab Units 07/21/22  0811 07/21/22  0800   BLOOD CULTURE  No Growth at 72 hrs  No Growth at 72 hrs  --    C DIFF TOXIN B BY PCR   --  Negative       Last 24 Hours Medication List:   Current Facility-Administered Medications   Medication Dose Route Frequency Provider Last Rate    acetaminophen  650 mg Oral Q6H PRN [facility], DO      al mag oxide-diphenhydramine-lidocaine viscous  10 mL Swish & Swallow Q4H PRN [facility], DO      artificial tear  1 application Both Eyes Daily [facility], [address]      cefepime  2,000 mg Intravenous Q12H TIFFANIE Eisenberg 2,000 mg (07/25/22 0902)    fluconazole  200 mg Oral Daily [place], [place]      gabapentin  300 mg Oral HS TIFFANIE Eisenberg      gentamicin   Topical Daily [facility], [address]      loperamide  2 mg Oral 4x Daily PRN [facility], DO      mineral oil-white petrolatum  1 inch Both Eyes HS [facility], DO      ondansetron  4 mg Intravenous Q4H PRN [facility], DO      oxyCODONE  10 mg Oral TID PRN Rolo Martinez PA-C      pantoprazole  40 mg Oral Early Morning [place], [place]      sodium chloride  1,000 mL Intravenous Once TIFFANIE Eisenberg      Systane Nighttime  1 inch Both Eyes HS [facility], DO      tiZANidine  2 mg Oral Daily [facility], [address]      traZODone  50 mg Oral HS PRN [facility], DO      vancomycin  15 mg/kg (Adjusted) Intravenous Q12H TIFFANIE Eisenberg          Today, Patient Was Seen By: TIFFANIE Eisenberg    ** Please Note: Dictation voice to text software may have been used in the creation of this document   **

## 2022-07-25 NOTE — ASSESSMENT & PLAN NOTE
· On morphine 30 mg twice daily, oxycodone 5 mg twice daily and gabapentin at home  · Will hold off for now due to acute encephalopathy

## 2022-07-25 NOTE — PLAN OF CARE
Problem: Potential for Falls  Goal: Patient will remain free of falls  Description: INTERVENTIONS:  - Educate patient/family on patient safety including physical limitations  - Instruct patient to call for assistance with activity   - Consult OT/PT to assist with strengthening/mobility   - Keep Call bell within reach  - Keep bed low and locked with side rails adjusted as appropriate  - Keep care items and personal belongings within reach  - Initiate and maintain comfort rounds  - Make Fall Risk Sign visible to staff  - Offer Toileting every 2 Hours, in advance of need  - Initiate/Maintain bed/chair alarm  - Obtain necessary fall risk management equipment  - Apply yellow socks and bracelet for high fall risk patients  Outcome: Progressing     Problem: MOBILITY - ADULT  Goal: Maintain or return to baseline ADL function  Description: INTERVENTIONS:  -  Assess patient's ability to carry out ADLs; assess patient's baseline for ADL function and identify physical deficits which impact ability to perform ADLs (bathing, care of mouth/teeth, toileting, grooming, dressing, etc )  - Assess/evaluate cause of self-care deficits   - Assess range of motion  - Assess patient's mobility; develop plan if impaired  - Assess patient's need for assistive devices and provide as appropriate  - Encourage maximum independence but intervene and supervise when necessary  - Involve family in performance of ADLs  - Assess for home care needs following discharge   - Consider OT consult to assist with ADL evaluation and planning for discharge  - Provide patient education as appropriate  Outcome: Progressing  Goal: Maintains/Returns to pre admission functional level  Description: INTERVENTIONS:  - Perform BMAT or MOVE assessment daily    - Set and communicate daily mobility goal to care team and patient/family/caregiver  - Collaborate with rehabilitation services on mobility goals if consulted  - Perform Range of Motion 3 times a day    - Reposition patient every 2 hours  - Dangle patient 3 times a day  - Stand patient 3 times a day  - Ambulate patient 3 times a day  - Out of bed to chair 3 times a day   - Out of bed for meals 3 times a day  - Out of bed for toileting  - Record patient progress and toleration of activity level   Outcome: Progressing     Problem: Prexisting or High Potential for Compromised Skin Integrity  Goal: Skin integrity is maintained or improved  Description: INTERVENTIONS:  - Identify patients at risk for skin breakdown  - Assess and monitor skin integrity  - Assess and monitor nutrition and hydration status  - Monitor labs   - Assess for incontinence   - Turn and reposition patient  - Assist with mobility/ambulation  - Relieve pressure over bony prominences  - Avoid friction and shearing  - Provide appropriate hygiene as needed including keeping skin clean and dry  - Evaluate need for skin moisturizer/barrier cream  - Collaborate with interdisciplinary team   - Patient/family teaching  - Consider wound care consult   Outcome: Progressing     Problem: Nutrition/Hydration-ADULT  Goal: Nutrient/Hydration intake appropriate for improving, restoring or maintaining nutritional needs  Description: Monitor and assess patient's nutrition/hydration status for malnutrition  Collaborate with interdisciplinary team and initiate plan and interventions as ordered  Monitor patient's weight and dietary intake as ordered or per policy  Utilize nutrition screening tool and intervene as necessary  Determine patient's food preferences and provide high-protein, high-caloric foods as appropriate       INTERVENTIONS:  - Monitor oral intake, urinary output, labs, and treatment plans  - Assess nutrition and hydration status and recommend course of action  - Evaluate amount of meals eaten  - Assist patient with eating if necessary   - Allow adequate time for meals  - Recommend/ encourage appropriate diets, oral nutritional supplements, and vitamin/mineral supplements  - Order, calculate, and assess calorie counts as needed  - Assess need for intravenous fluids  - Provide specific nutrition/hydration education as appropriate  - Include patient/family/caregiver in decisions related to nutrition  Outcome: Progressing

## 2022-07-25 NOTE — ASSESSMENT & PLAN NOTE
Presented with the complaint of diffuse abdominal pain and diarrhea since 07/17; no further diarrhea    · Toxic inflammatory colitis due to chemotherapy vs  Ischemic colitis due to hypotension   · CT abd/pelvis (7/21): Slight enlargement of the patient's known uterine mass with decreased size of the previously measured left periaortic lymph node  Thickening of the sigmoid colon wall suggesting colitis  Diverticulosis  Moderate volume stool throughout the colon     · Stool cultures including C diff- negative  · Further supportive care with IVF and Imodium   · GI input appreciated

## 2022-07-25 NOTE — SEPSIS NOTE
Sepsis Note   Jessica Walker 67 y o  female MRN: [id number]  Unit/Bed#: -01 Encounter: [id number]       qSOFA     Row Name 07/25/22 07:47:45 07/24/22 22:27:39 07/24/22 16:04:45 07/24/22 07:46:36 07/23/22 2299    Altered mental status GCS < 15 -- -- -- -- --    Respiratory Rate > / =22 0 0 0 0 0    Systolic BP < / =047 0 0 0 0 1    Q Sofa Score 0 0 0 0 1    Row Name 07/23/22 14:52:27 07/23/22 14:15:37 07/23/22 08:41:21 07/23/22 08:24:57       Altered mental status GCS < 15 -- -- -- --     Respiratory Rate > / =22 0 0 -- 0     Systolic BP < / =984 1 1 1 1     Q Sofa Score 1 1 1 1                Initial Sepsis Screening     Row Name 07/25/22 0818                Is the patient's history suggestive of a new or worsening infection? Yes (Proceed)  -SB        Suspected source of infection pneumonia  -SB        Are two or more of the following signs & symptoms of infection both present and new to the patient? Yes (Proceed)  -SB        Indicate SIRS criteria Tachycardia > 90 bpm;Leukocytosis (WBC > [address] IJL)  -SB        If the answer is yes to both questions, suspicion of sepsis is present --        If severe sepsis is present AND tissue hypoperfusion perists in the hour after fluid resuscitation or lactate > 4, the patient meets criteria for SEPTIC SHOCK --        Are any of the following organ dysfunction criteria present within 6 hours of suspected infection and SIRS criteria that are NOT considered to be chronic conditions?  No  -SB        Organ dysfunction --        Date of presentation of severe sepsis --        Time of presentation of severe sepsis --        Tissue hypoperfusion persists in the hour after crystalloid fluid administration, evidenced, by either: --        Was hypotension present within one hour of the conclusion of crystalloid fluid administration? --        Date of presentation of septic shock --        Time of presentation of septic shock --              User Key  (r) = Recorded By, (t) = Taken By, (c) = Cosigned By    [address] Name Provider Type    SB Chandan Murrieta, [address]  Nurse Practitioner

## 2022-07-25 NOTE — PROGRESS NOTES
Vancomycin Assessment    Wash Alt is a 67 y o  female who is currently ordered on  vancomycin 1000mg IV Q12hrs for Pneumonia, other sepsis   Relevant clinical data and objective history reviewed:  Creatinine   Date Value Ref Range Status   07/25/2022 0 63 0 60 - 1 30 mg/dL Final     Comment:     Standardized to IDMS reference method   07/24/2022 0 67 0 60 - 1 30 mg/dL Final     Comment:     Standardized to IDMS reference method   07/23/2022 0 71 0 60 - 1 30 mg/dL Final     Comment:     Standardized to IDMS reference method     BP (!) 127/47   Pulse (!) 106   Temp 100 2 °F (37 9 °C)   Resp 15   Ht 4' 11" (1 499 m)   Wt 65 4 kg (144 lb 2 9 oz)   SpO2 96%   BMI 29 12 kg/m²   I/O last 3 completed shifts: In: 1120 [P O :120; I V :1000]  Out: 3000 [Urine:3000]  Lab Results   Component Value Date/Time    BUN 11 07/25/2022 05:43 AM    WBC 12 21 (H) 07/25/2022 05:43 AM    HGB 8 7 (L) 07/25/2022 05:43 AM    HCT 31 4 (L) 07/25/2022 05:43 AM     (H) 07/25/2022 05:43 AM     07/25/2022 05:43 AM     Temp Readings from Last 3 Encounters:   07/25/22 100 2 °F (37 9 °C)   07/15/22 (!) 96 9 °F (36 1 °C) (Tympanic)   07/14/22 (!) 97 1 °F (36 2 °C) (Temporal)     Vancomycin Days of Therapy: 1    Assessment/Plan  The patient is currently on vancomycin utilizing scheduled dosing based on adjusted body weight (due to obesity)  Baseline risks associated with therapy include: concomitant nephrotoxic medications, advanced age, and dehydration  The patient is currently ordered on  1000mg IV Q12hrs and after clinical evaluation will be changed to 750mg IV Q12hrs  Pharmacy will also follow closely for s/sx of nephrotoxicity, infusion reactions, and appropriateness of therapy  BMP and CBC will be ordered per protocol  Plan for trough as patient approaches steady state, prior to the 4th  dose at approximately 2200 on 7/26/22    Due to infection severity, will target a trough of 15-20 (appropriate for most indications)   Pharmacy will continue to follow the patients culture results and clinical progress daily      Tj Herrmann, Pharmacist

## 2022-07-25 NOTE — NURSING NOTE
This RN noted patient to cough more frequently with a SaO2 of 86-88%  Frothy clear secretions from mouth suctioned  O2 increased from 4-6L with SaO2 increase to 96%  PA notified, orders received

## 2022-07-25 NOTE — ASSESSMENT & PLAN NOTE
· Plavix was held due to slight drop in hemoglobin- as there is no evidence of active bleeding will resume  · Continue Lipitor 40 mg daily

## 2022-07-25 NOTE — ASSESSMENT & PLAN NOTE
· Oral thrush and likely esophagitis  · GI input appreciated   · Continue Magic mouthwash and Protonix 40 mg daily   · Continue Diflucan 200 mg daily to complete 14 days  · Tolerating dysphagia level 2 diet and thin liquids

## 2022-07-25 NOTE — ASSESSMENT & PLAN NOTE
Meets sepsis criteria with tachycardia and leukocytosis suspects underlying pneumonia/aspiration pneumonia/pneumonitis on 7/25 with worsening confusion  Sepsis alert was called  · Procalcitonin- has been negative; repeat procalcitonin  · Continue with leukocytosis  · The patient is receiving IV fluid per sepsis protocol  · Follow-up with cultures; obtain MRSA culture  · Started on cefepime and vancomycin; if procalcitonin continues to be negative will discontinue antibiotics  The patient may need steroids for possible aspiration pneumonitis

## 2022-07-26 ENCOUNTER — PATIENT OUTREACH (OUTPATIENT)
Dept: HEMATOLOGY ONCOLOGY | Facility: CLINIC | Age: 72
End: 2022-07-26

## 2022-07-26 ENCOUNTER — TELEPHONE (OUTPATIENT)
Dept: GYNECOLOGIC ONCOLOGY | Facility: CLINIC | Age: 72
End: 2022-07-26

## 2022-07-26 LAB
ALBUMIN SERPL BCP-MCNC: 2.3 G/DL (ref 3.5–5)
ALP SERPL-CCNC: 85 U/L (ref 34–104)
ALT SERPL W P-5'-P-CCNC: 14 U/L (ref 7–52)
ANION GAP SERPL CALCULATED.3IONS-SCNC: 1 MMOL/L (ref 4–13)
ANISOCYTOSIS BLD QL SMEAR: PRESENT
AST SERPL W P-5'-P-CCNC: 19 U/L (ref 13–39)
BACTERIA BLD CULT: NORMAL
BACTERIA BLD CULT: NORMAL
BASOPHILS # BLD MANUAL: 0.12 THOUSAND/UL (ref 0–0.1)
BASOPHILS NFR MAR MANUAL: 1 % (ref 0–1)
BILIRUB SERPL-MCNC: 0.48 MG/DL (ref 0.2–1)
BUN SERPL-MCNC: 8 MG/DL (ref 5–25)
CALCIUM ALBUM COR SERPL-MCNC: 10.3 MG/DL (ref 8.3–10.1)
CALCIUM SERPL-MCNC: 8.9 MG/DL (ref 8.4–10.2)
CHLORIDE SERPL-SCNC: 96 MMOL/L (ref 96–108)
CO2 SERPL-SCNC: 42 MMOL/L (ref 21–32)
CREAT SERPL-MCNC: 0.51 MG/DL (ref 0.6–1.3)
EOSINOPHIL # BLD MANUAL: 0 THOUSAND/UL (ref 0–0.4)
EOSINOPHIL NFR BLD MANUAL: 0 % (ref 0–6)
ERYTHROCYTE [DISTWIDTH] IN BLOOD BY AUTOMATED COUNT: 20.3 % (ref 11.6–15.1)
GFR SERPL CREATININE-BSD FRML MDRD: 95 ML/MIN/1.73SQ M
GLUCOSE SERPL-MCNC: 97 MG/DL (ref 65–140)
HCT VFR BLD AUTO: 28.1 % (ref 34.8–46.1)
HGB BLD-MCNC: 8.1 G/DL (ref 11.5–15.4)
HYPERCHROMIA BLD QL SMEAR: PRESENT
LYMPHOCYTES # BLD AUTO: 1.18 THOUSAND/UL (ref 0.6–4.47)
LYMPHOCYTES # BLD AUTO: 10 % (ref 14–44)
MCH RBC QN AUTO: 28.7 PG (ref 26.8–34.3)
MCHC RBC AUTO-ENTMCNC: 28.8 G/DL (ref 31.4–37.4)
MCV RBC AUTO: 100 FL (ref 82–98)
MONOCYTES # BLD AUTO: 0.95 THOUSAND/UL (ref 0–1.22)
MONOCYTES NFR BLD: 8 % (ref 4–12)
MRSA NOSE QL CULT: NORMAL
NEUTROPHILS # BLD MANUAL: 9.58 THOUSAND/UL (ref 1.85–7.62)
NEUTS BAND NFR BLD MANUAL: 6 % (ref 0–8)
NEUTS SEG NFR BLD AUTO: 75 % (ref 43–75)
PLATELET # BLD AUTO: 203 THOUSANDS/UL (ref 149–390)
PLATELET BLD QL SMEAR: ADEQUATE
PMV BLD AUTO: 8.8 FL (ref 8.9–12.7)
POTASSIUM SERPL-SCNC: 3.7 MMOL/L (ref 3.5–5.3)
PROCALCITONIN SERPL-MCNC: 0.15 NG/ML
PROT SERPL-MCNC: 5 G/DL (ref 6.4–8.4)
RBC # BLD AUTO: 2.82 MILLION/UL (ref 3.81–5.12)
RBC MORPH BLD: PRESENT
SODIUM SERPL-SCNC: 139 MMOL/L (ref 135–147)
TSH SERPL DL<=0.05 MIU/L-ACNC: 3 UIU/ML (ref 0.45–4.5)
VIT B12 SERPL-MCNC: 4788 PG/ML (ref 100–900)
WBC # BLD AUTO: 11.83 THOUSAND/UL (ref 4.31–10.16)

## 2022-07-26 PROCEDURE — 92526 ORAL FUNCTION THERAPY: CPT

## 2022-07-26 PROCEDURE — 99448 NTRPROF PH1/NTRNET/EHR 21-30: CPT | Performed by: PSYCHIATRY & NEUROLOGY

## 2022-07-26 PROCEDURE — 84443 ASSAY THYROID STIM HORMONE: CPT | Performed by: NURSE PRACTITIONER

## 2022-07-26 PROCEDURE — 80053 COMPREHEN METABOLIC PANEL: CPT | Performed by: NURSE PRACTITIONER

## 2022-07-26 PROCEDURE — 84145 PROCALCITONIN (PCT): CPT | Performed by: NURSE PRACTITIONER

## 2022-07-26 PROCEDURE — 82607 VITAMIN B-12: CPT | Performed by: NURSE PRACTITIONER

## 2022-07-26 PROCEDURE — 99232 SBSQ HOSP IP/OBS MODERATE 35: CPT | Performed by: NURSE PRACTITIONER

## 2022-07-26 PROCEDURE — 85007 BL SMEAR W/DIFF WBC COUNT: CPT | Performed by: NURSE PRACTITIONER

## 2022-07-26 PROCEDURE — 85027 COMPLETE CBC AUTOMATED: CPT | Performed by: NURSE PRACTITIONER

## 2022-07-26 RX ORDER — SODIUM CHLORIDE, SODIUM GLUCONATE, SODIUM ACETATE, POTASSIUM CHLORIDE, MAGNESIUM CHLORIDE, SODIUM PHOSPHATE, DIBASIC, AND POTASSIUM PHOSPHATE .53; .5; .37; .037; .03; .012; .00082 G/100ML; G/100ML; G/100ML; G/100ML; G/100ML; G/100ML; G/100ML
50 INJECTION, SOLUTION INTRAVENOUS CONTINUOUS
Status: DISCONTINUED | OUTPATIENT
Start: 2022-07-26 | End: 2022-07-27

## 2022-07-26 RX ORDER — LORAZEPAM 2 MG/ML
0.5 INJECTION INTRAMUSCULAR ONCE
Status: COMPLETED | OUTPATIENT
Start: 2022-07-26 | End: 2022-07-26

## 2022-07-26 RX ORDER — OXYCODONE HYDROCHLORIDE 5 MG/1
5 TABLET ORAL 3 TIMES DAILY PRN
Status: DISCONTINUED | OUTPATIENT
Start: 2022-07-26 | End: 2022-08-02 | Stop reason: HOSPADM

## 2022-07-26 RX ORDER — MORPHINE SULFATE 15 MG/1
15 TABLET, FILM COATED, EXTENDED RELEASE ORAL EVERY 12 HOURS SCHEDULED
Status: DISCONTINUED | OUTPATIENT
Start: 2022-07-26 | End: 2022-07-29

## 2022-07-26 RX ORDER — OLANZAPINE 10 MG/1
5 INJECTION, POWDER, LYOPHILIZED, FOR SOLUTION INTRAMUSCULAR ONCE
Status: COMPLETED | OUTPATIENT
Start: 2022-07-26 | End: 2022-07-26

## 2022-07-26 RX ADMIN — SODIUM CHLORIDE, SODIUM GLUCONATE, SODIUM ACETATE, POTASSIUM CHLORIDE, MAGNESIUM CHLORIDE, SODIUM PHOSPHATE, DIBASIC, AND POTASSIUM PHOSPHATE 50 ML/HR: .53; .5; .37; .037; .03; .012; .00082 INJECTION, SOLUTION INTRAVENOUS at 14:08

## 2022-07-26 RX ADMIN — OLANZAPINE 5 MG: 10 INJECTION, POWDER, FOR SOLUTION INTRAMUSCULAR at 12:25

## 2022-07-26 RX ADMIN — OXYCODONE HYDROCHLORIDE 10 MG: 10 TABLET ORAL at 06:05

## 2022-07-26 RX ADMIN — PANTOPRAZOLE SODIUM 40 MG: 40 TABLET, DELAYED RELEASE ORAL at 06:05

## 2022-07-26 RX ADMIN — CEFEPIME HYDROCHLORIDE 2000 MG: 2 INJECTION, SOLUTION INTRAVENOUS at 08:39

## 2022-07-26 RX ADMIN — TRAZODONE HYDROCHLORIDE 50 MG: 50 TABLET ORAL at 01:59

## 2022-07-26 RX ADMIN — SODIUM CHLORIDE, SODIUM GLUCONATE, SODIUM ACETATE, POTASSIUM CHLORIDE, MAGNESIUM CHLORIDE, SODIUM PHOSPHATE, DIBASIC, AND POTASSIUM PHOSPHATE 50 ML/HR: .53; .5; .37; .037; .03; .012; .00082 INJECTION, SOLUTION INTRAVENOUS at 23:37

## 2022-07-26 RX ADMIN — TIZANIDINE 2 MG: 2 TABLET ORAL at 08:42

## 2022-07-26 RX ADMIN — LORAZEPAM 0.5 MG: 2 INJECTION INTRAMUSCULAR; INTRAVENOUS at 13:56

## 2022-07-26 RX ADMIN — FLUCONAZOLE 200 MG: 100 TABLET ORAL at 08:39

## 2022-07-26 RX ADMIN — CLOPIDOGREL BISULFATE 75 MG: 75 TABLET ORAL at 08:39

## 2022-07-26 NOTE — ASSESSMENT & PLAN NOTE
· On morphine 30 mg twice daily, oxycodone 5 mg twice daily and gabapentin at home  · Will resume morphine 15 mg BID; gabapentin decreased to HS only

## 2022-07-26 NOTE — TELEPHONE ENCOUNTER
Patients daughter would like a call back, her mom has been in the hospital since Thursday   [phone]

## 2022-07-26 NOTE — ASSESSMENT & PLAN NOTE
With worsening confusion started 7/25  The patient without focal neurological deficit  7/25 CT head without acute finding     · In the setting of metastatic endometrial cancer will obtain CT scan head rule out any possible metastatic to brain  · Suspects that this is related to toxic metabolic in etiology with possible underlying cognitive impairment  · Resume MS contin; gabapentin at HS  · Frequent neuro check  · Supportive care   · Discussed with her daughter, the patient appears to be more confused in the morning  The patient now with worsening mental status with some visual hallucinations, intermittent combative with nursing staff  · Will consult neurology   · Check Vit D, B12   TSH 2 99

## 2022-07-26 NOTE — ASSESSMENT & PLAN NOTE
· Stage IV endometrial cancer on palliative chemotherapy  · With associated drug-induced neutropenia, anemia, and neuropathy  · Retroperitoneal and supraclavicular lymphadenopathy with pulmonary and inguinal metastasis  · Last treatment with carboplatin and Taxotere on 07/14  · Continue home gabapentin and MS contin

## 2022-07-26 NOTE — CASE MANAGEMENT
Case Management Discharge Planning Note    Patient name Adra Maker  Location /-01 MRN [id number]  : 1950 Date 2022       Current Admission Date: 2022  Current Admission Diagnosis:Colitis   Patient Active Problem List    Diagnosis Date Noted    Sepsis ([facility] ) 2022    Encephalopathy 2022    Oral thrush 2022    Acute respiratory failure with hypoxia ([facility] ) 2022    Toxic gastroenteritis and colitis due to chemotherapy 2022    Colitis     Metabolic alkalosis with respiratory acidosis 2022    Hypokalemia 2022    Acute cystitis without hematuria 06/15/2022    Chemotherapy induced neutropenia ([facility] ) 2022    Chronic venous stasis dermatitis of bilateral lower extremities 2022    Gait disturbance 2022    Dehydration 2022    Endometrial cancer ([facility] ) 2022    Decubitus ulcer of ischial area, left, stage III ([facility] ) 2022    Hyponatremia 2022    ANDRES (acute kidney injury) ([facility] ) 2022    Vaginal bleeding 2022    Lower extremity edema 2022    Stage III pressure ulcer of sacral region ([facility] ) 2022    Arthritis 2021    Chronic pain syndrome 2020    Stroke-like symptoms 2020    Elevated troponin 2020    Elevated d-dimer 2020    Acute nasopharyngitis 2020    History of stroke 2020    Rotator cuff tear arthropathy of left shoulder 2020    Rotator cuff tear arthropathy, right 2020    Cervical disc herniation 01/10/2019    History of knee replacement, total, bilateral 2017    Cervical spondylosis 10/12/2016    Acute arterial ischemic stroke, vertebrobasilar, brainstem, right ([address] ) 10/11/2016    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    GERD (gastroesophageal reflux disease) 10/06/2016    Coronary artery disease involving native coronary artery of native heart without angina pectoris 2015    Essential hypertension 10/01/2013      LOS (days): 5  Geometric Mean LOS (GMLOS) (days): 4 30  Days to GMLOS:-0 7     OBJECTIVE:  Risk of Unplanned Readmission Score: 21 78     Current admission status: Inpatient   Preferred Pharmacy:   [address] [place] [address] #[id number] - Larry Deniz, [address]  [address] [facility] [address]  Phone: [phone] Fax: [phone]    Primary Care Provider: Juliet Le MD    Primary Insurance: [address] [place] [address]  Secondary Insurance:     DISCHARGE DETAILS:  Pt is being reviewed by [facility], [facility] and [facility]  All other SNF has denied  Will continue to follow

## 2022-07-26 NOTE — PROGRESS NOTES
[address]  Progress Note - Oleg Dyson 1950, 67 y o  female MRN: [id number]  Unit/Bed#: -Carol Encounter: [id number]  Primary Care Provider: Michael Holloway MD   Date and time admitted to hospital: 7/21/2022  7:59 AM    * Colitis  Assessment & Plan  Presented with the complaint of diffuse abdominal pain and diarrhea since 07/17; no further diarrhea    · Toxic inflammatory colitis due to chemotherapy vs  Ischemic colitis due to hypotension   · CT abd/pelvis (7/21): Slight enlargement of the patient's known uterine mass with decreased size of the previously measured left periaortic lymph node  Thickening of the sigmoid colon wall suggesting colitis  Diverticulosis  Moderate volume stool throughout the colon  · Stool cultures including C diff- negative  · Further supportive care with IVF and Imodium    · GI input appreciated       Acute respiratory failure with hypoxia (HCC)  Assessment & Plan  · Requiring 4-6L NC and does not use supplemental oxygen at baseline  Currently on 3L   · Afebrile with mild leukocytosis  · CT chest w contrast (7/22): Patchy opacity in the both lungs with mild bilateral basilar consolidation left greater than right suggest infiltrate/aspiration   Follow-up suggested in 6-8 weeks to demonstrate resolution  Previously noted the lung nodules in the right upper lobe, right lower lobe and left lower lobe remains stable  Mass in the lower left neck extending into the left axilla, as seen on the previous studies  Cirrhotic liver   · 7/25 chest x-ray shows mild pulmonary vascular congestion  Received lasix IV 40 mg x 1 with good urine output     · Given metastatic pulmonary nodules, may possibly require home oxygen  · Wean oxygen as tolerated   · Please see treatment outlined above      Sepsis [facility])  Assessment & Plan  Meets sepsis criteria with tachycardia and leukocytosis suspects underlying pneumonia/aspiration pneumonia/pneumonitis on 7/25 with worsening confusion  Sepsis alert was called  · Procalcitonin- has been negative  · Continue with leukocytosis-- slightly improves   · The patient received IV fluid per sepsis protocol  · Follow-up with cultures, MRSA culture  Blood cultures on admission- negative  · Started on cefepime and vancomycin; as procalcitonin is negative-antibiotics are discontinued  · Sepsis parameters are improving     Encephalopathy  Assessment & Plan  With worsening confusion started 7/25  The patient without focal neurological deficit  7/25 CT head without acute finding     · In the setting of metastatic endometrial cancer will obtain CT scan head rule out any possible metastatic to brain  · Suspects that this is related to toxic metabolic in etiology with possible underlying cognitive impairment  · Resume MS contin; gabapentin at HS  · Frequent neuro check  · Supportive care   · Discussed with her daughter, the patient appears to be more confused in the morning  The patient now with worsening mental status with some visual hallucinations, intermittent combative with nursing staff  · Will consult neurology   · Check Vit D, B12   TSH 2 99    Oral thrush  Assessment & Plan  · Oral thrush and likely esophagitis  · GI input appreciated   · Continue Magic mouthwash and Protonix 40 mg daily   · Continue Diflucan 200 mg daily to complete 14 days  · Tolerating dysphagia level 2 diet and thin liquids     Chronic venous stasis dermatitis of bilateral lower extremities  Assessment & Plan  · Patient has chronic lower extremity edema with venous stasis dermatitis/wounds  · Intermittently on Lasix and was recently treated for possible cellulitis with Keflex  · Was following outpatient with [facility]; daughter notes all of her mothers wounds appear better   · Continue local wound care   · No surgical intervention recommended by Podiatry or General surgery    Endometrial cancer [facility])  Assessment & Plan  · Stage IV endometrial cancer on palliative chemotherapy  · With associated drug-induced neutropenia, anemia, and neuropathy  · Retroperitoneal and supraclavicular lymphadenopathy with pulmonary and inguinal metastasis  · Last treatment with carboplatin and Taxotere on   · Continue home gabapentin and MS contin     Decubitus ulcer of ischial area, left, stage III (HCC)  Assessment & Plan  · Present on arrival  · Continue home gentamicin ointment  · Local wound care    · No surgical intervention recommended by Podiatry or General surgery    History of stroke  Assessment & Plan  · Plavix was held due to slight drop in hemoglobin- as there is no evidence of active bleeding will resume  · Continue Lipitor 40 mg daily      Chronic pain syndrome  Assessment & Plan  · On morphine 30 mg twice daily, oxycodone 5 mg twice daily and gabapentin at home  · Will resume morphine 15 mg BID; gabapentin decreased to HS only  VTE Prophylaxis:  Enoxaparin (Lovenox)    Patient Centered Rounds: I have performed bedside rounds with nursing staff today  Discussions with Specialists or Other Care Team Provider: neurology   Education and Discussions with Family / Patient: patient and daughter-Renita     Current Length of Stay: 5 day(s)    Current Patient Status: Inpatient   Certification Statement: The patient will continue to require additional inpatient hospital stay due to colitis     Discharge Plan: pending hospital course     Code Status: Level 3 - DNAR and DNI    Subjective:   "feeling okay" patient then fell back to sleep  Per staff, she has been somewhat combative with care  Worsening confusion with visual hallucination  Objective:     Vitals:   Temp (24hrs), Av 1 °F (37 3 °C), Min:98 4 °F (36 9 °C), Max:100 3 °F (37 9 °C)    Temp:  [98 4 °F (36 9 °C)-100 3 °F (37 9 °C)] 98 4 °F (36 9 °C)  HR:  [87-92] 92  Resp:  [18-21] 21  BP: (111-124)/(46-61) 111/46  SpO2:  [86 %-97 %] 97 %  Body mass index is 29 12 kg/m²       Input and Output Summary (last 24 hours): Intake/Output Summary (Last 24 hours) at 7/26/2022 1426  Last data filed at 7/26/2022 1100  Gross per 24 hour   Intake 0 ml   Output 1225 ml   Net -1225 ml       Physical Exam:   Physical Exam  Vitals and nursing note reviewed  Constitutional:       General: She is not in acute distress  Appearance: Normal appearance  She is ill-appearing  HENT:      Head: Normocephalic and atraumatic  Right Ear: External ear normal       Left Ear: External ear normal       Nose: Nose normal  No rhinorrhea  Mouth/Throat:      Mouth: Mucous membranes are moist       Pharynx: Oropharynx is clear  Eyes:      General:         Right eye: No discharge  Left eye: No discharge  Pupils: Pupils are equal, round, and reactive to light  Cardiovascular:      Rate and Rhythm: Normal rate and regular rhythm  Pulses: Normal pulses  Heart sounds: Normal heart sounds  No murmur heard  Pulmonary:      Effort: Pulmonary effort is normal  No respiratory distress  Comments: Decreased in bases     Abdominal:      General: Bowel sounds are normal  There is no distension  Palpations: Abdomen is soft  There is no mass  Tenderness: There is no abdominal tenderness  Musculoskeletal:         General: No swelling or tenderness  Normal range of motion  Cervical back: Normal range of motion and neck supple  No muscular tenderness  Skin:     General: Skin is warm and dry  Capillary Refill: Capillary refill takes less than 2 seconds  Coloration: Skin is pale  Findings: No erythema or rash  Neurological:      General: No focal deficit present  Mental Status: She is alert  She is disoriented     Psychiatric:         Mood and Affect: Mood normal          Behavior: Behavior normal          Additional Data:     Labs:    Results from last 7 days   Lab Units 07/26/22  0631   WBC Thousand/uL 11 83*   HEMOGLOBIN g/dL 8 1*   HEMATOCRIT % 28 1*   PLATELETS Thousands/uL 203   LYMPHO PCT % 10*   MONO PCT % 8   EOS PCT % 0     Results from last 7 days   Lab Units 07/26/22  0631   SODIUM mmol/L 139   POTASSIUM mmol/L 3 7   CHLORIDE mmol/L 96   CO2 mmol/L 42*   BUN mg/dL 8   CREATININE mg/dL 0 51*   CALCIUM mg/dL 8 9   ALK PHOS U/L 85   ALT U/L 14   AST U/L 19     Results from last 7 days   Lab Units 07/21/22  0811   INR  1 03     Results from last 7 days   Lab Units 07/21/22  0818   POC GLUCOSE mg/dl 117           * I Have Reviewed All Lab Data Listed Above  * Additional Pertinent Lab Tests Reviewed: [address] Admission  Reviewed    Imaging:  Imaging Reports Reviewed Today Include: CT head     Recent Cultures (last 7 days):     Results from last 7 days   Lab Units 07/25/22  0856 07/21/22  0811 07/21/22  0800   BLOOD CULTURE  Received in Microbiology Lab  Culture in Progress  Received in Microbiology Lab  Culture in Progress  No Growth After 5 Days  No Growth After 5 Days    --    C DIFF TOXIN B BY PCR   --   --  Negative       Last 24 Hours Medication List:   Current Facility-Administered Medications   Medication Dose Route Frequency Provider Last Rate    acetaminophen  650 mg Oral Q6H PRN [facility] Childs, DO      al mag oxide-diphenhydramine-lidocaine viscous  10 mL Swish & Swallow Q4H PRN [facility], DO      artificial tear  1 application Both Eyes Daily [place], [place]      clopidogrel  75 mg Oral Daily AlvaradoTIFFANIE      fluconazole  200 mg Oral Daily [address] [facility] [address] [place], [place]      gabapentin  300 mg Oral HS [place] city, CRNP      gentamicin   Topical Daily [place], [place]      loperamide  2 mg Oral 4x Daily PRN [facility], DO      mineral oil-white petrolatum  1 inch Both Eyes HS [place], [place]      morphine  15 mg Oral Q12H [facility] [place] city, CRNP      multi-electrolyte  50 mL/hr Intravenous Continuous [place], CRNP 50 mL/hr (07/26/22 1408)    ondansetron  4 mg Intravenous Q4H PRN [facility],       oxyCODONE  10 mg Oral TID PRN Jennifer García PA-C      pantoprazole  40 mg Oral Early Morning [facility] [place]      Systane Nighttime  1 inch Both Eyes HS [place], [place]      tiZANidine  2 mg Oral Daily [place], [place]      traZODone  50 mg Oral HS PRN [address] [facility], DO          Today, Patient Was Seen By: TIFFANIE Nunez    ** Please Note: Dictation voice to text software may have been used in the creation of this document   **

## 2022-07-26 NOTE — ASSESSMENT & PLAN NOTE
Meets sepsis criteria with tachycardia and leukocytosis suspects underlying pneumonia/aspiration pneumonia/pneumonitis on 7/25 with worsening confusion  Sepsis alert was called  · Procalcitonin- has been negative  · Continue with leukocytosis-- slightly improves   · The patient received IV fluid per sepsis protocol  · Follow-up with cultures, MRSA culture  Blood cultures on admission- negative  · Started on cefepime and vancomycin; as procalcitonin is negative-antibiotics are discontinued    · Sepsis parameters are improving

## 2022-07-26 NOTE — CONSULTS
The patients Vancomycin therapy has been completed / discontinued  Thank you for this consult; Pharmacy will sign-off now      Peg Saravia PharmD

## 2022-07-26 NOTE — SPEECH THERAPY NOTE
Speech Language/Pathology    Speech/Language Pathology Progress Note    Patient Name: Adi Farfan  YOVLV'O Date: 7/26/2022     Problem List  Principal Problem:    Colitis  Active Problems:    Chronic pain syndrome    History of stroke    Decubitus ulcer of ischial area, left, stage III (HCC)    Endometrial cancer ([facility] )    Chronic venous stasis dermatitis of bilateral lower extremities    Acute respiratory failure with hypoxia (HCC)    Oral thrush    Sepsis ([facility] )    Encephalopathy       Past Medical History  Past Medical History:   Diagnosis Date    Anxiety     Arthritis     Bernard's esophagus     Bleeds easily ([facility] )     CVA (cerebral vascular accident) ([facility] ) 10/2016    Endometrial cancer ([facility] )     Fibromyalgia     GERD (gastroesophageal reflux disease)     Hypercholesterolemia     Hypertension     Insomnia     Rheumatoid arthritis ([facility] )     Stroke ([facility] ) 2016        Past Surgical History  Past Surgical History:   Procedure Laterality Date    APPENDECTOMY      BREAST BIOPSY Bilateral     benign    CARPAL TUNNEL RELEASE Left 10/27/2003    DORSAL COMPARTMENT RELEASE Left 03/09/2006    FINGER SURGERY      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    IR PORT PLACEMENT  5/23/2022    KNEE ARTHROSCOPY Right     x3(6/91,7/96,7/99)    OTHER SURGICAL HISTORY Left 03/09/2006    tennis elbow release    CT DEBRIDEMENT, SKIN, SUB-Q TISSUE,=<20 SQ CM N/A 1/24/2022    Procedure: SACRAL DEBRIDEMENT WOUND AND DRESSING CHANGE ([address]);   Surgeon: Jerardo Sevilla MD;  Location: [facility];  Service: General    REPLACEMENT TOTAL KNEE BILATERAL      rt-11/99, lt-1/13/10    ROTATOR CUFF REPAIR Bilateral     SHOULDER ARTHROSCOPY Right 02/10/2016    SHOULDER ARTHROSCOPY Left     11/26/08,9/99    SHOULDER ARTHROSCOPY Left 03/09/2006    TONSILLECTOMY AND ADENOIDECTOMY      TRIGGER FINGER RELEASE Left 07/01/2002    middle and ring finger    ULNAR NERVE TRANSPOSITION Left        Subjective:  Patient received partially reclined in bed with breakfast tray present  Pt very confused on arrival, alternating between pleasant and irritable very quickly and without warning  She began discussing hallucinations and delirium like conversation including "letting you in on a little secret, they're trying to kill me and they'll kill you next "  "It's Art Laurence and the other one changed her name to Angelica- it's the Meryle Broker family and they're murderers "  Patient verbally aggressive toward nursing and SLP- reporting "get the hell out of here" and "this is poison, you're trying to poison me!"  Pt also visually seeing "the Srinivas Liter" in her room, pointing them out to clinician twice  Objective:  Pt observed tolerating small bites of puree as well as thin liquid via straw with no overt s/s of penetration or aspiration, pt demonstrating clear overall vocal quality  Assessment:  Altered mental status from evaluation on 7/22- swallow function remains intact but mentation will serve as a barrier for overall PO intake going forward  ST to follow  Plan/Recommendations:  Mech/thin still appropriate for pt at this time, ST ongoing

## 2022-07-26 NOTE — TELEMEDICINE
Neurology e-Consult ([facility])   Timothy Caldwell 67 y o  female MRN: [id number]  Unit/Bed#: -01 Encounter: [id number]      Reason for Consult / Principal Problem: encephalopathy  Inpatient consult to Neurology  Consult performed by: Carmita Frazier DO  Consult ordered by: TIFFANIE Ballesteros        07/26/22      ASSESSMENT:  68 y/o F with prior stroke, metastatic endometrial cancer with pulmonary involvement, HTN, HLD, and RA that presents with worsening encephalopathy  Pt is currently admitted for multiple issues  She was initially admitted on 7/21 for abdominal pain and colitis thought to be either inflammatory, infectious, or ischemic in etiology along with several days of diarrhea  She had recently received chemotherapy on 7/14 and a few days after began to have diarrhea, which was persistent and brought her in  Since that time, her hospital course had become more complicated  Yesterday (7/25) was reported to have worsening encephalopathy however in the setting of sepsis criteria with new fever, leukocytosis, and tachycardia  She has been on cefepime, now moreso for pneumonia rather than colitis  TSH and ammonia WNL with B12 pending  Further complicated by metabolic derangements, chronic opioid use, and continued infection  She received a head CT that was unremarkable  Gabapentin was held and opioids decreased  Encephalopathy is multifactorial and in the setting of malnutrition, poor baseline with advanced metastatic cancer, use of cefepime (which is known to contribute to encephalopathy), metabolic abnormalities, anemia, use of sedatives/opioids, and ongoing infection including pneumonia with worsening of mental status coinciding with concern for sepsis  No reported focal neurologic changes or seizure-like activity  No clear evidence of gross metastatic disease on CT      RECOMMENDATIONS:  -continue neurochecks; notify with changes  -HCT reviewed - unremarkable  -recommend holding sedatives as able; would hold tizanidine and trazodone if able  -wean opioids over time rather than discontinuing immediately to prevent withdrawal  -recommend finding alternative to cefepime  -B12 pending  -consider MRI brain with and without contrast if pt does not improve with control of underlying medical factors and if pt is able to tolerate this - if not can consider CT head w/ contrast  -consider EEG should she have abrupt fluctuations in mental status  -consider palliative and Geriatrics consultations  -notify Neurology with changes/questions    Total time spent in review of data, discussion with requesting provider and rendering advice was 30 minutes      Star Lira, DO

## 2022-07-26 NOTE — PROGRESS NOTES
MSW received a referral from [facility] for outreach to be made to pt's daughter  MSW called and spoke with Lenka Travis, who expressed concern over her mother's behavior  She reports that her mother has been combative to both staff and family and is exhibiting behaviors that are uncharacteristic  Lenka Travis has placed a call to [place], [place] to discuss  Lenka Angy shared that her mother was living independently prior to her admission but she felt it was getting difficult prior to this hospitalization  She is now requesting STR and is uncertain if she could return home  MSW explained that the in pt CM will help walk her through this process and proceeded to explain the role of the OSW  Wolf does not feel her mother should continue with any systemic therapy, which MSW encouraged her to discuss with [facility]  A great deal of time was spent offering active listening to Lenka Travis as she was processing the family dynamics that she is dealing with currently between herself and her sister  Lenka Travis was upset as she felt the hospital staff was communicating with her sister  As per chart, she is the only contact and MSW assured her the staff would not be communicating discharge planning details with her sister  MSW provided the contact number and encouraged her to call as needed  Emotional support offered

## 2022-07-26 NOTE — ASSESSMENT & PLAN NOTE
· Requiring 4-6L NC and does not use supplemental oxygen at baseline  Currently on 3L   · Afebrile with mild leukocytosis  · CT chest w contrast (7/22): Patchy opacity in the both lungs with mild bilateral basilar consolidation left greater than right suggest infiltrate/aspiration   Follow-up suggested in 6-8 weeks to demonstrate resolution  Previously noted the lung nodules in the right upper lobe, right lower lobe and left lower lobe remains stable  Mass in the lower left neck extending into the left axilla, as seen on the previous studies  Cirrhotic liver   · 7/25 chest x-ray shows mild pulmonary vascular congestion  Received lasix IV 40 mg x 1 with good urine output     · Given metastatic pulmonary nodules, may possibly require home oxygen  · Wean oxygen as tolerated   · Please see treatment outlined above

## 2022-07-26 NOTE — PLAN OF CARE
Problem: Potential for Falls  Goal: Patient will remain free of falls  Description: INTERVENTIONS:  - Educate patient/family on patient safety including physical limitations  - Instruct patient to call for assistance with activity   - Consult OT/PT to assist with strengthening/mobility   - Keep Call bell within reach  - Keep bed low and locked with side rails adjusted as appropriate  - Keep care items and personal belongings within reach  - Initiate and maintain comfort rounds  - Make Fall Risk Sign visible to staff  - Offer Toileting every 2 Hours, in advance of need  - Initiate/Maintain bed/chair alarm  - Obtain necessary fall risk management equipment  - Apply yellow socks and bracelet for high fall risk patients  Outcome: Progressing     Problem: MOBILITY - ADULT  Goal: Maintain or return to baseline ADL function  Description: INTERVENTIONS:  -  Assess patient's ability to carry out ADLs; assess patient's baseline for ADL function and identify physical deficits which impact ability to perform ADLs (bathing, care of mouth/teeth, toileting, grooming, dressing, etc )  - Assess/evaluate cause of self-care deficits   - Assess range of motion  - Assess patient's mobility; develop plan if impaired  - Assess patient's need for assistive devices and provide as appropriate  - Encourage maximum independence but intervene and supervise when necessary  - Involve family in performance of ADLs  - Assess for home care needs following discharge   - Consider OT consult to assist with ADL evaluation and planning for discharge  - Provide patient education as appropriate  Outcome: Progressing  Goal: Maintains/Returns to pre admission functional level  Description: INTERVENTIONS:  - Perform BMAT or MOVE assessment daily    - Set and communicate daily mobility goal to care team and patient/family/caregiver  - Collaborate with rehabilitation services on mobility goals if consulted  - Perform Range of Motion 3 times a day    - Reposition patient every 2 hours  - Dangle patient 3 times a day  - Stand patient 3 times a day  - Ambulate patient 3 times a day  - Out of bed to chair 3 times a day   - Out of bed for meals 3 times a day  - Out of bed for toileting  - Record patient progress and toleration of activity level   Outcome: Progressing     Problem: Nutrition/Hydration-ADULT  Goal: Nutrient/Hydration intake appropriate for improving, restoring or maintaining nutritional needs  Description: Monitor and assess patient's nutrition/hydration status for malnutrition  Collaborate with interdisciplinary team and initiate plan and interventions as ordered  Monitor patient's weight and dietary intake as ordered or per policy  Utilize nutrition screening tool and intervene as necessary  Determine patient's food preferences and provide high-protein, high-caloric foods as appropriate       INTERVENTIONS:  - Monitor oral intake, urinary output, labs, and treatment plans  - Assess nutrition and hydration status and recommend course of action  - Evaluate amount of meals eaten  - Assist patient with eating if necessary   - Allow adequate time for meals  - Recommend/ encourage appropriate diets, oral nutritional supplements, and vitamin/mineral supplements  - Order, calculate, and assess calorie counts as needed  - Assess need for intravenous fluids  - Provide specific nutrition/hydration education as appropriate  - Include patient/family/caregiver in decisions related to nutrition  Outcome: Progressing

## 2022-07-26 NOTE — ASSESSMENT & PLAN NOTE
· Patient has chronic lower extremity edema with venous stasis dermatitis/wounds  · Intermittently on Lasix and was recently treated for possible cellulitis with Keflex  · Was following outpatient with [place] wound [facility]; daughter notes all of her mothers wounds appear better   · Continue local wound care   · No surgical intervention recommended by Podiatry or General surgery

## 2022-07-27 ENCOUNTER — APPOINTMENT (INPATIENT)
Dept: MRI IMAGING | Facility: HOSPITAL | Age: 72
DRG: 393 | End: 2022-07-27
Payer: COMMERCIAL

## 2022-07-27 PROBLEM — Z71.89 GOALS OF CARE, COUNSELING/DISCUSSION: Status: ACTIVE | Noted: 2022-07-27

## 2022-07-27 LAB
ANION GAP SERPL CALCULATED.3IONS-SCNC: 3 MMOL/L (ref 4–13)
BUN SERPL-MCNC: 8 MG/DL (ref 5–25)
CALCIUM SERPL-MCNC: 9.3 MG/DL (ref 8.4–10.2)
CHLORIDE SERPL-SCNC: 95 MMOL/L (ref 96–108)
CO2 SERPL-SCNC: 43 MMOL/L (ref 21–32)
CREAT SERPL-MCNC: 0.48 MG/DL (ref 0.6–1.3)
ERYTHROCYTE [DISTWIDTH] IN BLOOD BY AUTOMATED COUNT: 20.3 % (ref 11.6–15.1)
GFR SERPL CREATININE-BSD FRML MDRD: 97 ML/MIN/1.73SQ M
GLUCOSE SERPL-MCNC: 80 MG/DL (ref 65–140)
HCT VFR BLD AUTO: 30.1 % (ref 34.8–46.1)
HGB BLD-MCNC: 8.3 G/DL (ref 11.5–15.4)
MCH RBC QN AUTO: 27.4 PG (ref 26.8–34.3)
MCHC RBC AUTO-ENTMCNC: 27.6 G/DL (ref 31.4–37.4)
MCV RBC AUTO: 99 FL (ref 82–98)
PLATELET # BLD AUTO: 212 THOUSANDS/UL (ref 149–390)
PMV BLD AUTO: 9.7 FL (ref 8.9–12.7)
POTASSIUM SERPL-SCNC: 3.8 MMOL/L (ref 3.5–5.3)
RBC # BLD AUTO: 3.03 MILLION/UL (ref 3.81–5.12)
SODIUM SERPL-SCNC: 141 MMOL/L (ref 135–147)
WBC # BLD AUTO: 10.72 THOUSAND/UL (ref 4.31–10.16)

## 2022-07-27 PROCEDURE — G1004 CDSM NDSC: HCPCS

## 2022-07-27 PROCEDURE — 80048 BASIC METABOLIC PNL TOTAL CA: CPT | Performed by: NURSE PRACTITIONER

## 2022-07-27 PROCEDURE — 70553 MRI BRAIN STEM W/O & W/DYE: CPT

## 2022-07-27 PROCEDURE — 85027 COMPLETE CBC AUTOMATED: CPT | Performed by: NURSE PRACTITIONER

## 2022-07-27 PROCEDURE — 99497 ADVNCD CARE PLAN 30 MIN: CPT | Performed by: NURSE PRACTITIONER

## 2022-07-27 PROCEDURE — 99232 SBSQ HOSP IP/OBS MODERATE 35: CPT | Performed by: NURSE PRACTITIONER

## 2022-07-27 PROCEDURE — A9585 GADOBUTROL INJECTION: HCPCS | Performed by: NURSE PRACTITIONER

## 2022-07-27 PROCEDURE — 82652 VIT D 1 25-DIHYDROXY: CPT | Performed by: NURSE PRACTITIONER

## 2022-07-27 RX ORDER — OLANZAPINE 10 MG/1
5 INJECTION, POWDER, LYOPHILIZED, FOR SOLUTION INTRAMUSCULAR ONCE
Status: COMPLETED | OUTPATIENT
Start: 2022-07-27 | End: 2022-07-27

## 2022-07-27 RX ORDER — LORAZEPAM 2 MG/ML
0.5 INJECTION INTRAMUSCULAR EVERY 4 HOURS PRN
Status: DISCONTINUED | OUTPATIENT
Start: 2022-07-27 | End: 2022-08-02 | Stop reason: HOSPADM

## 2022-07-27 RX ORDER — SODIUM CHLORIDE 9 MG/ML
50 INJECTION, SOLUTION INTRAVENOUS CONTINUOUS
Status: DISCONTINUED | OUTPATIENT
Start: 2022-07-27 | End: 2022-07-29

## 2022-07-27 RX ORDER — LORAZEPAM 2 MG/ML
1 INJECTION INTRAMUSCULAR ONCE
Status: COMPLETED | OUTPATIENT
Start: 2022-07-27 | End: 2022-07-27

## 2022-07-27 RX ORDER — HYDROMORPHONE HCL/PF 1 MG/ML
0.5 SYRINGE (ML) INJECTION EVERY 4 HOURS PRN
Status: DISCONTINUED | OUTPATIENT
Start: 2022-07-27 | End: 2022-08-02 | Stop reason: HOSPADM

## 2022-07-27 RX ADMIN — LORAZEPAM 1 MG: 2 INJECTION INTRAMUSCULAR; INTRAVENOUS at 13:33

## 2022-07-27 RX ADMIN — OLANZAPINE 5 MG: 10 INJECTION, POWDER, FOR SOLUTION INTRAMUSCULAR at 11:32

## 2022-07-27 RX ADMIN — MORPHINE SULFATE 2 MG: 2 INJECTION, SOLUTION INTRAMUSCULAR; INTRAVENOUS at 12:55

## 2022-07-27 RX ADMIN — FLUCONAZOLE 200 MG: 100 TABLET ORAL at 09:23

## 2022-07-27 RX ADMIN — MORPHINE SULFATE 15 MG: 15 TABLET, EXTENDED RELEASE ORAL at 20:30

## 2022-07-27 RX ADMIN — MORPHINE SULFATE 15 MG: 15 TABLET, EXTENDED RELEASE ORAL at 09:23

## 2022-07-27 RX ADMIN — SODIUM CHLORIDE 50 ML/HR: 0.9 INJECTION, SOLUTION INTRAVENOUS at 08:19

## 2022-07-27 RX ADMIN — MINERAL OIL AND WHITE PETROLATUM 1 INCH: 30; 940 OINTMENT OPHTHALMIC at 21:50

## 2022-07-27 RX ADMIN — HYDROMORPHONE HYDROCHLORIDE 0.5 MG: 1 INJECTION, SOLUTION INTRAMUSCULAR; INTRAVENOUS; SUBCUTANEOUS at 21:49

## 2022-07-27 RX ADMIN — GADOBUTROL 6 ML: 604.72 INJECTION INTRAVENOUS at 16:29

## 2022-07-27 RX ADMIN — CLOPIDOGREL BISULFATE 75 MG: 75 TABLET ORAL at 09:23

## 2022-07-27 RX ADMIN — MINERAL OIL AND PETROLATUM 1 APPLICATION: 150; 830 OINTMENT OPHTHALMIC at 09:23

## 2022-07-27 RX ADMIN — GABAPENTIN 300 MG: 300 CAPSULE ORAL at 21:49

## 2022-07-27 NOTE — NURSING NOTE
Patient very agitated today, yelling out, grabbing and kicking  Restraints on for safety  Family at bedside  Medicated for leg/abdominal pain and for agitation  Patient took oral meds crushed in applesauce but spit some out  Family tried to get patient to eat, had a few bites of pudding  Finally resting and quiet at 1400

## 2022-07-27 NOTE — ASSESSMENT & PLAN NOTE
· Patient has chronic lower extremity edema with venous stasis dermatitis/wounds  · Intermittently on Lasix and was recently treated for possible cellulitis with Keflex  · Was following outpatient with [facility]; daughter notes all of her mothers wounds appear better   · Continue local wound care    · No surgical intervention recommended by Podiatry or General surgery

## 2022-07-27 NOTE — PLAN OF CARE
Problem: Nutrition/Hydration-ADULT  Goal: Nutrient/Hydration intake appropriate for improving, restoring or maintaining nutritional needs  Description: Monitor and assess patient's nutrition/hydration status for malnutrition  Collaborate with interdisciplinary team and initiate plan and interventions as ordered  Monitor patient's weight and dietary intake as ordered or per policy  Utilize nutrition screening tool and intervene as necessary  Determine patient's food preferences and provide high-protein, high-caloric foods as appropriate       INTERVENTIONS:  - Monitor oral intake, urinary output, labs, and treatment plans  - Assess nutrition and hydration status and recommend course of action  - Evaluate amount of meals eaten  - Assist patient with eating if necessary   - Allow adequate time for meals  - Recommend/ encourage appropriate diets, oral nutritional supplements, and vitamin/mineral supplements  - Order, calculate, and assess calorie counts as needed  - Assess need for intravenous fluids  - Provide specific nutrition/hydration education as appropriate  - Include patient/family/caregiver in decisions related to nutrition  Outcome: Progressing     Problem: PAIN - ADULT  Goal: Verbalizes/displays adequate comfort level or baseline comfort level  Description: Interventions:  - Encourage patient to monitor pain and request assistance  - Assess pain using appropriate pain scale  - Administer analgesics based on type and severity of pain and evaluate response  - Implement non-pharmacological measures as appropriate and evaluate response  - Consider cultural and social influences on pain and pain management  - Notify physician/advanced practitioner if interventions unsuccessful or patient reports new pain  Outcome: Progressing     Problem: Knowledge Deficit  Goal: Patient/family/caregiver demonstrates understanding of disease process, treatment plan, medications, and discharge instructions  Description: Complete learning assessment and assess knowledge base    Interventions:  - Provide teaching at level of understanding  - Provide teaching via preferred learning methods  Outcome: Progressing

## 2022-07-27 NOTE — ASSESSMENT & PLAN NOTE
Meets sepsis criteria with tachycardia and leukocytosis suspects underlying pneumonia/aspiration pneumonia/pneumonitis on 7/25 with worsening confusion  Sepsis alert was called  As there is no clear source of infection identified at this time, sepsis is ruled out  · Procalcitonin- has been negative  · Continue with leukocytosis-- slightly improves   · The patient received IV fluid per sepsis protocol  · Follow-up with cultures, MRSA culture  Blood cultures on admission- negative  · Started on cefepime and vancomycin; as procalcitonin is negative-antibiotics are discontinued

## 2022-07-27 NOTE — ASSESSMENT & PLAN NOTE
· Discussed with daughters --Cyn Liz at bedside and Bertha Landaverde over the phone regarding goals of care  Suggestions present to the family about palliative care/hospice care  Bertha Landaverde who is the POA would like to speak with hospice

## 2022-07-27 NOTE — PROGRESS NOTES
[facility]  Progress Note - Earma Setters 1950, 67 y o  female MRN: [id number]  Unit/Bed#: -01 Encounter: [id number]  Primary Care Provider: Lou Buchanan MD   Date and time admitted to hospital: 7/21/2022  7:59 AM    * Encephalopathy  Assessment & Plan  With worsening confusion started 7/25  The patient without focal neurological deficit  7/25 CT head without acute finding     · Neurology input appreciated -- suspects toxic metabolic encephalopathy with possible underlying cognitive impairment  · In the setting of metastatic endometrial cancer, will obtain MRI brain if the patient can tolerate   · Consider EEG if not much improved   · Vit D, B12 4788, TSH 2 99  · Resume MS contin; gabapentin at HS  · Frequent neuro check  · Supportive care   · Discussed with her daughters, the patient appears to be more confused in the morning  The patient now with worsening mental status with some visual hallucinations, intermittent combative with nursing staff  Acute respiratory failure with hypoxia (HCC)  Assessment & Plan  · Requiring 4-6L NC and does not use supplemental oxygen at baseline  Currently on 3L   · Afebrile with mild leukocytosis  · CT chest w contrast (7/22): Patchy opacity in the both lungs with mild bilateral basilar consolidation left greater than right suggest infiltrate/aspiration   Follow-up suggested in 6-8 weeks to demonstrate resolution  Previously noted the lung nodules in the right upper lobe, right lower lobe and left lower lobe remains stable  Mass in the lower left neck extending into the left axilla, as seen on the previous studies  Cirrhotic liver   · 7/25 chest x-ray shows mild pulmonary vascular congestion  Received lasix IV 40 mg x 1 with good urine output     · Given metastatic pulmonary nodules, may possibly require home oxygen  · Wean oxygen as tolerated   · Please see treatment outlined above       Colitis  Assessment & Plan  Presented with the complaint of diffuse abdominal pain and diarrhea since 07/17; no further diarrhea    · Toxic inflammatory colitis due to chemotherapy vs  Ischemic colitis due to hypotension   · CT abd/pelvis (7/21): Slight enlargement of the patient's known uterine mass with decreased size of the previously measured left periaortic lymph node  Thickening of the sigmoid colon wall suggesting colitis  Diverticulosis  Moderate volume stool throughout the colon  · Stool cultures including C diff- negative  · Further supportive care with IVF and Imodium    · GI input appreciated        Sepsis [facility])  Assessment & Plan  Meets sepsis criteria with tachycardia and leukocytosis suspects underlying pneumonia/aspiration pneumonia/pneumonitis on 7/25 with worsening confusion  Sepsis alert was called  As there is no clear source of infection identified at this time, sepsis is ruled out  · Procalcitonin- has been negative  · Continue with leukocytosis-- slightly improves   · The patient received IV fluid per sepsis protocol  · Follow-up with cultures, MRSA culture  Blood cultures on admission- negative  · Started on cefepime and vancomycin; as procalcitonin is negative-antibiotics are discontinued  Goals of care, counseling/discussion  Assessment & Plan  · Discussed with daughters --Blanca Zavaleta at bedside and Ish Aguilera over the phone regarding goals of care  Suggestions present to the family about palliative care/hospice care  Ish Aguilera who is the POA would like to speak with hospice  Oral thrush  Assessment & Plan  · Oral thrush and likely esophagitis  · GI input appreciated   · Continue Magic mouthwash and Protonix 40 mg daily   · Continue Diflucan 200 mg daily to complete 14 days  · Tolerating dysphagia level 2 diet and thin liquids    · Of note patient has not been taking her PO medications as scheduled due to her acute encephalopathy       Chronic venous stasis dermatitis of bilateral lower extremities  Assessment & Plan  · Patient has chronic lower extremity edema with venous stasis dermatitis/wounds  · Intermittently on Lasix and was recently treated for possible cellulitis with Keflex  · Was following outpatient with [facility] wound [facility]; daughter notes all of her mothers wounds appear better   · Continue local wound care    · No surgical intervention recommended by Podiatry or General surgery    Endometrial cancer [facility])  Assessment & Plan  · Stage IV endometrial cancer on palliative chemotherapy  · With associated drug-induced neutropenia, anemia, and neuropathy  · Retroperitoneal and supraclavicular lymphadenopathy with pulmonary and inguinal metastasis  · Last treatment with carboplatin and Taxotere on 07/14  · Continue home gabapentin and MS contin      Decubitus ulcer of ischial area, left, stage III ([address] )  Assessment & Plan  · Present on arrival  · Continue home gentamicin ointment  · Local wound care     · No surgical intervention recommended by Podiatry or General surgery    History of stroke  Assessment & Plan  · Plavix was held due to slight drop in hemoglobin- as there is no evidence of active bleeding will resume  · Continue Lipitor 40 mg daily       Chronic pain syndrome  Assessment & Plan  · On morphine 30 mg twice daily, oxycodone 5 mg twice daily and gabapentin at home  · Decreased morphine 15 mg BID; gabapentin decreased to HS only  VTE Prophylaxis:  Enoxaparin (Lovenox)    Patient Centered Rounds: I have performed bedside rounds with nursing staff today  Discussions with Specialists or Other Care Team Provider: neurology   Education and Discussions with Family / Patient: patient and daughters Constantine Pulliam and Matt Medrano    Current Length of Stay: 6 day(s)    Current Patient Status: Inpatient   Certification Statement: The patient will continue to require additional inpatient hospital stay due to encephalopathy     Discharge Plan: pending hospital course     Code Status: Level 3 - DNAR and DNI    Subjective:   Unable to obtain  Patient is confused  Per staff, has been combative and refusing care  Objective:     Vitals:   Temp (24hrs), Av 7 °F (37 1 °C), Min:98 7 °F (37 1 °C), Max:98 7 °F (37 1 °C)    Temp:  [98 7 °F (37 1 °C)] 98 7 °F (37 1 °C)  HR:  [82-94] 82  Resp:  [18] 18  BP: (124-142)/(52-73) 142/73  SpO2:  [95 %-99 %] 96 %  Body mass index is 29 12 kg/m²  Input and Output Summary (last 24 hours): Intake/Output Summary (Last 24 hours) at 2022 1027  Last data filed at 2022 5072  Gross per 24 hour   Intake 950 ml   Output 1600 ml   Net -650 ml       Physical Exam:   Physical Exam  Vitals and nursing note reviewed  Constitutional:       General: She is not in acute distress  Appearance: Normal appearance  Comments: Frail and elderly      HENT:      Head: Normocephalic and atraumatic  Right Ear: External ear normal       Left Ear: External ear normal       Nose: Nose normal  No rhinorrhea  Mouth/Throat:      Mouth: Mucous membranes are moist       Pharynx: Oropharynx is clear  Eyes:      General:         Right eye: No discharge  Left eye: No discharge  Pupils: Pupils are equal, round, and reactive to light  Cardiovascular:      Rate and Rhythm: Normal rate and regular rhythm  Pulses: Normal pulses  Heart sounds: Normal heart sounds  No murmur heard  Pulmonary:      Effort: Pulmonary effort is normal  No respiratory distress  Breath sounds: Normal breath sounds  Abdominal:      General: Bowel sounds are normal  There is no distension  Palpations: Abdomen is soft  There is no mass  Tenderness: There is no abdominal tenderness  Musculoskeletal:         General: No swelling or tenderness  Normal range of motion  Cervical back: Normal range of motion and neck supple  No muscular tenderness  Skin:     General: Skin is warm and dry  Capillary Refill: Capillary refill takes less than 2 seconds  Coloration: Skin is pale  Findings: No erythema or rash  Neurological:      General: No focal deficit present  Mental Status: She is alert  She is disoriented  Comments: Patient is not oriented to person, place or time  Psychiatric:         Mood and Affect: Mood normal          Behavior: Behavior normal          Additional Data:     Labs:    Results from last 7 days   Lab Units 07/27/22  0421 07/26/22  0631   WBC Thousand/uL 10 72* 11 83*   HEMOGLOBIN g/dL 8 3* 8 1*   HEMATOCRIT % 30 1* 28 1*   PLATELETS Thousands/uL 212 203   LYMPHO PCT %  --  10*   MONO PCT %  --  8   EOS PCT %  --  0     Results from last 7 days   Lab Units 07/27/22  0421 07/26/22  0631   SODIUM mmol/L 141 139   POTASSIUM mmol/L 3 8 3 7   CHLORIDE mmol/L 95* 96   CO2 mmol/L 43* 42*   BUN mg/dL 8 8   CREATININE mg/dL 0 48* 0 51*   CALCIUM mg/dL 9 3 8 9   ALK PHOS U/L  --  85   ALT U/L  --  14   AST U/L  --  19     Results from last 7 days   Lab Units 07/21/22  0811   INR  1 03     Results from last 7 days   Lab Units 07/21/22  0818   POC GLUCOSE mg/dl 117           * I Have Reviewed All Lab Data Listed Above  * Additional Pertinent Lab Tests Reviewed: [address] Admission  Reviewed    Imaging:  Imaging Reports Reviewed Today Include: MRI pending     Recent Cultures (last 7 days):     Results from last 7 days   Lab Units 07/25/22  0856 07/21/22  0811 07/21/22  0800   BLOOD CULTURE  No Growth at 24 hrs  No Growth at 24 hrs  No Growth After 5 Days  No Growth After 5 Days    --    C DIFF TOXIN B BY PCR   --   --  Negative       Last 24 Hours Medication List:   Current Facility-Administered Medications   Medication Dose Route Frequency Provider Last Rate    acetaminophen  650 mg Oral Q6H PRN Mayra Childs, DO      al mag oxide-diphenhydramine-lidocaine viscous  10 mL Swish & Swallow Q4H PRN Mayra Childs DO      artificial tear  1 application Both Eyes Daily Mayra Childs, [place]      clopidogrel  75 mg Oral Daily [place] Gely[facility] TIFFANIE Whitehead      fluconazole  200 mg Oral Daily [place], [place]      gabapentin  300 mg Oral HS TIFFANIE Webb      gentamicin   Topical Daily [place], [place]      loperamide  2 mg Oral 4x Daily PRN [facility],       mineral oil-white petrolatum  1 inch Both Eyes HS [place], [place]      morphine  15 mg Oral Q12H [facility] TIFFANIE Webb      ondansetron  4 mg Intravenous Q4H PRN [facility],       oxyCODONE  5 mg Oral TID PRN TIFFANIE Webb      pantoprazole  40 mg Oral Early Morning [address] [facility] [address] [place] [address]      sodium chloride  50 mL/hr Intravenous Continuous TIFFANIE Webb 50 mL/hr (07/27/22 0819)    Systane Nighttime  1 inch Both Eyes HS [facility], DO          Today, Patient Was Seen By: TIFFANIE Webb    ** Please Note: Dictation voice to text software may have been used in the creation of this document   **

## 2022-07-27 NOTE — QUICK NOTE
Spoke with Anusha Martinez- daughter at bedside  At this time, she would like to pursue hospice care  CM is facilitating the process  Await for hospice evaluation  She is okay for us to give the patient ativan and diluadid IV prn to help with agitation and pain  She is aware that these medications can cause worsening confusion, respiratory depression  She would like her mother to be comfortable while waiting to be evaluated by hospice service

## 2022-07-27 NOTE — ASSESSMENT & PLAN NOTE
With worsening confusion started 7/25  The patient without focal neurological deficit  7/25 CT head without acute finding     · Neurology input appreciated -- suspects toxic metabolic encephalopathy with possible underlying cognitive impairment  · In the setting of metastatic endometrial cancer, will obtain MRI brain if the patient can tolerate   · Consider EEG if not much improved   · Vit D, B12 4788, TSH 2 99  · Resume MS contin; gabapentin at HS  · Frequent neuro check  · Supportive care   · Discussed with her daughters, the patient appears to be more confused in the morning  The patient now with worsening mental status with some visual hallucinations, intermittent combative with nursing staff

## 2022-07-27 NOTE — ASSESSMENT & PLAN NOTE
· On morphine 30 mg twice daily, oxycodone 5 mg twice daily and gabapentin at home  · Decreased morphine 15 mg BID; gabapentin decreased to HS only

## 2022-07-27 NOTE — ASSESSMENT & PLAN NOTE
· Oral thrush and likely esophagitis  · GI input appreciated   · Continue Magic mouthwash and Protonix 40 mg daily   · Continue Diflucan 200 mg daily to complete 14 days  · Tolerating dysphagia level 2 diet and thin liquids    · Of note patient has not been taking her PO medications as scheduled due to her acute encephalopathy

## 2022-07-27 NOTE — WOUND OSTOMY CARE
Progress Note - Wound   Aaliyah Kent 67 y o  female MRN: [id number]  Unit/Bed#: -01 Encounter: [id number]    Assessment:   Wound care weekly follow up  Patient in bed for assessment  She is combative and verbally abusive refusing wound care treatments  One time dose of morphine given and one time dose of ativan  Patient uncooperative and kicking at staff  She has soft wrist restraints in place  Was able to remove dressings to the lower extremities with assistance from daughter at the bedside  Patient was verbally abusive to the daughter as well  She was more cooperative on initial consult and was not as verbally abusive  Was able to apply dressings to the lower extremities but was not able to cleanse wound beds, only pour NSS onto wound beds to loosen dressings  AP at bedside to assist with dressing changes to the lower extremities  Patient did not allow dressings to be changed on 7/26 per patient's daughter  Patient on P-500 low air loss therapy surface, laying supine at time of assessment and Allevyn heel foams in place  Patient was incontinent of stool earlier today, Allevyn foams intact at that time  Findings  1  Bilateral lower extremity venous stasis ulcers beefy red with yellow in the wound beds, periwounds with less edema than on prior assessment  2  Unable to continue with dressing changes to the sacrum and right thigh-ischium due to patient's refusal of treatment    Discussed with daughter at bedside that sacral and ischial wounds will not be changed by wound care today  She requested that her sister attempt to change those wounds as she has been changing the dressings at home  Skin and Wound Care Plan:   1  Apply skin nourishing cream to the skin daily  2  Ehob offloading cushion to chair when OOB  3  Turn and reposition patient Q2 hours  4  Cleanse left lateral thigh and sacral wound with Wound Cleanser, pat dry   Apply Gentamicin to wound beds, pack wound beds lightly with moistened 1" gauze and cover with Allevyn life silicone bordered foam dressings, change daily and PRN  5  Cleanse wounds to the anterior and posterior lower legs gently with Wound Cleanser and gauze  Apply Hydraguard to the periwounds, place Xeroform on the wound beds, top with ABD pads and wrap with max  Change daily and PRN  6  Allevyn life heel foams to bilateral heels, kirk with P, date, and peel back daily for skin assessment, change every 3 days or with soilage or dislodgement  7  P-500 low air loss therapy surface     Wound:  Wound 01/22/22 Pressure Injury Sacrum Right (Active)   Wound Image   07/22/22 0825   Wound Description ROBERT 07/26/22 2051   Allie-wound Assessment ROBERT 07/26/22 2051   Drainage Amount ROBERT 07/26/22 2051   Treatments Cleansed 07/22/22 0825   Dressing Foam, Silicon (eg  Allevyn, etc) 07/26/22 2051   Dressing Status Clean;Dry; Intact 07/26/22 2051       Wound 01/22/22 Pressure Injury Hip Left;Lateral (Active)       Wound 01/22/22 Buttocks Left (Active)   Wound Image   07/22/22 1110   Wound Description ROBERT 07/26/22 2051   Allie-wound Assessment ROBERT 07/26/22 2051   Drainage Amount ROBERT 07/26/22 2051   Treatments Cleansed;Elevated 07/22/22 1110   Dressing Foam, Silicon (eg  Allevyn, etc) 07/26/22 2051   Dressing Status Clean;Dry; Intact 07/24/22 0839       Wound 01/24/22 Buttocks N/A (Active)       Wound 07/22/22 Venous Ulcer Pretibial Left (Active)   Wound Image   07/27/22 1321   Wound Description Beefy red;Yellow 07/27/22 1321   Allie-wound Assessment Dry;Scaly 07/27/22 1321   Wound Length (cm) 17 5 cm 07/22/22 1209   Wound Width (cm) 9 5 cm 07/22/22 1209   Wound Depth (cm) 0 1 cm 07/22/22 1209   Wound Surface Area (cm^2) 166 25 cm^2 07/22/22 1209   Wound Volume (cm^3) 16 625 cm^3 07/22/22 1209   Calculated Wound Volume (cm^3) 16 63 cm^3 07/22/22 1209   Undermining 0 07/22/22 1209   Drainage Amount Small 07/27/22 1321   Drainage Description Serosanguineous 07/27/22 1321   Non-staged Wound Description Partial thickness 07/27/22 1321   Treatments Irrigation with NSS 07/27/22 1321   Dressing ABD;Gauze; Xeroform 07/27/22 1321   Wound packed? No 07/27/22 1321   Dressing Changed Changed 07/27/22 1321   Patient Tolerance Tolerated poorly 07/27/22 1321   Dressing Status Clean;Dry; Intact 07/27/22 0900       Wound 07/22/22 Tibial Left;Posterior (Active)   Wound Image   07/27/22 1321   Wound Description Beefy red;Yellow 07/27/22 1321   Allie-wound Assessment Dry 07/27/22 1321   Wound Length (cm) 5 cm 07/22/22 1208   Wound Width (cm) 3 cm 07/22/22 1208   Wound Depth (cm) 0 1 cm 07/22/22 1208   Wound Surface Area (cm^2) 15 cm^2 07/22/22 1208   Wound Volume (cm^3) 1 5 cm^3 07/22/22 1208   Calculated Wound Volume (cm^3) 1 5 cm^3 07/22/22 1208   Undermining 0 07/22/22 1208   Drainage Amount Scant 07/27/22 1321   Drainage Description Serosanguineous 07/27/22 1321   Non-staged Wound Description Partial thickness 07/27/22 1321   Treatments Irrigation with NSS 07/27/22 1321   Dressing ABD;Gauze; Xeroform 07/27/22 1321   Dressing Changed Changed 07/27/22 1321   Patient Tolerance Tolerated poorly 07/27/22 1321   Dressing Status Clean;Dry; Intact 07/27/22 0900       Wound 07/22/22 Venous Ulcer Pretibial Right (Active)   Wound Image    07/22/22 1216   Wound Description Beefy red 07/27/22 1321   Allie-wound Assessment Dry 07/27/22 1321   Wound Length (cm) 14 5 cm 07/22/22 1216   Wound Width (cm) 19 cm 07/22/22 1216   Wound Depth (cm) 0 1 cm 07/22/22 1216   Wound Surface Area (cm^2) 275 5 cm^2 07/22/22 1216   Wound Volume (cm^3) 27 55 cm^3 07/22/22 1216   Calculated Wound Volume (cm^3) 27 55 cm^3 07/22/22 1216   Undermining 0 07/22/22 1216   Drainage Amount Small 07/27/22 1321   Drainage Description Serosanguineous 07/27/22 1321   Non-staged Wound Description Partial thickness 07/27/22 1321   Treatments Irrigation with NSS 07/27/22 1321   Dressing ABD;Gauze; Xeroform 07/27/22 1321   Dressing Changed Changed 07/27/22 1321   Patient Tolerance Tolerated poorly 07/27/22 1321   Dressing Status Clean;Dry; Intact 07/27/22 0900       Wound 07/22/22 Pressure Injury Thigh Anterior;Left;Proximal (Active)   Wound Image   07/22/22 1228   Wound Description Beefy red 07/27/22 0900   Pressure Injury Stage 3 07/27/22 0900   Allie-wound Assessment Pink 07/27/22 0900   Wound Length (cm) 1 4 cm 07/22/22 1226   Wound Width (cm) 1 8 cm 07/22/22 1226   Wound Depth (cm) 0 7 cm 07/22/22 1226   Wound Surface Area (cm^2) 2 52 cm^2 07/22/22 1226   Wound Volume (cm^3) 1 764 cm^3 07/22/22 1226   Calculated Wound Volume (cm^3) 1 76 cm^3 07/22/22 1226   Tunneling 5 9 cm 07/22/22 1226   Tunneling in depth located at 1200 07/22/22 1226   Drainage Amount Scant 07/25/22 1400   Drainage Description Yellow 07/25/22 1400   Treatments Cleansed;Irrigation with NSS;Site care 07/25/22 1400   Dressing Foam, Silicon (eg  Allevyn, etc); Gauze; Other (Comment) 07/26/22 2051   Wound packed? Yes 07/25/22 1400   Packing- # removed 1 07/25/22 1400   Packing- # inserted 2 07/25/22 1400   Dressing Changed Changed 07/25/22 1400   Patient Tolerance Tolerated well 07/25/22 1400   Dressing Status Clean;Dry; Intact 07/27/22 0900       Wound 07/22/22 Pressure Injury Coccyx (Active)   Wound Image    07/22/22 1233   Wound Description Beefy red 07/27/22 0900   Pressure Injury Stage 3 07/27/22 0900   Allie-wound Assessment Pink 07/27/22 0900   Wound Length (cm) 2 7 cm 07/22/22 1233   Wound Width (cm) 1 cm 07/22/22 1233   Wound Depth (cm) 1 3 cm 07/22/22 1233   Wound Surface Area (cm^2) 2 7 cm^2 07/22/22 1233   Wound Volume (cm^3) 3 51 cm^3 07/22/22 1233   Calculated Wound Volume (cm^3) 3 51 cm^3 07/22/22 1233   Tunneling 0 cm 07/22/22 1233   Undermining 1 9 07/22/22 1233   Undermining is depth extending from 6-1 07/22/22 1233   Drainage Amount Scant 07/25/22 1400   Drainage Description Yellow 07/25/22 1400   Treatments Cleansed;Irrigation with NSS;Site care 07/25/22 1400   Dressing Foam, Silicon (eg  Allevyn, etc); Other (Comment) 07/26/22 2051   Wound packed? Yes 07/25/22 1400   Packing- # removed 1 07/25/22 1400   Packing- # inserted 1 07/25/22 1400   Dressing Changed Changed 07/25/22 1400   Patient Tolerance Tolerated well 07/25/22 1400   Dressing Status Clean;Dry; Intact 07/25/22 1400     Reviewed plan of care with primary RN [facility]  Recommendations written as orders  Wound care team to follow weekly while admitted  Questions or concerns [address] [facility] Nurse    Margret Burkitt BSN, RN, [facility]

## 2022-07-27 NOTE — PLAN OF CARE
Problem: Potential for Falls  Goal: Patient will remain free of falls  Description: INTERVENTIONS:  - Educate patient/family on patient safety including physical limitations  - Instruct patient to call for assistance with activity   - Consult OT/PT to assist with strengthening/mobility   - Keep Call bell within reach  - Keep bed low and locked with side rails adjusted as appropriate  - Keep care items and personal belongings within reach  - Initiate and maintain comfort rounds  - Make Fall Risk Sign visible to staff  - Offer Toileting every 2 Hours, in advance of need  - Initiate/Maintain bed/chair alarm  - Obtain necessary fall risk management equipment  - Apply yellow socks and bracelet for high fall risk patients  Outcome: Progressing     Problem: MOBILITY - ADULT  Goal: Maintain or return to baseline ADL function  Description: INTERVENTIONS:  -  Assess patient's ability to carry out ADLs; assess patient's baseline for ADL function and identify physical deficits which impact ability to perform ADLs (bathing, care of mouth/teeth, toileting, grooming, dressing, etc )  - Assess/evaluate cause of self-care deficits   - Assess range of motion  - Assess patient's mobility; develop plan if impaired  - Assess patient's need for assistive devices and provide as appropriate  - Encourage maximum independence but intervene and supervise when necessary  - Involve family in performance of ADLs  - Assess for home care needs following discharge   - Consider OT consult to assist with ADL evaluation and planning for discharge  - Provide patient education as appropriate  Outcome: Progressing  Goal: Maintains/Returns to pre admission functional level  Description: INTERVENTIONS:  - Perform BMAT or MOVE assessment daily    - Set and communicate daily mobility goal to care team and patient/family/caregiver  - Collaborate with rehabilitation services on mobility goals if consulted  - Perform Range of Motion 3 times a day    - Reposition patient every 2 hours  - Dangle patient 3 times a day  - Stand patient 3 times a day  - Ambulate patient 3 times a day  - Out of bed to chair 3 times a day   - Out of bed for meals 3 times a day  - Out of bed for toileting  - Record patient progress and toleration of activity level   Outcome: Progressing     Problem: Prexisting or High Potential for Compromised Skin Integrity  Goal: Skin integrity is maintained or improved  Description: INTERVENTIONS:  - Identify patients at risk for skin breakdown  - Assess and monitor skin integrity  - Assess and monitor nutrition and hydration status  - Monitor labs   - Assess for incontinence   - Turn and reposition patient  - Assist with mobility/ambulation  - Relieve pressure over bony prominences  - Avoid friction and shearing  - Provide appropriate hygiene as needed including keeping skin clean and dry  - Evaluate need for skin moisturizer/barrier cream  - Collaborate with interdisciplinary team   - Patient/family teaching  - Consider wound care consult   Outcome: Progressing     Problem: Nutrition/Hydration-ADULT  Goal: Nutrient/Hydration intake appropriate for improving, restoring or maintaining nutritional needs  Description: Monitor and assess patient's nutrition/hydration status for malnutrition  Collaborate with interdisciplinary team and initiate plan and interventions as ordered  Monitor patient's weight and dietary intake as ordered or per policy  Utilize nutrition screening tool and intervene as necessary  Determine patient's food preferences and provide high-protein, high-caloric foods as appropriate       INTERVENTIONS:  - Monitor oral intake, urinary output, labs, and treatment plans  - Assess nutrition and hydration status and recommend course of action  - Evaluate amount of meals eaten  - Assist patient with eating if necessary   - Allow adequate time for meals  - Recommend/ encourage appropriate diets, oral nutritional supplements, and vitamin/mineral supplements  - Order, calculate, and assess calorie counts as needed  - Assess need for intravenous fluids  - Provide specific nutrition/hydration education as appropriate  - Include patient/family/caregiver in decisions related to nutrition  Outcome: Progressing     Problem: PAIN - ADULT  Goal: Verbalizes/displays adequate comfort level or baseline comfort level  Description: Interventions:  - Encourage patient to monitor pain and request assistance  - Assess pain using appropriate pain scale  - Administer analgesics based on type and severity of pain and evaluate response  - Implement non-pharmacological measures as appropriate and evaluate response  - Consider cultural and social influences on pain and pain management  - Notify physician/advanced practitioner if interventions unsuccessful or patient reports new pain  Outcome: Progressing     Problem: DISCHARGE PLANNING  Goal: Discharge to home or other facility with appropriate resources  Description: INTERVENTIONS:  - Identify barriers to discharge w/patient and caregiver  - Arrange for needed discharge resources and transportation as appropriate  - Identify discharge learning needs (meds, wound care, etc )  - Refer to Case Management Department for coordinating discharge planning if the patient needs post-hospital services based on physician/advanced practitioner order or complex needs related to functional status, cognitive ability, or social support system  Outcome: Progressing     Problem: Knowledge Deficit  Goal: Patient/family/caregiver demonstrates understanding of disease process, treatment plan, medications, and discharge instructions  Description: Complete learning assessment and assess knowledge base    Interventions:  - Provide teaching at level of understanding  - Provide teaching via preferred learning methods  Outcome: Progressing     Problem: GASTROINTESTINAL - ADULT  Goal: Minimal or absence of nausea and/or vomiting  Description: INTERVENTIONS:  - Administer IV fluids if ordered to ensure adequate hydration  - Maintain NPO status until nausea and vomiting are resolved  - Nasogastric tube if ordered  - Administer ordered antiemetic medications as needed  - Provide nonpharmacologic comfort measures as appropriate  - Advance diet as tolerated, if ordered  - Consider nutrition services referral to assist patient with adequate nutrition and appropriate food choices  Outcome: Progressing  Goal: Maintains or returns to baseline bowel function  Description: INTERVENTIONS:  - Assess bowel function  - Encourage oral fluids to ensure adequate hydration  - Administer IV fluids if ordered to ensure adequate hydration  - Administer ordered medications as needed  - Encourage mobilization and activity  - Consider nutritional services referral to assist patient with adequate nutrition and appropriate food choices  Outcome: Progressing  Goal: Maintains adequate nutritional intake  Description: INTERVENTIONS:  - Monitor percentage of each meal consumed  - Identify factors contributing to decreased intake, treat as appropriate  - Assist with meals as needed  - Monitor I&O, weight, and lab values if indicated  - Obtain nutrition services referral as needed  Outcome: Progressing

## 2022-07-27 NOTE — ACP (ADVANCE CARE PLANNING)
Serious Illness Conversation    1  What is your understanding now of where you are with your illness? Prognostic Understanding: appropriate understanding of prognosis     2  How much information about what is likely to be ahead with your illness would you like to have? Information: patient wants to be fully informed     3  What did you (clinician) communicate to the patient? Prognostic Communication: Uncertain - It can be difficult to predict what will happen with your illness  I hope you will continue to live well for a long time but Im worried that you could get sick quickly, and I think it is important to prepare for that possibility  4  If your health situation worsens, what are your most important goals? 5  What are the biggest fears and worries about the future and your health? 6  What abilities are so critical to your life that you cannot imagine living without them? 7  What gives you strength as you think about the future with your illness? 8  If you become sicker, how much are you willing to go through for the possibility of gaining more time? Have a feeding tube: No   Be in the ICU: No Live in a nursing home: No   Be on a ventilator: No Be uncomfortable: No   Be on dialysis: No       9  How much does your proxy and family know about your priorities and wishes? How does this plan sound to you? I will do everything I can to help you through this  I have spent 30 minutes speaking with the patient's daughter at bedside on advanced care planning today or during this visit  The patient is with acute encephalopathy and unable to answer questions        Advanced directives  Five Wishes: Patient does not have Five Wishes- would not like information

## 2022-07-27 NOTE — OCCUPATIONAL THERAPY NOTE
07/27/22 1200   OT Last Visit   OT Visit Date 07/27/22   Note Type   Note Type Cancelled Session   Cancel Reasons Medical status  (patient is unable to engage in purposeful/therapeutic activities at this time)     DANIELA Cook/LINDA

## 2022-07-28 ENCOUNTER — TRANSCRIBE ORDERS (OUTPATIENT)
Dept: HOME HEALTH SERVICES | Facility: HOME HEALTHCARE | Age: 72
End: 2022-07-28

## 2022-07-28 ENCOUNTER — HOME CARE VISIT (OUTPATIENT)
Dept: HOME HEALTH SERVICES | Facility: HOME HEALTHCARE | Age: 72
End: 2022-07-28

## 2022-07-28 DIAGNOSIS — J96.01 ACUTE RESPIRATORY FAILURE WITH HYPOXIA (HCC): Primary | ICD-10-CM

## 2022-07-28 LAB
ANION GAP SERPL CALCULATED.3IONS-SCNC: 2 MMOL/L (ref 4–13)
BASE EX.OXY STD BLDV CALC-SCNC: 92.2 % (ref 60–80)
BASE EXCESS BLDV CALC-SCNC: 13 MMOL/L
BUN SERPL-MCNC: 8 MG/DL (ref 5–25)
CALCIUM SERPL-MCNC: 9 MG/DL (ref 8.4–10.2)
CHLORIDE SERPL-SCNC: 96 MMOL/L (ref 96–108)
CO2 SERPL-SCNC: 43 MMOL/L (ref 21–32)
CREAT SERPL-MCNC: 0.46 MG/DL (ref 0.6–1.3)
ERYTHROCYTE [DISTWIDTH] IN BLOOD BY AUTOMATED COUNT: 20.3 % (ref 11.6–15.1)
GFR SERPL CREATININE-BSD FRML MDRD: 99 ML/MIN/1.73SQ M
GLUCOSE SERPL-MCNC: 116 MG/DL (ref 65–140)
HCO3 BLDV-SCNC: 39.7 MMOL/L (ref 24–30)
HCT VFR BLD AUTO: 31.8 % (ref 34.8–46.1)
HGB BLD-MCNC: 9.1 G/DL (ref 11.5–15.4)
MCH RBC QN AUTO: 28.3 PG (ref 26.8–34.3)
MCHC RBC AUTO-ENTMCNC: 28.6 G/DL (ref 31.4–37.4)
MCV RBC AUTO: 99 FL (ref 82–98)
O2 CT BLDV-SCNC: 13.4 ML/DL
PCO2 BLDV: 63.8 MM HG (ref 42–50)
PH BLDV: 7.41 [PH] (ref 7.3–7.4)
PLATELET # BLD AUTO: 236 THOUSANDS/UL (ref 149–390)
PMV BLD AUTO: 8.5 FL (ref 8.9–12.7)
PO2 BLDV: 76.2 MM HG (ref 35–45)
POTASSIUM SERPL-SCNC: 3.7 MMOL/L (ref 3.5–5.3)
RBC # BLD AUTO: 3.22 MILLION/UL (ref 3.81–5.12)
SODIUM SERPL-SCNC: 141 MMOL/L (ref 135–147)
WBC # BLD AUTO: 8.13 THOUSAND/UL (ref 4.31–10.16)

## 2022-07-28 PROCEDURE — 97110 THERAPEUTIC EXERCISES: CPT

## 2022-07-28 PROCEDURE — C9113 INJ PANTOPRAZOLE SODIUM, VIA: HCPCS | Performed by: PHYSICIAN ASSISTANT

## 2022-07-28 PROCEDURE — 85027 COMPLETE CBC AUTOMATED: CPT | Performed by: NURSE PRACTITIONER

## 2022-07-28 PROCEDURE — 97116 GAIT TRAINING THERAPY: CPT

## 2022-07-28 PROCEDURE — 97530 THERAPEUTIC ACTIVITIES: CPT

## 2022-07-28 PROCEDURE — 82805 BLOOD GASES W/O2 SATURATION: CPT | Performed by: NURSE PRACTITIONER

## 2022-07-28 PROCEDURE — 80048 BASIC METABOLIC PNL TOTAL CA: CPT | Performed by: NURSE PRACTITIONER

## 2022-07-28 PROCEDURE — 99232 SBSQ HOSP IP/OBS MODERATE 35: CPT | Performed by: NURSE PRACTITIONER

## 2022-07-28 RX ORDER — TRAZODONE HYDROCHLORIDE 50 MG/1
50 TABLET ORAL ONCE
Status: COMPLETED | OUTPATIENT
Start: 2022-07-28 | End: 2022-07-28

## 2022-07-28 RX ORDER — PANTOPRAZOLE SODIUM 40 MG/10ML
40 INJECTION, POWDER, LYOPHILIZED, FOR SOLUTION INTRAVENOUS
Status: DISCONTINUED | OUTPATIENT
Start: 2022-07-28 | End: 2022-08-02 | Stop reason: HOSPADM

## 2022-07-28 RX ADMIN — FLUCONAZOLE 200 MG: 100 TABLET ORAL at 08:18

## 2022-07-28 RX ADMIN — MORPHINE SULFATE 15 MG: 15 TABLET, EXTENDED RELEASE ORAL at 08:18

## 2022-07-28 RX ADMIN — GABAPENTIN 300 MG: 300 CAPSULE ORAL at 21:16

## 2022-07-28 RX ADMIN — TRAZODONE HYDROCHLORIDE 50 MG: 50 TABLET ORAL at 22:27

## 2022-07-28 RX ADMIN — MINERAL OIL AND WHITE PETROLATUM 1 INCH: 30; 940 OINTMENT OPHTHALMIC at 22:28

## 2022-07-28 RX ADMIN — PANTOPRAZOLE SODIUM 40 MG: 40 INJECTION, POWDER, FOR SOLUTION INTRAVENOUS at 05:53

## 2022-07-28 RX ADMIN — MORPHINE SULFATE 15 MG: 15 TABLET, EXTENDED RELEASE ORAL at 21:15

## 2022-07-28 RX ADMIN — MINERAL OIL AND PETROLATUM 1 APPLICATION: 150; 830 OINTMENT OPHTHALMIC at 15:03

## 2022-07-28 RX ADMIN — GENTAMICIN SULFATE: 1 OINTMENT TOPICAL at 16:36

## 2022-07-28 RX ADMIN — HYDROMORPHONE HYDROCHLORIDE 0.5 MG: 1 INJECTION, SOLUTION INTRAMUSCULAR; INTRAVENOUS; SUBCUTANEOUS at 18:46

## 2022-07-28 RX ADMIN — SODIUM CHLORIDE 50 ML/HR: 0.9 INJECTION, SOLUTION INTRAVENOUS at 08:17

## 2022-07-28 RX ADMIN — CLOPIDOGREL BISULFATE 75 MG: 75 TABLET ORAL at 08:18

## 2022-07-28 NOTE — ASSESSMENT & PLAN NOTE
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

## 2022-07-28 NOTE — PHYSICAL THERAPY NOTE
Physical Therapy Treatment Session Note    Patient's Name: Yajaira Zhang    Admitting Diagnosis  Diarrhea [R19 7]  Weakness generalized [R53 1]  Hypoxia [R09 02]  Generalized weakness [R53 1]  Elevated procalcitonin [R79 89]    Problem List  Patient Active Problem List   Diagnosis    Rotator cuff tear arthropathy of left shoulder    Rotator cuff tear arthropathy, right    Chronic pain syndrome    Stroke-like symptoms    Elevated troponin    Elevated d-dimer    Acute nasopharyngitis    Essential hypertension    History of stroke    Arthritis    Acute arterial ischemic stroke, vertebrobasilar, brainstem, right (HCC)    Cervical disc herniation    Cervical spondylosis    Coronary artery disease involving native coronary artery of native heart without angina pectoris    GERD (gastroesophageal reflux disease)    History of knee replacement, total, bilateral    Lateral medullary syndrome    Stage III pressure ulcer of sacral region ([facility] )    Hyponatremia    ANDRES (acute kidney injury) ([facility] )    Vaginal bleeding    Lower extremity edema    Decubitus ulcer of ischial area, left, stage III ([facility] )    Endometrial cancer ([facility] )    Dehydration    Gait disturbance    Chronic venous stasis dermatitis of bilateral lower extremities    Chemotherapy induced neutropenia (HCC)    Acute cystitis without hematuria    Colitis    Metabolic alkalosis with respiratory acidosis    Hypokalemia    Acute respiratory failure with hypoxia (HCC)    Toxic gastroenteritis and colitis due to chemotherapy    Oral thrush    Sepsis ([address] )    Encephalopathy    Goals of care, counseling/discussion       Past Medical History  Past Medical History:   Diagnosis Date    Anxiety     Arthritis     Bernard's esophagus     Bleeds easily ([facility] )     CVA (cerebral vascular accident) ([facility] ) 10/2016    Endometrial cancer ([facility] )     Fibromyalgia     GERD (gastroesophageal reflux disease)     Hypercholesterolemia     Hypertension     Insomnia     Rheumatoid arthritis ([facility] ) Stroke [facility]) 2016       Past Surgical History  Past Surgical History:   Procedure Laterality Date    APPENDECTOMY      BREAST BIOPSY Bilateral     benign    CARPAL TUNNEL RELEASE Left 10/27/2003    DORSAL COMPARTMENT RELEASE Left 03/09/2006    FINGER SURGERY      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    IR PORT PLACEMENT  5/23/2022    KNEE ARTHROSCOPY Right     x3(6/91,7/96,7/99)    OTHER SURGICAL HISTORY Left 03/09/2006    tennis elbow release    MS DEBRIDEMENT, SKIN, SUB-Q TISSUE,=<20 SQ CM N/A 1/24/2022    Procedure: SACRAL DEBRIDEMENT WOUND AND DRESSING CHANGE ([address] OUT); Surgeon: Mamie Gowers, MD;  Location: [facility];  Service: General    REPLACEMENT TOTAL KNEE BILATERAL      rt-11/99, lt-1/13/10    ROTATOR CUFF REPAIR Bilateral     SHOULDER ARTHROSCOPY Right 02/10/2016    SHOULDER ARTHROSCOPY Left     11/26/08,9/99    SHOULDER ARTHROSCOPY Left 03/09/2006    TONSILLECTOMY AND ADENOIDECTOMY      TRIGGER FINGER RELEASE Left 07/01/2002    middle and ring finger    ULNAR NERVE TRANSPOSITION Left         07/28/22 0843   PT Last Visit   PT Visit Date 07/28/22   Note Type   Note Type Treatment   Pain Assessment   Pain Assessment Tool 0-10   Pain Score 9   Pain Location/Orientation Orientation: Bilateral;Location: Leg   Pain Onset/Description Onset: Ongoing;Frequency: Constant/Continuous; Descriptor: Aching   Effect of Pain on Daily Activities yes   Patient's Stated Pain Goal No pain   Hospital Pain Intervention(s) Medication (See MAR); Repositioned; Ambulation/increased activity; Emotional support; Environmental changes   Multiple Pain Sites Yes   Restrictions/Precautions   Weight Bearing Precautions Per Order No   Other Precautions Cognitive; Chair Alarm; Bed Alarm;Multiple lines;O2;Pain; Fall Risk  (jones, decreased skin integrity B legs)   General   Chart Reviewed Yes   Response to Previous Treatment Patient unable to report, no changes reported from family or staff   Family/Caregiver Present Yes  (daughter Alayna Moore present upon conclusion)   Cognition   Overall Cognitive Status Impaired   Arousal/Participation Alert; Responsive; Cooperative   Attention Attends with cues to redirect   Orientation Level Oriented to person;Oriented to place; Disoriented to time;Disoriented to situation  (could not provide year)   Memory Decreased recall of precautions;Decreased recall of recent events   Following Commands Follows one step commands with increased time or repetition   Comments Pt  agreeable to PT tx session,pleasant and cooperative  Subjective   Subjective "my legs are achy"   Bed Mobility   Additional Comments DNT pt  was sitting out of bed on commode w/PCAs upon arrival    Transfers   Sit to Stand 4  Minimal assistance   Additional items Assist x 2;Armrests; Increased time required;Verbal cues  (RW usage)   Stand to Sit 4  Minimal assistance   Additional items Assist x 2;Armrests; Increased time required;Verbal cues   Stand pivot 3  Moderate assistance   Additional items Assist x 2;Armrests; Increased time required;Verbal cues   Toilet transfer 3  Moderate assistance   Additional items Assist x 2;Armrests; Increased time required;Verbal cues; Commode   Additional Comments Verbal cues for proper body mechanics, appropriate RW usage, safety awareness with directional changes  Ambulation/Elevation   Gait pattern Improper Weight shift; Antalgic; Forward Flexion;Decreased foot clearance; Short stride; Excessively slow   Gait Assistance 3  Moderate assist   Additional items Assist x 2;Verbal cues; Tactile cues   Assistive Device Rolling walker   Distance 5 feet  (could not advance distance due to fatigue severity with accompanied safety concerns )   Ambulation/Elevation Additional Comments Verbal cues for proper body mechanics and RW usage  Tactile cues for stability w/transitional movements     Balance   Static Sitting Fair -   Dynamic Sitting Poor +   Static Standing Poor   Dynamic Standing Poor   Ambulatory Poor -   Endurance Deficit Endurance Deficit Yes   Activity Tolerance   Activity Tolerance Patient limited by fatigue;Patient limited by pain   Nurse Made Aware Yes, nursing staff was informed of tx session outcome  Exercises   Balance training  Postural correction tasks in unsupported sitting, 10 second hold with deep breathing  B reaching across midline with varying heights x 10 reps B  Assessment   Prognosis Fair   Problem List Decreased strength;Decreased endurance; Impaired balance;Decreased mobility; Decreased coordination;Decreased cognition;Pain   Assessment Pt seen for PT treatment session this date with interventions consisting of gait training to reduce the risk of medical complications w/ emphasis on improving pt's ability to ambulate level surfaces x 5 feet with mod A of 2 provided by therapist with RW, Therapeutic exercise to increase BLE stability consisting of: AROM varying reps B LE and B UE in sitting position and therapeutic activity to reduce fall risk consisting of training: sit<>stand transfers, static standing tolerance for 2 minutes w/ B UE support, vc and tactile cues for static sitting posture faciliation, vc and tactile cues for static standing posture faciliation, stand pivot transfers towards B direction and toileting  Pt agreeable to PT treatment session upon arrival, pt found seated OOB on commode, A&O x 2  In comparison to previous session, pt with improvements in cognition, functional task tolerance  Post session: pt returned back to recliner, chair alarm engaged, all needs in reach and RN notified of session findings/recommendations  Continue to recommend post acute rehabilitation services at time of d/c in order to maximize pt's functional independence and safety w/ mobility  Pt continues to be functioning below baseline level, and remains limited 2* factors listed above and including weakness, impaired balance, activity intolerance,pain, gait deviations   PT will continue to see pt during current hospitalization in order to address the deficits listed above and provide interventions consistent w/ POC in effort to achieve LTGs  Barriers to Discharge Inaccessible home environment   Goals   Patient Goals to have less pain   LTG Expiration Date 07/31/22   Long Term Goal #1 LTGs remain appropriate   PT Treatment Day 2   Plan   Treatment/Interventions Functional transfer training;LE strengthening/ROM; Therapeutic exercise; Endurance training;Cognitive reorientation;Patient/family training;Equipment eval/education; Bed mobility;Gait training;Spoke to nursing   Progress Slow progress, decreased activity tolerance   PT Frequency 3-5x/wk   Recommendation   PT Discharge Recommendation Post acute rehabilitation services  (maintained recommendation)   Equipment Recommended [facility] Recommended Wheeled walker   Change/add to [employer]? No   Additional Comments Upon conclusion, pt  was resting out of bed on recliner  Chair alarm was engaged and all needs within reach     AM-PAC Basic Mobility Inpatient   Turning in Bed Without Bedrails 2   Lying on Back to Sitting on Edge of Flat Bed 2   Moving Bed to Chair 2   Standing Up From Chair 2   Walk in Room 2   Climb 3-5 Stairs 2   Basic Mobility Inpatient Raw Score 12   Basic Mobility Standardized Score 32 23   Highest Level Of Mobility   JH-HLM Goal 4: Move to chair/commode   JH-HLM Achieved 4: Move to chair/commode   Education   Education Provided Mobility training;Assistive device   Patient Demonstrates acceptance/verbal understanding;Reinforcement needed     Treatment Time: 2005-0990    Fawn Dudley, PT

## 2022-07-28 NOTE — ASSESSMENT & PLAN NOTE
Meets sepsis criteria with tachycardia and leukocytosis suspects underlying pneumonia/aspiration pneumonia/pneumonitis on 7/25 with worsening confusion  Sepsis alert was called  As there is no clear source of infection identified at this time, sepsis is ruled out  · Procalcitonin- has been negative  · Leukocytosis initially persistent--now resolved  · The patient received IV fluid per sepsis protocol  · Blood cultures- NGTD, MRSA culture-negative  Blood cultures on admission- negative  · Started on cefepime and vancomycin; as procalcitonin is negative-antibiotics are discontinued

## 2022-07-28 NOTE — UTILIZATION REVIEW
Continued Stay Review    Date: 7/28/22                      Current Patient Class: Inpatient Current Level of Care: Med Surg    HPI:72 y o  female initially admitted on 7/21/22  On 7/25, patient developed worsening confusion  Met sepsis criteria with tachycardia and leukocytosis, suspected underlying pneumonia/aspiration pneumonia/pneumonitis  Patient was started on IV cefepime and vancomycin  On 7/26 Neurology was consulted, noted encephalopathy is multifactorial and in the setting of malnutrition, poor baseline with advanced metastatic cancer, use of cefepime (which is known to contribute to encephalopathy), metabolic abnormalities, anemia, use of sedatives/opioids, and ongoing infection including pneumonia with worsening of mental status coinciding with concern for sepsis  No reported focal neurologic changes or seizure-like activity  No clear evidence of gross metastatic disease on CT  Recommended MRI brain, EEG if not improved  On 7/27, patient remained confused, combative, refusing care  No clear source of infection identified  Family requested hospice evaluation  Family agreeable to patient receiving Ativan and Dilaudid PRN to help with agitation and pain pending hospice evaluation  7/28 Internal Medicine:  Acute encephalopathy now resolved  Patient is now AAOx3  This is a significant improvement from yesterday  Suspect that she might have been withdrawing from narcotics (MS contin and gabapentin) as she has not been able to tolerate p o  Intake over the past few days  The patient received IV Ativan, morphine, and Dilaudid for acute pain and agitation over the past 48 hours  Sepsis ruled out, leukocytosis resolved, blood cultures NGTD  Patient reports she is not interested in hospice at this time, but is agreeable to speak with hospice to obtain information  Physical Therapy recommendation is post acute rehab at discharge         Vital Signs:       Date/Time Temp Pulse Resp BP MAP (mmHg) SpO2 Calculated FIO2 (%)  Nasal Cannula O2 Flow Rate (L/min) O2 Device   07/28/22 07:23:55 98 9 °F (37 2 °C) 95 17 121/53 76 97 % -- -- --   07/27/22 2249 98 5 °F (36 9 °C) 89 19 107/80 -- 95 % -- -- Nasal cannula   07/27/22 1915 98 °F (36 7 °C) 105 17 -- -- -- 32 3 L/min Nasal cannula   07/27/22 15:03:55 98 7 °F (37 1 °C) 83 16 101/62 75 96 % 32 3 L/min Nasal cannula   07/27/22 0900 -- -- -- -- -- 95 % -- -- Nasal cannula   07/27/22 07:40:48 98 7 °F (37 1 °C) 82 18 142/73 96 96 % 44 6 L/min Nasal cannula   07/26/22 21:34:44 -- 86 18 137/68 91 95 % -- -- --   07/26/22 15:06:34 -- 94 18 124/52 76 99 % -- -- --   07/26/22 07:10:09 98 4 °F (36 9 °C) 92 21 111/46 68 97 % -- -- --   07/26/22 00:45:46 98 7 °F (37 1 °C) 91 -- -- -- 93 % -- -- --   07/26/22 00:45:36 98 7 °F (37 1 °C) 89 -- -- -- 86 % Abnormal  -- -- --   07/26/22 00:19:39 99 6 °F (37 6 °C) 92 -- -- -- 89 % Abnormal           Date and Time Eye Opening Best Verbal Response Best Motor Response Marcell Coma Scale Score   07/27/22 1915 4 5 6 15   07/27/22 0900 4 4 5 13   07/27/22 0300 4 4 6 14   07/26/22 2300 4 4 6 14   07/26/22 2051 4 4 6 14   07/26/22 1100 4 4 6 14   07/25/22 1920 4 4 6 14       Pertinent Labs/Diagnostic Results:     7/27 MRI brain:  Mild-to-moderate motion artifact  No acute intracranial abnormality  No evidence of intracranial metastasis on this motion degraded exam  Minimal chronic microangiopathy      7/25 CT head: No acute intracranial abnormality  If there is concern for intracranial metastatic disease, either contrast-enhanced CT or MRI of the brain with contrast recommended to exclude small metastasis      7/25 CXR: Mild pulmonary vascular congestion        Results from last 7 days   Lab Units 07/28/22  0504 07/27/22  0421 07/26/22  0631 07/25/22  0543 07/24/22  0450   WBC Thousand/uL 8 13 10 72* 11 83* 12 21* 12 38*   HEMOGLOBIN g/dL 9 1* 8 3* 8 1* 8 7* 8 1*   HEMATOCRIT % 31 8* 30 1* 28 1* 31 4* 28 5*   PLATELETS Thousands/uL 236 212 203 153 191   BANDS PCT %  --   --  6  --   --          Results from last 7 days   Lab Units 07/28/22  0504 07/27/22  0421 07/26/22  0631 07/25/22  0543 07/24/22  0450 07/22/22  0626   SODIUM mmol/L 141 141 139 135 134* 134*   POTASSIUM mmol/L 3 7 3 8 3 7 4 1 3 9 3 1*   CHLORIDE mmol/L 96 95* 96 93* 93* 88*   CO2 mmol/L 43* 43* 42* 39* 39* 41*   ANION GAP mmol/L 2* 3* 1* 3* 2* 5   BUN mg/dL 8 8 8 11 16 23   CREATININE mg/dL 0 46* 0 48* 0 51* 0 63 0 67 0 86   EGFR ml/min/1 73sq m 99 97 95 89 87 67   CALCIUM mg/dL 9 0 9 3 8 9 9 3 8 5 8 4   MAGNESIUM mg/dL  --   --   --   --   --  1 9     Results from last 7 days   Lab Units 07/26/22  0631 07/25/22  0928   AST U/L 19 19   ALT U/L 14 14   ALK PHOS U/L 85 93   TOTAL PROTEIN g/dL 5 0* 5 3*   ALBUMIN g/dL 2 3* 2 5*   TOTAL BILIRUBIN mg/dL 0 48 0 38   BILIRUBIN DIRECT mg/dL  --  0 11   AMMONIA umol/L  --  53         Results from last 7 days   Lab Units 07/28/22  0504 07/27/22  0421 07/26/22  0631 07/25/22  0543 07/24/22  0450 07/23/22  0453 07/22/22  0626 07/21/22  1614   GLUCOSE RANDOM mg/dL 116 80 97 123 107 96 80 82           Results from last 7 days   Lab Units 07/28/22  0504   PH [place]  7 412*   PCO2 [place] mm Hg 63 8*   PO2 [place] mm Hg 76 2*   HCO3 [place] mmol/L 39 7*   BASE EXC [place] mmol/L 13 0   O2 CONTENT [place] ml/dL 13 4   O2 HGB, VENOUS % 92 2*                 Results from last 7 days   Lab Units 07/26/22  0631   TSH 3RD GENERATON uIU/mL 2 999     Results from last 7 days   Lab Units 07/26/22  0631 07/25/22  0928 07/25/22  0543 07/24/22  0450 07/23/22  0453   PROCALCITONIN ng/ml 0 15 0 21 0 20 0 22 0 31*     Results from last 7 days   Lab Units 07/25/22  0841   LACTIC ACID mmol/L 0 5                 Results from last 7 days   Lab Units 07/25/22  0837   CLARITY UA  Clear   COLOR UA  Straw   SPEC GRAV UA  1 020   PH UA  5 5   GLUCOSE UA mg/dl Negative   KETONES UA mg/dl Negative   BLOOD UA  Trace-Intact*   PROTEIN UA mg/dl Negative   NITRITE UA  Negative   BILIRUBIN UA  Negative UROBILINOGEN UA E U /dl 0 2   LEUKOCYTES UA  Negative   WBC UA /hpf 1-2*   RBC UA /hpf 0-1*   BACTERIA UA /hpf None Seen   EPITHELIAL CELLS WET PREP /hpf Occasional     Results from last 7 days   Lab Units 07/21/22  1348   SALMONELLA SP PCR  None Detected   SHIGELLA SP/ENTEROINVASIVE E  COLI (EIEC)  None Detected   CAMPYLOBACTER SP (JEJUNI AND COLI)  None Detected   SHIGA TOXIN 1/SHIGA TOXIN 2  None Detected         Results from last 7 days   Lab Units 07/25/22  0856   BLOOD CULTURE  No Growth at 48 hrs  No Growth at 48 hrs  Medications:   Scheduled Medications:    artificial tear, 1 application, Both Eyes, Daily  clopidogrel, 75 mg, Oral, Daily  fluconazole, 200 mg, Oral, Daily  gabapentin, 300 mg, Oral, HS  gentamicin, , Topical, Daily  mineral oil-white petrolatum, 1 inch, Both Eyes, HS  morphine, 15 mg, Oral, Q12H RENETTA  pantoprazole, 40 mg, Intravenous, Q24H [facility]  Systane Nighttime, 1 inch, Both Eyes, HS    morphine injection 2 mg  Dose: 2 mg  Freq: Once Route: IV  Start: 07/27/22 1300 End: 07/27/22 1255      LORazepam (ATIVAN) injection 0 5 mg  Dose: 0 5 mg  Freq: Once Route: IV  Start: 07/26/22 1400 End: 07/26/22 1356    LORazepam (ATIVAN) injection 1 mg  Dose: 1 mg  Freq: Once Route: IV  Start: 07/27/22 1330 End: 07/27/22 1333    Start: 07/27/22 1330 End: 07/27/22 1333  OLANZapine (ZyPREXA) IM injection 5 mg  Dose: 5 mg  Freq: Once Route: IM  Start: 07/26/22 1215 End: 07/26/22 1225    OLANZapine (ZyPREXA) IM injection 5 mg  Dose: 5 mg  Freq:  Once Route: IM  Start: 07/27/22 1130 End: 07/27/22 1132    cefepime (MAXIPIME) IVPB (premix in dextrose) 2,000 mg 50 mL  Dose: 2,000 mg  Freq: Every 12 hours Route: IV  Last Dose: 2,000 mg (07/26/22 0839)  Start: 07/25/22 0830 End: 07/26/22 1157    vancomycin (VANCOCIN) IVPB (premix in dextrose) 750 mg 150 mL  Dose: 15 mg/kg  Weight Dosing Info: 55 4 kg (Adjusted)  Freq: Every 12 hours Route: IV  Last Dose: Stopped (07/25/22 1230)  Start: 07/25/22 1030 End: 07/25/22 1500    Continuous IV Infusions:    sodium chloride, 50 mL/hr, Intravenous, Continuous      PRN Meds:    acetaminophen, 650 mg, Oral, Q6H PRN  al mag oxide-diphenhydramine-lidocaine viscous, 10 mL, Swish & Swallow, Q4H PRN  HYDROmorphone, 0 5 mg, Intravenous, Q4H PRN x 1 dose 7/27 @ 2149  loperamide, 2 mg, Oral, 4x Daily PRN  LORazepam, 0 5 mg, Intravenous, Q4H PRN  ondansetron, 4 mg, Intravenous, Q4H PRN  oxyCODONE, 5 mg, Oral, TID PRN        Discharge Plan: D    Network Utilization Review Department  ATTENTION: Please call with any questions or concerns to [phone] and carefully listen to the prompts so that you are directed to the right person  All voicemails are confidential   Joyce Handy all requests for admission clinical reviews, approved or denied determinations and any other requests to dedicated fax number below belonging to the campus where the patient is receiving treatment   List of dedicated fax numbers for the Facilities:  [address] [place] [address] DENIALS (Administrative/Medical Necessity) [phone]   [address] [place] [address] (Maternity/NICU/Pediatrics) [phone]   [address]  [address] [place] [address] [address] [place] [address] [address] [facility] [address] [facility] [phone]

## 2022-07-28 NOTE — CASE MANAGEMENT
Case Management Discharge Planning Note    Patient name Moni Gutierrez  Location /-01 MRN [id number]  : 1950 Date 2022       Current Admission Date: 2022  Current Admission Diagnosis:Encephalopathy   Patient Active Problem List    Diagnosis Date Noted    Goals of care, counseling/discussion 2022    Sepsis ([facility] ) 2022    Encephalopathy 2022    Oral thrush 2022    Acute respiratory failure with hypoxia ([address] ) 2022    Toxic gastroenteritis and colitis due to chemotherapy 2022    Colitis 15/21/1524    Metabolic alkalosis with respiratory acidosis 2022    Hypokalemia 2022    Acute cystitis without hematuria 06/15/2022    Chemotherapy induced neutropenia ([address] ) 2022    Chronic venous stasis dermatitis of bilateral lower extremities 2022    Gait disturbance 2022    Dehydration 2022    Endometrial cancer ([facility] ) 2022    Decubitus ulcer of ischial area, left, stage III ([address] ) 2022    Hyponatremia 2022    ANDRES (acute kidney injury) ([address] ) 2022    Vaginal bleeding 2022    Lower extremity edema 2022    Stage III pressure ulcer of sacral region ([facility] ) 2022    Arthritis 2021    Chronic pain syndrome 2020    Stroke-like symptoms 2020    Elevated troponin 2020    Elevated d-dimer 2020    Acute nasopharyngitis 2020    History of stroke 2020    Rotator cuff tear arthropathy of left shoulder 2020    Rotator cuff tear arthropathy, right 2020    Cervical disc herniation 01/10/2019    History of knee replacement, total, bilateral 2017    Cervical spondylosis 10/12/2016    Acute arterial ischemic stroke, vertebrobasilar, brainstem, right ([address] ) 10/11/2016    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    GERD (gastroesophageal reflux disease) 10/06/2016    Coronary artery disease involving native coronary artery of native heart without angina pectoris 07/17/2015    Essential hypertension 10/01/2013      LOS (days): 7  Geometric Mean LOS (GMLOS) (days): 4 30  Days to GMLOS:-2 7     OBJECTIVE:  Risk of Unplanned Readmission Score: 23 15         Current admission status: Inpatient   Preferred Pharmacy:   [address] [place] [address] #[id number] - [facility] [address] [address] [facility] [address]  Phone: [phone] Fax: [phone]    Primary Care Provider: Shyam Hernandez MD    Primary Insurance: [facility]  Secondary Insurance:     DISCHARGE DETAILS:          Comments - [employer] Choice: referral for hospice consult sent via sara and faxed  due to computer issues                               Other Referral/Resources/Interventions Provided:  Interventions: Hospice  Referral Comments: cm sent a tiger text to [facility] to ask if she received the referral that was sent yesterday and she stated she did not-  information was faxed  she is aware the family is interested in hopsice care and they have many questions

## 2022-07-28 NOTE — PLAN OF CARE
Problem: PHYSICAL THERAPY ADULT  Goal: Performs mobility at highest level of function for planned discharge setting  See evaluation for individualized goals  Description: Treatment/Interventions: LE strengthening/ROM, Functional transfer training, Therapeutic exercise, Endurance training, Patient/family training, Equipment eval/education, Bed mobility, Gait training, Spoke to case management, OT, Spoke to nursing  Equipment Recommended: Letty Hurley       See flowsheet documentation for full assessment, interventions and recommendations  Outcome: Progressing  Note: Prognosis: Fair  Problem List: Decreased strength, Decreased endurance, Impaired balance, Decreased mobility, Decreased coordination, Decreased cognition, Pain  Assessment: Pt seen for PT treatment session this date with interventions consisting of gait training to reduce the risk of medical complications w/ emphasis on improving pt's ability to ambulate level surfaces x 5 feet with mod A of 2 provided by therapist with RW, Therapeutic exercise to increase BLE stability consisting of: AROM varying reps B LE and B UE in sitting position and therapeutic activity to reduce fall risk consisting of training: sit<>stand transfers, static standing tolerance for 2 minutes w/ B UE support, vc and tactile cues for static sitting posture faciliation, vc and tactile cues for static standing posture faciliation, stand pivot transfers towards B direction and toileting  Pt agreeable to PT treatment session upon arrival, pt found seated OOB on commode, A&O x 2  In comparison to previous session, pt with improvements in cognition, functional task tolerance  Post session: pt returned back to recliner, chair alarm engaged, all needs in reach and RN notified of session findings/recommendations  Continue to recommend post acute rehabilitation services at time of d/c in order to maximize pt's functional independence and safety w/ mobility   Pt continues to be functioning below baseline level, and remains limited 2* factors listed above and including weakness, impaired balance, activity intolerance,pain, gait deviations  PT will continue to see pt during current hospitalization in order to address the deficits listed above and provide interventions consistent w/ POC in effort to achieve LTGs  Barriers to Discharge: Inaccessible home environment  Barriers to Discharge Comments: pt reports dtr lives down the street from her, not there all the time  PT Discharge Recommendation: Post acute rehabilitation services (maintained recommendation)    See flowsheet documentation for full assessment

## 2022-07-28 NOTE — ASSESSMENT & PLAN NOTE
With worsening confusion started 7/25  The patient without focal neurological deficit  7/25 CT head without acute finding     · Neurology input appreciated -- suspects toxic metabolic encephalopathy with possible underlying cognitive impairment  · In the setting of metastatic endometrial cancer, MRI brain was obtained  No evidence of metastatic lesion  · Consider EEG if not much improved   · Vit D-pending, B12- 4788, TSH- 2 99  · Resume MS contin; gabapentin at HS  · Frequent neuro check  · Supportive care   · 7/28 the patient is now AAOx3  This is a significant improvement from yesterday  Suspects that she might have been withdraw from narcotics (MS contin and gabapentin) as she has not been able to tolerate p o  Intake over the past few days  The patient received IV Ativan, morphine, and Dilaudid for acute pain and agitation over the past 48 hours     · Acute encephalopathy now resolved

## 2022-07-28 NOTE — ASSESSMENT & PLAN NOTE
· Discussed with daughters --Jaqueline Borrego at bedside and Wilfrido Avery over the phone regarding goals of care  Suggestions present to the family about palliative care/hospice care  Wilfrido Avery who is the POA would like to speak with hospice  · 7/28 the patient is AAOx3 today  She is cooperative with staff  She is eating and taking her medications  I discussed with her regarding hospice/palliative care options  At this time, the patient states that she is planning to "fully recover" from this  She is not interested in hospice care at this time but is agreeable to speak with hospice to obtain some more information  She refused to be discharged to [facility] and would like to return home

## 2022-07-28 NOTE — ASSESSMENT & PLAN NOTE
· Present on arrival  · Continue home gentamicin ointment  · Local wound care     · No surgical intervention recommended by General surgery

## 2022-07-28 NOTE — PLAN OF CARE
Problem: MOBILITY - ADULT  Goal: Maintain or return to baseline ADL function  Description: INTERVENTIONS:  -  Assess patient's ability to carry out ADLs; assess patient's baseline for ADL function and identify physical deficits which impact ability to perform ADLs (bathing, care of mouth/teeth, toileting, grooming, dressing, etc )  - Assess/evaluate cause of self-care deficits   - Assess range of motion  - Assess patient's mobility; develop plan if impaired  - Assess patient's need for assistive devices and provide as appropriate  - Encourage maximum independence but intervene and supervise when necessary  - Involve family in performance of ADLs  - Assess for home care needs following discharge   - Consider OT consult to assist with ADL evaluation and planning for discharge  - Provide patient education as appropriate  Outcome: Progressing     Problem: Nutrition/Hydration-ADULT  Goal: Nutrient/Hydration intake appropriate for improving, restoring or maintaining nutritional needs  Description: Monitor and assess patient's nutrition/hydration status for malnutrition  Collaborate with interdisciplinary team and initiate plan and interventions as ordered  Monitor patient's weight and dietary intake as ordered or per policy  Utilize nutrition screening tool and intervene as necessary  Determine patient's food preferences and provide high-protein, high-caloric foods as appropriate       INTERVENTIONS:  - Monitor oral intake, urinary output, labs, and treatment plans  - Assess nutrition and hydration status and recommend course of action  - Evaluate amount of meals eaten  - Assist patient with eating if necessary   - Allow adequate time for meals  - Recommend/ encourage appropriate diets, oral nutritional supplements, and vitamin/mineral supplements  - Order, calculate, and assess calorie counts as needed  - Assess need for intravenous fluids  - Provide specific nutrition/hydration education as appropriate  - Include patient/family/caregiver in decisions related to nutrition  Outcome: Progressing     Problem: PAIN - ADULT  Goal: Verbalizes/displays adequate comfort level or baseline comfort level  Description: Interventions:  - Encourage patient to monitor pain and request assistance  - Assess pain using appropriate pain scale  - Administer analgesics based on type and severity of pain and evaluate response  - Implement non-pharmacological measures as appropriate and evaluate response  - Consider cultural and social influences on pain and pain management  - Notify physician/advanced practitioner if interventions unsuccessful or patient reports new pain  Outcome: Progressing     Problem: GASTROINTESTINAL - ADULT  Goal: Maintains adequate nutritional intake  Description: INTERVENTIONS:  - Monitor percentage of each meal consumed  - Identify factors contributing to decreased intake, treat as appropriate  - Assist with meals as needed  - Monitor I&O, weight, and lab values if indicated  - Obtain nutrition services referral as needed  Outcome: Progressing

## 2022-07-28 NOTE — CASE MANAGEMENT
Case Management Discharge Planning Note    Patient name Bonney Landau  Location /-01 MRN [id number]  : 1950 Date 2022       Current Admission Date: 2022  Current Admission Diagnosis:Encephalopathy   Patient Active Problem List    Diagnosis Date Noted    Goals of care, counseling/discussion 2022    Sepsis ([facility] [address] [facility] ) 2022    Encephalopathy 2022    Oral thrush 2022    Acute respiratory failure with hypoxia ([address] ) 2022    Toxic gastroenteritis and colitis due to chemotherapy 2022    Colitis     Metabolic alkalosis with respiratory acidosis 2022    Hypokalemia 2022    Acute cystitis without hematuria 06/15/2022    Chemotherapy induced neutropenia ([address] ) 2022    Chronic venous stasis dermatitis of bilateral lower extremities 2022    Gait disturbance 2022    Dehydration 2022    Endometrial cancer ([facility] [address] [facility] ) 2022    Decubitus ulcer of ischial area, left, stage III ([address] ) 2022    Hyponatremia 2022    ANDRES (acute kidney injury) ([address] ) 2022    Vaginal bleeding 2022    Lower extremity edema 2022    Stage III pressure ulcer of sacral region ([facility] [address] [facility] ) 2022    Arthritis 2021    Chronic pain syndrome 2020    Stroke-like symptoms 2020    Elevated troponin 2020    Elevated d-dimer 2020    Acute nasopharyngitis 2020    History of stroke 2020    Rotator cuff tear arthropathy of left shoulder 2020    Rotator cuff tear arthropathy, right 2020    Cervical disc herniation 01/10/2019    History of knee replacement, total, bilateral 2017    Cervical spondylosis 10/12/2016    Acute arterial ischemic stroke, vertebrobasilar, brainstem, right ([address] ) 10/11/2016    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    GERD (gastroesophageal reflux disease) 10/06/2016    Coronary artery disease involving native coronary artery of native heart without angina pectoris 07/17/2015    Essential hypertension 10/01/2013      LOS (days): 6  Geometric Mean LOS (GMLOS) (days): 4 30  Days to GMLOS:-2 1     OBJECTIVE:  Risk of Unplanned Readmission Score: 24 83         Current admission status: Inpatient   Preferred Pharmacy:   [address] [place] [address] #[id number] - [address] [address]  Phone: [phone] Fax: [phone]    Primary Care Provider: Darlyn Mena MD    Primary Insurance: [address] [place] [address]  Secondary Insurance:     DISCHARGE DETAILS:    Discharge planning discussed with[de-identified] patient and  Ish Aguilera at [facility] bedside  Freedom of Choice: Yes  Comments - [place] of Choice: hospice  referral received  cm spoke with Ish Aguilera choices were given  she [facility]  [facility]errals was sent  CM contacted family/caregiver?: Yes             Contacts  Patient Contacts: Brain Elian  Relationship to Patient[de-identified] Family (daughter)  Contact Method:  In Person  Reason/Outcome: Discharge Planning              Other Referral/Resources/Interventions Provided:  Interventions: Hospice  Referral Comments: cm made Renita aware [facility] offers hospice, she does not want home hospice, she is aware [facility] is not able to accept the pt, she did ask me to find out the cost of room & board at [facility], she did ask to send a referral to [facility] hospice, they would have options for hospice house if they decide and the pt does qualify  cost of rm&broard $ 255 00/day    Would you like to participate in our [address] service program?  : No - Declined    Treatment Team Recommendation:  (d/c plan tbd- bls)

## 2022-07-28 NOTE — PROGRESS NOTES
[facility]  Progress Note - Ruddy Trevino 1950, 67 y o  female MRN: [id number]  Unit/Bed#: -01 Encounter: [id number]  Primary Care Provider: Dannielle Lopez MD   Date and time admitted to hospital: 7/21/2022  7:59 AM    * Encephalopathy  Assessment & Plan  With worsening confusion started 7/25  The patient without focal neurological deficit  7/25 CT head without acute finding     · Neurology input appreciated -- suspects toxic metabolic encephalopathy with possible underlying cognitive impairment  · In the setting of metastatic endometrial cancer, MRI brain was obtained  No evidence of metastatic lesion  · Consider EEG if not much improved   · Vit D-pending, B12- 4788, TSH- 2 99  · Resume MS contin; gabapentin at HS  · Frequent neuro check  · Supportive care   · 7/28 the patient is now AAOx3  This is a significant improvement from yesterday  Suspects that she might have been withdraw from narcotics (MS contin and gabapentin) as she has not been able to tolerate p o  Intake over the past few days  The patient received IV Ativan, morphine, and Dilaudid for acute pain and agitation over the past 48 hours  · Acute encephalopathy now resolved      Acute respiratory failure with hypoxia (HCC)  Assessment & Plan  · Requiring 4-6L NC and does not use supplemental oxygen at baseline  Currently on 3L   · Afebrile with mild leukocytosis  · CT chest w contrast (7/22): Patchy opacity in the both lungs with mild bilateral basilar consolidation left greater than right suggest infiltrate/aspiration   Follow-up suggested in 6-8 weeks to demonstrate resolution  Previously noted the lung nodules in the right upper lobe, right lower lobe and left lower lobe remains stable  Mass in the lower left neck extending into the left axilla, as seen on the previous studies  Cirrhotic liver   · 7/25 chest x-ray shows mild pulmonary vascular congestion   Received lasix IV 40 mg x 1 with good urine output  · Given metastatic pulmonary nodules, may possibly require home oxygen  · Wean oxygen as tolerated   · Please see treatment outlined above        Colitis  Assessment & Plan  Presented with the complaint of diffuse abdominal pain and diarrhea since 07/17; no further diarrhea    · Toxic inflammatory colitis due to chemotherapy vs  Ischemic colitis due to hypotension   · CT abd/pelvis (7/21): Slight enlargement of the patient's known uterine mass with decreased size of the previously measured left periaortic lymph node  Thickening of the sigmoid colon wall suggesting colitis  Diverticulosis  Moderate volume stool throughout the colon  · Stool cultures including C diff- negative  · Further supportive care with IVF and Imodium    · GI input appreciated         Sepsis [facility])  Assessment & Plan  Meets sepsis criteria with tachycardia and leukocytosis suspects underlying pneumonia/aspiration pneumonia/pneumonitis on 7/25 with worsening confusion  Sepsis alert was called  As there is no clear source of infection identified at this time, sepsis is ruled out  · Procalcitonin- has been negative  · Leukocytosis initially persistent--now resolved  · The patient received IV fluid per sepsis protocol  · Blood cultures- NGTD, MRSA culture-negative  Blood cultures on admission- negative  · Started on cefepime and vancomycin; as procalcitonin is negative-antibiotics are discontinued  Goals of care, counseling/discussion  Assessment & Plan  · Discussed with daughters --Nehemias Ayala at bedside and Arielle Jenkins over the phone regarding goals of care  Suggestions present to the family about palliative care/hospice care  Arielle Jenkins who is the POA would like to speak with hospice  · 7/28 the patient is AAOx3 today  She is cooperative with staff  She is eating and taking her medications  I discussed with her regarding hospice/palliative care options  At this time, the patient states that she is planning to "fully recover" from this   She is not interested in hospice care at this time but is agreeable to speak with hospice to obtain some more information  She refused to be discharged to [facility] and would like to return home  Oral thrush  Assessment & Plan  · Oral thrush and likely esophagitis  · GI input appreciated   · Continue Magic mouthwash and Protonix 40 mg daily   · Continue Diflucan 200 mg daily to complete 14 days  · Tolerating dysphagia level 2 diet and thin liquids    · Of note patient has not been taking her PO medications as scheduled due to her acute encephalopathy  Chronic venous stasis dermatitis of bilateral lower extremities  Assessment & Plan  · Patient has chronic lower extremity edema with venous stasis dermatitis/wounds  · Intermittently on Lasix and was recently treated for possible cellulitis with Keflex  · Was following outpatient with [facility] wound care  · Continue local wound care     · No surgical intervention recommended by Podiatry or General surgery     Endometrial cancer [facility])  Assessment & Plan  · Stage IV endometrial cancer on palliative chemotherapy  · 7/27 Discussed with Dr Yarelis Colunga who spoke with her daughter-Renita  He is agreeing with hospice option if the patient and family are agreeable with this plan     · With associated drug-induced neutropenia, anemia, and neuropathy  · Retroperitoneal and supraclavicular lymphadenopathy with pulmonary and inguinal metastasis  · Last treatment with carboplatin and Taxotere on 07/14  · Continue home gabapentin and MS contin       Decubitus ulcer of ischial area, left, stage III (HCC)  Assessment & Plan  · Present on arrival  · Continue home gentamicin ointment  · Local wound care     · No surgical intervention recommended by General surgery     History of stroke  Assessment & Plan  · Plavix was held due to slight drop in hemoglobin- as there is no evidence of active bleeding will resume  · Continue Lipitor 40 mg daily        Chronic pain syndrome  Assessment & Plan  · On morphine 30 mg twice daily, oxycodone 5 mg twice daily and gabapentin at home  · Decreased morphine 15 mg BID; gabapentin decreased to HS only  VTE Prophylaxis:  Enoxaparin (Lovenox)    Patient Centered Rounds: I have performed bedside rounds with nursing staff today  Discussions with Specialists or Other Care Team Provider: SANDEEP   Education and Discussions with Family / Patient: patient and both daughters     Current Length of Stay: 7 day(s)    Current Patient Status: Inpatient   Certification Statement: The patient will continue to require additional inpatient hospital stay due to encephalopathy     Discharge Plan: pending hospital course  Await for hospice evaluation  The patient is now willing to go instead and reluctant about hospice service  If she does not agree with hospice care then will be medically cleared to be discharge in 24 hours  Code Status: Level 3 - DNAR and DNI    Subjective:   Feeling much better today  Denies any pain currently  She is awake and sitting in the chair with her daughter at bedside  She told me correct date, month and year  Knows current president  I brought up hospice care and she stated "are you saying that you want me to die?"  Objective:     Vitals:   Temp (24hrs), Av 5 °F (36 9 °C), Min:98 °F (36 7 °C), Max:98 9 °F (37 2 °C)    Temp:  [98 °F (36 7 °C)-98 9 °F (37 2 °C)] 98 9 °F (37 2 °C)  HR:  [] 95  Resp:  [16-19] 17  BP: (101-121)/(53-80) 121/53  SpO2:  [95 %-97 %] 97 %  Body mass index is 29 12 kg/m²  Input and Output Summary (last 24 hours): Intake/Output Summary (Last 24 hours) at 2022 1258  Last data filed at 2022 0817  Gross per 24 hour   Intake 950 ml   Output 575 ml   Net 375 ml       Physical Exam:   Physical Exam  Vitals and nursing note reviewed  Constitutional:       General: She is not in acute distress  Appearance: Normal appearance  She is ill-appearing     HENT:      Head: Normocephalic and atraumatic  Right Ear: External ear normal       Left Ear: External ear normal       Nose: Nose normal  No rhinorrhea  Mouth/Throat:      Mouth: Mucous membranes are moist       Pharynx: Oropharynx is clear  Eyes:      General:         Right eye: No discharge  Left eye: No discharge  Pupils: Pupils are equal, round, and reactive to light  Cardiovascular:      Rate and Rhythm: Normal rate and regular rhythm  Pulses: Normal pulses  Heart sounds: Normal heart sounds  No murmur heard  Pulmonary:      Effort: Pulmonary effort is normal  No respiratory distress  Breath sounds: Normal breath sounds  Abdominal:      General: Bowel sounds are normal  There is no distension  Palpations: Abdomen is soft  There is no mass  Tenderness: There is no abdominal tenderness  Musculoskeletal:         General: No swelling or tenderness  Normal range of motion  Cervical back: Normal range of motion and neck supple  No muscular tenderness  Skin:     General: Skin is warm and dry  Capillary Refill: Capillary refill takes less than 2 seconds  Findings: No erythema or rash  Neurological:      General: No focal deficit present  Mental Status: She is alert and oriented to person, place, and time  Mental status is at baseline  Psychiatric:         Mood and Affect: Mood normal          Behavior: Behavior normal          Thought Content: Thought content normal          Judgment: Judgment normal          Additional Data:     Labs:    Results from last 7 days   Lab Units 07/28/22  0504 07/27/22  0421 07/26/22  0631   WBC Thousand/uL 8 13   < > 11 83*   HEMOGLOBIN g/dL 9 1*   < > 8 1*   HEMATOCRIT % 31 8*   < > 28 1*   PLATELETS Thousands/uL 236   < > 203   LYMPHO PCT %  --   --  10*   MONO PCT %  --   --  8   EOS PCT %  --   --  0    < > = values in this interval not displayed       Results from last 7 days   Lab Units 07/28/22  0504 07/27/22  0421 07/26/22  3366 SODIUM mmol/L 141   < > 139   POTASSIUM mmol/L 3 7   < > 3 7   CHLORIDE mmol/L 96   < > 96   CO2 mmol/L 43*   < > 42*   BUN mg/dL 8   < > 8   CREATININE mg/dL 0 46*   < > 0 51*   CALCIUM mg/dL 9 0   < > 8 9   ALK PHOS U/L  --   --  85   ALT U/L  --   --  14   AST U/L  --   --  19    < > = values in this interval not displayed  * I Have Reviewed All Lab Data Listed Above  * Additional Pertinent Lab Tests Reviewed: [facility] [facility] Admission  Reviewed    Imaging:  Imaging Reports Reviewed Today Include: n/a     Recent Cultures (last 7 days):     Results from last 7 days   Lab Units 07/25/22  0856   BLOOD CULTURE  No Growth at 48 hrs  No Growth at 48 hrs         Last 24 Hours Medication List:   Current Facility-Administered Medications   Medication Dose Route Frequency Provider Last Rate    acetaminophen  650 mg Oral Q6H PRN [facility], DO      al mag oxide-diphenhydramine-lidocaine viscous  10 mL Swish & Swallow Q4H PRN [facility], DO      artificial tear  1 application Both Eyes Daily [place], [place]      clopidogrel  75 mg Oral Daily [place], [address]      fluconazole  200 mg Oral Daily [facility] [address] [place] [address]      gabapentin  300 mg Oral HS [place], CRNP      gentamicin   Topical Daily [place], [place]      HYDROmorphone  0 5 mg Intravenous Q4H PRN [place], CRNP      loperamide  2 mg Oral 4x Daily PRN [facility], DO      LORazepam  0 5 mg Intravenous Q4H PRN [place], CRNP      mineral oil-white petrolatum  1 inch Both Eyes HS [facility], DO      morphine  15 mg Oral Q12H [address] [place], CRNP      ondansetron  4 mg Intravenous Q4H PRN [place] Cast Childs, DO      oxyCODONE  5 mg Oral TID PRN [place], CRNP      pantoprazole  40 mg Intravenous Q24H [address] Allegra Mackay PA-C      sodium chloride  50 mL/hr Intravenous Continuous [place], CRNP 50 mL/hr (07/28/22 0817)  Systane Nighttime  1 inch Both Eyes HS Tiffanie Hiss, DO          Today, Patient Was Seen By: TIFFANIE Barber    ** Please Note: Dictation voice to text software may have been used in the creation of this document   **

## 2022-07-28 NOTE — PLAN OF CARE
Problem: Potential for Falls  Goal: Patient will remain free of falls  Description: INTERVENTIONS:  - Educate patient/family on patient safety including physical limitations  - Instruct patient to call for assistance with activity   - Consult OT/PT to assist with strengthening/mobility   - Keep Call bell within reach  - Keep bed low and locked with side rails adjusted as appropriate  - Keep care items and personal belongings within reach  - Initiate and maintain comfort rounds  - Make Fall Risk Sign visible to staff  - Offer Toileting every 2 Hours, in advance of need  - Initiate/Maintain bed/chair alarm  - Obtain necessary fall risk management equipment  - Apply yellow socks and bracelet for high fall risk patients  Outcome: Progressing     Problem: MOBILITY - ADULT  Goal: Maintain or return to baseline ADL function  Description: INTERVENTIONS:  -  Assess patient's ability to carry out ADLs; assess patient's baseline for ADL function and identify physical deficits which impact ability to perform ADLs (bathing, care of mouth/teeth, toileting, grooming, dressing, etc )  - Assess/evaluate cause of self-care deficits   - Assess range of motion  - Assess patient's mobility; develop plan if impaired  - Assess patient's need for assistive devices and provide as appropriate  - Encourage maximum independence but intervene and supervise when necessary  - Involve family in performance of ADLs  - Assess for home care needs following discharge   - Consider OT consult to assist with ADL evaluation and planning for discharge  - Provide patient education as appropriate  Outcome: Progressing  Goal: Maintains/Returns to pre admission functional level  Description: INTERVENTIONS:  - Perform BMAT or MOVE assessment daily    - Set and communicate daily mobility goal to care team and patient/family/caregiver  - Collaborate with rehabilitation services on mobility goals if consulted  - Perform Range of Motion 3 times a day    - Reposition patient every 2 hours  - Dangle patient 3 times a day  - Stand patient 3 times a day  - Ambulate patient 3 times a day  - Out of bed to chair 3 times a day   - Out of bed for meals 3 times a day  - Out of bed for toileting  - Record patient progress and toleration of activity level   Outcome: Progressing     Problem: Prexisting or High Potential for Compromised Skin Integrity  Goal: Skin integrity is maintained or improved  Description: INTERVENTIONS:  - Identify patients at risk for skin breakdown  - Assess and monitor skin integrity  - Assess and monitor nutrition and hydration status  - Monitor labs   - Assess for incontinence   - Turn and reposition patient  - Assist with mobility/ambulation  - Relieve pressure over bony prominences  - Avoid friction and shearing  - Provide appropriate hygiene as needed including keeping skin clean and dry  - Evaluate need for skin moisturizer/barrier cream  - Collaborate with interdisciplinary team   - Patient/family teaching  - Consider wound care consult   Outcome: Progressing     Problem: Nutrition/Hydration-ADULT  Goal: Nutrient/Hydration intake appropriate for improving, restoring or maintaining nutritional needs  Description: Monitor and assess patient's nutrition/hydration status for malnutrition  Collaborate with interdisciplinary team and initiate plan and interventions as ordered  Monitor patient's weight and dietary intake as ordered or per policy  Utilize nutrition screening tool and intervene as necessary  Determine patient's food preferences and provide high-protein, high-caloric foods as appropriate       INTERVENTIONS:  - Monitor oral intake, urinary output, labs, and treatment plans  - Assess nutrition and hydration status and recommend course of action  - Evaluate amount of meals eaten  - Assist patient with eating if necessary   - Allow adequate time for meals  - Recommend/ encourage appropriate diets, oral nutritional supplements, and vitamin/mineral supplements  - Order, calculate, and assess calorie counts as needed  - Assess need for intravenous fluids  - Provide specific nutrition/hydration education as appropriate  - Include patient/family/caregiver in decisions related to nutrition  Outcome: Progressing     Problem: PAIN - ADULT  Goal: Verbalizes/displays adequate comfort level or baseline comfort level  Description: Interventions:  - Encourage patient to monitor pain and request assistance  - Assess pain using appropriate pain scale  - Administer analgesics based on type and severity of pain and evaluate response  - Implement non-pharmacological measures as appropriate and evaluate response  - Consider cultural and social influences on pain and pain management  - Notify physician/advanced practitioner if interventions unsuccessful or patient reports new pain  Outcome: Progressing     Problem: DISCHARGE PLANNING  Goal: Discharge to home or other facility with appropriate resources  Description: INTERVENTIONS:  - Identify barriers to discharge w/patient and caregiver  - Arrange for needed discharge resources and transportation as appropriate  - Identify discharge learning needs (meds, wound care, etc )  - Refer to Case Management Department for coordinating discharge planning if the patient needs post-hospital services based on physician/advanced practitioner order or complex needs related to functional status, cognitive ability, or social support system  Outcome: Progressing     Problem: Knowledge Deficit  Goal: Patient/family/caregiver demonstrates understanding of disease process, treatment plan, medications, and discharge instructions  Description: Complete learning assessment and assess knowledge base    Interventions:  - Provide teaching at level of understanding  - Provide teaching via preferred learning methods  Outcome: Progressing     Problem: GASTROINTESTINAL - ADULT  Goal: Minimal or absence of nausea and/or vomiting  Description: INTERVENTIONS:  - Administer IV fluids if ordered to ensure adequate hydration  - Maintain NPO status until nausea and vomiting are resolved  - Nasogastric tube if ordered  - Administer ordered antiemetic medications as needed  - Provide nonpharmacologic comfort measures as appropriate  - Advance diet as tolerated, if ordered  - Consider nutrition services referral to assist patient with adequate nutrition and appropriate food choices  Outcome: Progressing  Goal: Maintains or returns to baseline bowel function  Description: INTERVENTIONS:  - Assess bowel function  - Encourage oral fluids to ensure adequate hydration  - Administer IV fluids if ordered to ensure adequate hydration  - Administer ordered medications as needed  - Encourage mobilization and activity  - Consider nutritional services referral to assist patient with adequate nutrition and appropriate food choices  Outcome: Progressing  Goal: Maintains adequate nutritional intake  Description: INTERVENTIONS:  - Monitor percentage of each meal consumed  - Identify factors contributing to decreased intake, treat as appropriate  - Assist with meals as needed  - Monitor I&O, weight, and lab values if indicated  - Obtain nutrition services referral as needed  Outcome: Progressing     Problem: SAFETY,RESTRAINT: NV/NON-SELF DESTRUCTIVE BEHAVIOR  Goal: Remains free of harm/injury (restraint for non violent/non self-detsructive behavior)  Description: INTERVENTIONS:  - Instruct patient/family regarding restraint use   - Assess and monitor physiologic and psychological status   - Provide interventions and comfort measures to meet assessed patient needs   - Identify and implement measures to help patient regain control  - Assess readiness for release of restraint   Outcome: Progressing  Goal: Returns to optimal restraint-free functioning  Description: INTERVENTIONS:  - Assess the patient's behavior and symptoms that indicate continued need for restraint  - Identify and implement measures to help patient regain control  - Assess readiness for release of restraint   Outcome: Progressing

## 2022-07-28 NOTE — Clinical Note
Ni Blair 66 yo female  6 6  Currently at [facility]  Hx stage IV endometrial cancer  Presented with abdominal pain and diarrhea  CT showed colitis  Labs showed hypokalemia and metabolic alkalosis  Not wanting urhter treatment  On Oxycodone and MS contin for pain management  Approve, RLOC?

## 2022-07-29 ENCOUNTER — APPOINTMENT (INPATIENT)
Dept: RADIOLOGY | Facility: HOSPITAL | Age: 72
DRG: 393 | End: 2022-07-29
Payer: COMMERCIAL

## 2022-07-29 LAB
1,25(OH)2D3 SERPL-MCNC: 62.3 PG/ML (ref 24.8–81.5)
ANION GAP SERPL CALCULATED.3IONS-SCNC: 4 MMOL/L (ref 4–13)
BUN SERPL-MCNC: 7 MG/DL (ref 5–25)
CALCIUM SERPL-MCNC: 8.7 MG/DL (ref 8.4–10.2)
CHLORIDE SERPL-SCNC: 97 MMOL/L (ref 96–108)
CO2 SERPL-SCNC: 41 MMOL/L (ref 21–32)
CREAT SERPL-MCNC: 0.37 MG/DL (ref 0.6–1.3)
ERYTHROCYTE [DISTWIDTH] IN BLOOD BY AUTOMATED COUNT: 20.8 % (ref 11.6–15.1)
GFR SERPL CREATININE-BSD FRML MDRD: 106 ML/MIN/1.73SQ M
GLUCOSE SERPL-MCNC: 108 MG/DL (ref 65–140)
HCT VFR BLD AUTO: 29.8 % (ref 34.8–46.1)
HGB BLD-MCNC: 8.5 G/DL (ref 11.5–15.4)
MCH RBC QN AUTO: 28.3 PG (ref 26.8–34.3)
MCHC RBC AUTO-ENTMCNC: 28.5 G/DL (ref 31.4–37.4)
MCV RBC AUTO: 99 FL (ref 82–98)
PLATELET # BLD AUTO: 216 THOUSANDS/UL (ref 149–390)
PMV BLD AUTO: 8.4 FL (ref 8.9–12.7)
POTASSIUM SERPL-SCNC: 3.4 MMOL/L (ref 3.5–5.3)
RBC # BLD AUTO: 3 MILLION/UL (ref 3.81–5.12)
SODIUM SERPL-SCNC: 142 MMOL/L (ref 135–147)
WBC # BLD AUTO: 7.48 THOUSAND/UL (ref 4.31–10.16)

## 2022-07-29 PROCEDURE — 80048 BASIC METABOLIC PNL TOTAL CA: CPT | Performed by: NURSE PRACTITIONER

## 2022-07-29 PROCEDURE — 99232 SBSQ HOSP IP/OBS MODERATE 35: CPT | Performed by: NURSE PRACTITIONER

## 2022-07-29 PROCEDURE — 85027 COMPLETE CBC AUTOMATED: CPT | Performed by: NURSE PRACTITIONER

## 2022-07-29 PROCEDURE — C9113 INJ PANTOPRAZOLE SODIUM, VIA: HCPCS | Performed by: PHYSICIAN ASSISTANT

## 2022-07-29 PROCEDURE — 92526 ORAL FUNCTION THERAPY: CPT

## 2022-07-29 PROCEDURE — 74018 RADEX ABDOMEN 1 VIEW: CPT

## 2022-07-29 RX ORDER — MORPHINE SULFATE 30 MG/1
30 TABLET, FILM COATED, EXTENDED RELEASE ORAL EVERY 12 HOURS SCHEDULED
Status: DISCONTINUED | OUTPATIENT
Start: 2022-07-29 | End: 2022-07-29

## 2022-07-29 RX ORDER — MORPHINE SULFATE 15 MG/1
15 TABLET ORAL EVERY 6 HOURS
Status: DISCONTINUED | OUTPATIENT
Start: 2022-07-29 | End: 2022-07-31

## 2022-07-29 RX ORDER — TRAZODONE HYDROCHLORIDE 50 MG/1
50 TABLET ORAL
Status: DISCONTINUED | OUTPATIENT
Start: 2022-07-29 | End: 2022-08-02 | Stop reason: HOSPADM

## 2022-07-29 RX ORDER — POTASSIUM CHLORIDE 20 MEQ/1
40 TABLET, EXTENDED RELEASE ORAL ONCE
Status: COMPLETED | OUTPATIENT
Start: 2022-07-29 | End: 2022-07-29

## 2022-07-29 RX ORDER — GABAPENTIN 300 MG/1
300 CAPSULE ORAL 3 TIMES DAILY
Status: DISCONTINUED | OUTPATIENT
Start: 2022-07-29 | End: 2022-08-02 | Stop reason: HOSPADM

## 2022-07-29 RX ORDER — SIMETHICONE 80 MG
80 TABLET,CHEWABLE ORAL EVERY 6 HOURS PRN
Status: DISCONTINUED | OUTPATIENT
Start: 2022-07-29 | End: 2022-08-02 | Stop reason: HOSPADM

## 2022-07-29 RX ADMIN — MORPHINE SULFATE 15 MG: 15 TABLET, EXTENDED RELEASE ORAL at 08:10

## 2022-07-29 RX ADMIN — MINERAL OIL AND PETROLATUM 1 APPLICATION: 150; 830 OINTMENT OPHTHALMIC at 10:30

## 2022-07-29 RX ADMIN — GABAPENTIN 300 MG: 300 CAPSULE ORAL at 15:06

## 2022-07-29 RX ADMIN — GABAPENTIN 300 MG: 300 CAPSULE ORAL at 22:00

## 2022-07-29 RX ADMIN — SODIUM CHLORIDE 50 ML/HR: 0.9 INJECTION, SOLUTION INTRAVENOUS at 03:10

## 2022-07-29 RX ADMIN — GENTAMICIN SULFATE: 1 OINTMENT TOPICAL at 10:29

## 2022-07-29 RX ADMIN — MORPHINE SULFATE 15 MG: 15 TABLET ORAL at 22:00

## 2022-07-29 RX ADMIN — CLOPIDOGREL BISULFATE 75 MG: 75 TABLET ORAL at 08:10

## 2022-07-29 RX ADMIN — HYDROMORPHONE HYDROCHLORIDE 0.5 MG: 1 INJECTION, SOLUTION INTRAMUSCULAR; INTRAVENOUS; SUBCUTANEOUS at 15:06

## 2022-07-29 RX ADMIN — POTASSIUM CHLORIDE 40 MEQ: 1500 TABLET, EXTENDED RELEASE ORAL at 10:25

## 2022-07-29 RX ADMIN — SIMETHICONE 80 MG: 80 TABLET, CHEWABLE ORAL at 13:01

## 2022-07-29 RX ADMIN — HYDROMORPHONE HYDROCHLORIDE 0.5 MG: 1 INJECTION, SOLUTION INTRAMUSCULAR; INTRAVENOUS; SUBCUTANEOUS at 10:25

## 2022-07-29 RX ADMIN — FLUCONAZOLE 200 MG: 100 TABLET ORAL at 08:10

## 2022-07-29 RX ADMIN — PANTOPRAZOLE SODIUM 40 MG: 40 INJECTION, POWDER, FOR SOLUTION INTRAVENOUS at 08:10

## 2022-07-29 RX ADMIN — TRAZODONE HYDROCHLORIDE 50 MG: 50 TABLET ORAL at 22:09

## 2022-07-29 RX ADMIN — HYDROMORPHONE HYDROCHLORIDE 0.5 MG: 1 INJECTION, SOLUTION INTRAMUSCULAR; INTRAVENOUS; SUBCUTANEOUS at 20:19

## 2022-07-29 RX ADMIN — MINERAL OIL AND WHITE PETROLATUM 1 INCH: 150; 830 OINTMENT OPHTHALMIC at 22:10

## 2022-07-29 NOTE — ASSESSMENT & PLAN NOTE
· Stage IV endometrial cancer on palliative chemotherapy  · 7/27 Discussed with Dr Yarelis Colunga who spoke with her daughter-Renita  He is agreeing with hospice option if the patient and family are agreeable with this plan  · With associated drug-induced neutropenia, anemia, and neuropathy  · Retroperitoneal and supraclavicular lymphadenopathy with pulmonary and inguinal metastasis  · Last treatment with carboplatin and Taxotere on 07/14  · Continue home gabapentin and MS contin     · Patient is complaining of abdominal pain  Will follow up with [facility]

## 2022-07-29 NOTE — NURSING NOTE
RN alerted pt is pulling her IV tubing and almost pulling pole over onto her  Unable to reorient pt  Hospitalist notified  New order noted  NV soft wrist restraints applied  Pt confused yelling out  Call bell and belongings are within reach

## 2022-07-29 NOTE — PROGRESS NOTES
Bess U  65   Progress Note - Derick Barnacle 1950, 67 y o  female MRN: [id number]  Unit/Bed#: -01 Encounter: [id number]  Primary Care Provider: Kallie Woodard MD   Date and time admitted to hospital: 7/21/2022  7:59 AM    * Encephalopathy  Assessment & Plan  With worsening confusion started 7/25  The patient without focal neurological deficit  7/25 CT head without acute finding     · Neurology input appreciated -- suspects toxic metabolic encephalopathy with possible underlying cognitive impairment  · In the setting of metastatic endometrial cancer, MRI brain was obtained  No evidence of metastatic lesion  · Consider EEG if not much improved   · Vit D-pending, B12- 4788, TSH- 2 99  · Resume MS contin; gabapentin at HS  · Frequent neuro check  · Supportive care   · 7/28 the patient is now AAOx3  This is a significant improvement from yesterday  Suspects that she might have been withdraw from narcotics (MS contin and gabapentin) as she has not been able to tolerate p o  Intake over the past few days  The patient received IV Ativan, morphine, and Dilaudid for acute pain and agitation over the past 48 hours  · Acute encephalopathy now resolved      Acute respiratory failure with hypoxia (HCC)  Assessment & Plan  · Requiring 4-6L NC and does not use supplemental oxygen at baseline  Currently on 3L   · Afebrile with mild leukocytosis  · CT chest w contrast (7/22): Patchy opacity in the both lungs with mild bilateral basilar consolidation left greater than right suggest infiltrate/aspiration   Follow-up suggested in 6-8 weeks to demonstrate resolution  Previously noted the lung nodules in the right upper lobe, right lower lobe and left lower lobe remains stable  Mass in the lower left neck extending into the left axilla, as seen on the previous studies  Cirrhotic liver   · 7/25 chest x-ray shows mild pulmonary vascular congestion   Received lasix IV 40 mg x 1 with good urine output  · Given metastatic pulmonary nodules, may possibly require home oxygen  · Wean oxygen as tolerated   · Please see treatment outlined above         Colitis  Assessment & Plan  Presented with the complaint of diffuse abdominal pain and diarrhea since 07/17; no further diarrhea    · Toxic inflammatory colitis due to chemotherapy vs  Ischemic colitis due to hypotension   · CT abd/pelvis (7/21): Slight enlargement of the patient's known uterine mass with decreased size of the previously measured left periaortic lymph node  Thickening of the sigmoid colon wall suggesting colitis  Diverticulosis  Moderate volume stool throughout the colon  · Stool cultures including C diff- negative  · Further supportive care with IVF and Imodium    · GI input appreciated          Sepsis [facility])  Assessment & Plan  Meets sepsis criteria with tachycardia and leukocytosis suspects underlying pneumonia/aspiration pneumonia/pneumonitis on 7/25 with worsening confusion  Sepsis alert was called  As there is no clear source of infection identified at this time, sepsis is ruled out  · Procalcitonin- has been negative  · Leukocytosis initially persistent--now resolved   · The patient received IV fluid per sepsis protocol  · Blood cultures- NGTD, MRSA culture-negative  Blood cultures on admission- negative  · Started on cefepime and vancomycin; as procalcitonin is negative-antibiotics are discontinued  Goals of care, counseling/discussion  Assessment & Plan  · Discussed with daughters --Blanca Zavaleta at bedside and Ish Aguilera over the phone regarding goals of care  Suggestions present to the family about palliative care/hospice care  Ish Aguilera who is the POA would like to speak with hospice  · 7/28 the patient is AAOx3 today  She is cooperative with staff  She is eating and taking her medications  I discussed with her regarding hospice/palliative care options  At this time, the patient states that she is planning to "fully recover" from this  She is not interested in hospice care at this time but is agreeable to speak with hospice to obtain some more information  She refused to be discharged to [facility] and would like to return home  · Hospice input appreciated     Oral thrush  Assessment & Plan  · Oral thrush and likely esophagitis  · GI input appreciated   · Continue Magic mouthwash and Protonix 40 mg daily   · Continue Diflucan 200 mg daily to complete 14 days  · Tolerating dysphagia level 2 diet and thin liquids     · Of note patient has not been taking her PO medications as scheduled due to her acute encephalopathy  Mental status is much improved  The patient has been taking medications  Chronic venous stasis dermatitis of bilateral lower extremities  Assessment & Plan  · Patient has chronic lower extremity edema with venous stasis dermatitis/wounds  · Intermittently on Lasix and was recently treated for possible cellulitis with Keflex  · Was following outpatient with [facility] wound [facility]  · Continue local wound care     · No surgical intervention recommended by Podiatry or General surgery      Endometrial cancer [facility])  Assessment & Plan  · Stage IV endometrial cancer on palliative chemotherapy  · 7/27 Discussed with Dr Miles Robins who spoke with her daughter-Renita  He is agreeing with hospice option if the patient and family are agreeable with this plan  · With associated drug-induced neutropenia, anemia, and neuropathy  · Retroperitoneal and supraclavicular lymphadenopathy with pulmonary and inguinal metastasis  · Last treatment with carboplatin and Taxotere on 07/14  · Continue home gabapentin and MS contin     · Patient is complaining of abdominal pain  Will follow up with [facility]       Decubitus ulcer of ischial area, left, stage III (HCC)  Assessment & Plan  · Present on arrival  · Continue home gentamicin ointment  · Local wound care     · No surgical intervention recommended by General surgery      History of stroke  Assessment & Plan  · Plavix was held due to slight drop in hemoglobin- as there is no evidence of active bleeding will resume  · Continue Lipitor 40 mg daily         Chronic pain syndrome  Assessment & Plan  · On morphine 30 mg twice daily, oxycodone 5 mg twice daily and gabapentin at home  · morphine increased back to her normal dose of 30 mg BID; will increase gabapentin  VTE Prophylaxis:  Enoxaparin (Lovenox)    Patient Centered Rounds: I have performed bedside rounds with nursing staff today  Discussions with Specialists or Other Care Team Provider: CM   Education and Discussions with Family / Patient: patient and daughter via phone     Current Length of Stay: 8 day(s)    Current Patient Status: Inpatient   Certification Statement: The patient will continue to require additional inpatient hospital stay due to encephalopathy     Discharge Plan: pending hospital course     Code Status: Level 3 - DNAR and DNI    Subjective:   Feeling okay  C/o some abdominal like she is "gassy"  No n/v      Objective:     Vitals:   Temp (24hrs), Av 6 °F (37 6 °C), Min:99 4 °F (37 4 °C), Max:99 9 °F (37 7 °C)    Temp:  [99 4 °F (37 4 °C)-99 9 °F (37 7 °C)] 99 9 °F (37 7 °C)  HR:  [108] 108  Resp:  [18] 18  BP: (118-151)/(65-74) 151/65  SpO2:  [91 %] 91 %  Body mass index is 29 12 kg/m²  Input and Output Summary (last 24 hours): Intake/Output Summary (Last 24 hours) at 2022 1311  Last data filed at 2022 1100  Gross per 24 hour   Intake 1655 ml   Output 950 ml   Net 705 ml       Physical Exam:   Physical Exam  Vitals and nursing note reviewed  Constitutional:       General: She is not in acute distress  Appearance: Normal appearance  She is ill-appearing  HENT:      Head: Normocephalic and atraumatic  Right Ear: External ear normal       Left Ear: External ear normal       Nose: Nose normal  No rhinorrhea  Mouth/Throat:      Mouth: Mucous membranes are moist       Pharynx: Oropharynx is clear  Eyes:      General:         Right eye: No discharge  Left eye: No discharge  Pupils: Pupils are equal, round, and reactive to light  Cardiovascular:      Rate and Rhythm: Normal rate and regular rhythm  Pulses: Normal pulses  Heart sounds: Normal heart sounds  No murmur heard  Pulmonary:      Effort: Pulmonary effort is normal  No respiratory distress  Comments: Decreased in bases    Abdominal:      General: Bowel sounds are normal  There is distension (but soft )  Palpations: Abdomen is soft  There is no mass  Tenderness: There is no abdominal tenderness  Comments: No acute abdominal signs   Musculoskeletal:         General: No swelling or tenderness  Normal range of motion  Cervical back: Normal range of motion and neck supple  No muscular tenderness  Skin:     General: Skin is warm and dry  Capillary Refill: Capillary refill takes less than 2 seconds  Findings: No erythema or rash  Comments: Wound on both legs-ace wrap in place  Sacral wound dressing intact  Neurological:      General: No focal deficit present  Mental Status: She is alert and oriented to person, place, and time  Mental status is at baseline  Psychiatric:         Mood and Affect: Mood normal          Behavior: Behavior normal          Thought Content: Thought content normal          Judgment: Judgment normal          Additional Data:     Labs:    Results from last 7 days   Lab Units 07/29/22 0447 07/27/22 0421 07/26/22  0631   WBC Thousand/uL 7 48   < > 11 83*   HEMOGLOBIN g/dL 8 5*   < > 8 1*   HEMATOCRIT % 29 8*   < > 28 1*   PLATELETS Thousands/uL 216   < > 203   LYMPHO PCT %  --   --  10*   MONO PCT %  --   --  8   EOS PCT %  --   --  0    < > = values in this interval not displayed       Results from last 7 days   Lab Units 07/29/22 0447 07/27/22 0421 07/26/22  0631   SODIUM mmol/L 142   < > 139   POTASSIUM mmol/L 3 4*   < > 3 7   CHLORIDE mmol/L 97   < > 96 CO2 mmol/L 41*   < > 42*   BUN mg/dL 7   < > 8   CREATININE mg/dL 0 37*   < > 0 51*   CALCIUM mg/dL 8 7   < > 8 9   ALK PHOS U/L  --   --  85   ALT U/L  --   --  14   AST U/L  --   --  19    < > = values in this interval not displayed  * I Have Reviewed All Lab Data Listed Above  * Additional Pertinent Lab Tests Reviewed: [facility] 65 Admission  Reviewed    Imaging:  Imaging Reports Reviewed Today Include: K[facility] pending     Recent Cultures (last 7 days):     Results from last 7 days   Lab Units 07/25/22  0856   BLOOD CULTURE  No Growth at 72 hrs  No Growth at 72 hrs         Last 24 Hours Medication List:   Current Facility-Administered Medications   Medication Dose Route Frequency Provider Last Rate    acetaminophen  650 mg Oral Q6H PRN [facility], [facility]      al mag oxide-diphenhydramine-lidocaine viscous  10 mL Swish & Swallow Q4H PRN [facility], DO      artificial tear  1 application Both Eyes Daily [address] [place]      clopidogrel  75 mg Oral Daily Guerrero Meo, [address]      fluconazole  200 mg Oral Daily [address] [place], [place]      gabapentin  300 mg Oral TID Guerrero Meo, CRNP      gentamicin   Topical Daily [address] [place]      HYDROmorphone  0 5 mg Intravenous Q4H PRN Guerrero Meo, CRNP      loperamide  2 mg Oral 4x Daily PRN [facility], DO      LORazepam  0 5 mg Intravenous Q4H PRN Guerrero Meo, CRNP      mineral oil-white petrolatum  1 inch Both Eyes HS Luis Coley Childs, DO      morphine  30 mg Oral Q12H [facility] Guerrero Meo, CRNP      ondansetron  4 mg Intravenous Q4H PRN [facility], DO      oxyCODONE  5 mg Oral TID PRN Guerrero Meo, CRNP      pantoprazole  40 mg Intravenous Q24H [facility] Raul Albert PA-C      simethicone  80 mg Oral Q6H PRN Guerrero Meo, CRNP      Systane Nighttime  1 inch Both Eyes HS Rocael Rolle DO          Today, Patient Was Seen By: Cesar Howard TIFFANIE    ** Please Note: Dictation voice to text software may have been used in the creation of this document   **

## 2022-07-29 NOTE — ASSESSMENT & PLAN NOTE
· Patient has chronic lower extremity edema with venous stasis dermatitis/wounds  · Intermittently on Lasix and was recently treated for possible cellulitis with Keflex  · Was following outpatient with [facility]  · Continue local wound care     · No surgical intervention recommended by Podiatry or General surgery

## 2022-07-29 NOTE — ASSESSMENT & PLAN NOTE
· Oral thrush and likely esophagitis  · GI input appreciated   · Continue Magic mouthwash and Protonix 40 mg daily   · Continue Diflucan 200 mg daily to complete 14 days  · Tolerating dysphagia level 2 diet and thin liquids     · Of note patient has not been taking her PO medications as scheduled due to her acute encephalopathy  Mental status is much improved  The patient has been taking medications

## 2022-07-29 NOTE — SPEECH THERAPY NOTE
Speech Language/Pathology    Speech/Language Pathology Progress Note    Patient Name: Oliver PENNINGTON Date: 7/29/2022     Problem List  Principal Problem:    Encephalopathy  Active Problems:    Chronic pain syndrome    History of stroke    Decubitus ulcer of ischial area, left, stage III ([facility] )    Endometrial cancer ([facility] )    Chronic venous stasis dermatitis of bilateral lower extremities    Colitis    Acute respiratory failure with hypoxia (HCC)    Oral thrush    Sepsis ([facility] )    Goals of care, counseling/discussion       Past Medical History  Past Medical History:   Diagnosis Date    Anxiety     Arthritis     Bernard's esophagus     Bleeds easily ([facility] )     CVA (cerebral vascular accident) ([facility] ) 10/2016    Endometrial cancer ([facility] )     Fibromyalgia     GERD (gastroesophageal reflux disease)     Hypercholesterolemia     Hypertension     Insomnia     Rheumatoid arthritis ([facility] )     Stroke ([facility] ) 2016        Past Surgical History  Past Surgical History:   Procedure Laterality Date    APPENDECTOMY      BREAST BIOPSY Bilateral     benign    CARPAL TUNNEL RELEASE Left 10/27/2003    DORSAL COMPARTMENT RELEASE Left 03/09/2006    FINGER SURGERY      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    IR PORT PLACEMENT  5/23/2022    KNEE ARTHROSCOPY Right     x3(6/91,7/96,7/99)    OTHER SURGICAL HISTORY Left 03/09/2006    tennis elbow release    NJ DEBRIDEMENT, SKIN, SUB-Q TISSUE,=<20 SQ CM N/A 1/24/2022    Procedure: SACRAL DEBRIDEMENT WOUND AND DRESSING CHANGE ([address] OUT);   Surgeon: Leonie Green MD;  Location: [facility];  Service: General    REPLACEMENT TOTAL KNEE BILATERAL      rt-11/99, lt-1/13/10    ROTATOR CUFF REPAIR Bilateral     SHOULDER ARTHROSCOPY Right 02/10/2016    SHOULDER ARTHROSCOPY Left     11/26/08,9/99    SHOULDER ARTHROSCOPY Left 03/09/2006    TONSILLECTOMY AND ADENOIDECTOMY      TRIGGER FINGER RELEASE Left 07/01/2002    middle and ring finger    ULNAR NERVE TRANSPOSITION Left Subjective:  Patient received upright in bed, boosted with NSG assistance  Patient reporting very small appetite at this time but follows "I'm doing my best "  Pt is very pleasant today, ox x4 and conversational without delusion  Objective: Thin via straw:  Pt with appropriate oral reception, AP transport and timing is grossly WFL  No overt s/s of penetration or aspiration, clear vocal quality  Mech soft:  Pt tolerating, prolonged mastication or oral manipulation results in SOB  Pt describes sensation of "pressure" on her chest, she denies having difficulty breathing  Assessment:  Pt is presently on highest possible diet from medical stand point and tolerance- baseline diet regular solids  Patient reports tolerance of present diet and ST ongoing      Plan/Recommendations:  ST to cont following for care to ensure tolerance

## 2022-07-29 NOTE — CASE MANAGEMENT
Case Management Discharge Planning Note    Patient name [facility]  Location /-01 MRN [id number]  : 1950 Date 2022       Current Admission Date: 2022  Current Admission Diagnosis:Encephalopathy   Patient Active Problem List    Diagnosis Date Noted    Goals of care, counseling/discussion 2022    Sepsis ([facility] ) 2022    Encephalopathy 2022    Oral thrush 2022    Acute respiratory failure with hypoxia ([address] ) 2022    Toxic gastroenteritis and colitis due to chemotherapy 2022    Colitis     Metabolic alkalosis with respiratory acidosis 2022    Hypokalemia 2022    Acute cystitis without hematuria 06/15/2022    Chemotherapy induced neutropenia ([address] ) 2022    Chronic venous stasis dermatitis of bilateral lower extremities 2022    Gait disturbance 2022    Dehydration 2022    Endometrial cancer ([facility] ) 2022    Decubitus ulcer of ischial area, left, stage III ([address] ) 2022    Hyponatremia 2022    ANDRES (acute kidney injury) ([address] ) 2022    Vaginal bleeding 2022    Lower extremity edema 2022    Stage III pressure ulcer of sacral region ([facility] ) 2022    Arthritis 2021    Chronic pain syndrome 2020    Stroke-like symptoms 2020    Elevated troponin 2020    Elevated d-dimer 2020    Acute nasopharyngitis 2020    History of stroke 2020    Rotator cuff tear arthropathy of left shoulder 2020    Rotator cuff tear arthropathy, right 2020    Cervical disc herniation 01/10/2019    History of knee replacement, total, bilateral 2017    Cervical spondylosis 10/12/2016    Acute arterial ischemic stroke, vertebrobasilar, brainstem, right ([address] ) 10/11/2016    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    GERD (gastroesophageal reflux disease) 10/06/2016    Coronary artery disease involving native coronary artery of native heart without angina pectoris 07/17/2015    Essential hypertension 10/01/2013      LOS (days): 8  Geometric Mean LOS (GMLOS) (days): 4 30  Days to GMLOS:-4     OBJECTIVE:  Risk of Unplanned Readmission Score: 23 8         Current admission status: Inpatient   Preferred Pharmacy:   [address] [place] [address]  , [address] [facility] [address]  Phone: [phone] Fax: [phone]    Primary Care Provider: Darlyn Mena MD    Primary Insurance: [facility]  Secondary Insurance:     DISCHARGE DETAILS:    Discharge planning discussed with[de-identified] daughter  Freedom of Choice: Yes  Comments - Freedom of Choice: pt and family would liked rehab  referrals have been sent  they do not want hopsice at this time                               Other Referral/Resources/Interventions Provided:  Interventions: Short Term Rehab  Referral Comments: blanket referral was sent, cm did reach out to [facility] to see if they are able to accept,pt needs authorization    Would you like to participate in our [facility] service program?  : No - Declined

## 2022-07-29 NOTE — ASSESSMENT & PLAN NOTE
· On morphine 30 mg twice daily, oxycodone 5 mg twice daily and gabapentin at home  · morphine increased back to her normal dose of 30 mg BID; will increase gabapentin

## 2022-07-29 NOTE — ASSESSMENT & PLAN NOTE
· Discussed with daughters --Sophia Peace at bedside and Stefani Mccarthy over the phone regarding goals of care  Suggestions present to the family about palliative care/hospice care  Stefani Mccarthy who is the POA would like to speak with hospice  · 7/28 the patient is AAOx3 today  She is cooperative with staff  She is eating and taking her medications  I discussed with her regarding hospice/palliative care options  At this time, the patient states that she is planning to "fully recover" from this  She is not interested in hospice care at this time but is agreeable to speak with hospice to obtain some more information  She refused to be discharged to [facility] and would like to return home      · Hospice input appreciated

## 2022-07-29 NOTE — WOUND OSTOMY CARE
Progress Note - Wound   Adra Maker 67 y o  female MRN: [id number]  Unit/Bed#: -01 Encounter: [id number]      Assessment:   Wound care weekly follow up  Patient was refusing wound care treatment on prior follow up  Patient had been combative and verbally abusive on prior assessment  Was only able to change dressings to the lower legs at that time, was not able to measure of image all wounds to the legs at that time  Was not able to do wound care treatments to right ischium-lateral thigh and sacrum on prior assessment  Per RN, patient refused wound treatments on prior days  Patient states the dressings were changed last evening  Discussed with her that sacrum and ischial wounds need to be assessed  She was compliant with the request  Daughter was at the bedside, she was watching the process for doing dressing changes at home  Patient is able to feed herself and has been eating her meals yesterday and today  She has nutritional supplements ordered, discussed with patient the importance of getting protein for wound healing  Patient has a jones catheter in place for urinary management and is also incontinent of stool  Findings  1  Bilateral leg dressings intact  2  POA-Stage 3 pressure injury to the left lateral thigh-ischium wound bed has granulation tissue  The wound has a tunnel at 12:00 which has increased in depth since last assessment, small amount of serosanguineous drainage noted on dressing when removed, undermining noted  No foul odor, induration or fluctuance to wound  3  POA-Stage 3 pressure injury to the sacrum  The wound has increased in size since prior assessment, wound bed has increased moist yellow slough in the base, appearance of maceration noted to the periwound  Light purple periwound with brown edges noted on the proximal and distal wound, seen with increasing the size of the image of the wound  Undermining has increased to be circumferentially   No foul odor, induration or fluctuance to wound     Discussed with daughter and patient that she should be following on discharge with the [address] [facility] for continued monitoring of the wounds to the lower extremities, sacrum and ischium-lateral thigh  Skin and Wound Care Plan:   1  Apply skin nourishing cream to the skin daily  2  Ehob offloading cushion to chair when OOB  3  Turn and reposition patient Q2 hours  4  Cleanse left lateral thigh and sacral wound with Wound Cleanser, pat dry  Apply Gentamicin to wound beds, pack wound beds lightly with moistened 1" gauze and cover with Allevyn life silicone bordered foam dressings, change daily and PRN  5  Cleanse wounds to the anterior and posterior lower legs gently with Wound Cleanser and gauze  Apply Hydraguard to the periwounds, place Xeroform on the wound beds, top with ABD pads and wrap with max  Change daily and PRN  6  Allevyn life heel foams to bilateral heels, kirk with P, date, and peel back daily for skin assessment, change every 3 days or with soilage or dislodgement  7  P-500 low air loss therapy surface    Wound:  Wound 07/22/22 Venous Ulcer Pretibial Left (Active)   Wound Image   07/27/22 1321   Wound Description Beefy red;Yellow 07/28/22 1630   Allie-wound Assessment Dry;Scaly 07/27/22 1321   Wound Length (cm) 17 5 cm 07/22/22 1209   Wound Width (cm) 9 5 cm 07/22/22 1209   Wound Depth (cm) 0 1 cm 07/22/22 1209   Wound Surface Area (cm^2) 166 25 cm^2 07/22/22 1209   Wound Volume (cm^3) 16 625 cm^3 07/22/22 1209   Calculated Wound Volume (cm^3) 16 63 cm^3 07/22/22 1209   Undermining 0 07/22/22 1209   Drainage Amount Small 07/28/22 1630   Drainage Description Serosanguineous 07/28/22 1630   Non-staged Wound Description Partial thickness 07/28/22 1630   Treatments Irrigation with NSS 07/28/22 2115   Dressing Vaseline gauze; Xeroform;ABD;Gauze 07/28/22 2115   Wound packed?  No 07/27/22 1321   Dressing Changed Changed 07/28/22 2115   Patient Tolerance Tolerated poorly 07/28/22 2115 Dressing Status Intact 07/29/22 1000       Wound 07/22/22 Tibial Left;Posterior (Active)   Wound Image   07/27/22 1321   Wound Description Beefy red;Yellow 07/28/22 1630   Allie-wound Assessment Dry 07/27/22 1321   Wound Length (cm) 5 cm 07/22/22 1208   Wound Width (cm) 3 cm 07/22/22 1208   Wound Depth (cm) 0 1 cm 07/22/22 1208   Wound Surface Area (cm^2) 15 cm^2 07/22/22 1208   Wound Volume (cm^3) 1 5 cm^3 07/22/22 1208   Calculated Wound Volume (cm^3) 1 5 cm^3 07/22/22 1208   Undermining 0 07/22/22 1208   Drainage Amount Scant 07/28/22 1630   Drainage Description Serosanguineous 07/28/22 1630   Non-staged Wound Description Partial thickness 07/28/22 1630   Treatments Irrigation with NSS 07/28/22 1630   Dressing ABD;Gauze; Xeroform 07/28/22 1630   Dressing Changed Changed 07/28/22 1630   Patient Tolerance Tolerated well 07/28/22 1630   Dressing Status Intact 07/29/22 1000       Wound 07/22/22 Venous Ulcer Pretibial Right (Active)   Wound Image    07/22/22 1216   Wound Description Beefy red 07/28/22 1630   Allie-wound Assessment Dry 07/27/22 1321   Wound Length (cm) 14 5 cm 07/22/22 1216   Wound Width (cm) 19 cm 07/22/22 1216   Wound Depth (cm) 0 1 cm 07/22/22 1216   Wound Surface Area (cm^2) 275 5 cm^2 07/22/22 1216   Wound Volume (cm^3) 27 55 cm^3 07/22/22 1216   Calculated Wound Volume (cm^3) 27 55 cm^3 07/22/22 1216   Undermining 0 07/22/22 1216   Drainage Amount Small 07/28/22 1630   Drainage Description Serosanguineous 07/28/22 1630   Non-staged Wound Description Partial thickness 07/28/22 1630   Treatments Cleansed;Irrigation with NSS 07/28/22 2115   Dressing Vaseline gauze; Xeroform;ABD;Gauze 07/28/22 2115   Dressing Changed Changed 07/28/22 1630   Patient Tolerance Tolerated poorly 07/28/22 2115   Dressing Status Intact 07/29/22 1000       Wound 07/22/22 Pressure Injury Thigh Anterior;Left;Proximal (Active)   Wound Image   07/29/22 3204   Wound Description Beefy red;Granulation tissue;Pink 07/29/22 3231 Pressure Injury Stage 3 07/29/22 0949   Allie-wound Assessment Fragile;Pale 07/28/22 1905   Wound Length (cm) 1 5 cm 07/29/22 0949   Wound Width (cm) 2 1 cm 07/29/22 0949   Wound Depth (cm) 0 4 cm 07/29/22 0949   Wound Surface Area (cm^2) 3 15 cm^2 07/29/22 0949   Wound Volume (cm^3) 1 26 cm^3 07/29/22 0949   Calculated Wound Volume (cm^3) 1 26 cm^3 07/29/22 0949   Change in Wound Size % 28 41 07/29/22 0949   Tunneling 7 5 cm 07/29/22 0949   Tunneling in depth located at 1200 07/29/22 0949   Undermining 0 7 07/29/22 0949   Undermining is depth extending from 7-1 07/29/22 0949   Drainage Amount Small 07/29/22 0949   Drainage Description Serosanguineous 07/29/22 0949   Treatments Cleansed;Irrigation with NSS 07/29/22 0949   Dressing Foam, Silicon (eg  Allevyn, etc); Packings; Other (Comment) 07/29/22 0949   Wound packed? Yes 07/29/22 0949   Packing- # removed 1 07/29/22 0949   Packing- # inserted 1 07/29/22 0949   Dressing Changed Changed 07/29/22 0949   Patient Tolerance Tolerated well 07/29/22 0949   Dressing Status Clean;Dry; Intact 07/28/22 1905       Wound 07/22/22 Pressure Injury Coccyx (Active)   Wound Image   07/29/22 1000   Wound Description Beefy red;Yellow;Slough 07/29/22 1000   Pressure Injury Stage 3 07/29/22 1000   Allie-wound Assessment Hyperpigmented 07/29/22 1000   Wound Length (cm) 3 5 cm 07/29/22 1000   Wound Width (cm) 1 5 cm 07/29/22 1000   Wound Depth (cm) 1 5 cm 07/29/22 1000   Wound Surface Area (cm^2) 5 25 cm^2 07/29/22 1000   Wound Volume (cm^3) 7 875 cm^3 07/29/22 1000   Calculated Wound Volume (cm^3) 7 88 cm^3 07/29/22 1000   Change in Wound Size % -124 5 07/29/22 1000   Tunneling 0 cm 07/29/22 1000   Undermining 1 5 07/29/22 1000   Undermining is depth extending from 12-12 07/29/22 1000   Drainage Amount Small 07/29/22 1000   Drainage Description Serosanguineous 07/29/22 1000   Treatments Cleansed;Irrigation with NSS 07/29/22 1000   Dressing Foam, Silicon (eg  Allevyn, etc); Packings; Other (Comment) 07/29/22 1000   Wound packed? Yes 07/29/22 1000   Packing- # removed 1 07/29/22 1000   Packing- # inserted 1 07/29/22 1000   Dressing Changed Changed 07/29/22 1000   Patient Tolerance Tolerated well 07/29/22 1000   Dressing Status Clean;Dry; Intact 07/28/22 1630     Reviewed plan of care with primary RN [facility]  Recommendations written as orders  Wound care team to follow weekly while admitted  Questions or concerns [address] [facility] Nurse    Garret JOYCEN, RN, [facility]

## 2022-07-30 LAB
BACTERIA BLD CULT: NORMAL
BACTERIA BLD CULT: NORMAL

## 2022-07-30 PROCEDURE — C9113 INJ PANTOPRAZOLE SODIUM, VIA: HCPCS | Performed by: PHYSICIAN ASSISTANT

## 2022-07-30 RX ORDER — FUROSEMIDE 20 MG/1
20 TABLET ORAL
Status: DISCONTINUED | OUTPATIENT
Start: 2022-07-31 | End: 2022-08-01

## 2022-07-30 RX ORDER — POTASSIUM CHLORIDE 20 MEQ/1
40 TABLET, EXTENDED RELEASE ORAL ONCE
Status: COMPLETED | OUTPATIENT
Start: 2022-07-30 | End: 2022-07-30

## 2022-07-30 RX ORDER — FUROSEMIDE 10 MG/ML
40 INJECTION INTRAMUSCULAR; INTRAVENOUS ONCE
Status: COMPLETED | OUTPATIENT
Start: 2022-07-30 | End: 2022-07-30

## 2022-07-30 RX ADMIN — HYDROMORPHONE HYDROCHLORIDE 0.5 MG: 1 INJECTION, SOLUTION INTRAMUSCULAR; INTRAVENOUS; SUBCUTANEOUS at 21:49

## 2022-07-30 RX ADMIN — FUROSEMIDE 40 MG: 10 INJECTION, SOLUTION INTRAMUSCULAR; INTRAVENOUS at 20:00

## 2022-07-30 RX ADMIN — GABAPENTIN 300 MG: 300 CAPSULE ORAL at 21:48

## 2022-07-30 RX ADMIN — MORPHINE SULFATE 15 MG: 15 TABLET ORAL at 19:56

## 2022-07-30 RX ADMIN — MORPHINE SULFATE 15 MG: 15 TABLET ORAL at 14:53

## 2022-07-30 RX ADMIN — GABAPENTIN 300 MG: 300 CAPSULE ORAL at 08:33

## 2022-07-30 RX ADMIN — TRAZODONE HYDROCHLORIDE 50 MG: 50 TABLET ORAL at 21:48

## 2022-07-30 RX ADMIN — GENTAMICIN SULFATE: 1 OINTMENT TOPICAL at 08:35

## 2022-07-30 RX ADMIN — GABAPENTIN 300 MG: 300 CAPSULE ORAL at 16:08

## 2022-07-30 RX ADMIN — CLOPIDOGREL BISULFATE 75 MG: 75 TABLET ORAL at 08:33

## 2022-07-30 RX ADMIN — MORPHINE SULFATE 15 MG: 15 TABLET ORAL at 08:34

## 2022-07-30 RX ADMIN — POTASSIUM CHLORIDE 40 MEQ: 1500 TABLET, EXTENDED RELEASE ORAL at 13:12

## 2022-07-30 RX ADMIN — HYDROMORPHONE HYDROCHLORIDE 0.5 MG: 1 INJECTION, SOLUTION INTRAMUSCULAR; INTRAVENOUS; SUBCUTANEOUS at 10:58

## 2022-07-30 RX ADMIN — FLUCONAZOLE 200 MG: 100 TABLET ORAL at 08:33

## 2022-07-30 RX ADMIN — PANTOPRAZOLE SODIUM 40 MG: 40 INJECTION, POWDER, FOR SOLUTION INTRAVENOUS at 08:34

## 2022-07-30 RX ADMIN — MINERAL OIL AND PETROLATUM 1 APPLICATION: 150; 830 OINTMENT OPHTHALMIC at 08:34

## 2022-07-30 NOTE — PLAN OF CARE
Problem: Potential for Falls  Goal: Patient will remain free of falls  Description: INTERVENTIONS:  - Educate patient/family on patient safety including physical limitations  - Instruct patient to call for assistance with activity   - Consult OT/PT to assist with strengthening/mobility   - Keep Call bell within reach  - Keep bed low and locked with side rails adjusted as appropriate  - Keep care items and personal belongings within reach  - Initiate and maintain comfort rounds  - Make Fall Risk Sign visible to staff  - Offer Toileting every 2 Hours, in advance of need  - Initiate/Maintain bed/chair alarm  - Obtain necessary fall risk management equipment  - Apply yellow socks and bracelet for high fall risk patients  Outcome: Progressing

## 2022-07-30 NOTE — ASSESSMENT & PLAN NOTE
· Discussed with daughters --Mishel Wilburn at bedside and Antonio Allen over the phone regarding goals of care  Suggestions present to the family about palliative care/hospice care  Antonio Allen who is the POA would like to speak with hospice  · 7/28 the patient is AAOx3  She is cooperative with staff  She is eating and taking her medications  I discussed with her regarding hospice/palliative care options  At this time, the patient states that she is planning to "fully recover" from this  She is not interested in hospice care at this time but is agreeable to speak with hospice to obtain some more information  · Hospice input appreciated  The patient and family would like to pursue rehab at [facility] and start hospice care after she is discharged from rehab

## 2022-07-30 NOTE — ASSESSMENT & PLAN NOTE
· Patient has chronic lower extremity edema with venous stasis dermatitis/wounds  · Intermittently on Lasix and was recently treated for possible cellulitis with Keflex  · Was following outpatient with [facility] wound care  · Continue local wound care     · No surgical intervention recommended by Podiatry or General surgery   · Resumed lasix as she is able to tolerate PO intake

## 2022-07-30 NOTE — ASSESSMENT & PLAN NOTE
With worsening confusion started 7/25  The patient without focal neurological deficit  7/25 CT head without acute finding     · Neurology input appreciated -- suspects toxic metabolic encephalopathy with possible underlying cognitive impairment  · In the setting of metastatic endometrial cancer, MRI brain was obtained  No evidence of metastatic lesion  · Consider EEG if not much improved   · Vit D-pending, B12- 4788, TSH- 2 99  · Resume MS contin; gabapentin at HS  · Frequent neuro check  · Supportive care   · 7/28 the patient is now AAOx3  This is a significant improvement  Suspects that she might have a withdrawal from narcotics (MS contin and gabapentin) as she was unable to tolerate p o  Intake  The patient received IV Ativan, morphine, and Dilaudid for acute pain and agitation      · Acute encephalopathy now resolved

## 2022-07-30 NOTE — ASSESSMENT & PLAN NOTE
· On morphine 30 mg twice daily, oxycodone 5 mg twice daily and gabapentin at home  · morphine increased back to her normal dose of 30 mg BID; increased gabapentin to 300 mg TID

## 2022-07-30 NOTE — ASSESSMENT & PLAN NOTE
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

## 2022-07-31 LAB
BUN SERPL-MCNC: 8 MG/DL (ref 5–25)
CALCIUM SERPL-MCNC: 9.2 MG/DL (ref 8.4–10.2)
CHLORIDE SERPL-SCNC: 96 MMOL/L (ref 96–108)
CO2 SERPL-SCNC: >45 MMOL/L (ref 21–32)
CREAT SERPL-MCNC: 0.42 MG/DL (ref 0.6–1.3)
ERYTHROCYTE [DISTWIDTH] IN BLOOD BY AUTOMATED COUNT: 20.5 % (ref 11.6–15.1)
GFR SERPL CREATININE-BSD FRML MDRD: 102 ML/MIN/1.73SQ M
GLUCOSE SERPL-MCNC: 112 MG/DL (ref 65–140)
HCT VFR BLD AUTO: 34.4 % (ref 34.8–46.1)
HGB BLD-MCNC: 9.3 G/DL (ref 11.5–15.4)
MCH RBC QN AUTO: 28.8 PG (ref 26.8–34.3)
MCHC RBC AUTO-ENTMCNC: 27 G/DL (ref 31.4–37.4)
MCV RBC AUTO: 107 FL (ref 82–98)
PLATELET # BLD AUTO: 214 THOUSANDS/UL (ref 149–390)
PMV BLD AUTO: 8.6 FL (ref 8.9–12.7)
POTASSIUM SERPL-SCNC: 5 MMOL/L (ref 3.5–5.3)
RBC # BLD AUTO: 3.23 MILLION/UL (ref 3.81–5.12)
SODIUM SERPL-SCNC: 143 MMOL/L (ref 135–147)
WBC # BLD AUTO: 7.76 THOUSAND/UL (ref 4.31–10.16)

## 2022-07-31 PROCEDURE — 99232 SBSQ HOSP IP/OBS MODERATE 35: CPT | Performed by: NURSE PRACTITIONER

## 2022-07-31 PROCEDURE — 80048 BASIC METABOLIC PNL TOTAL CA: CPT | Performed by: NURSE PRACTITIONER

## 2022-07-31 PROCEDURE — 85027 COMPLETE CBC AUTOMATED: CPT | Performed by: NURSE PRACTITIONER

## 2022-07-31 PROCEDURE — C9113 INJ PANTOPRAZOLE SODIUM, VIA: HCPCS | Performed by: PHYSICIAN ASSISTANT

## 2022-07-31 RX ORDER — MORPHINE SULFATE 15 MG/1
15 TABLET ORAL EVERY 6 HOURS
Status: DISCONTINUED | OUTPATIENT
Start: 2022-07-31 | End: 2022-08-02 | Stop reason: HOSPADM

## 2022-07-31 RX ORDER — SENNOSIDES 8.6 MG
2 TABLET ORAL
Status: DISCONTINUED | OUTPATIENT
Start: 2022-07-31 | End: 2022-08-02 | Stop reason: HOSPADM

## 2022-07-31 RX ORDER — DOCUSATE SODIUM 100 MG/1
100 CAPSULE, LIQUID FILLED ORAL 2 TIMES DAILY
Status: DISCONTINUED | OUTPATIENT
Start: 2022-07-31 | End: 2022-07-31

## 2022-07-31 RX ORDER — MORPHINE SULFATE 30 MG/1
30 TABLET, FILM COATED, EXTENDED RELEASE ORAL EVERY 8 HOURS SCHEDULED
Status: DISCONTINUED | OUTPATIENT
Start: 2022-07-31 | End: 2022-07-31

## 2022-07-31 RX ADMIN — FLUCONAZOLE 200 MG: 100 TABLET ORAL at 09:35

## 2022-07-31 RX ADMIN — GABAPENTIN 300 MG: 300 CAPSULE ORAL at 09:35

## 2022-07-31 RX ADMIN — MORPHINE SULFATE 15 MG: 15 TABLET ORAL at 09:36

## 2022-07-31 RX ADMIN — HYDROMORPHONE HYDROCHLORIDE 0.5 MG: 1 INJECTION, SOLUTION INTRAMUSCULAR; INTRAVENOUS; SUBCUTANEOUS at 16:00

## 2022-07-31 RX ADMIN — PANTOPRAZOLE SODIUM 40 MG: 40 INJECTION, POWDER, FOR SOLUTION INTRAVENOUS at 09:37

## 2022-07-31 RX ADMIN — TRAZODONE HYDROCHLORIDE 50 MG: 50 TABLET ORAL at 21:37

## 2022-07-31 RX ADMIN — GENTAMICIN SULFATE: 1 OINTMENT TOPICAL at 09:38

## 2022-07-31 RX ADMIN — MORPHINE SULFATE 15 MG: 15 TABLET ORAL at 20:01

## 2022-07-31 RX ADMIN — OXYCODONE HYDROCHLORIDE 5 MG: 5 TABLET ORAL at 13:59

## 2022-07-31 RX ADMIN — SENNOSIDES 17.2 MG: 8.6 TABLET, FILM COATED ORAL at 21:37

## 2022-07-31 RX ADMIN — GABAPENTIN 300 MG: 300 CAPSULE ORAL at 15:56

## 2022-07-31 RX ADMIN — MINERAL OIL AND PETROLATUM 1 APPLICATION: 150; 830 OINTMENT OPHTHALMIC at 09:38

## 2022-07-31 RX ADMIN — FUROSEMIDE 20 MG: 20 TABLET ORAL at 15:56

## 2022-07-31 RX ADMIN — CLOPIDOGREL BISULFATE 75 MG: 75 TABLET ORAL at 09:35

## 2022-07-31 RX ADMIN — GABAPENTIN 300 MG: 300 CAPSULE ORAL at 20:01

## 2022-07-31 RX ADMIN — MINERAL OIL AND WHITE PETROLATUM 1 INCH: 150; 830 OINTMENT OPHTHALMIC at 21:46

## 2022-07-31 RX ADMIN — FUROSEMIDE 20 MG: 20 TABLET ORAL at 09:35

## 2022-07-31 RX ADMIN — HYDROMORPHONE HYDROCHLORIDE 0.5 MG: 1 INJECTION, SOLUTION INTRAMUSCULAR; INTRAVENOUS; SUBCUTANEOUS at 22:09

## 2022-07-31 RX ADMIN — MORPHINE SULFATE 15 MG: 15 TABLET ORAL at 02:43

## 2022-07-31 NOTE — NURSING NOTE
Dressings changed to bilateral lower extremities, sacrum and left thigh all cleansed and changed per order  Patient tolerated well

## 2022-07-31 NOTE — ASSESSMENT & PLAN NOTE
· On morphine 30 mg twice daily, oxycodone 5 mg twice daily and gabapentin at home  · Changed morphine from ER to IR 15 mg every 6 hours as she takes all medications crushed in applesauce; increased gabapentin to 300 mg TID

## 2022-07-31 NOTE — NURSING NOTE
Scheduled morphine PO dose held and rescheduled due to patient receiving dressing changes  Patient received oxy IR at 94 31 11, was then due at 1600 for morphine, but also requested dilaudid for breakthrough pain  Spoke with pharmacist, times rescheduled for Morphine

## 2022-07-31 NOTE — UTILIZATION REVIEW
Continued Stay Review    Date: 07/30/2022                          Current Patient Class: Inpatient  Current Level of Care: Med/Surg    HPI:72 y o  female initially admitted on 07/21/2022    Assessment/Plan: Encephalopathy  Continue Neuro checks  Currently on O2 @ 3L via NC, baseline in no O2 home use  Wean as tolerated  Analgesia PRN        VTE Prophylaxis:  Enoxaparin (Lovenox)    Vital Signs:   07/30/22 2300 -- -- -- -- -- -- 28 2 L/min Nasal cannula --   07/30/22 22:25:15 98 4 °F (36 9 °C) 97 -- 121/57 78 95 % -- -- -- --   07/30/22 14:57:16 97 7 °F (36 5 °C) 90 18 127/57 80 97 % -- -- -- --   07/30/22 07:20:49 97 7 °F (36 5 °C) 97 18 150/73 99 98 % -- -- -- --     Date and Time Eye Opening Best Verbal Response Best Motor Response Marcell Coma Scale Score   07/31/22 1143 4 5 6 15   07/30/22 2300 4 5 6 15   07/30/22 1100 4 5 6 15       Pertinent Labs/Diagnostic Results:     Results from last 7 days   Lab Units 07/31/22  0530 07/29/22  0447 07/28/22  0504 07/27/22  0421 07/26/22  0631   WBC Thousand/uL 7 76 7 48 8 13 10 72* 11 83*   HEMOGLOBIN g/dL 9 3* 8 5* 9 1* 8 3* 8 1*   HEMATOCRIT % 34 4* 29 8* 31 8* 30 1* 28 1*   PLATELETS Thousands/uL 214 216 236 212 203   BANDS PCT %  --   --   --   --  6     Results from last 7 days   Lab Units 07/31/22  0530 07/29/22  0447 07/28/22  0504 07/27/22  0421 07/26/22  0631 07/25/22  0543   SODIUM mmol/L 143 142 141 141 139 135   POTASSIUM mmol/L 5 0 3 4* 3 7 3 8 3 7 4 1   CHLORIDE mmol/L 96 97 96 95* 96 93*   CO2 mmol/L >45* 41* 43* 43* 42* 39*   ANION GAP mmol/L  --  4 2* 3* 1* 3*   BUN mg/dL 8 7 8 8 8 11   CREATININE mg/dL 0 42* 0 37* 0 46* 0 48* 0 51* 0 63   EGFR ml/min/1 73sq m 102 106 99 97 95 89   CALCIUM mg/dL 9 2 8 7 9 0 9 3 8 9 9 3     Results from last 7 days   Lab Units 07/26/22  0631 07/25/22  0928   AST U/L 19 19   ALT U/L 14 14   ALK PHOS U/L 85 93   TOTAL PROTEIN g/dL 5 0* 5 3*   ALBUMIN g/dL 2 3* 2 5*   TOTAL BILIRUBIN mg/dL 0 48 0 38   BILIRUBIN DIRECT mg/dL  --  0 11   AMMONIA umol/L  --  53     Results from last 7 days   Lab Units 07/31/22  0530 07/29/22  0447 07/28/22  0504 07/27/22  0421 07/26/22  0631 07/25/22  0543   GLUCOSE RANDOM mg/dL 112 108 116 80 97 123     Results from last 7 days   Lab Units 07/28/22  0504   PH [place]  7 412*   PCO2 [place] mm Hg 63 8*   PO2 [place] mm Hg 76 2*   HCO3 [place] mmol/L 39 7*   BASE EXC [place] mmol/L 13 0   O2 CONTENT [place] ml/dL 13 4   O2 HGB, VENOUS % 92 2*     Results from last 7 days   Lab Units 07/26/22  0631   TSH 3RD GENERATON uIU/mL 2 999     Results from last 7 days   Lab Units 07/26/22  0631 07/25/22  0928 07/25/22  0543   PROCALCITONIN ng/ml 0 15 0 21 0 20     Results from last 7 days   Lab Units 07/25/22  0841   LACTIC ACID mmol/L 0 5     Results from last 7 days   Lab Units 07/25/22  0837   CLARITY UA  Clear   COLOR UA  Straw   SPEC GRAV UA  1 020   PH UA  5 5   GLUCOSE UA mg/dl Negative   KETONES UA mg/dl Negative   BLOOD UA  Trace-Intact*   PROTEIN UA mg/dl Negative   NITRITE UA  Negative   BILIRUBIN UA  Negative   UROBILINOGEN UA E U /dl 0 2   LEUKOCYTES UA  Negative   WBC UA /hpf 1-2*   RBC UA /hpf 0-1*   BACTERIA UA /hpf None Seen   EPITHELIAL CELLS WET PREP /hpf Occasional     Results from last 7 days   Lab Units 07/25/22  0856   BLOOD CULTURE  No Growth After 5 Days  No Growth After 5 Days       Medications:   Scheduled Medications:  artificial tear, 1 application, Both Eyes, Daily  clopidogrel, 75 mg, Oral, Daily  docusate sodium, 100 mg, Oral, BID  fluconazole, 200 mg, Oral, Daily  furosemide, 20 mg, Oral, BID (diuretic)  gabapentin, 300 mg, Oral, TID  gentamicin, , Topical, Daily  mineral oil-white petrolatum, 1 inch, Both Eyes, HS  morphine, 15 mg, Oral, Q6H  pantoprazole, 40 mg, Intravenous, Q24H RENETTA  Systane Nighttime, 1 inch, Both Eyes, HS  traZODone, 50 mg, Oral, HS      Continuous IV Infusions:     PRN Meds:  acetaminophen, 650 mg, Oral, Q6H PRN  al mag oxide-diphenhydramine-lidocaine viscous, 10 mL, Swish & Swallow, Q4H PRN  HYDROmorphone, 0 5 mg, Intravenous, Q4H PRN   X 2 doses 7/30  loperamide, 2 mg, Oral, 4x Daily PRN  LORazepam, 0 5 mg, Intravenous, Q4H PRN  ondansetron, 4 mg, Intravenous, Q4H PRN  oxyCODONE, 5 mg, Oral, TID PRN  simethicone, 80 mg, Oral, Q6H PRN        Discharge Plan: D    Network Utilization Review Department  ATTENTION: Please call with any questions or concerns to [phone] and carefully listen to the prompts so that you are directed to the right person  All voicemails are confidential   Mayte White all requests for admission clinical reviews, approved or denied determinations and any other requests to dedicated fax number below belonging to the campus where the patient is receiving treatment   List of dedicated fax numbers for the Facilities:  [address] DENIALS (Administrative/Medical Necessity) [phone]   [address] (Maternity/NICU/Pediatrics) [phone]   [address]  [address] [facility] [place] [address] [address] [place] [address] [address] [place] [address] [phone]

## 2022-07-31 NOTE — PROGRESS NOTES
[address]  Progress Note - Oliver Rizvi 1950, 67 y o  female MRN: [id number]  Unit/Bed#: -01 Encounter: [id number]  Primary Care Provider: Wanda Wolfe MD   Date and time admitted to hospital: 7/21/2022  7:59 AM    * Encephalopathy  Assessment & Plan  With worsening confusion started 7/25  The patient without focal neurological deficit  7/25 CT head without acute finding     · Neurology input appreciated -- suspects toxic metabolic encephalopathy with possible underlying cognitive impairment  · In the setting of metastatic endometrial cancer, MRI brain was obtained  No evidence of metastatic lesion  · Consider EEG if not much improved   · Vit D-pending, B12- 4788, TSH- 2 99  · Resume MS contin; gabapentin at HS  · Frequent neuro check  · Supportive care   · 7/28 the patient is now AAOx3  This is a significant improvement  Suspects that she might have a withdrawal from narcotics (MS contin and gabapentin) as she was unable to tolerate p o  Intake  The patient received IV Ativan, morphine, and Dilaudid for acute pain and agitation  · Acute encephalopathy now resolved        Acute respiratory failure with hypoxia (HCC)  Assessment & Plan  · Requiring 4-6L NC and does not use supplemental oxygen at baseline  Currently on 3L   · Afebrile with mild leukocytosis  · CT chest w contrast (7/22): Patchy opacity in the both lungs with mild bilateral basilar consolidation left greater than right suggest infiltrate/aspiration   Follow-up suggested in 6-8 weeks to demonstrate resolution  Previously noted the lung nodules in the right upper lobe, right lower lobe and left lower lobe remains stable  Mass in the lower left neck extending into the left axilla, as seen on the previous studies  Cirrhotic liver   · 7/25 chest x-ray shows mild pulmonary vascular congestion  Received lasix IV 40 mg x 1 with good urine output     · Given metastatic pulmonary nodules, may possibly require home oxygen  · Wean oxygen as tolerated   · Please see treatment outlined above           Colitis  Assessment & Plan  Presented with the complaint of diffuse abdominal pain and diarrhea since 07/17; no further diarrhea    · Toxic inflammatory colitis due to chemotherapy vs  Ischemic colitis due to hypotension   · CT abd/pelvis (7/21): Slight enlargement of the patient's known uterine mass with decreased size of the previously measured left periaortic lymph node  Thickening of the sigmoid colon wall suggesting colitis  Diverticulosis  Moderate volume stool throughout the colon  · Stool cultures including C diff- negative  · Further supportive care with IVF and Imodium    · GI input appreciated            Sepsis [facility])  Assessment & Plan  Meets sepsis criteria with tachycardia and leukocytosis suspects underlying pneumonia/aspiration pneumonia/pneumonitis on 7/25 with worsening confusion  Sepsis alert was called  As there is no clear source of infection identified at this time, sepsis is ruled out  · Procalcitonin- has been negative  · Leukocytosis initially persistent--now resolved    · The patient received IV fluid per sepsis protocol  · Blood cultures- NGTD, MRSA culture-negative  Blood cultures on admission- negative  · Started on cefepime and vancomycin; as procalcitonin is negative-antibiotics are discontinued  Goals of care, counseling/discussion  Assessment & Plan  · Discussed with daughters --Kallie Phelps at bedside and Sabine Joya over the phone regarding goals of care  Suggestions present to the family about palliative care/hospice care  Sabine Joya who is the POA would like to speak with hospice  · 7/28 the patient is AAOx3  She is cooperative with staff  She is eating and taking her medications  I discussed with her regarding hospice/palliative care options  At this time, the patient states that she is planning to "fully recover" from this   She is not interested in hospice care at this time but is agreeable to speak with hospice to obtain some more information  · Hospice input appreciated  The patient and family would like to pursue rehab at [facility] and start hospice care after she is discharged from rehab  Oral thrush  Assessment & Plan  · Oral thrush and likely esophagitis  · GI input appreciated    · Continue Magic mouthwash and Protonix 40 mg daily   · Continue Diflucan 200 mg daily to complete 14 days  · Tolerating dysphagia level 2 diet and thin liquids          Chronic venous stasis dermatitis of bilateral lower extremities  Assessment & Plan  · Patient has chronic lower extremity edema with venous stasis dermatitis/wounds  · Intermittently on Lasix and was recently treated for possible cellulitis with Keflex  · Was following outpatient with [facility]  · Continue local wound care     · No surgical intervention recommended by Podiatry or General surgery   · Resumed lasix as she is able to tolerate PO intake     Endometrial cancer [facility])  Assessment & Plan  · Stage IV endometrial cancer on palliative chemotherapy  · 7/27 Discussed with Dr Yang Marcum who spoke with her daughter-Renita  He is agreeing with hospice option if the patient and family are agreeable with this plan  No further palliative chemotherapy  · With associated drug-induced neutropenia, anemia, and neuropathy  · Retroperitoneal and supraclavicular lymphadenopathy with pulmonary and inguinal metastasis  · Last treatment with carboplatin and Taxotere on 07/14  · Continue home gabapentin and MS contin      · Patient is complaining of abdominal pain  KUB without obstruction/constipation  Continue with simethicone for gas       Decubitus ulcer of ischial area, left, stage III (HCC)  Assessment & Plan  · Present on arrival   · Continue home gentamicin ointment  · Local wound care      · No surgical intervention recommended by General surgery      History of stroke  Assessment & Plan  · Plavix was held due to slight drop in hemoglobin- as there is no evidence of active bleeding will resume  · Continue Lipitor 40 mg daily           Chronic pain syndrome  Assessment & Plan  · On morphine 30 mg twice daily, oxycodone 5 mg twice daily and gabapentin at home  · morphine increased back to her normal dose of 30 mg BID; increased gabapentin to 300 mg TID  VTE Prophylaxis:  Enoxaparin (Lovenox)    Patient Centered Rounds: I have performed bedside rounds with nursing staff today  Discussions with Specialists or Other Care Team Provider: n/a   Education and Discussions with Family / Patient: patient and daughter via phone     Current Length of Stay: 10 day(s)    Current Patient Status: Inpatient   Certification Statement: The patient will continue to require additional inpatient hospital stay due to encephalopathy     Discharge Plan: pending discharge disposition     Code Status: Level 3 - DNAR and DNI    Subjective:   Feeling ok but tired  Objective:     Vitals:   Temp (24hrs), Av °F (36 7 °C), Min:97 7 °F (36 5 °C), Max:98 4 °F (36 9 °C)    Temp:  [97 7 °F (36 5 °C)-98 4 °F (36 9 °C)] 97 9 °F (36 6 °C)  HR:  [] 104  Resp:  [17-18] 17  BP: (121-144)/(57-65) 144/65  SpO2:  [94 %-97 %] 94 %  Body mass index is 29 12 kg/m²  Input and Output Summary (last 24 hours): Intake/Output Summary (Last 24 hours) at 2022 1425  Last data filed at 2022 1231  Gross per 24 hour   Intake 300 ml   Output 2975 ml   Net -2675 ml       Physical Exam:   Physical Exam  Vitals and nursing note reviewed  Constitutional:       General: She is not in acute distress  Appearance: Normal appearance  She is underweight  She is ill-appearing  HENT:      Head: Normocephalic and atraumatic  Right Ear: External ear normal       Left Ear: External ear normal       Nose: Nose normal  No rhinorrhea  Mouth/Throat:      Mouth: Mucous membranes are moist       Pharynx: Oropharynx is clear     Eyes:      General:         Right eye: No discharge  Left eye: No discharge  Pupils: Pupils are equal, round, and reactive to light  Cardiovascular:      Rate and Rhythm: Normal rate and regular rhythm  Pulses: Normal pulses  Heart sounds: Normal heart sounds  No murmur heard  Pulmonary:      Effort: Pulmonary effort is normal  No respiratory distress  Comments: Decreased in bases    Abdominal:      General: Bowel sounds are normal  There is no distension  Palpations: Abdomen is soft  There is no mass  Tenderness: There is no abdominal tenderness  Musculoskeletal:         General: No swelling or tenderness  Normal range of motion  Cervical back: Normal range of motion and neck supple  No muscular tenderness  Skin:     General: Skin is warm and dry  Capillary Refill: Capillary refill takes less than 2 seconds  Coloration: Skin is pale  Findings: No erythema or rash  Neurological:      General: No focal deficit present  Mental Status: She is alert and oriented to person, place, and time  Mental status is at baseline  Psychiatric:         Mood and Affect: Mood normal          Behavior: Behavior normal          Thought Content: Thought content normal          Judgment: Judgment normal          Additional Data:     Labs:    Results from last 7 days   Lab Units 07/31/22  0530 07/27/22 0421 07/26/22  0631   WBC Thousand/uL 7 76   < > 11 83*   HEMOGLOBIN g/dL 9 3*   < > 8 1*   HEMATOCRIT % 34 4*   < > 28 1*   PLATELETS Thousands/uL 214   < > 203   LYMPHO PCT %  --   --  10*   MONO PCT %  --   --  8   EOS PCT %  --   --  0    < > = values in this interval not displayed       Results from last 7 days   Lab Units 07/31/22  0530 07/27/22  0421 07/26/22  0631   SODIUM mmol/L 143   < > 139   POTASSIUM mmol/L 5 0   < > 3 7   CHLORIDE mmol/L 96   < > 96   CO2 mmol/L >45*   < > 42*   BUN mg/dL 8   < > 8   CREATININE mg/dL 0 42*   < > 0 51*   CALCIUM mg/dL 9 2   < > 8 9   ALK PHOS U/L  --   --  85 ALT U/L  --   --  14   AST U/L  --   --  19    < > = values in this interval not displayed  * I Have Reviewed All Lab Data Listed Above  * Additional Pertinent Lab Tests Reviewed: [address] Admission  Reviewed    Imaging:  Imaging Reports Reviewed Today Include: n/a     Recent Cultures (last 7 days):     Results from last 7 days   Lab Units 07/25/22  0856   BLOOD CULTURE  No Growth After 5 Days  No Growth After 5 Days         Last 24 Hours Medication List:   Current Facility-Administered Medications   Medication Dose Route Frequency Provider Last Rate    acetaminophen  650 mg Oral Q6H PRN [facility], DO      al mag oxide-diphenhydramine-lidocaine viscous  10 mL Swish & Swallow Q4H PRN [facility], DO      artificial tear  1 application Both Eyes Daily [place], [place]      clopidogrel  75 mg Oral Daily [place],  [address]      fluconazole  200 mg Oral Daily [place], [place]      furosemide  20 mg Oral BID (diuretic) [address]      gabapentin  300 mg Oral TID [place], CRNP      gentamicin   Topical Daily [place], [place]      HYDROmorphone  0 5 mg Intravenous Q4H PRN [place], CRNP      loperamide  2 mg Oral 4x Daily PRN [facility], DO      LORazepam  0 5 mg Intravenous Q4H PRN [place], CRNP      mineral oil-white petrolatum  1 inch Both Eyes HS [facility], DO     Jun Pert morphine  15 mg Oral Q6H [place]      ondansetron  4 mg Intravenous Q4H PRN [facility], DO      oxyCODONE  5 mg Oral TID PRN [place], CRNP      pantoprazole  40 mg Intravenous Q24H [address] [place]      simethicone  80 mg Oral Q6H PRN [place], CRNP      Systane Nighttime  1 inch Both Eyes HS [facility], DO      traZODone  50 mg Oral HS [place], CRNP          Today, Patient Was Seen By: [place], CRNP    ** Please Note: Dictation voice to text software may have been used in the creation of this document   **

## 2022-08-01 LAB
BASE EX.OXY STD BLDV CALC-SCNC: 93.2 % (ref 60–80)
BASE EXCESS BLDV CALC-SCNC: 17.1 MMOL/L
BUN SERPL-MCNC: 8 MG/DL (ref 5–25)
CALCIUM SERPL-MCNC: 9.4 MG/DL (ref 8.4–10.2)
CHLORIDE SERPL-SCNC: 93 MMOL/L (ref 96–108)
CO2 SERPL-SCNC: >45 MMOL/L (ref 21–32)
CREAT SERPL-MCNC: 0.35 MG/DL (ref 0.6–1.3)
GFR SERPL CREATININE-BSD FRML MDRD: 108 ML/MIN/1.73SQ M
GLUCOSE SERPL-MCNC: 96 MG/DL (ref 65–140)
HCO3 BLDV-SCNC: 42.6 MMOL/L (ref 24–30)
O2 CT BLDV-SCNC: 13.5 ML/DL
PCO2 BLDV: 56.5 MM HG (ref 42–50)
PH BLDV: 7.5 [PH] (ref 7.3–7.4)
PO2 BLDV: 101.2 MM HG (ref 35–45)
POTASSIUM SERPL-SCNC: 4.3 MMOL/L (ref 3.5–5.3)
SODIUM SERPL-SCNC: 141 MMOL/L (ref 135–147)

## 2022-08-01 PROCEDURE — 97530 THERAPEUTIC ACTIVITIES: CPT

## 2022-08-01 PROCEDURE — 97116 GAIT TRAINING THERAPY: CPT

## 2022-08-01 PROCEDURE — 82805 BLOOD GASES W/O2 SATURATION: CPT | Performed by: NURSE PRACTITIONER

## 2022-08-01 PROCEDURE — 99232 SBSQ HOSP IP/OBS MODERATE 35: CPT | Performed by: PHYSICIAN ASSISTANT

## 2022-08-01 PROCEDURE — 80048 BASIC METABOLIC PNL TOTAL CA: CPT | Performed by: NURSE PRACTITIONER

## 2022-08-01 PROCEDURE — C9113 INJ PANTOPRAZOLE SODIUM, VIA: HCPCS | Performed by: PHYSICIAN ASSISTANT

## 2022-08-01 RX ADMIN — GABAPENTIN 300 MG: 300 CAPSULE ORAL at 08:25

## 2022-08-01 RX ADMIN — MINERAL OIL AND PETROLATUM 1 APPLICATION: 150; 830 OINTMENT OPHTHALMIC at 08:28

## 2022-08-01 RX ADMIN — MINERAL OIL AND WHITE PETROLATUM 1 INCH: 30; 940 OINTMENT OPHTHALMIC at 20:50

## 2022-08-01 RX ADMIN — FLUCONAZOLE 200 MG: 100 TABLET ORAL at 08:25

## 2022-08-01 RX ADMIN — MORPHINE SULFATE 15 MG: 15 TABLET ORAL at 14:39

## 2022-08-01 RX ADMIN — GENTAMICIN SULFATE: 1 OINTMENT TOPICAL at 10:05

## 2022-08-01 RX ADMIN — HYDROMORPHONE HYDROCHLORIDE 0.5 MG: 1 INJECTION, SOLUTION INTRAMUSCULAR; INTRAVENOUS; SUBCUTANEOUS at 22:03

## 2022-08-01 RX ADMIN — HYDROMORPHONE HYDROCHLORIDE 0.5 MG: 1 INJECTION, SOLUTION INTRAMUSCULAR; INTRAVENOUS; SUBCUTANEOUS at 05:26

## 2022-08-01 RX ADMIN — MORPHINE SULFATE 15 MG: 15 TABLET ORAL at 01:50

## 2022-08-01 RX ADMIN — GABAPENTIN 300 MG: 300 CAPSULE ORAL at 20:42

## 2022-08-01 RX ADMIN — TRAZODONE HYDROCHLORIDE 50 MG: 50 TABLET ORAL at 20:45

## 2022-08-01 RX ADMIN — HYDROMORPHONE HYDROCHLORIDE 0.5 MG: 1 INJECTION, SOLUTION INTRAMUSCULAR; INTRAVENOUS; SUBCUTANEOUS at 13:03

## 2022-08-01 RX ADMIN — CLOPIDOGREL BISULFATE 75 MG: 75 TABLET ORAL at 08:25

## 2022-08-01 RX ADMIN — GABAPENTIN 300 MG: 300 CAPSULE ORAL at 17:35

## 2022-08-01 RX ADMIN — MORPHINE SULFATE 15 MG: 15 TABLET ORAL at 20:42

## 2022-08-01 RX ADMIN — MORPHINE SULFATE 15 MG: 15 TABLET ORAL at 08:25

## 2022-08-01 RX ADMIN — PANTOPRAZOLE SODIUM 40 MG: 40 INJECTION, POWDER, FOR SOLUTION INTRAVENOUS at 08:25

## 2022-08-01 RX ADMIN — OXYCODONE HYDROCHLORIDE 5 MG: 5 TABLET ORAL at 20:43

## 2022-08-01 RX ADMIN — FUROSEMIDE 20 MG: 20 TABLET ORAL at 08:25

## 2022-08-01 RX ADMIN — HYDROMORPHONE HYDROCHLORIDE 0.5 MG: 1 INJECTION, SOLUTION INTRAMUSCULAR; INTRAVENOUS; SUBCUTANEOUS at 17:35

## 2022-08-01 NOTE — PLAN OF CARE
Problem: OCCUPATIONAL THERAPY ADULT  Goal: Performs self-care activities at highest level of function for planned discharge setting  See evaluation for individualized goals  Description: Treatment Interventions: ADL retraining, Functional transfer training, UE strengthening/ROM, Endurance training, Patient/family training, Equipment evaluation/education, Compensatory technique education, Energy conservation, Activityengagement    See flowsheet documentation for full assessment, interventions and recommendations  8/1/2022 1439 by Jimbo Perez OT  Outcome: Not Progressing  Note: Limitation: Decreased ADL status, Decreased UE ROM, Decreased UE strength, Decreased Safe judgement during ADL, Decreased endurance, Decreased self-care trans, Decreased high-level ADLs  Prognosis: Good  Assessment: Patient participated in Skilled OT session this date with interventions consisting of  therapeutic activities to: increase activity tolerance   Patient agreeable to OT treatment session, upon arrival patient was found supine in bed  Patient requiring verbal cues for safety and frequent rest periods  Patient continues to be functioning below baseline level, occupational performance remains limited secondary to factors listed above and increased risk for falls and injury  From OT standpoint, recommendation at time of d/c would be Short Term Rehab  Patient to benefit from continued Occupational Therapy treatment while in the hospital to address deficits as defined above and maximize level of functional independence with ADLs and functional mobility       OT Discharge Recommendation: Post acute rehabilitation services     Jimbo Perez MS, OTR/L

## 2022-08-01 NOTE — ASSESSMENT & PLAN NOTE
Presented with the complaint of diffuse abdominal pain and diarrhea since 07/17; no further diarrhea    · Toxic inflammatory colitis due to chemotherapy vs  Ischemic colitis due to hypotension   · CT abd/pelvis (7/21): Slight enlargement of the patient's known uterine mass with decreased size of the previously measured left periaortic lymph node  Thickening of the sigmoid colon wall suggesting colitis  Diverticulosis  Moderate volume stool throughout the colon     · Stool cultures including C diff- negative  · Further supportive care with Imodium    · GI input appreciated

## 2022-08-01 NOTE — ASSESSMENT & PLAN NOTE
· Requiring 4-6L NC and does not use supplemental oxygen at baseline  Currently on 2L   · Afebrile with mild leukocytosis  · CT chest w contrast (7/22): Patchy opacity in the both lungs with mild bilateral basilar consolidation left greater than right suggest infiltrate/aspiration   Follow-up suggested in 6-8 weeks to demonstrate resolution  Previously noted the lung nodules in the right upper lobe, right lower lobe and left lower lobe remains stable  Mass in the lower left neck extending into the left axilla, as seen on the previous studies  Cirrhotic liver   · 7/25 chest x-ray shows mild pulmonary vascular congestion  Received lasix IV 40 mg x 1 with good urine output     · Given metastatic pulmonary nodules, may possibly require home oxygen  · Wean oxygen as tolerated   · Please see treatment outlined above

## 2022-08-01 NOTE — CASE MANAGEMENT
Case Management Discharge Planning Note    Patient name Oliver Rizvi  Location /-01 MRN [id number]  : 1950 Date 2022       Current Admission Date: 2022  Current Admission Diagnosis:Encephalopathy   Patient Active Problem List    Diagnosis Date Noted    Goals of care, counseling/discussion 2022    Sepsis ([facility] [address] ) 2022    Encephalopathy 2022    Oral thrush 2022    Acute respiratory failure with hypoxia ([address] ) 2022    Toxic gastroenteritis and colitis due to chemotherapy 2022    Colitis     Metabolic alkalosis with respiratory acidosis 2022    Hypokalemia 2022    Acute cystitis without hematuria 06/15/2022    Chemotherapy induced neutropenia ([address] ) 2022    Chronic venous stasis dermatitis of bilateral lower extremities 2022    Gait disturbance 2022    Dehydration 2022    Endometrial cancer ([facility] [address] ) 2022    Decubitus ulcer of ischial area, left, stage III ([address] ) 2022    Hyponatremia 2022    ANDRES (acute kidney injury) ([address] ) 2022    Vaginal bleeding 2022    Lower extremity edema 2022    Stage III pressure ulcer of sacral region ([facility] [address] [facility] ) 2022    Arthritis 2021    Chronic pain syndrome 2020    Stroke-like symptoms 2020    Elevated troponin 2020    Elevated d-dimer 2020    Acute nasopharyngitis 2020    History of stroke 2020    Rotator cuff tear arthropathy of left shoulder 2020    Rotator cuff tear arthropathy, right 2020    Cervical disc herniation 01/10/2019    History of knee replacement, total, bilateral 2017    Cervical spondylosis 10/12/2016    Acute arterial ischemic stroke, vertebrobasilar, brainstem, right ([address] ) 10/11/2016    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    GERD (gastroesophageal reflux disease) 10/06/2016    Coronary artery disease involving native coronary artery of native heart without angina pectoris 07/17/2015    Essential hypertension 10/01/2013      LOS (days): 11  Geometric Mean LOS (GMLOS) (days): 4 30  Days to GMLOS:-6 7     OBJECTIVE:  Risk of Unplanned Readmission Score: 25 79         Current admission status: Inpatient   Preferred Pharmacy:   [address] [place] [address] #[id number]  Cezar Salinas, [address] [facility] [address] [facility] [address]  Phone: [phone] Fax: [phone]    Primary Care Provider: Titi Bright MD    Primary Insurance: [address] [facility]  Secondary Insurance:     DISCHARGE DETAILS:     cm spoke with Genia Phalen at [facility], therapy was made aware we need updated notes, [facility] is reviewing for possible admission, authorization will be needed  Cm will continue to work on a safe d/c plan

## 2022-08-01 NOTE — ASSESSMENT & PLAN NOTE
With worsening confusion started 7/25  The patient without focal neurological deficit  7/25 CT head without acute finding     · Neurology input appreciated -- suspects toxic metabolic encephalopathy with possible underlying cognitive impairment  · In the setting of metastatic endometrial cancer, MRI brain was obtained  No evidence of metastatic lesion  · Vit D- 62 3, B12- 4788, TSH- 2 99  · Resume MS contin; gabapentin at HS  · 7/28 the patient is now AAOx3  This is a significant improvement  Suspects that she might have a withdrawal from narcotics (MS contin and gabapentin) as she was unable to tolerate p o  Intake  The patient received IV Ativan, morphine, and Dilaudid for acute pain and agitation      · Acute encephalopathy now resolved

## 2022-08-01 NOTE — ASSESSMENT & PLAN NOTE
· Patient has chronic lower extremity edema with venous stasis dermatitis/wounds  · Intermittently on Lasix and was recently treated for possible cellulitis with Keflex  · Was following outpatient with [facility] wound care  · Continue local wound care     · No surgical intervention recommended by Podiatry or General surgery   · Resumed lasix on 7/31- she received this mornings dose-will hold po lasix for now given labs show mild dehydration

## 2022-08-01 NOTE — CASE MANAGEMENT
Case Management Discharge Planning Note    Patient name Adella Goltz  Location /-01 MRN [id number]  : 1950 Date 2022       Current Admission Date: 2022  Current Admission Diagnosis:Encephalopathy   Patient Active Problem List    Diagnosis Date Noted    Goals of care, counseling/discussion 2022    Sepsis ([address] ) 2022    Encephalopathy 2022    Oral thrush 2022    Acute respiratory failure with hypoxia ([address] ) 2022    Toxic gastroenteritis and colitis due to chemotherapy 2022    Colitis 69/15/3122    Metabolic alkalosis with respiratory acidosis 2022    Hypokalemia 2022    Acute cystitis without hematuria 06/15/2022    Chemotherapy induced neutropenia ([address] ) 2022    Chronic venous stasis dermatitis of bilateral lower extremities 2022    Gait disturbance 2022    Dehydration 2022    Endometrial cancer ([address] ) 2022    Decubitus ulcer of ischial area, left, stage III ([address] ) 2022    Hyponatremia 2022    ANDRES (acute kidney injury) ([address] ) 2022    Vaginal bleeding 2022    Lower extremity edema 2022    Stage III pressure ulcer of sacral region ([address] ) 2022    Arthritis 2021    Chronic pain syndrome 2020    Stroke-like symptoms 2020    Elevated troponin 2020    Elevated d-dimer 2020    Acute nasopharyngitis 2020    History of stroke 2020    Rotator cuff tear arthropathy of left shoulder 2020    Rotator cuff tear arthropathy, right 2020    Cervical disc herniation 01/10/2019    History of knee replacement, total, bilateral 2017    Cervical spondylosis 10/12/2016    Acute arterial ischemic stroke, vertebrobasilar, brainstem, right ([address] ) 10/11/2016    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    GERD (gastroesophageal reflux disease) 10/06/2016    Coronary artery disease involving native coronary artery of native heart without angina pectoris 07/17/2015    Essential hypertension 10/01/2013      LOS (days): 11  Geometric Mean LOS (GMLOS) (days): 4 30  Days to GMLOS:-6 7     OBJECTIVE:  Risk of Unplanned Readmission Score: 25 79         Current admission status: Inpatient   Preferred Pharmacy:   [address] [place] [address] #[id number] - [address] [address]  Phone: [phone] Fax: [phone]    Primary Care Provider: Darlyn Mena MD    Primary Insurance: [address] [facility]  Secondary Insurance:     DISCHARGE DETAILS:    Discharge planning discussed with[de-identified] patient and daughter Ish Aguilera at 10:47 am  Freedom of Choice: Yes  Comments - Freedom of Choice: rehab recommended - family does not want hospice care at thsi time, they would like rehab - referrals sent for rehab  pt will need authorization    pt is active with [facility]  CM contacted family/caregiver?: Yes             Contacts  Patient Contacts: Brain Elian  Relationship to Patient[de-identified] Family (daughter)  Contact Method: Phone  Phone Number: [phone]  Reason/Outcome: Discharge Planning              Other Referral/Resources/Interventions Provided:  Interventions: Short Term Rehab  Referral Comments: [facility], pt needs new therapy notes  and pt will need authorization    Would you like to participate in our [address] service program?  : No - Declined    Treatment Team Recommendation: Short Term Rehab (sn[facility] reha -auth needed- transport needed)

## 2022-08-01 NOTE — PHYSICAL THERAPY NOTE
Physical Therapy Treatment     Patient's Name: Piper Alt    Admitting Diagnosis  Diarrhea [R19 7]  Weakness generalized [R53 1]  Hypoxia [R09 02]  Generalized weakness [R53 1]  Elevated procalcitonin [R79 89]    Problem List  Patient Active Problem List   Diagnosis    Rotator cuff tear arthropathy of left shoulder    Rotator cuff tear arthropathy, right    Chronic pain syndrome    Stroke-like symptoms    Elevated troponin    Elevated d-dimer    Acute nasopharyngitis    Essential hypertension    History of stroke    Arthritis    Acute arterial ischemic stroke, vertebrobasilar, brainstem, right (HCC)    Cervical disc herniation    Cervical spondylosis    Coronary artery disease involving native coronary artery of native heart without angina pectoris    GERD (gastroesophageal reflux disease)    History of knee replacement, total, bilateral    Lateral medullary syndrome    Stage III pressure ulcer of sacral region ([facility] )    Hyponatremia    ANDRES (acute kidney injury) ([facility] )    Vaginal bleeding    Lower extremity edema    Decubitus ulcer of ischial area, left, stage III ([facility] )    Endometrial cancer ([facility] )    Dehydration    Gait disturbance    Chronic venous stasis dermatitis of bilateral lower extremities    Chemotherapy induced neutropenia (HCC)    Acute cystitis without hematuria    Colitis    Metabolic alkalosis with respiratory acidosis    Hypokalemia    Acute respiratory failure with hypoxia (HCC)    Toxic gastroenteritis and colitis due to chemotherapy    Oral thrush    Sepsis ([address] )    Encephalopathy    Goals of care, counseling/discussion       Past Medical History  Past Medical History:   Diagnosis Date    Anxiety     Arthritis     Bernard's esophagus     Bleeds easily ([facility] )     CVA (cerebral vascular accident) ([facility] ) 10/2016    Endometrial cancer ([facility] )     Fibromyalgia     GERD (gastroesophageal reflux disease)     Hypercholesterolemia     Hypertension     Insomnia     Rheumatoid arthritis ([facility] )     Stroke ([facility] ) 2016       Past Surgical History  Past Surgical History:   Procedure Laterality Date    APPENDECTOMY      BREAST BIOPSY Bilateral     benign    CARPAL TUNNEL RELEASE Left 10/27/2003    DORSAL COMPARTMENT RELEASE Left 03/09/2006    FINGER SURGERY      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    IR PORT PLACEMENT  5/23/2022    KNEE ARTHROSCOPY Right     x3(6/91,7/96,7/99)    OTHER SURGICAL HISTORY Left 03/09/2006    tennis elbow release    UT DEBRIDEMENT, SKIN, SUB-Q TISSUE,=<20 SQ CM N/A 1/24/2022    Procedure: SACRAL DEBRIDEMENT WOUND AND DRESSING CHANGE ([facility]); Surgeon: Marsha Ferrell MD;  Location: [facility];  Service: General    REPLACEMENT TOTAL KNEE BILATERAL      rt-11/99, lt-1/13/10    ROTATOR CUFF REPAIR Bilateral     SHOULDER ARTHROSCOPY Right 02/10/2016    SHOULDER ARTHROSCOPY Left     11/26/08,9/99    SHOULDER ARTHROSCOPY Left 03/09/2006    TONSILLECTOMY AND ADENOIDECTOMY      TRIGGER FINGER RELEASE Left 07/01/2002    middle and ring finger    ULNAR NERVE TRANSPOSITION Left         08/01/22 1233   PT Last Visit   PT Visit Date 08/01/22   Note Type   Note Type Treatment   Pain Assessment   Pain Assessment Tool 0-10   Pain Score 10 - Worst Possible Pain   Pain Location/Orientation Location: Generalized;Orientation: Bilateral;Location: Leg   Pain Onset/Description Onset: Ongoing;Frequency: Constant/Continuous   Hospital Pain Intervention(s) Medication (See MAR); Repositioned; Ambulation/increased activity; Emotional support  (Notified DEZ Mariscal Half after session about pain medication)   Restrictions/Precautions   Weight Bearing Precautions Per Order No   Other Precautions Chair Alarm;Multiple lines;O2;Fall Risk;Pain  (2L O2)   General   Chart Reviewed Yes   Cognition   Overall Cognitive Status WFL   Arousal/Participation Alert; Responsive; Cooperative   Attention Attends with cues to redirect   Orientation Level Oriented X4   Memory Within functional limits   Following Commands Follows all commands and directions without difficulty   Subjective   Subjective pt  reports pain in legs and weakness   Bed Mobility   Supine to Sit 4  Minimal assistance   Additional items Assist x 1;HOB elevated; Bedrails;LE management;Verbal cues; Increased time required   Sit to Supine Unable to assess   Additional Comments Pt  seated in recliner at end of session  VCs to initiate UE and LE movement  Utilized PT and bed rails for support  Needed Vcs to mobilize feet to touch the floor before continuing therapy  Transfers   Sit to Stand 4  Minimal assistance   Additional items Assist x 1;Verbal cues; Increased time required; Bedrails  (RW)   Stand to Sit 4  Minimal assistance   Additional items Assist x 1; Increased time required;Verbal cues;Armrests  (RW)   Stand pivot 4  Minimal assistance   Additional items Assist x 1; Increased time required;Verbal cues   Additional Comments VCs to use one hand to push off from bed while keeping the other on the walker during sit to stand transfer  VCs to use hands to reach for armrests while descending from standing to sit  Second staff member present for safety and to help ease patients fear of movement  Ambulation/Elevation   Gait pattern Antalgic;Decreased foot clearance; Short stride; Step to;Excessively slow  (Pt  took multiple steps to complete stand pivot motion )   Gait Assistance 4  Minimal assist   Additional items Assist x 1;Verbal cues; Tactile cues   Assistive Device Rolling walker   Distance 5 feet  (from bed to recliner)   Ambulation/Elevation Additional Comments VCs for ambulatory direction  VCs to turn the walker while preparing to sit after walking     Balance   Static Sitting Fair   Dynamic Sitting Fair -   Static Standing Poor   Dynamic Standing Poor   Ambulatory Poor -   Endurance Deficit   Endurance Deficit Yes   Endurance Deficit Description Pt  wearing 2L of supplemental O2 ; pain in legs   Activity Tolerance   Activity Tolerance Patient limited by fatigue;Patient limited by pain  (Limited by the patients fear and avoidance of movement )   Medical Staff Made Aware VEENA Damon CM [facility]  (Coassessment completed with Rosa French, due to the complexity of the patient two skilled clinicians were required )   Nurse Made Aware DEZ Cardenas   Assessment   Prognosis Fair   Problem List Decreased strength;Decreased endurance; Impaired balance;Decreased mobility; Decreased coordination;Decreased cognition;Pain   Assessment Pt  Treated by PT and OT on 8/1/22 in Med Surg to assess functional mobility  Pt  Treated for one unit of therapeutic activity and one unit of gait training  Pt  was lying supine with HOB elevated at the beginning of the session  Pt  was cognitively oriented and responsive to treatment questions  Pain scale on a 0-10 was a 10 in the pts  B legs  During bed mobility pt  required minimal assistance  Pt  taught how to roll using opposite arm on bed rail to mobilize to side off bed  During transfers pt  required minimal assistance  Pt  completed stand pivot from bed to recliner, RW Min Assist x1  Stand pivot required 5 feet; therefore, gait was assessed  Pt  ambulated 5 feet with RW Min assist x1  Gait pattern during ambulation was forward flexed, antalgic, and decreased foot clearance  During transfers and gait training pt  received VCs for safety assurance and motivation, additionally patient received verbal and tactile cues for widening LEYDI, initating ambulation, and moving back in to the recliner  At the end of the session pt  seated in recliner with chair alarm on and with all needs in reach  Pt  will benefit from skilled physical therapy to help increase B LE strength, improve balance, improve transfers, improve bed mobility, increase ambulation distance and endurance and preserve skin integrity  Goals   Patient Goals to decrease pain   Plan   Treatment/Interventions ADL retraining;Functional transfer training;LE strengthening/ROM; Therapeutic exercise; Endurance training;Patient/family training;Equipment eval/education; Bed mobility;Gait training;Spoke to nursing;Spoke to case management;OT   Progress Slow progress, decreased activity tolerance   PT Frequency 3-5x/wk   Recommendation   PT Discharge Recommendation (S)  Post acute rehabilitation services   Equipment Recommended [address] Basic Mobility Inpatient   Turning in Bed Without Bedrails 2   Lying on Back to Sitting on Edge of Flat Bed 2   Moving Bed to Chair 2   Standing Up From Chair 2   Walk in Room 2   Climb 3-5 Stairs 1   Basic Mobility Inpatient Raw Score 11   Basic Mobility Standardized Score 30 25   Highest Level Of Mobility   JH-HLM Goal 4: Move to chair/commode   JH-HLM Achieved 4: Move to chair/commode   Education   Education Provided Mobility training;Assistive device   Patient Demonstrates acceptance/verbal understanding;Reinforcement needed   End of Consult   Patient Position at End of Consult   (Pt  seated in recliner withchair alarm on at end of session )     Treatment Time: 12:33-13:01        Berta Jhaveri, SPT

## 2022-08-01 NOTE — PLAN OF CARE
Problem: PHYSICAL THERAPY ADULT  Goal: Performs mobility at highest level of function for planned discharge setting  See evaluation for individualized goals  Description: Treatment/Interventions: LE strengthening/ROM, Functional transfer training, Therapeutic exercise, Endurance training, Patient/family training, Equipment eval/education, Bed mobility, Gait training, Spoke to case management, OT, Spoke to nursing  Equipment Recommended: [place]       See flowsheet documentation for full assessment, interventions and recommendations  Note: Prognosis: Fair  Problem List: Decreased strength, Decreased endurance, Impaired balance, Decreased mobility, Decreased coordination, Decreased cognition, Pain  Assessment: Pt  Treated by PT and OT on 8/1/22 in Med Surg to assess functional mobility  Pt  Treated for one unit of therapeutic activity and one unit of gait training  Pt  was lying supine with HOB elevated at the beginning of the session  Pt  was cognitively oriented and responsive to treatment questions  Pain scale on a 0-10 was a 10 in the pts  B legs  During bed mobility pt  required minimal assistance  Pt  taught how to roll using opposite arm on bed rail to mobilize to side off bed  During transfers pt  required minimal assistance  Pt  completed stand pivot from bed to recliner, RW Min Assist x1  Stand pivot required 5 feet; therefore, gait was assessed  Pt  ambulated 5 feet with RW Min assist x1  Gait pattern during ambulation was forward flexed, antalgic, and decreased foot clearance  During transfers and gait training pt  received VCs for safety assurance and motivation, additionally patient received verbal and tactile cues for widening LEYDI, initating ambulation, and moving back in to the recliner  At the end of the session pt  seated in recliner with chair alarm on and with all needs in reach   Pt  will benefit from skilled physical therapy to help increase B LE strength, improve balance, improve transfers, improve bed mobility, increase ambulation distance and endurance and preserve skin integrity  Barriers to Discharge: Inaccessible home environment  Barriers to Discharge Comments: pt reports dtr lives down the street from her, not there all the time  PT Discharge Recommendation: (S) Post acute rehabilitation services    See flowsheet documentation for full assessment

## 2022-08-01 NOTE — CASE MANAGEMENT
Case Management Discharge Planning Note    Patient name Juan Jose Schaefer  Location /-01 MRN [id number]  : 1950 Date 2022       Current Admission Date: 2022  Current Admission Diagnosis:Encephalopathy   Patient Active Problem List    Diagnosis Date Noted    Goals of care, counseling/discussion 2022    Sepsis ([address] ) 2022    Encephalopathy 2022    Oral thrush 2022    Acute respiratory failure with hypoxia ([address] ) 2022    Toxic gastroenteritis and colitis due to chemotherapy 2022    Colitis     Metabolic alkalosis with respiratory acidosis 2022    Hypokalemia 2022    Acute cystitis without hematuria 06/15/2022    Chemotherapy induced neutropenia ([address] ) 2022    Chronic venous stasis dermatitis of bilateral lower extremities 2022    Gait disturbance 2022    Dehydration 2022    Endometrial cancer ([address] ) 2022    Decubitus ulcer of ischial area, left, stage III ([address] ) 2022    Hyponatremia 2022    ANDRES (acute kidney injury) ([address] ) 2022    Vaginal bleeding 2022    Lower extremity edema 2022    Stage III pressure ulcer of sacral region ([address] ) 2022    Arthritis 2021    Chronic pain syndrome 2020    Stroke-like symptoms 2020    Elevated troponin 2020    Elevated d-dimer 2020    Acute nasopharyngitis 2020    History of stroke 2020    Rotator cuff tear arthropathy of left shoulder 2020    Rotator cuff tear arthropathy, right 2020    Cervical disc herniation 01/10/2019    History of knee replacement, total, bilateral 2017    Cervical spondylosis 10/12/2016    Acute arterial ischemic stroke, vertebrobasilar, brainstem, right ([address] ) 10/11/2016    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    GERD (gastroesophageal reflux disease) 10/06/2016    Coronary artery disease involving native coronary artery of native heart without angina pectoris 07/17/2015    Essential hypertension 10/01/2013      LOS (days): 11  Geometric Mean LOS (GMLOS) (days): 4 30  Days to GMLOS:-6 9     OBJECTIVE:  Risk of Unplanned Readmission Score: 25 6         Current admission status: Inpatient   Preferred Pharmacy:   [address] [place] [address] #[id number] [address]  Phone: [phone] Fax: [phone]    Primary Care Provider: Eric Worthington MD    Primary Insurance: [address] [facility]  Secondary Insurance:     [address] Number: submitted SNF auth request to [facility]  pending ref [id number]  for [facility]  NPI: [id number]  Dr Cristin Prado  NPI: [id number]   Clinicals faxed to [phone]

## 2022-08-01 NOTE — ASSESSMENT & PLAN NOTE
· Plavix was held due to slight drop in hemoglobin- as there is no evidence of active bleeding- now resumed  · Continue Lipitor 40 mg daily

## 2022-08-01 NOTE — OCCUPATIONAL THERAPY NOTE
Occupational Therapy Treatment Note      Brittaney Mas    8/1/2022    Principal Problem:    Encephalopathy  Active Problems:    Chronic pain syndrome    History of stroke    Decubitus ulcer of ischial area, left, stage III (HCC)    Endometrial cancer (HCC)    Chronic venous stasis dermatitis of bilateral lower extremities    Colitis    Acute respiratory failure with hypoxia (HCC)    Oral thrush    Sepsis ([facility] )    Goals of care, counseling/discussion      Past Medical History:   Diagnosis Date    Anxiety     Arthritis     Bernard's esophagus     Bleeds easily ([facility] [address] )     CVA (cerebral vascular accident) ([facility] [address] ) 10/2016    Endometrial cancer ([facility] [address] )     Fibromyalgia     GERD (gastroesophageal reflux disease)     Hypercholesterolemia     Hypertension     Insomnia     Rheumatoid arthritis ([facility] )     Stroke ([facility] ) 2016       Past Surgical History:   Procedure Laterality Date    APPENDECTOMY      BREAST BIOPSY Bilateral     benign    CARPAL TUNNEL RELEASE Left 10/27/2003    DORSAL COMPARTMENT RELEASE Left 03/09/2006    FINGER SURGERY      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    IR PORT PLACEMENT  5/23/2022    KNEE ARTHROSCOPY Right     x3(6/91,7/96,7/99)    OTHER SURGICAL HISTORY Left 03/09/2006    tennis elbow release    FL DEBRIDEMENT, SKIN, SUB-Q TISSUE,=<20 SQ CM N/A 1/24/2022    Procedure: SACRAL DEBRIDEMENT WOUND AND DRESSING CHANGE ([address] OUT);   Surgeon: Shabana Monahan MD;  Location: [facility];  Service: General    REPLACEMENT TOTAL KNEE BILATERAL      rt-11/99, lt-1/13/10    ROTATOR CUFF REPAIR Bilateral     SHOULDER ARTHROSCOPY Right 02/10/2016    SHOULDER ARTHROSCOPY Left     11/26/08,9/99    SHOULDER ARTHROSCOPY Left 03/09/2006    TONSILLECTOMY AND ADENOIDECTOMY      TRIGGER FINGER RELEASE Left 07/01/2002    middle and ring finger    ULNAR NERVE TRANSPOSITION Left         08/01/22 1235   OT Last Visit   OT Visit Date 08/01/22   Note Type   Note Type Treatment   Restrictions/Precautions   Weight Bearing Precautions Per Order No   Other Precautions Chair Alarm; Bed Alarm;Multiple lines;O2;Fall Risk;Pain  (2L of O2)   Pain Assessment   Pain Assessment Tool 0-10   Pain Score 10 - Worst Possible Pain   Pain Location/Orientation Location: Generalized;Orientation: Bilateral;Location: Leg   Pain Onset/Description Onset: Ongoing;Frequency: Constant/Continuous   Patient's Stated Pain Goal No pain   Hospital Pain Intervention(s) Medication (See MAR); Repositioned; Ambulation/increased activity   Bed Mobility   Supine to Sit 4  Minimal assistance   Additional items Assist x 1;HOB elevated; Bedrails; Increased time required;Verbal cues;LE management   Additional Comments Pt remained OOB in recliner upon conclusion   Transfers   Sit to Stand 4  Minimal assistance   Additional items Assist x 1; Increased time required;Verbal cues   Stand to Sit 4  Minimal assistance   Additional items Assist x 1; Armrests; Increased time required;Verbal cues   Stand pivot 4  Minimal assistance   Additional items Assist x 1; Increased time required;Verbal cues  (2nd staff member present for safety)   Additional Comments Pt request 2nd staff member present during session due to fear of falling   Cognition   Overall Cognitive Status [facility]   Arousal/Participation Alert; Cooperative   Attention Attends with cues to redirect   Orientation Level Oriented X4   Memory Decreased recall of precautions;Decreased recall of recent events   Following Commands Follows one step commands with increased time or repetition   Activity Tolerance   Activity Tolerance Patient limited by fatigue;Patient limited by pain   Medical Staff Made Aware RN [id number]   Assessment   Assessment Patient participated in Skilled OT session this date with interventions consisting of  therapeutic activities to: increase activity tolerance   Patient agreeable to OT treatment session, upon arrival patient was found supine in bed  Patient requiring verbal cues for safety and frequent rest periods  Patient continues to be functioning below baseline level, occupational performance remains limited secondary to factors listed above and increased risk for falls and injury  From OT standpoint, recommendation at time of d/c would be Short Term Rehab  Patient to benefit from continued Occupational Therapy treatment while in the hospital to address deficits as defined above and maximize level of functional independence with ADLs and functional mobility  Plan   Treatment Interventions ADL retraining;Functional transfer training;UE strengthening/ROM; Endurance training;Patient/family training;Equipment evaluation/education; Compensatory technique education; Energy conservation; Activityengagement   Goal Expiration Date 22  (goals  22, current goals remain appropriate)   OT Treatment Day 1   OT Frequency 3-5x/wk   Recommendation   OT Discharge Recommendation Post acute rehabilitation services   Additional Comments  Co treatment with PT completed secondary to complex medical condition of pt, Min A of 1 with 2nd staff member for safety required to achieve and maintain transitional movements, requiring the need of skilled therapeutic intervention of 2 therapists to achieve delivery of services  Additional Comments 2 The patient's raw score on the AM-PAC Daily Activity inpatient short form is 17, standardized score is 37 26, less than 39 4  Patients at this level are likely to benefit from discharge to post-acute rehabilitation services  Please refer to the recommendation of the Occupational Therapist for safe discharge planning     AM-PAC Daily Activity Inpatient   Lower Body Dressing 2   Bathing 2   Toileting 3   Upper Body Dressing 3   Grooming 3   Eating 4   Daily Activity Raw Score 17   Daily Activity Standardized Score (Calc for Raw Score >=11) 37 26   AM-[facility] Applied Cognition Inpatient   Following a Speech/Presentation 4   Understanding Ordinary Conversation 4   Taking Medications 4   Remembering Where Things Are Placed or Put Away 4   Remembering List of 4-5 Errands 4   Taking Care of Complicated Tasks 4   Applied Cognition Raw Score 24   Applied Cognition Standardized Score 62 21     Awa Gordillo MS, OTR/L

## 2022-08-01 NOTE — PROGRESS NOTES
[address]   Progress Note - Jeff Mcmahon 1950, 67 y o  female MRN: [id number]  Unit/Bed#: -[facility] Encounter: [id number]  Primary Care Provider: Patrick Kamara MD   Date and time admitted to hospital: 7/21/2022  7:59 AM    * Encephalopathy  Assessment & Plan  With worsening confusion started 7/25  The patient without focal neurological deficit  7/25 CT head without acute finding     · Neurology input appreciated -- suspects toxic metabolic encephalopathy with possible underlying cognitive impairment  · In the setting of metastatic endometrial cancer, MRI brain was obtained  No evidence of metastatic lesion  · Vit D- 62 3, B12- 4788, TSH- 2 99  · Resume MS contin; gabapentin at HS  · 7/28 the patient is now AAOx3  This is a significant improvement  Suspects that she might have a withdrawal from narcotics (MS contin and gabapentin) as she was unable to tolerate p o  Intake  The patient received IV Ativan, morphine, and Dilaudid for acute pain and agitation  · Acute encephalopathy now resolved        Acute respiratory failure with hypoxia (HCC)  Assessment & Plan  · Requiring 4-6L NC and does not use supplemental oxygen at baseline  Currently on 2L   · Afebrile with mild leukocytosis  · CT chest w contrast (7/22): Patchy opacity in the both lungs with mild bilateral basilar consolidation left greater than right suggest infiltrate/aspiration   Follow-up suggested in 6-8 weeks to demonstrate resolution  Previously noted the lung nodules in the right upper lobe, right lower lobe and left lower lobe remains stable  Mass in the lower left neck extending into the left axilla, as seen on the previous studies  Cirrhotic liver   · 7/25 chest x-ray shows mild pulmonary vascular congestion  Received lasix IV 40 mg x 1 with good urine output     · Given metastatic pulmonary nodules, may possibly require home oxygen  · Wean oxygen as tolerated   · Please see treatment outlined above Colitis  Assessment & Plan  Presented with the complaint of diffuse abdominal pain and diarrhea since 07/17; no further diarrhea    · Toxic inflammatory colitis due to chemotherapy vs  Ischemic colitis due to hypotension   · CT abd/pelvis (7/21): Slight enlargement of the patient's known uterine mass with decreased size of the previously measured left periaortic lymph node  Thickening of the sigmoid colon wall suggesting colitis  Diverticulosis  Moderate volume stool throughout the colon  · Stool cultures including C diff- negative  · Further supportive care with Imodium    · GI input appreciated            Sepsis [facility])  Assessment & Plan  Meets sepsis criteria with tachycardia and leukocytosis suspects underlying pneumonia/aspiration pneumonia/pneumonitis on 7/25 with worsening confusion  Sepsis alert was called  As there is no clear source of infection identified at this time, sepsis is ruled out  · Procalcitonin- has been negative  · Leukocytosis initially persistent--now resolved    · The patient received IV fluid per sepsis protocol  · Blood cultures- NGTD, MRSA culture-negative  Blood cultures on admission- negative  · Started on cefepime and vancomycin; as procalcitonin is negative-antibiotics are discontinued  Goals of care, counseling/discussion  Assessment & Plan  · Hospice input appreciated  The patient and family would like to pursue rehab at [facility] and start hospice care after she is discharged from rehab        Oral thrush  Assessment & Plan  · Oral thrush and likely esophagitis  · GI input appreciated    · Continue Magic mouthwash and Protonix 40 mg daily   · Continue Diflucan 200 mg daily to complete 14 days  · Tolerating dysphagia level 2 diet and thin liquids          Chronic venous stasis dermatitis of bilateral lower extremities  Assessment & Plan  · Patient has chronic lower extremity edema with venous stasis dermatitis/wounds  · Intermittently on Lasix and was recently treated for possible cellulitis with Keflex  · Was following outpatient with [facility]  · Continue local wound care     · No surgical intervention recommended by Podiatry or General surgery   · Resumed lasix on 7/31- she received this mornings dose-will hold po lasix for now given labs show mild dehydration      Endometrial cancer [facility])  Assessment & Plan  · Stage IV endometrial cancer on palliative chemotherapy  · 7/27 Discussed with Dr Dawit Temple who spoke with her daughter-Renita  He is agreeing with hospice option if the patient and family are agreeable with this plan  No further palliative chemotherapy  · With associated drug-induced neutropenia, anemia, and neuropathy  · Retroperitoneal and supraclavicular lymphadenopathy with pulmonary and inguinal metastasis  · Last treatment with carboplatin and Taxotere on 07/14  · Continue home gabapentin and MS contin      · Patient is complaining of abdominal pain  KUB without obstruction/constipation  Continue with simethicone for gas  Decubitus ulcer of ischial area, left, stage III (HCC)  Assessment & Plan  · Present on arrival   · Continue home gentamicin ointment  · Local wound care      · No surgical intervention recommended by General surgery      History of stroke  Assessment & Plan  · Plavix was held due to slight drop in hemoglobin- as there is no evidence of active bleeding- now resumed  · Continue Lipitor 40 mg daily           Chronic pain syndrome  Assessment & Plan  · On morphine 30 mg twice daily, oxycodone 5 mg twice daily and gabapentin at home  · Changed morphine from ER to IR 15 mg every 6 hours as she takes all medications crushed in applesauce; increased gabapentin to 300 mg TID  VTE Pharmacologic Prophylaxis: VTE Score: 8 High Risk (Score >/= 5) - Pharmacological DVT Prophylaxis Contraindicated  Sequential Compression Devices Ordered  Patient Centered Rounds: I performed bedside rounds with nursing staff today    Discussions with Specialists or Other Care Team Provider: nursing, CM    Education and Discussions with Family / Patient: Updated  (daughter) via phone  Time Spent for Care: 20 minutes  More than 50% of total time spent on counseling and coordination of care as described above  Current Length of Stay: 11 day(s)  Current Patient Status: Inpatient   Certification Statement: The patient will continue to require additional inpatient hospital stay due to need for placement to STR  Discharge Plan: Anticipate discharge in 24-48 hrs to rehab facility  Code Status: Level 3 - DNAR and DNI    Subjective: The patient was seen and examined  The patient is c/o leg pain  No acute events overnight  Objective:     Vitals:   Temp (24hrs), Av 6 °F (37 °C), Min:97 7 °F (36 5 °C), Max:99 6 °F (37 6 °C)    Temp:  [97 7 °F (36 5 °C)-99 6 °F (37 6 °C)] 99 6 °F (37 6 °C)  HR:  [] 106  Resp:  [16-18] 18  BP: (132-147)/(60-81) 132/81  SpO2:  [95 %-96 %] 96 %  Body mass index is 29 12 kg/m²  Input and Output Summary (last 24 hours): Intake/Output Summary (Last 24 hours) at 2022 1838  Last data filed at 2022 1750  Gross per 24 hour   Intake 345 ml   Output 2300 ml   Net -1955 ml       Physical Exam:   Physical Exam  Vitals and nursing note reviewed  Constitutional:       General: She is awake  Appearance: She is ill-appearing (chronically)  Interventions: Nasal cannula in place  Cardiovascular:      Rate and Rhythm: Normal rate and regular rhythm  Pulmonary:      Effort: Pulmonary effort is normal       Breath sounds: Normal breath sounds  Abdominal:      Palpations: Abdomen is soft  Tenderness: There is no abdominal tenderness  Skin:     General: Skin is warm and dry  Neurological:      General: No focal deficit present  Mental Status: She is alert and oriented to person, place, and time     Psychiatric:         Attention and Perception: Attention normal          Mood and Affect: Mood normal          Speech: Speech normal          Behavior: Behavior is cooperative  Additional Data:     Labs:  Results from last 7 days   Lab Units 07/31/22  0530 07/27/22  0421 07/26/22  0631   WBC Thousand/uL 7 76   < > 11 83*   HEMOGLOBIN g/dL 9 3*   < > 8 1*   HEMATOCRIT % 34 4*   < > 28 1*   PLATELETS Thousands/uL 214   < > 203   BANDS PCT %  --   --  6   LYMPHO PCT %  --   --  10*   MONO PCT %  --   --  8   EOS PCT %  --   --  0    < > = values in this interval not displayed  Results from last 7 days   Lab Units 08/01/22  0520 07/31/22  0530 07/29/22  0447 07/27/22  0421 07/26/22  0631   SODIUM mmol/L 141   < > 142   < > 139   POTASSIUM mmol/L 4 3   < > 3 4*   < > 3 7   CHLORIDE mmol/L 93*   < > 97   < > 96   CO2 mmol/L >45*   < > 41*   < > 42*   BUN mg/dL 8   < > 7   < > 8   CREATININE mg/dL 0 35*   < > 0 37*   < > 0 51*   ANION GAP mmol/L  --   --  4   < > 1*   CALCIUM mg/dL 9 4   < > 8 7   < > 8 9   ALBUMIN g/dL  --   --   --   --  2 3*   TOTAL BILIRUBIN mg/dL  --   --   --   --  0 48   ALK PHOS U/L  --   --   --   --  85   ALT U/L  --   --   --   --  14   AST U/L  --   --   --   --  19   GLUCOSE RANDOM mg/dL 96   < > 108   < > 97    < > = values in this interval not displayed  Results from last 7 days   Lab Units 07/26/22  0631   PROCALCITONIN ng/ml 0 15       Lines/Drains:  Invasive Devices  Report    Central Venous Catheter Line  Duration           Port A Cath 05/23/22 Right Subclavian 70 days          Peripheral Intravenous Line  Duration           Peripheral IV 07/31/22 Right Antecubital 1 day          Drain  Duration           Urethral Catheter 16 Fr  8 days              Urinary Catheter:  Goal for removal: N/A- Discharging with [place]         Central Line:  Goal for removal: N/A - Chronic PICC             Imaging: No pertinent imaging reviewed      Recent Cultures (last 7 days):         Last 24 Hours Medication List:   Current Facility-Administered Medications   Medication Dose Route Frequency Provider Last Rate    acetaminophen  650 mg Oral Q6H PRN [facility], DO      al mag oxide-diphenhydramine-lidocaine viscous  10 mL Swish & Swallow Q4H PRN [facility], DO      artificial tear  1 application Both Eyes Daily [place], [place]      clopidogrel  75 mg Oral Daily [facility], [address]      fluconazole  200 mg Oral Daily [address] [facility] [address] [place]      gabapentin  300 mg Oral TID Ortley, CRNP      gentamicin   Topical Daily [place], [place]      HYDROmorphone  0 5 mg Intravenous Q4H PRN Ortley, CRNP      loperamide  2 mg Oral 4x Daily PRN [facility], DO      LORazepam  0 5 mg Intravenous Q4H PRN Ortley, CRNP      mineral oil-white petrolatum  1 inch Both Eyes HS [facility], DO      morphine  15 mg Oral Q6H Ortley, CRNP      ondansetron  4 mg Intravenous Q4H PRN [facility], DO      oxyCODONE  5 mg Oral TID PRN Ortley, CRNP      pantoprazole  40 mg Intravenous Q24H [address] Marbella Gibbs PA-C      senna  2 tablet Oral HS Ortley, CRNP      simethicone  80 mg Oral Q6H PRN Ortley, CRNP      Systane Nighttime  1 inch Both Eyes HS [facility], DO      traZODone  50 mg Oral HS Ortley, CRNP          Today, Patient Was Seen By: Haroon Slaughter PA-C    **Please Note: This note may have been constructed using a voice recognition system  **

## 2022-08-01 NOTE — ASSESSMENT & PLAN NOTE
· Hospice input appreciated  The patient and family would like to pursue rehab at [facility] and start hospice care after she is discharged from rehab

## 2022-08-01 NOTE — ASSESSMENT & PLAN NOTE
· Stage IV endometrial cancer on palliative chemotherapy  · 7/27 Discussed with Dr Juanita Miranda who spoke with her daughter-Renita  He is agreeing with hospice option if the patient and family are agreeable with this plan  No further palliative chemotherapy  · With associated drug-induced neutropenia, anemia, and neuropathy  · Retroperitoneal and supraclavicular lymphadenopathy with pulmonary and inguinal metastasis  · Last treatment with carboplatin and Taxotere on 07/14  · Continue home gabapentin and MS contin      · Patient is complaining of abdominal pain  KUB without obstruction/constipation  Continue with simethicone for gas

## 2022-08-02 ENCOUNTER — TELEPHONE (OUTPATIENT)
Dept: SURGICAL ONCOLOGY | Facility: CLINIC | Age: 72
End: 2022-08-02

## 2022-08-02 VITALS
WEIGHT: 144.18 LBS | BODY MASS INDEX: 29.07 KG/M2 | DIASTOLIC BLOOD PRESSURE: 72 MMHG | HEIGHT: 59 IN | OXYGEN SATURATION: 93 % | SYSTOLIC BLOOD PRESSURE: 150 MMHG | TEMPERATURE: 98.6 F | RESPIRATION RATE: 17 BRPM | HEART RATE: 91 BPM

## 2022-08-02 LAB
BUN SERPL-MCNC: 9 MG/DL (ref 5–25)
CALCIUM SERPL-MCNC: 8.5 MG/DL (ref 8.4–10.2)
CHLORIDE SERPL-SCNC: 91 MMOL/L (ref 96–108)
CO2 SERPL-SCNC: >45 MMOL/L (ref 21–32)
CREAT SERPL-MCNC: 0.51 MG/DL (ref 0.6–1.3)
FLUAV RNA RESP QL NAA+PROBE: NEGATIVE
FLUBV RNA RESP QL NAA+PROBE: NEGATIVE
GFR SERPL CREATININE-BSD FRML MDRD: 95 ML/MIN/1.73SQ M
GLUCOSE SERPL-MCNC: 91 MG/DL (ref 65–140)
POTASSIUM SERPL-SCNC: 4.4 MMOL/L (ref 3.5–5.3)
RSV RNA RESP QL NAA+PROBE: NEGATIVE
SARS-COV-2 RNA RESP QL NAA+PROBE: NEGATIVE
SODIUM SERPL-SCNC: 139 MMOL/L (ref 135–147)

## 2022-08-02 PROCEDURE — 91301 COVID-19 MODERNA VACCINE BOOSTER 50 MCG/0.25ML SUSP: CPT | Performed by: PHYSICIAN ASSISTANT

## 2022-08-02 PROCEDURE — 0241U HB NFCT DS VIR RESP RNA 4 TRGT: CPT | Performed by: PHYSICIAN ASSISTANT

## 2022-08-02 PROCEDURE — 80048 BASIC METABOLIC PNL TOTAL CA: CPT | Performed by: PHYSICIAN ASSISTANT

## 2022-08-02 PROCEDURE — 99238 HOSP IP/OBS DSCHRG MGMT 30/<: CPT | Performed by: PHYSICIAN ASSISTANT

## 2022-08-02 PROCEDURE — 0011A HB IMMUNIZATION ADMIN COVID-19 100MCG/0.5ML FIRST DOSE: CPT | Performed by: PHYSICIAN ASSISTANT

## 2022-08-02 PROCEDURE — C9113 INJ PANTOPRAZOLE SODIUM, VIA: HCPCS | Performed by: PHYSICIAN ASSISTANT

## 2022-08-02 RX ORDER — FLUCONAZOLE 200 MG/1
200 TABLET ORAL DAILY
Qty: 3 TABLET | Refills: 0
Start: 2022-08-03 | End: 2022-08-06

## 2022-08-02 RX ORDER — MORPHINE SULFATE 30 MG/1
30 TABLET, FILM COATED, EXTENDED RELEASE ORAL EVERY 12 HOURS
Qty: 20 TABLET | Refills: 0 | Status: SHIPPED | OUTPATIENT
Start: 2022-08-02 | End: 2022-08-12

## 2022-08-02 RX ORDER — SENNOSIDES 8.6 MG
17.2 TABLET ORAL
Refills: 0
Start: 2022-08-02 | End: 2022-08-24

## 2022-08-02 RX ORDER — SIMETHICONE 80 MG
80 TABLET,CHEWABLE ORAL EVERY 6 HOURS PRN
Qty: 30 TABLET | Refills: 0
Start: 2022-08-02 | End: 2022-08-22

## 2022-08-02 RX ORDER — GABAPENTIN 300 MG/1
300 CAPSULE ORAL 3 TIMES DAILY
Refills: 0
Start: 2022-08-02

## 2022-08-02 RX ORDER — FUROSEMIDE 20 MG/1
20 TABLET ORAL DAILY
Refills: 0
Start: 2022-08-02

## 2022-08-02 RX ORDER — OXYCODONE HYDROCHLORIDE 5 MG/1
5 CAPSULE ORAL 3 TIMES DAILY PRN
Qty: 20 CAPSULE | Refills: 0 | Status: SHIPPED | OUTPATIENT
Start: 2022-08-02 | End: 2022-08-12

## 2022-08-02 RX ADMIN — HYDROMORPHONE HYDROCHLORIDE 0.5 MG: 1 INJECTION, SOLUTION INTRAMUSCULAR; INTRAVENOUS; SUBCUTANEOUS at 08:52

## 2022-08-02 RX ADMIN — HYDROMORPHONE HYDROCHLORIDE 0.5 MG: 1 INJECTION, SOLUTION INTRAMUSCULAR; INTRAVENOUS; SUBCUTANEOUS at 13:40

## 2022-08-02 RX ADMIN — MORPHINE SULFATE 15 MG: 15 TABLET ORAL at 12:31

## 2022-08-02 RX ADMIN — CLOPIDOGREL BISULFATE 75 MG: 75 TABLET ORAL at 08:52

## 2022-08-02 RX ADMIN — FLUCONAZOLE 200 MG: 100 TABLET ORAL at 08:52

## 2022-08-02 RX ADMIN — MINERAL OIL AND PETROLATUM 1 APPLICATION: 150; 830 OINTMENT OPHTHALMIC at 09:23

## 2022-08-02 RX ADMIN — PANTOPRAZOLE SODIUM 40 MG: 40 INJECTION, POWDER, FOR SOLUTION INTRAVENOUS at 08:52

## 2022-08-02 RX ADMIN — CX-024414 0.25 ML: 0.2 INJECTION, SUSPENSION INTRAMUSCULAR at 14:17

## 2022-08-02 RX ADMIN — MORPHINE SULFATE 15 MG: 15 TABLET ORAL at 05:50

## 2022-08-02 RX ADMIN — GABAPENTIN 300 MG: 300 CAPSULE ORAL at 08:52

## 2022-08-02 NOTE — ASSESSMENT & PLAN NOTE
· Stage IV endometrial cancer on palliative chemotherapy  · 7/27 Discussed with Dr Sadi Smith who spoke with her daughter-Renita  He is agreeing with hospice option if the patient and family are agreeable with this plan  No further palliative chemotherapy  · With associated drug-induced neutropenia, anemia, and neuropathy  · Retroperitoneal and supraclavicular lymphadenopathy with pulmonary and inguinal metastasis  · Last treatment with carboplatin and Taxotere on 07/14  · Continue home gabapentin and MS contin      · Patient was complaining of abdominal pain, now resolved  KUB without obstruction/constipation  Continue with simethicone for gas

## 2022-08-02 NOTE — UTILIZATION REVIEW
Continued Stay Review    Date: 8/2                          Current Patient Class: INpatient   Current Level of Care: Med/surg    HPI:72 y o  female initially admitted on  7/21     Assessment/Plan:  ENcephalopathy resolved  GCS 15  No further diarrhea  Case management working on safe discharge plan to SNF with possible transition to hospice after  Declines OT due to pain  Vital Signs:   Time Temp Pulse Resp BP MAP (mmHg) SpO2 Calculated FIO2 (%) - Nasal Cannula Nasal Cannula O2 Flow Rate (L/min) O2 Device Patient Position - Orthostatic VS   08/02/22 0700 98 6 °F (37 °C) 91 17 150/72 98 93 % -- -- -- --   08/02/22 00:48:02 98 7 °F (37 1 °C) 101 17 -- -- 90 % -- -- -- --     Pertinent Labs/Diagnostic Results:   7/29 XRAY KUB:   Bowel gas pattern is nonobstructive  7/27 MRI BRAIN: Mild-to-moderate motion artifact    - No acute intracranial abnormality    - No evidence of intracranial metastasis on this motion degraded exam    - Minimal chronic microangiopathy     Results from last 7 days   Lab Units 08/02/22  1237   SARS-COV-2  Negative     Results from last 7 days   Lab Units 07/31/22  0530 07/29/22  0447 07/28/22  0504 07/27/22  0421   WBC Thousand/uL 7 76 7 48 8 13 10 72*   HEMOGLOBIN g/dL 9 3* 8 5* 9 1* 8 3*   HEMATOCRIT % 34 4* 29 8* 31 8* 30 1*   PLATELETS Thousands/uL 214 216 236 212         Results from last 7 days   Lab Units 08/02/22  0533 08/01/22  0520 07/31/22  0530 07/29/22  0447 07/28/22  0504 07/27/22  0421   SODIUM mmol/L 139 141 143 142 141 141   POTASSIUM mmol/L 4 4 4 3 5 0 3 4* 3 7 3 8   CHLORIDE mmol/L 91* 93* 96 97 96 95*   CO2 mmol/L >45* >45* >45* 41* 43* 43*   ANION GAP mmol/L  --   --   --  4 2* 3*   BUN mg/dL 9 8 8 7 8 8   CREATININE mg/dL 0 51* 0 35* 0 42* 0 37* 0 46* 0 48*   EGFR ml/min/1 73sq m 95 108 102 106 99 97   CALCIUM mg/dL 8 5 9 4 9 2 8 7 9 0 9 3             Results from last 7 days   Lab Units 08/02/22  0533 08/01/22  0520 07/31/22  0530 07/29/22  0447 07/28/22  5073 07/27/22  0421   GLUCOSE RANDOM mg/dL 91 96 112 108 116 80     Results from last 7 days   Lab Units 08/01/22  0520 07/28/22  0504   PH [place]  7 495* 7 412*   PCO2 [place] mm Hg 56 5* 63 8*   PO2 [place] mm Hg 101 2* 76 2*   HCO3 [place] mmol/L 42 6* 39 7*   BASE EXC [place] mmol/L 17 1 13 0   O2 CONTENT [place] ml/dL 13 5 13 4   O2 HGB, VENOUS % 93 2* 92 2*         Results from last 7 days   Lab Units 08/02/22  1237   INFLUENZA A PCR  Negative   INFLUENZA B PCR  Negative   RSV PCR  Negative       Medications:   Scheduled Medications:  artificial tear, 1 application, Both Eyes, Daily  clopidogrel, 75 mg, Oral, Daily  fluconazole, 200 mg, Oral, Daily  gabapentin, 300 mg, Oral, TID  gentamicin, , Topical, Daily  mineral oil-white petrolatum, 1 inch, Both Eyes, HS  morphine, 15 mg, Oral, Q6H  pantoprazole, 40 mg, Intravenous, Q24H [place]  senna, 2 tablet, Oral, HS  Systane Nighttime, 1 inch, Both Eyes, HS  traZODone, 50 mg, Oral, HS      Continuous IV Infusions:     PRN Meds:  acetaminophen, 650 mg, Oral, Q6H PRN  al mag oxide-diphenhydramine-lidocaine viscous, 10 mL, Swish & Swallow, Q4H PRN  HYDROmorphone, 0 5 mg, Intravenous, Q4H PRN  loperamide, 2 mg, Oral, 4x Daily PRN  LORazepam, 0 5 mg, Intravenous, Q4H PRN  ondansetron, 4 mg, Intravenous, Q4H PRN  oxyCODONE, 5 mg, Oral, TID PRN  simethicone, 80 mg, Oral, Q6H PRN        Discharge Plan: D    Network Utilization Review Department  ATTENTION: Please call with any questions or concerns to [phone] and carefully listen to the prompts so that you are directed to the right person  All voicemails are confidential   [facility] all requests for admission clinical reviews, approved or denied determinations and any other requests to dedicated fax number below belonging to the campus where the patient is receiving treatment   List of dedicated fax numbers for the Facilities:  [address] [place] [address] DENIALS (Administrative/Medical Necessity) [phone]   [address] [place] [address] (Maternity/NICU/Pediatrics) [address] [facility],[address] Floor [facility] 40 125 [facility]  [phone]   [address] [place] [address] [id number] [place] [address] [address] [place] [address] [address] [facility] [address] [facility] [phone]

## 2022-08-02 NOTE — CASE MANAGEMENT
Case Management Discharge Planning Note    Patient name Bonney Landau  Location /-01 MRN [id number]  : 1950 Date 2022       Current Admission Date: 2022  Current Admission Diagnosis:Encephalopathy   Patient Active Problem List    Diagnosis Date Noted    Goals of care, counseling/discussion 2022    Sepsis ([facility] ) 2022    Encephalopathy 2022    Oral thrush 2022    Acute respiratory failure with hypoxia ([address] ) 2022    Toxic gastroenteritis and colitis due to chemotherapy 2022    Colitis     Metabolic alkalosis with respiratory acidosis 2022    Hypokalemia 2022    Acute cystitis without hematuria 06/15/2022    Chemotherapy induced neutropenia ([address] ) 2022    Chronic venous stasis dermatitis of bilateral lower extremities 2022    Gait disturbance 2022    Dehydration 2022    Endometrial cancer ([facility] ) 2022    Decubitus ulcer of ischial area, left, stage III ([address] ) 2022    Hyponatremia 2022    ANDRES (acute kidney injury) ([facility] ) 2022    Vaginal bleeding 2022    Lower extremity edema 2022    Stage III pressure ulcer of sacral region ([facility] ) 2022    Arthritis 2021    Chronic pain syndrome 2020    Stroke-like symptoms 2020    Elevated troponin 2020    Elevated d-dimer 2020    Acute nasopharyngitis 2020    History of stroke 2020    Rotator cuff tear arthropathy of left shoulder 2020    Rotator cuff tear arthropathy, right 2020    Cervical disc herniation 01/10/2019    History of knee replacement, total, bilateral 2017    Cervical spondylosis 10/12/2016    Acute arterial ischemic stroke, vertebrobasilar, brainstem, right ([address] ) 10/11/2016    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    GERD (gastroesophageal reflux disease) 10/06/2016    Coronary artery disease involving native coronary artery of native heart without angina pectoris 07/17/2015    Essential hypertension 10/01/2013      LOS (days): 12  Geometric Mean LOS (GMLOS) (days): 4 30  Days to GMLOS:-7 8     OBJECTIVE:  Risk of Unplanned Readmission Score: 25 94         Current admission status: Inpatient   Preferred Pharmacy:   [address] [place] [address] Avenue [id number] - [place], [address] [place] [id number]  Phone: [phone] Fax: [phone]    Primary Care Provider: Eric Worthington MD    Primary Insurance: [facility] REP  Secondary Insurance:     [address] [place] [address] Number: approved SNF auth # [id number] for SOC: 8/2  NRD: 8/4  Care Coord: Kristin  P: [phone]  F: [phone]

## 2022-08-02 NOTE — NURSING NOTE
Patient discharged and transported BLS  IV catheter removed and intact  Report given to [facility]

## 2022-08-02 NOTE — ASSESSMENT & PLAN NOTE
· Hospice input appreciated  The patient and family would like to pursue rehab at [facility] and start hospice care after she is discharged from rehab

## 2022-08-02 NOTE — PLAN OF CARE
Pt resting in between care  Pt medicated for severe generalized/BLLE pain per MAR  Pt with low grade temp of 100 3 at this time, Tylenol offered but refused  Will continue to monitor  Turned and repositioned frequently  Call bell and bedside table are within reach, denies further needs at this time      Problem: Potential for Falls  Goal: Patient will remain free of falls  Description: INTERVENTIONS:  - Educate patient/family on patient safety including physical limitations  - Instruct patient to call for assistance with activity   - Consult OT/PT to assist with strengthening/mobility   - Keep Call bell within reach  - Keep bed low and locked with side rails adjusted as appropriate  - Keep care items and personal belongings within reach  - Initiate and maintain comfort rounds  - Make Fall Risk Sign visible to staff  - Offer Toileting every 2 Hours, in advance of need  - Initiate/Maintain bed/chair alarm  - Obtain necessary fall risk management equipment  - Apply yellow socks and bracelet for high fall risk patients  Outcome: Progressing     Problem: Prexisting or High Potential for Compromised Skin Integrity  Goal: Skin integrity is maintained or improved  Description: INTERVENTIONS:  - Identify patients at risk for skin breakdown  - Assess and monitor skin integrity  - Assess and monitor nutrition and hydration status  - Monitor labs   - Assess for incontinence   - Turn and reposition patient  - Assist with mobility/ambulation  - Relieve pressure over bony prominences  - Avoid friction and shearing  - Provide appropriate hygiene as needed including keeping skin clean and dry  - Evaluate need for skin moisturizer/barrier cream  - Collaborate with interdisciplinary team   - Patient/family teaching  - Consider wound care consult   Outcome: Progressing

## 2022-08-02 NOTE — OCCUPATIONAL THERAPY NOTE
08/02/22 1120   OT Last Visit   OT Visit Date 08/02/22   Note Type   Note Type Cancelled Session   Cancel Reasons Other  (pt  offered light treatment options, but pt  declined stating "I hurt all over today - I had therapy yesterday, I don't want it today"  "But please check in again/another day" )       [facility] city, SUAREZ/L  Continue with POC as able/as appropriate

## 2022-08-02 NOTE — ASSESSMENT & PLAN NOTE
· Patient has chronic lower extremity edema with venous stasis dermatitis/wounds  · Intermittently on Lasix and was recently treated for possible cellulitis with Keflex  · Was following outpatient with [facility] wound care  · Continue local wound care     · No surgical intervention recommended by Podiatry or General surgery   · Resumed lasix on 7/31- she received 1 dose on morning of 8/1 then held   Resume po lasix 20 mg daily tomorrow 8/3

## 2022-08-02 NOTE — DISCHARGE SUMMARY
[address]  Discharge- Leora Goode 1950, 67 y o  female MRN: [id number]  Unit/Bed#: -01 Encounter: [id number]  Primary Care Provider: Espinoza Marmolejo MD   Date and time admitted to hospital: 7/21/2022  7:59 AM    * Encephalopathy  Assessment & Plan  With worsening confusion started 7/25  The patient without focal neurological deficit  7/25 CT head without acute finding     · Neurology input appreciated -- suspects toxic metabolic encephalopathy with possible underlying cognitive impairment  · In the setting of metastatic endometrial cancer, MRI brain was obtained  No evidence of metastatic lesion  · Vit D- 62 3, B12- 4788, TSH- 2 99  · Resume MS contin; gabapentin at HS  · 7/28 the patient is now AAOx3  This is a significant improvement  Suspects that she might have a withdrawal from narcotics (MS contin and gabapentin) as she was unable to tolerate p o  Intake  The patient received IV Ativan, morphine, and Dilaudid for acute pain and agitation  · Acute encephalopathy now resolved        Acute respiratory failure with hypoxia (HCC)  Assessment & Plan  · Requiring 4-6L NC and does not use supplemental oxygen at baseline  Currently on 2L   · Afebrile with mild leukocytosis  · CT chest w contrast (7/22): Patchy opacity in the both lungs with mild bilateral basilar consolidation left greater than right suggest infiltrate/aspiration   Follow-up suggested in 6-8 weeks to demonstrate resolution  Previously noted the lung nodules in the right upper lobe, right lower lobe and left lower lobe remains stable  Mass in the lower left neck extending into the left axilla, as seen on the previous studies  Cirrhotic liver   · 7/25 chest x-ray shows mild pulmonary vascular congestion  Received lasix IV 40 mg x 1 with good urine output     · Given metastatic pulmonary nodules, may possibly require home oxygen  · Wean oxygen as tolerated   · Please see treatment outlined above Colitis  Assessment & Plan  Presented with the complaint of diffuse abdominal pain and diarrhea since 07/17; no further diarrhea    · Toxic inflammatory colitis due to chemotherapy vs  Ischemic colitis due to hypotension   · CT abd/pelvis (7/21): Slight enlargement of the patient's known uterine mass with decreased size of the previously measured left periaortic lymph node  Thickening of the sigmoid colon wall suggesting colitis  Diverticulosis  Moderate volume stool throughout the colon  · Stool cultures including C diff- negative  · Further supportive care with Imodium    · GI input appreciated            Sepsis [facility])  Assessment & Plan  Meets sepsis criteria with tachycardia and leukocytosis suspects underlying pneumonia/aspiration pneumonia/pneumonitis on 7/25 with worsening confusion  Sepsis alert was called  As there is no clear source of infection identified at this time, sepsis is ruled out  · Procalcitonin- has been negative  · Leukocytosis initially persistent--now resolved    · The patient received IV fluid per sepsis protocol  · Blood cultures- NGTD, MRSA culture-negative  Blood cultures on admission- negative  · Started on cefepime and vancomycin; as procalcitonin is negative-antibiotics are discontinued  Goals of care, counseling/discussion  Assessment & Plan  · Hospice input appreciated  The patient and family would like to pursue rehab at [facility] and start hospice care after she is discharged from rehab        Oral thrush  Assessment & Plan  · Oral thrush and likely esophagitis  · GI input appreciated    · Continue Magic mouthwash and Protonix 40 mg daily   · Continue Diflucan 200 mg daily to complete 14 days  · Tolerating dysphagia level 2 diet and thin liquids          Chronic venous stasis dermatitis of bilateral lower extremities  Assessment & Plan  · Patient has chronic lower extremity edema with venous stasis dermatitis/wounds  · Intermittently on Lasix and was recently treated for possible cellulitis with Keflex  · Was following outpatient with [facility] wound care  · Continue local wound care     · No surgical intervention recommended by Podiatry or General surgery   · Resumed lasix on 7/31- she received 1 dose on morning of 8/1 then held  Resume po lasix 20 mg daily tomorrow 8/3    Endometrial cancer [facility])  Assessment & Plan  · Stage IV endometrial cancer on palliative chemotherapy  · 7/27 Discussed with Dr Mervat Oleary who spoke with her daughter-Renita  He is agreeing with hospice option if the patient and family are agreeable with this plan  No further palliative chemotherapy  · With associated drug-induced neutropenia, anemia, and neuropathy  · Retroperitoneal and supraclavicular lymphadenopathy with pulmonary and inguinal metastasis  · Last treatment with carboplatin and Taxotere on 07/14  · Continue home gabapentin and MS contin      · Patient was complaining of abdominal pain, now resolved  KUB without obstruction/constipation  Continue with simethicone for gas       Decubitus ulcer of ischial area, left, stage III (HCC)  Assessment & Plan  · Present on arrival   · Continue home gentamicin ointment  · Local wound care      · No surgical intervention recommended by General surgery      History of stroke  Assessment & Plan  · Plavix was held due to slight drop in hemoglobin- as there is no evidence of active bleeding- now resumed  · Continue Lipitor 40 mg daily             Medical Problems             Resolved Problems  Date Reviewed: 8/2/2022   None               Discharging Physician / Practitioner: Gabriela Neri PA-C  PCP: Ortiz Blum MD  Admission Date:   Admission Orders (From admission, onward)     Ordered        07/21/22 [facility]  Once                      Discharge Date: 08/02/22    Consultations During Hospital Stay:  · Gastroenterology  · General surgery  · Podiatry     Procedures Performed:   · none    Significant Findings / Test Results:   CT abdomen pelvis wo contrast    Result Date: 7/21/2022  Impression: Slight enlargement of the patient's known uterine mass with decreased size of the previously measured left periaortic lymph node  Thickening of the sigmoid colon wall suggesting colitis  Diverticulosis  Moderate volume stool throughout the colon  Left lower lobe subsegmental atelectasis  Workstation performed: [id number]     XR chest portable    Result Date: 7/25/2022  Impression: Mild pulmonary vascular congestion  Workstation performed: [id number]     XR chest portable    Result Date: 7/21/2022  Impression: Mild cardiomegaly  Slight vascular congestion No acute cardiopulmonary process otherwise Workstation performed: [id number]     CT head wo contrast    Result Date: 7/25/2022  Impression: No acute intracranial abnormality  If there is concern for intracranial metastatic disease, either contrast-enhanced CT or MRI of the brain with contrast recommended to exclude small metastasis  Workstation performed: [id number]     CT chest w contrast    Result Date: 7/22/2022  · Impression: There are new patchy opacity in the both lungs with mild bilateral basilar consolidation left greater than right suggest infiltrate/aspiration  Follow-up suggested in 6-8 weeks to demonstrate resolution Previously noted the lung nodules in the right upper lobe, right lower lobe and left lower lobe remains stable Mass in the lower left neck extending into the left axilla, as seen on the previous studies Cirrhotic liver Workstation performed: [id number]   ·     Incidental Findings:   · none     Test Results Pending at Discharge (will require follow up):   · none     Outpatient Tests Requested:  · none    Complications:  none    Reason for Admission: colitis    Hospital Course:   Nicole Guerra is a 67 y o  female patient who originally presented to the hospital on 7/21/2022 due to abdominal pain and diarrhea   Please see H&P from Dr Jesse Rehman dated 7/21/22 for complete details of presentation  The patient reported abdominal pain and diarrhea which was likely due to her palliative chemotherapy  CT scan showed acute colitis  The patient was started on IV Cefepime and flagyl  Cdiff and stool enteric bacterial panel negative  Shortly after admission the patient was noted to be hypoxic  CT chest showed patchy opacity in both lungs with bilateral consolidations suggestive of infiltrate/aspiration  Patient was already on IV antibiotics for colitis which would also cover pneumonia  The patient was noted to have a metabolic alkalosis with respiratory acidosis which was thought to be due to her lasix use with significant diarrhea  This improved with holding lasix and given IV fluids  GI consulted and recommended empiric thrush/candida esophagitis treatment with fluconazole 200 mg daily x 14 days  The patient then developed encephalopathy  Neurology consulted and felt this was multifactorial and in the setting of malnutrition, poor baseline with advanced metastatic cancer, metabolic abnormalities, use of sedative/opioids  Procalcitonin negative so IV antibiotics discontinued  The provider at that time had a goals of care discussion with the patient's family at the bedside and hospice was considered however the patient's encephalopathy resolved  The patient opted for discharge to short-term rehab with plans to transition to hospice once home  The patient was discharged to [facility] for [address]  Please see above list of diagnoses and related plan for additional information       Condition at Discharge: stable    Discharge Day Visit / Exam:   Subjective:    Vitals: Blood Pressure: 150/72 (08/02/22 0700)  Pulse: 91 (08/02/22 0700)  Temperature: 98 6 °F (37 °C) (08/02/22 0700)  Temp Source: Temporal (08/01/22 1931)  Respirations: 17 (08/02/22 0700)  Height: 4' 11" (149 9 cm) (07/21/22 1944)  Weight - Scale: 65 4 kg (144 lb 2 9 oz) (07/21/22 1944)  SpO2: 93 % (08/02/22 0700)  Exam:   Physical Exam  Vitals and nursing note reviewed  Constitutional:       Appearance: She is ill-appearing (chronically)  HENT:      Head: Normocephalic and atraumatic  Cardiovascular:      Rate and Rhythm: Normal rate and regular rhythm  Pulmonary:      Breath sounds: Normal breath sounds  No wheezing, rhonchi or rales  Abdominal:      General: There is no distension  Palpations: Abdomen is soft  Tenderness: There is no abdominal tenderness  Skin:     General: Skin is warm and dry  Neurological:      General: No focal deficit present  Mental Status: She is alert and oriented to person, place, and time  Discussion with Family: Updated  (daughter) at bedside  Discharge instructions/Information to patient and family:   See after visit summary for information provided to patient and family  Provisions for Follow-Up Care:  See after visit summary for information related to follow-up care and any pertinent home health orders  Disposition:   [facility] at [facility]    Planned Readmission: no     Discharge Statement:  I spent 25 minutes discharging the patient  This time was spent on the day of discharge  I had direct contact with the patient on the day of discharge  Greater than 50% of the total time was spent examining patient, answering all patient questions, arranging and discussing plan of care with patient as well as directly providing post-discharge instructions  Additional time then spent on discharge activities  Discharge Medications:  See after visit summary for reconciled discharge medications provided to patient and/or family        **Please Note: This note may have been constructed using a voice recognition system**

## 2022-08-02 NOTE — CASE MANAGEMENT
Case Management Discharge Planning Note    Patient name Moni Gutierrez  Location /-01 MRN [id number]  : 1950 Date 2022       Current Admission Date: 2022  Current Admission Diagnosis:Encephalopathy   Patient Active Problem List    Diagnosis Date Noted    Goals of care, counseling/discussion 2022    Sepsis ([facility] ) 2022    Encephalopathy 2022    Oral thrush 2022    Acute respiratory failure with hypoxia ([address] ) 2022    Toxic gastroenteritis and colitis due to chemotherapy 2022    Colitis     Metabolic alkalosis with respiratory acidosis 2022    Hypokalemia 2022    Acute cystitis without hematuria 06/15/2022    Chemotherapy induced neutropenia ([address] ) 2022    Chronic venous stasis dermatitis of bilateral lower extremities 2022    Gait disturbance 2022    Dehydration 2022    Endometrial cancer ([facility] ) 2022    Decubitus ulcer of ischial area, left, stage III ([address] ) 2022    Hyponatremia 2022    ANDRES (acute kidney injury) ([address] ) 2022    Vaginal bleeding 2022    Lower extremity edema 2022    Stage III pressure ulcer of sacral region ([facility] ) 2022    Arthritis 2021    Chronic pain syndrome 2020    Stroke-like symptoms 2020    Elevated troponin 2020    Elevated d-dimer 2020    Acute nasopharyngitis 2020    History of stroke 2020    Rotator cuff tear arthropathy of left shoulder 2020    Rotator cuff tear arthropathy, right 2020    Cervical disc herniation 01/10/2019    History of knee replacement, total, bilateral 2017    Cervical spondylosis 10/12/2016    Acute arterial ischemic stroke, vertebrobasilar, brainstem, right ([address] ) 10/11/2016    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    GERD (gastroesophageal reflux disease) 10/06/2016    Coronary artery disease involving native coronary artery of native heart without angina pectoris 07/17/2015    Essential hypertension 10/01/2013      LOS (days): 12  Geometric Mean LOS (GMLOS) (days): 4 30  Days to GMLOS:-7 9     OBJECTIVE:  Risk of Unplanned Readmission Score: 25 94         Current admission status: Inpatient   Preferred Pharmacy:   [address] [place] [address] #[id number] - Nabil Simmons, [address] [facility] [facility] [address]  Phone: [phone] Fax: [phone]    Primary Care Provider: Belkis Taylor MD    Primary Insurance: [facility]  Secondary Insurance:     DISCHARGE DETAILS:    Discharge planning discussed with[de-identified] Chava Benson was called 12:19pm and 13:31 pm  Freedom of Choice: Yes  Comments - Freedom of Choice: rehab recommended  [facility] the pt  authorization was obtained  CM contacted family/caregiver?: Yes  Were Treatment Team discharge recommendations reviewed with patient/caregiver?: Yes     Were patient/caregiver advised of the risks associated with not following Treatment Team discharge recommendations?: Yes    Contacts  Patient Contacts: Mica Jones and Alecia Gould  Relationship to Patient[de-identified] Family (daughter)  Contact Method: Phone  Phone Number: [phone] Elena Douglas at the bedside  Reason/Outcome: Discharge [address]         Is the patient interested in [facility] at discharge?: No    DME Referral Provided  Referral made for DME?: No    Other Referral/Resources/Interventions Provided:  Interventions: Short Term Rehab  Referral Comments: cm made [address] aware that the pt is going to [facility] for rehab  auth susannah auth# [id number]  coivd test completed and booster was given  IMM reviewed with daughter, daughter agrees with discharge determination    Pt and family are in agreement with the d/c and d/c plan,  Pt's  is at [employer]       Treatment Team Recommendation: Short Term Rehab [facility] AUTHORITY- 15:30 BLS transport)  Discharge Destination Plan[de-identified] Short Term Rehab ([facility] 15:30 bls [facility])  Transport at Discharge : BLS Ambulance                             IMM Given (Date):: 08/02/22  IMM Given to[de-identified] Family Nena Cloud was called at 12:19pm # [phone]  IMM was reviewed ,she stated she understood and copy of IMM was mailed)  Family notified[de-identified] daughters

## 2022-08-03 NOTE — CASE MANAGEMENT
Case Management Discharge Planning Note    Patient name Rogers Mandujano  Location /-01 MRN [id number]  : 1950 Date 8/3/2022       Current Admission Date: 2022  Current Admission Diagnosis:Encephalopathy   Patient Active Problem List    Diagnosis Date Noted    Goals of care, counseling/discussion 2022    Sepsis ([address] ) 2022    Encephalopathy 2022    Oral thrush 2022    Acute respiratory failure with hypoxia ([address] ) 2022    Toxic gastroenteritis and colitis due to chemotherapy 2022    Colitis     Metabolic alkalosis with respiratory acidosis 2022    Hypokalemia 2022    Acute cystitis without hematuria 06/15/2022    Chemotherapy induced neutropenia ([address] ) 2022    Chronic venous stasis dermatitis of bilateral lower extremities 2022    Gait disturbance 2022    Dehydration 2022    Endometrial cancer ([facility] ) 2022    Decubitus ulcer of ischial area, left, stage III ([address] ) 2022    Hyponatremia 2022    ANDRES (acute kidney injury) ([address] ) 2022    Vaginal bleeding 2022    Lower extremity edema 2022    Stage III pressure ulcer of sacral region ([facility] ) 2022    Arthritis 2021    Chronic pain syndrome 2020    Stroke-like symptoms 2020    Elevated troponin 2020    Elevated d-dimer 2020    Acute nasopharyngitis 2020    History of stroke 2020    Rotator cuff tear arthropathy of left shoulder 2020    Rotator cuff tear arthropathy, right 2020    Cervical disc herniation 01/10/2019    History of knee replacement, total, bilateral 2017    Cervical spondylosis 10/12/2016    Acute arterial ischemic stroke, vertebrobasilar, brainstem, right ([address] ) 10/11/2016    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    GERD (gastroesophageal reflux disease) 10/06/2016    Coronary artery disease involving native coronary artery of native heart without angina pectoris 07/17/2015    Essential hypertension 10/01/2013      LOS (days): 12  Geometric Mean LOS (GMLOS) (days): 4 30  Days to GMLOS:-7 9     OBJECTIVE:  Risk of Unplanned Readmission Score: 26 97         Current admission status: Inpatient   Preferred Pharmacy:   [address] [place] [address] [id number] [address] [address]  Phone: [phone] Fax: [phone]    Primary Care Provider: Bri Eagle MD    Primary Insurance: [address] [facility]  Secondary Insurance:     DISCHARGE DETAILS:  No authorization needed for transport and cm made [address] [facility] ambulance aware

## 2022-08-03 NOTE — UTILIZATION REVIEW
Notification of Discharge   This is a Notification of Discharge from our facility [address]  Please be advised that this patient has been discharge from our facility  Below you will find the admission and discharge date and time including the patients disposition  UTILIZATION REVIEW CONTACT:  Jersey Lam  Utilization   Network Utilization Review Department  Phone: [phone] carefully listen to the prompts  All voicemails are confidential   Email: [email or web address]     PHYSICIAN ADVISORY SERVICES:  FOR YAMO-EG-VZAH REVIEW - MEDICAL NECESSITY DENIAL  Phone: [phone]  Fax: [phone]  Email: [email or web address]     PRESENTATION DATE: 7/21/2022  7:59 AM  OBERVATION ADMISSION DATE:   INPATIENT ADMISSION DATE: 7/21/22 10:42 AM   DISCHARGE DATE: 8/2/2022  4:15 PM  DISPOSITION: Non [facility] SN[facility]/SNU Non [facility] SNF/TC[facility]/SNU      IMPORTANT INFORMATION:  Send all requests for admission clinical reviews, approved or denied determinations and any other requests to dedicated fax number below belonging to the campus where the patient is receiving treatment   List of dedicated fax numbers:  [address] DENIALS (Administrative/Medical Necessity) [phone]   [address]  (Maternity/NICU/Pediatrics) [phone]   [facility] [phone]   [address] [place] [address] [phone]   [address] [place] [address] [phone]   [address] [place] [address],[address] [phone]   [facility]  [phone]   [address] [place] [address], [facility]  [address] [facility] [address] [facility] [phone]

## 2022-08-19 ENCOUNTER — TRANSCRIBE ORDERS (OUTPATIENT)
Dept: HOME HEALTH SERVICES | Facility: HOME HEALTHCARE | Age: 72
End: 2022-08-19

## 2022-08-19 ENCOUNTER — HOME CARE VISIT (OUTPATIENT)
Dept: HOME HEALTH SERVICES | Facility: HOME HEALTHCARE | Age: 72
End: 2022-08-19
Payer: MEDICARE

## 2022-08-19 ENCOUNTER — HOSPICE ADMISSION (OUTPATIENT)
Dept: HOME HOSPICE | Facility: HOSPICE | Age: 72
End: 2022-08-19
Payer: MEDICARE

## 2022-08-19 DIAGNOSIS — G96.9 CENTRAL NERVOUS SYSTEM COMPLICATION: Primary | ICD-10-CM

## 2022-08-22 ENCOUNTER — HOME CARE VISIT (OUTPATIENT)
Dept: HOME HEALTH SERVICES | Facility: HOME HEALTHCARE | Age: 72
End: 2022-08-22
Payer: MEDICARE

## 2022-08-22 ENCOUNTER — HOME CARE VISIT (OUTPATIENT)
Dept: HOME HOSPICE | Facility: HOSPICE | Age: 72
End: 2022-08-22
Payer: MEDICARE

## 2022-08-22 VITALS
HEART RATE: 56 BPM | SYSTOLIC BLOOD PRESSURE: 144 MMHG | RESPIRATION RATE: 18 BRPM | DIASTOLIC BLOOD PRESSURE: 78 MMHG | TEMPERATURE: 98 F

## 2022-08-22 PROCEDURE — 10330057 MEDICATION, GENERAL

## 2022-08-22 PROCEDURE — T2042 HOSPICE ROUTINE HOME CARE: HCPCS

## 2022-08-22 PROCEDURE — G0299 HHS/HOSPICE OF RN EA 15 MIN: HCPCS

## 2022-08-22 NOTE — CASE COMMUNICATION
Ship to Pt  Home or Clinician  Branch: [facility]    [employer]  18fr 30cc cath [id number] (1)  Syringe 60cc 468744 (1)   Drain bag 2000cc [id number] (1)  Saline 100ml 064948(2)    Cleansers  [place] 060576 (1)    Dry Dressings   (Nonsterile gauze not covered by private insurance)  (Sterile gauze may be requested for insurance rather than nonsterile gauze)  Gauze 2x2 N/S 200 2x2s per unit  565680 (1 unit)  Gauze 4x4 N/S  200 4x4s per unit  893715 (1 unit)  ABD 5x9 032319 (14)      Hydrocolloids   4x4 foam 476100 (14)  Sacral 142319 (14)    Foam Dressings  Sub for Allevyn:  Hydrocellular Foam Non-Adh   4x4 [id number] (14)    Gloves  Medium 588612 (1)

## 2022-08-22 NOTE — CASE COMMUNICATION
Ship to   Home  Branch: [facility]    Tab Briefs  Large [id number]                     96/cs (1)    Bedpan [id number] (2)  Underpad, reusable 36x36  [id number] (5)  Heel Protectors 1 ea     162733 (2)  Coccyx cushion   [id number] (1)

## 2022-08-23 ENCOUNTER — HOME CARE VISIT (OUTPATIENT)
Dept: HOME HEALTH SERVICES | Facility: HOME HEALTHCARE | Age: 72
End: 2022-08-23
Payer: MEDICARE

## 2022-08-23 LAB
ATRIAL RATE: 72 BPM
P AXIS: 39 DEGREES
PR INTERVAL: 142 MS
QRS AXIS: 50 DEGREES
QRSD INTERVAL: 90 MS
QT INTERVAL: 402 MS
QTC INTERVAL: 440 MS
T WAVE AXIS: 55 DEGREES
VENTRICULAR RATE: 72 BPM

## 2022-08-23 PROCEDURE — 93010 ELECTROCARDIOGRAM REPORT: CPT | Performed by: INTERNAL MEDICINE

## 2022-08-23 PROCEDURE — 10330064 PAD, HEEL CUSHION ULTRA (1/PR)SKLCRE

## 2022-08-23 PROCEDURE — T2042 HOSPICE ROUTINE HOME CARE: HCPCS

## 2022-08-23 PROCEDURE — 10330064

## 2022-08-23 PROCEDURE — 10330064 BEDPAN, PONTOON GRAPHITE 55OZ (20/CS)

## 2022-08-23 PROCEDURE — 10330064 UNDERPAD, REUSE 36X36 1DZ     BECKCL

## 2022-08-23 PROCEDURE — G0156 HHCP-SVS OF AIDE,EA 15 MIN: HCPCS

## 2022-08-23 PROCEDURE — 10330064 BRIEF, WINGS CHOICE+ QUILTED LG (18/BG 4

## 2022-08-24 ENCOUNTER — HOME CARE VISIT (OUTPATIENT)
Dept: HOME HOSPICE | Facility: HOSPICE | Age: 72
End: 2022-08-24
Payer: MEDICARE

## 2022-08-24 ENCOUNTER — HOME CARE VISIT (OUTPATIENT)
Dept: HOME HEALTH SERVICES | Facility: HOME HEALTHCARE | Age: 72
End: 2022-08-24
Payer: MEDICARE

## 2022-08-24 VITALS
HEART RATE: 84 BPM | SYSTOLIC BLOOD PRESSURE: 148 MMHG | DIASTOLIC BLOOD PRESSURE: 64 MMHG | TEMPERATURE: 98.4 F | RESPIRATION RATE: 16 BRPM

## 2022-08-24 PROCEDURE — G0299 HHS/HOSPICE OF RN EA 15 MIN: HCPCS

## 2022-08-24 PROCEDURE — 10330057 MEDICATION, GENERAL

## 2022-08-24 PROCEDURE — T2042 HOSPICE ROUTINE HOME CARE: HCPCS

## 2022-08-25 ENCOUNTER — HOME CARE VISIT (OUTPATIENT)
Dept: HOME HOSPICE | Facility: HOSPICE | Age: 72
End: 2022-08-25
Payer: MEDICARE

## 2022-08-25 PROCEDURE — T2042 HOSPICE ROUTINE HOME CARE: HCPCS

## 2022-08-26 ENCOUNTER — HOME CARE VISIT (OUTPATIENT)
Dept: HOME HOSPICE | Facility: HOSPICE | Age: 72
End: 2022-08-26
Payer: MEDICARE

## 2022-08-26 ENCOUNTER — HOME CARE VISIT (OUTPATIENT)
Dept: HOME HEALTH SERVICES | Facility: HOME HEALTHCARE | Age: 72
End: 2022-08-26
Payer: MEDICARE

## 2022-08-26 VITALS
TEMPERATURE: 98.2 F | RESPIRATION RATE: 18 BRPM | DIASTOLIC BLOOD PRESSURE: 58 MMHG | SYSTOLIC BLOOD PRESSURE: 84 MMHG | HEART RATE: 89 BPM

## 2022-08-26 PROCEDURE — G0155 HHCP-SVS OF CSW,EA 15 MIN: HCPCS

## 2022-08-26 PROCEDURE — T2042 HOSPICE ROUTINE HOME CARE: HCPCS

## 2022-08-26 PROCEDURE — 10330064 HOLDER, CATHETER TU    (12/PK)0105

## 2022-08-26 PROCEDURE — G0299 HHS/HOSPICE OF RN EA 15 MIN: HCPCS

## 2022-08-27 PROCEDURE — T2042 HOSPICE ROUTINE HOME CARE: HCPCS

## 2022-08-28 ENCOUNTER — HOME CARE VISIT (OUTPATIENT)
Dept: HOME HOSPICE | Facility: HOSPICE | Age: 72
End: 2022-08-28
Payer: MEDICARE

## 2022-08-28 ENCOUNTER — HOME CARE VISIT (OUTPATIENT)
Dept: HOME HEALTH SERVICES | Facility: HOME HEALTHCARE | Age: 72
End: 2022-08-28
Payer: MEDICARE

## 2022-08-28 PROCEDURE — G0156 HHCP-SVS OF AIDE,EA 15 MIN: HCPCS

## 2022-08-28 PROCEDURE — T2042 HOSPICE ROUTINE HOME CARE: HCPCS

## 2022-08-29 ENCOUNTER — HOME CARE VISIT (OUTPATIENT)
Dept: HOME HEALTH SERVICES | Facility: HOME HEALTHCARE | Age: 72
End: 2022-08-29
Payer: MEDICARE

## 2022-08-29 VITALS
RESPIRATION RATE: 16 BRPM | TEMPERATURE: 98.5 F | HEART RATE: 84 BPM | SYSTOLIC BLOOD PRESSURE: 104 MMHG | DIASTOLIC BLOOD PRESSURE: 58 MMHG

## 2022-08-29 PROCEDURE — T2042 HOSPICE ROUTINE HOME CARE: HCPCS

## 2022-08-29 PROCEDURE — 10330057 MEDICATION, GENERAL

## 2022-08-29 PROCEDURE — G0299 HHS/HOSPICE OF RN EA 15 MIN: HCPCS

## 2022-08-30 PROCEDURE — T2042 HOSPICE ROUTINE HOME CARE: HCPCS

## 2022-08-31 ENCOUNTER — HOME CARE VISIT (OUTPATIENT)
Dept: HOME HEALTH SERVICES | Facility: HOME HEALTHCARE | Age: 72
End: 2022-08-31
Payer: MEDICARE

## 2022-08-31 PROCEDURE — T2042 HOSPICE ROUTINE HOME CARE: HCPCS

## 2022-08-31 PROCEDURE — G0156 HHCP-SVS OF AIDE,EA 15 MIN: HCPCS

## 2022-08-31 PROCEDURE — 10330057 MEDICATION, GENERAL

## 2022-09-01 ENCOUNTER — HOME CARE VISIT (OUTPATIENT)
Dept: HOME HOSPICE | Facility: HOSPICE | Age: 72
End: 2022-09-01
Payer: MEDICARE

## 2022-09-01 ENCOUNTER — HOME CARE VISIT (OUTPATIENT)
Dept: HOME HEALTH SERVICES | Facility: HOME HEALTHCARE | Age: 72
End: 2022-09-01
Payer: MEDICARE

## 2022-09-01 PROCEDURE — G0156 HHCP-SVS OF AIDE,EA 15 MIN: HCPCS

## 2022-09-01 PROCEDURE — T2042 HOSPICE ROUTINE HOME CARE: HCPCS

## 2022-09-02 PROCEDURE — T2042 HOSPICE ROUTINE HOME CARE: HCPCS

## 2022-09-03 PROCEDURE — T2042 HOSPICE ROUTINE HOME CARE: HCPCS

## 2022-09-03 NOTE — ASSESSMENT & PLAN NOTE
AUTH GIVEN BY  PER ADMITTING FOR PATIENT TO STAY HERE SINCE THERE'S 
NO BED IN CONTRACTED HOSPITAL · Patient has chronic lower extremity edema with venous stasis dermatitis/wounds  · Intermittently on Lasix and was recently treated for possible cellulitis with Keflex  · Was following outpatient with [facility]  · Continue local wound care     · No surgical intervention recommended by Podiatry or General surgery

## 2022-09-04 PROCEDURE — T2042 HOSPICE ROUTINE HOME CARE: HCPCS

## 2022-09-05 PROCEDURE — T2042 HOSPICE ROUTINE HOME CARE: HCPCS

## 2022-09-06 PROCEDURE — T2042 HOSPICE ROUTINE HOME CARE: HCPCS

## 2022-09-06 PROCEDURE — 10330057 MEDICATION, GENERAL

## 2022-09-07 PROCEDURE — T2042 HOSPICE ROUTINE HOME CARE: HCPCS

## 2022-09-07 PROCEDURE — 10330087 HSPC SERVICE INTENSITY ADD-ON

## 2022-09-08 VITALS
DIASTOLIC BLOOD PRESSURE: 60 MMHG | SYSTOLIC BLOOD PRESSURE: 108 MMHG | HEART RATE: 72 BPM | RESPIRATION RATE: 18 BRPM | TEMPERATURE: 98.3 F

## 2022-09-08 PROCEDURE — 10330064 SALINE, IRR SOL STR 100ML (48/CS) MGM37

## 2022-09-14 ENCOUNTER — HOME CARE VISIT (OUTPATIENT)
Dept: HOME HOSPICE | Facility: HOSPICE | Age: 72
End: 2022-09-14
Payer: MEDICARE

## 2022-09-14 NOTE — CASE COMMUNICATION
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

## 2022-09-21 PROCEDURE — 10330064 CATH SECURE, STATLOCK FOLEY SWIVEL SIL P

## 2024-10-02 NOTE — PROGRESS NOTES
H&P reviewed, no changes. Will proceed with planned procedures.     Waldemar Gloria MD  [facility]     Progress Note - General Surgery   Edmund Sommer 66 y o  female MRN: [id number]  Encounter: [id number]    Assessment/Plan    Stage III pressure ulcer of sacral region [facility])  Wound clean, open, granulating  Measures 4 cm long, 3 cm wide, and 3 cm deep with tunneling at 1 o'clock position  periwound skin with superficial pustular rash which was cleansed  VAC dressing re-applied using hydrocolloid dressing at 6 o'clock to bridge gluteal crease and with foam bridge moved to right hip  Tolerated well with good seal  Will continue M/W/F and see back in 2 weeks  Diagnoses and all orders for this visit:    Stage III pressure ulcer of sacral region ([address] )    Other orders  -     morphine (MS CONTIN) 30 mg 12 hr tablet; Take 30 mg by mouth every 12 (twelve) hours        Subjective       Chief Complaint   Patient presents with    Follow-up     2wk vac change      Patient states she drove to her appointment today for the first time in a while since she went into the hospital, she states it felt good to drive but her butt hurt really bad  She is also having to use the bathroom at like 4-5 times a night, the nurse believes it might be coming from the machine  No fevers or chills  EWA Duenas 67 yo F here for VAC change reports some buttock pain from dressing changes  Review of Systems    The following portions of the patient's history were reviewed and updated as appropriate: allergies, current medications, past family history, past medical history, past social history, past surgical history and problem list     Objective      Temperature 98 1 °F (36 7 °C), temperature source Temporal    Physical Exam  Vitals and nursing note reviewed  Constitutional:       General: She is not in acute distress  Appearance: She is well-developed  She is not diaphoretic  HENT:      Head: Normocephalic and atraumatic     Eyes:      Conjunctiva/sclera: Conjunctivae normal       Pupils: Pupils are equal, round, and reactive to light  Pulmonary:      Effort: No respiratory distress  Musculoskeletal:         General: Normal range of motion  Cervical back: Normal range of motion  Skin:     General: Skin is warm and dry  Capillary Refill: Capillary refill takes less than 2 seconds  Comments: Wound 4 cm x 3 cm and 3 cm deep with tunnel at 1 o'clock  Clean and granulating  Left buttock under drape with mild pustular rash  Neurological:      Mental Status: She is alert and oriented to person, place, and time  Psychiatric:         Behavior: Behavior normal        VAC change:  Dressing removed  Skin and wound cavity cleansed with soap water  Nystatin powder applied to skin then skin prep applied over top  Hydrocolloid dressing applied to 6 o'clock position to improve seal  Long single portion of black foam cut to size then packed into wound and bridged to right hip  New drape applied and good seal appreciated  Tolerated well      Signature:  Rene Nash PA-C  Date: 2/23/2022 Time: 1:35 PM

## (undated) DEVICE — BULB SYRINGE,IRRIGATION WITH PROTECTIVE CAP: Brand: DOVER

## (undated) DEVICE — GLOVE INDICATOR PI UNDERGLOVE SZ 7.5 BLUE

## (undated) DEVICE — GLOVE SRG BIOGEL 7.5

## (undated) DEVICE — PULSAVAC TIP FAN SPRAY W/SHIELD

## (undated) DEVICE — GLOVE SRG BIOGEL ECLIPSE 7

## (undated) DEVICE — VAC CANISTER 500ML

## (undated) DEVICE — INTENDED FOR TISSUE SEPARATION, AND OTHER PROCEDURES THAT REQUIRE A SHARP SURGICAL BLADE TO PUNCTURE OR CUT.: Brand: BARD-PARKER ® CARBON RIB-BACK BLADES

## (undated) DEVICE — ASTOUND STANDARD SURGICAL GOWN, XL: Brand: CONVERTORS

## (undated) DEVICE — 3M™ V.A.C.® GRANUFOAM™ DRESSING KIT, M8275052, MEDIUM: Brand: 3M™ V.A.C.® GRANUFOAM™

## (undated) DEVICE — BETHLEHEM UNIVERSAL MINOR GEN: Brand: CARDINAL HEALTH